# Patient Record
Sex: FEMALE | Race: WHITE | Employment: OTHER | URBAN - NONMETROPOLITAN AREA
[De-identification: names, ages, dates, MRNs, and addresses within clinical notes are randomized per-mention and may not be internally consistent; named-entity substitution may affect disease eponyms.]

---

## 2017-01-03 LAB
ALBUMIN SERPL-MCNC: ABNORMAL G/DL
ALP BLD-CCNC: ABNORMAL U/L
ALT SERPL-CCNC: ABNORMAL U/L
ANION GAP SERPL CALCULATED.3IONS-SCNC: ABNORMAL MMOL/L
AST SERPL-CCNC: ABNORMAL U/L
BASOPHILS ABSOLUTE: ABNORMAL /ΜL
BASOPHILS RELATIVE PERCENT: ABNORMAL %
BILIRUB SERPL-MCNC: ABNORMAL MG/DL (ref 0.1–1.4)
BUN BLDV-MCNC: ABNORMAL MG/DL
CALCIUM SERPL-MCNC: ABNORMAL MG/DL
CHLORIDE BLD-SCNC: ABNORMAL MMOL/L
CHOLESTEROL, TOTAL: ABNORMAL MG/DL
CHOLESTEROL/HDL RATIO: ABNORMAL
CO2: ABNORMAL MMOL/L
CREAT SERPL-MCNC: ABNORMAL MG/DL
EOSINOPHILS ABSOLUTE: ABNORMAL /ΜL
EOSINOPHILS RELATIVE PERCENT: ABNORMAL %
GFR CALCULATED: ABNORMAL
GLUCOSE BLD-MCNC: ABNORMAL MG/DL
HCT VFR BLD CALC: ABNORMAL % (ref 36–46)
HDLC SERPL-MCNC: ABNORMAL MG/DL (ref 35–70)
HEMOGLOBIN: ABNORMAL G/DL (ref 12–16)
LDL CHOLESTEROL CALCULATED: ABNORMAL MG/DL (ref 0–160)
LYMPHOCYTES ABSOLUTE: ABNORMAL /ΜL
LYMPHOCYTES RELATIVE PERCENT: ABNORMAL %
MCH RBC QN AUTO: ABNORMAL PG
MCHC RBC AUTO-ENTMCNC: ABNORMAL G/DL
MCV RBC AUTO: ABNORMAL FL
MONOCYTES ABSOLUTE: ABNORMAL /ΜL
MONOCYTES RELATIVE PERCENT: ABNORMAL %
NEUTROPHILS ABSOLUTE: ABNORMAL /ΜL
NEUTROPHILS RELATIVE PERCENT: ABNORMAL %
PDW BLD-RTO: ABNORMAL %
PLATELET # BLD: ABNORMAL K/ΜL
PMV BLD AUTO: ABNORMAL FL
POTASSIUM SERPL-SCNC: ABNORMAL MMOL/L
RBC # BLD: ABNORMAL 10^6/ΜL
SODIUM BLD-SCNC: ABNORMAL MMOL/L
TOTAL PROTEIN: ABNORMAL
TRIGL SERPL-MCNC: ABNORMAL MG/DL
VLDLC SERPL CALC-MCNC: ABNORMAL MG/DL
WBC # BLD: ABNORMAL 10^3/ML

## 2017-03-27 LAB
VITAMIN D 25-HYDROXY: 24.04
VITAMIN D2, 25 HYDROXY: ABNORMAL
VITAMIN D3,25 HYDROXY: ABNORMAL

## 2017-04-10 LAB
ALBUMIN SERPL-MCNC: ABNORMAL G/DL
ALP BLD-CCNC: ABNORMAL U/L
ALT SERPL-CCNC: ABNORMAL U/L
ANION GAP SERPL CALCULATED.3IONS-SCNC: ABNORMAL MMOL/L
AST SERPL-CCNC: ABNORMAL U/L
AVERAGE GLUCOSE: ABNORMAL
BASOPHILS ABSOLUTE: NORMAL /ΜL
BASOPHILS RELATIVE PERCENT: NORMAL %
BILIRUB SERPL-MCNC: ABNORMAL MG/DL (ref 0.1–1.4)
BUN BLDV-MCNC: ABNORMAL MG/DL
CALCIUM SERPL-MCNC: ABNORMAL MG/DL
CHLORIDE BLD-SCNC: ABNORMAL MMOL/L
CHOLESTEROL, TOTAL: ABNORMAL MG/DL
CHOLESTEROL/HDL RATIO: ABNORMAL
CO2: ABNORMAL MMOL/L
CREAT SERPL-MCNC: ABNORMAL MG/DL
EOSINOPHILS ABSOLUTE: NORMAL /ΜL
EOSINOPHILS RELATIVE PERCENT: NORMAL %
GFR CALCULATED: ABNORMAL
GLUCOSE BLD-MCNC: ABNORMAL MG/DL
HBA1C MFR BLD: ABNORMAL %
HCT VFR BLD CALC: NORMAL % (ref 36–46)
HDLC SERPL-MCNC: ABNORMAL MG/DL (ref 35–70)
HEMOGLOBIN: NORMAL G/DL (ref 12–16)
LDL CHOLESTEROL CALCULATED: ABNORMAL MG/DL (ref 0–160)
LYMPHOCYTES ABSOLUTE: NORMAL /ΜL
LYMPHOCYTES RELATIVE PERCENT: NORMAL %
MCH RBC QN AUTO: NORMAL PG
MCHC RBC AUTO-ENTMCNC: NORMAL G/DL
MCV RBC AUTO: NORMAL FL
MONOCYTES ABSOLUTE: NORMAL /ΜL
MONOCYTES RELATIVE PERCENT: NORMAL %
NEUTROPHILS ABSOLUTE: NORMAL /ΜL
NEUTROPHILS RELATIVE PERCENT: NORMAL %
PDW BLD-RTO: NORMAL %
PLATELET # BLD: NORMAL K/ΜL
PMV BLD AUTO: NORMAL FL
POTASSIUM SERPL-SCNC: ABNORMAL MMOL/L
RBC # BLD: NORMAL 10^6/ΜL
SODIUM BLD-SCNC: ABNORMAL MMOL/L
TOTAL PROTEIN: ABNORMAL
TRIGL SERPL-MCNC: ABNORMAL MG/DL
VLDLC SERPL CALC-MCNC: ABNORMAL MG/DL
WBC # BLD: NORMAL 10^3/ML

## 2017-07-13 LAB
INR BLD: 0.94
PROTIME: 10.8 SECONDS
PTH INTACT: 68.3

## 2017-09-08 LAB
MAGNESIUM: 1.8 MG/DL
PHOSPHORUS: 5.7 MG/DL

## 2017-10-03 LAB — CALCIUM SERPL-MCNC: 6.8 MG/DL

## 2017-10-05 LAB — IRON: 104

## 2017-11-17 LAB
T4 FREE: 0.49
TSH SERPL DL<=0.05 MIU/L-ACNC: 0.08 UIU/ML

## 2018-01-11 ENCOUNTER — OFFICE VISIT (OUTPATIENT)
Dept: FAMILY MEDICINE CLINIC | Age: 58
End: 2018-01-11
Payer: COMMERCIAL

## 2018-01-11 VITALS
HEART RATE: 84 BPM | DIASTOLIC BLOOD PRESSURE: 80 MMHG | WEIGHT: 130 LBS | TEMPERATURE: 98.1 F | RESPIRATION RATE: 16 BRPM | SYSTOLIC BLOOD PRESSURE: 116 MMHG | BODY MASS INDEX: 26.21 KG/M2 | HEIGHT: 59 IN

## 2018-01-11 DIAGNOSIS — K58.9 IRRITABLE BOWEL SYNDROME, UNSPECIFIED TYPE: ICD-10-CM

## 2018-01-11 DIAGNOSIS — F32.A DEPRESSION, UNSPECIFIED DEPRESSION TYPE: ICD-10-CM

## 2018-01-11 DIAGNOSIS — M79.7 FIBROMYALGIA: ICD-10-CM

## 2018-01-11 DIAGNOSIS — B00.1 RECURRENT COLD SORES: ICD-10-CM

## 2018-01-11 DIAGNOSIS — M85.80 OSTEOPENIA, UNSPECIFIED LOCATION: ICD-10-CM

## 2018-01-11 DIAGNOSIS — K21.00 GASTROESOPHAGEAL REFLUX DISEASE WITH ESOPHAGITIS: ICD-10-CM

## 2018-01-11 DIAGNOSIS — Z98.84 PERSONAL HISTORY OF GASTRIC BYPASS: Primary | ICD-10-CM

## 2018-01-11 DIAGNOSIS — E03.9 HYPOTHYROIDISM, UNSPECIFIED TYPE: ICD-10-CM

## 2018-01-11 DIAGNOSIS — R00.0 TACHYCARDIA: ICD-10-CM

## 2018-01-11 DIAGNOSIS — F41.9 ANXIETY: ICD-10-CM

## 2018-01-11 DIAGNOSIS — I82.622 DEEP VEIN THROMBOSIS (DVT) OF LEFT UPPER EXTREMITY, UNSPECIFIED CHRONICITY, UNSPECIFIED VEIN (HCC): ICD-10-CM

## 2018-01-11 DIAGNOSIS — E21.3 HYPERPARATHYROIDISM (HCC): ICD-10-CM

## 2018-01-11 PROCEDURE — G8484 FLU IMMUNIZE NO ADMIN: HCPCS | Performed by: FAMILY MEDICINE

## 2018-01-11 PROCEDURE — 3017F COLORECTAL CA SCREEN DOC REV: CPT | Performed by: FAMILY MEDICINE

## 2018-01-11 PROCEDURE — G8419 CALC BMI OUT NRM PARAM NOF/U: HCPCS | Performed by: FAMILY MEDICINE

## 2018-01-11 PROCEDURE — 1036F TOBACCO NON-USER: CPT | Performed by: FAMILY MEDICINE

## 2018-01-11 PROCEDURE — G8427 DOCREV CUR MEDS BY ELIG CLIN: HCPCS | Performed by: FAMILY MEDICINE

## 2018-01-11 PROCEDURE — 99204 OFFICE O/P NEW MOD 45 MIN: CPT | Performed by: FAMILY MEDICINE

## 2018-01-11 PROCEDURE — 3014F SCREEN MAMMO DOC REV: CPT | Performed by: FAMILY MEDICINE

## 2018-01-11 RX ORDER — MAGNESIUM OXIDE 400 MG/1
400 TABLET ORAL 2 TIMES DAILY
COMMUNITY
End: 2018-03-13 | Stop reason: SDUPTHER

## 2018-01-11 RX ORDER — GABAPENTIN 100 MG/1
100 CAPSULE ORAL 3 TIMES DAILY
Qty: 90 CAPSULE | Refills: 0 | Status: SHIPPED | OUTPATIENT
Start: 2018-01-11 | End: 2018-02-08 | Stop reason: SDUPTHER

## 2018-01-11 RX ORDER — METAXALONE 800 MG/1
800 TABLET ORAL DAILY
Qty: 90 TABLET | Refills: 3 | Status: SHIPPED | OUTPATIENT
Start: 2018-01-11 | End: 2018-12-06 | Stop reason: SDUPTHER

## 2018-01-11 RX ORDER — LEVOTHYROXINE SODIUM 0.03 MG/1
100 TABLET ORAL DAILY
COMMUNITY
End: 2018-02-22 | Stop reason: DRUGHIGH

## 2018-01-11 RX ORDER — ACYCLOVIR 400 MG/1
400 TABLET ORAL 2 TIMES DAILY
COMMUNITY
End: 2018-06-25 | Stop reason: SDUPTHER

## 2018-01-11 RX ORDER — DULOXETIN HYDROCHLORIDE 60 MG/1
60 CAPSULE, DELAYED RELEASE ORAL
COMMUNITY
End: 2018-06-25 | Stop reason: SDUPTHER

## 2018-01-11 RX ORDER — TRAMADOL HYDROCHLORIDE 50 MG/1
50 TABLET ORAL
COMMUNITY
End: 2018-02-08

## 2018-01-11 RX ORDER — TIZANIDINE 4 MG/1
TABLET ORAL
COMMUNITY
Start: 2018-01-07 | End: 2018-06-25 | Stop reason: SDUPTHER

## 2018-01-11 RX ORDER — DULOXETIN HYDROCHLORIDE 30 MG/1
30 CAPSULE, DELAYED RELEASE ORAL
COMMUNITY
End: 2018-06-25 | Stop reason: SDUPTHER

## 2018-01-11 RX ORDER — ATENOLOL 25 MG/1
25 TABLET ORAL DAILY
COMMUNITY
End: 2018-02-08 | Stop reason: SDUPTHER

## 2018-01-11 RX ORDER — PSYLLIUM HUSK/CALCIUM CARB 1 G-60 MG
CAPSULE ORAL
COMMUNITY
End: 2019-01-01

## 2018-01-11 RX ORDER — LEVOTHYROXINE SODIUM 0.15 MG/1
150 TABLET ORAL DAILY
COMMUNITY
End: 2018-02-22 | Stop reason: DRUGHIGH

## 2018-01-11 RX ORDER — ZOLPIDEM TARTRATE 10 MG/1
10 TABLET ORAL
COMMUNITY
End: 2018-03-13 | Stop reason: SDUPTHER

## 2018-01-11 RX ORDER — RANITIDINE 150 MG/1
150 TABLET ORAL 2 TIMES DAILY
Status: ON HOLD | COMMUNITY
End: 2020-01-01 | Stop reason: HOSPADM

## 2018-01-11 RX ORDER — CALCITRIOL 0.5 UG/1
0.5 CAPSULE, LIQUID FILLED ORAL 4 TIMES DAILY
COMMUNITY
End: 2018-02-08 | Stop reason: ALTCHOICE

## 2018-01-11 RX ORDER — ASCORBIC ACID 500 MG
500 TABLET ORAL 2 TIMES DAILY
COMMUNITY
End: 2019-01-01

## 2018-01-11 RX ORDER — POTASSIUM CHLORIDE 750 MG/1
10 TABLET, FILM COATED, EXTENDED RELEASE ORAL 2 TIMES DAILY
COMMUNITY
End: 2018-08-16 | Stop reason: SDUPTHER

## 2018-01-11 RX ORDER — ASPIRIN 325 MG
325 TABLET ORAL DAILY
COMMUNITY
End: 2018-08-16 | Stop reason: DRUGHIGH

## 2018-01-11 RX ORDER — MULTIVIT WITH MINERALS/LUTEIN
250 TABLET ORAL DAILY
COMMUNITY
End: 2018-02-08 | Stop reason: DRUGHIGH

## 2018-01-11 ASSESSMENT — PATIENT HEALTH QUESTIONNAIRE - PHQ9
6. FEELING BAD ABOUT YOURSELF - OR THAT YOU ARE A FAILURE OR HAVE LET YOURSELF OR YOUR FAMILY DOWN: 1
9. THOUGHTS THAT YOU WOULD BE BETTER OFF DEAD, OR OF HURTING YOURSELF: 0
7. TROUBLE CONCENTRATING ON THINGS, SUCH AS READING THE NEWSPAPER OR WATCHING TELEVISION: 1
8. MOVING OR SPEAKING SO SLOWLY THAT OTHER PEOPLE COULD HAVE NOTICED. OR THE OPPOSITE, BEING SO FIGETY OR RESTLESS THAT YOU HAVE BEEN MOVING AROUND A LOT MORE THAN USUAL: 0
10. IF YOU CHECKED OFF ANY PROBLEMS, HOW DIFFICULT HAVE THESE PROBLEMS MADE IT FOR YOU TO DO YOUR WORK, TAKE CARE OF THINGS AT HOME, OR GET ALONG WITH OTHER PEOPLE: 1
SUM OF ALL RESPONSES TO PHQ9 QUESTIONS 1 & 2: 4
SUM OF ALL RESPONSES TO PHQ QUESTIONS 1-9: 12
2. FEELING DOWN, DEPRESSED OR HOPELESS: 1
1. LITTLE INTEREST OR PLEASURE IN DOING THINGS: 3
3. TROUBLE FALLING OR STAYING ASLEEP: 3
5. POOR APPETITE OR OVEREATING: 0
4. FEELING TIRED OR HAVING LITTLE ENERGY: 3

## 2018-01-11 ASSESSMENT — ENCOUNTER SYMPTOMS
EYE DISCHARGE: 0
VOMITING: 0
BACK PAIN: 1
COUGH: 0
SHORTNESS OF BREATH: 0
SORE THROAT: 0
NAUSEA: 0
CONSTIPATION: 1
ABDOMINAL PAIN: 0
DIARRHEA: 1
BLOOD IN STOOL: 0
EYE REDNESS: 0

## 2018-01-11 NOTE — PROGRESS NOTES
dermatology but not currently following. Patient Active Problem List   Diagnosis    TIA (transient ischemic attack)    Hypothyroidism    Hypothyroidism    Hyperparathyroidism ([facility] [address]    DVT (deep venous thrombosis) (HCC)    Depression    Anxiety    Fibromyalgia    Osteopenia    Tachycardia    GERD (gastroesophageal reflux disease)    Recurrent cold sores    Irritable bowel       The patient is allergic to penicillins and sulfa antibiotics. Past Medical History  Gato Rea  has a past medical history of Anxiety; Depression; DVT (deep venous thrombosis) ([facility] [address] Fibromyalgia; GERD (gastroesophageal reflux disease); History of ITP; Hyperparathyroidism ([facility] [address] Hypothyroidism; Insomnia; Irritable bowel; Lactose intolerance; Osteopenia; Recurrent cold sores; Tachycardia; and TIA (transient ischemic attack). Past Surgical History  The patient  has a past surgical history that includes Wrist fracture surgery (Right); Leg Surgery (Right); Tonsillectomy and adenoidectomy; Gastric bypass surgery (1996); Cholecystectomy (1996); Appendectomy (1996); Tubal ligation (1996); parathyroidectomy (08/2017); Colonoscopy (approx 2013); and Upper gastrointestinal endoscopy (approx 2013). Family History  This patient's family history includes Breast Cancer in her mother and sister; Cancer in her father; Depression in her mother; High Blood Pressure in her father; Other in her father and mother. Social History  Gato Rea  reports that she has never smoked. She has never used smokeless tobacco. She reports that she drinks about 5.4 oz of alcohol per week . She reports that she does not use drugs.     Medications    Current Outpatient Prescriptions:     DULoxetine (CYMBALTA) 30 MG extended release capsule, Take 30 mg by mouth, Disp: , Rfl:     DULoxetine (CYMBALTA) 60 MG extended release capsule, Take 60 mg by mouth, Disp: , Rfl:     tiZANidine (ZANAFLEX) 4 MG tablet, At bed time, Disp: , Rfl:     traMADol (ULTRAM) 50 noted.   Does have a small open area that she reports is dry skin on right arm approx 1 cm diameter; almost looks like a shallow burn. Otherwise unremarkable. Psychiatric: She has a normal mood and affect. Her behavior is normal.   Vitals reviewed. No results found for: WBC, HGB, HCT, PLT, CHOL, TRIG, HDL, LDLDIRECT, ALT, AST, NA, K, CL, CREATININE, BUN, CO2, TSH, PSA, INR, GLUF, LABA1C, LABMICR    Assessment/Plan:   1. Hypothyroidism, unspecified type  To follow-up with Dr. Jayy Soriano this week; likely needs repeat labs. 2. Hyperparathyroidism ([facility] [address]  To follow-up with Dr. Jayy Soriano this week; likely needs repeat labs. 3. Deep vein thrombosis (DVT) of left upper extremity, unspecified chronicity, unspecified vein (HCC)  Post procedural.  Not currently on anticoagulation. 4. Depression, unspecified depression type  Continue current regimen. Consider adjustment if symptoms not related to organic process with thyroid/parathyroid. 5. Anxiety  Continue current regimen. Consider adjustment if symptoms not related to organic process with thyroid/parathyroid. 6. Fibromyalgia  Trialing gabapentin instead of lyrica. May switch back once records received if not effective. Agreed to re-start skelaxin. - metaxalone (SKELAXIN) 800 MG tablet; Take 1 tablet by mouth daily  Dispense: 90 tablet; Refill: 3  - gabapentin (NEURONTIN) 100 MG capsule; Take 1 capsule by mouth 3 times daily for 30 days. Dispense: 90 capsule; Refill: 0    7. Osteopenia, unspecified location  On reclast intermittently. 8. Tachycardia  On beta blocker with remission of symptoms. 9. Gastroesophageal reflux disease with esophagitis  Stable on current regimen. 10. Recurrent cold sores  Stable on current regimen. 11. Irritable bowel syndrome, unspecified type  Stable overall. Mild intermittent symptoms. Records requested.       12. Personal history of gastric bypass  Gave handout on multivitamins needed for persons with PMH bypass. Advised that endocrinologist consider checking vitamin levels with next labs to ensure that this is not related to symptoms. Will follow skin exam; encouraged lotion. Return in about 4 weeks (around 2/8/2018). Orders Placed   No orders of the defined types were placed in this encounter. Prescriptions given/sent   Orders Placed This Encounter   Medications    metaxalone (SKELAXIN) 800 MG tablet     Sig: Take 1 tablet by mouth daily     Dispense:  90 tablet     Refill:  3    gabapentin (NEURONTIN) 100 MG capsule     Sig: Take 1 capsule by mouth 3 times daily for 30 days. Dispense:  90 capsule     Refill:  0       Patient given educational materials - see patient instructions. Discussed use, benefit, and side effects of prescribed medications. All patient questions answered. Pt voiced understanding. Reviewed health maintenance. Records requested. Electronically signed by Kingston Campbell MD on 1/11/2018 at 10:50 AM                 On the basis of positive PHQ-9 screening (PHQ-9 Total Score: 12), the following plan was implemented: exercise program recommended for stress management and is to see endocrinology; continue current medications. .  Patient will follow-up in 4 week(s) with PCP.

## 2018-01-16 ENCOUNTER — HOSPITAL ENCOUNTER (OUTPATIENT)
Age: 58
Discharge: HOME | End: 2018-01-16
Payer: COMMERCIAL

## 2018-01-16 LAB
CALCIUM SERPL-MCNC: 6.9 MG/DL (ref 8.5–10.5)
MAGNESIUM: 2 MG/DL (ref 1.6–2.4)
POTASSIUM SERPL-SCNC: 4.8 MEQ/L (ref 3.5–5.2)
PTH INTACT: 14.5 PG/ML (ref 15–65)
T3 FREE: 2.61 PG/ML (ref 2.02–4.43)
T4 FREE: 0.54 NG/DL (ref 0.93–1.76)
TSH SERPL DL<=0.05 MIU/L-ACNC: 0.01 UIU/ML (ref 0.4–4.2)
VITAMIN D 25-HYDROXY: 31 NG/ML (ref 30–100)

## 2018-01-16 PROCEDURE — 83970 ASSAY OF PARATHORMONE: CPT

## 2018-01-16 PROCEDURE — 36415 COLL VENOUS BLD VENIPUNCTURE: CPT

## 2018-01-16 PROCEDURE — 83735 ASSAY OF MAGNESIUM: CPT

## 2018-01-16 PROCEDURE — 82306 VITAMIN D 25 HYDROXY: CPT

## 2018-01-16 PROCEDURE — 84132 ASSAY OF SERUM POTASSIUM: CPT

## 2018-01-16 PROCEDURE — 84443 ASSAY THYROID STIM HORMONE: CPT

## 2018-01-16 PROCEDURE — 82310 ASSAY OF CALCIUM: CPT

## 2018-01-16 PROCEDURE — 84481 FREE ASSAY (FT-3): CPT

## 2018-01-16 PROCEDURE — 84439 ASSAY OF FREE THYROXINE: CPT

## 2018-01-17 ENCOUNTER — TELEPHONE (OUTPATIENT)
Dept: FAMILY MEDICINE CLINIC | Age: 58
End: 2018-01-17

## 2018-01-17 NOTE — TELEPHONE ENCOUNTER
Called patient to confirm the medication she is taking.  She confirmed [facility] ER tabs 10 meq 2 times daily

## 2018-01-26 VITALS
BODY MASS INDEX: 28.66 KG/M2 | HEART RATE: 92 BPM | RESPIRATION RATE: 18 BRPM | SYSTOLIC BLOOD PRESSURE: 126 MMHG | OXYGEN SATURATION: 98 % | DIASTOLIC BLOOD PRESSURE: 68 MMHG | HEIGHT: 60 IN | WEIGHT: 146 LBS | TEMPERATURE: 98.2 F

## 2018-02-08 ENCOUNTER — OFFICE VISIT (OUTPATIENT)
Dept: FAMILY MEDICINE CLINIC | Age: 58
End: 2018-02-08
Payer: COMMERCIAL

## 2018-02-08 VITALS
TEMPERATURE: 98.2 F | OXYGEN SATURATION: 98 % | RESPIRATION RATE: 14 BRPM | BODY MASS INDEX: 26.65 KG/M2 | DIASTOLIC BLOOD PRESSURE: 78 MMHG | WEIGHT: 132.2 LBS | HEART RATE: 76 BPM | HEIGHT: 59 IN | SYSTOLIC BLOOD PRESSURE: 126 MMHG

## 2018-02-08 DIAGNOSIS — Z72.89 OTHER PROBLEMS RELATED TO LIFESTYLE: ICD-10-CM

## 2018-02-08 DIAGNOSIS — K58.9 IRRITABLE BOWEL SYNDROME, UNSPECIFIED TYPE: ICD-10-CM

## 2018-02-08 DIAGNOSIS — Z98.84 STATUS POST BARIATRIC SURGERY: ICD-10-CM

## 2018-02-08 DIAGNOSIS — E21.3 HYPERPARATHYROIDISM (HCC): ICD-10-CM

## 2018-02-08 DIAGNOSIS — R00.0 TACHYCARDIA: ICD-10-CM

## 2018-02-08 DIAGNOSIS — F41.9 ANXIETY: ICD-10-CM

## 2018-02-08 DIAGNOSIS — R73.09 BLOOD GLUCOSE ABNORMAL: ICD-10-CM

## 2018-02-08 DIAGNOSIS — Z13.31 POSITIVE DEPRESSION SCREENING: ICD-10-CM

## 2018-02-08 DIAGNOSIS — M79.7 FIBROMYALGIA: ICD-10-CM

## 2018-02-08 DIAGNOSIS — E03.9 HYPOTHYROIDISM, UNSPECIFIED TYPE: Primary | ICD-10-CM

## 2018-02-08 DIAGNOSIS — K21.00 GASTROESOPHAGEAL REFLUX DISEASE WITH ESOPHAGITIS: ICD-10-CM

## 2018-02-08 DIAGNOSIS — F32.A DEPRESSION, UNSPECIFIED DEPRESSION TYPE: ICD-10-CM

## 2018-02-08 LAB — HBA1C MFR BLD: 4.1 % (ref 4.3–5.7)

## 2018-02-08 PROCEDURE — G8427 DOCREV CUR MEDS BY ELIG CLIN: HCPCS | Performed by: FAMILY MEDICINE

## 2018-02-08 PROCEDURE — 3017F COLORECTAL CA SCREEN DOC REV: CPT | Performed by: FAMILY MEDICINE

## 2018-02-08 PROCEDURE — G8484 FLU IMMUNIZE NO ADMIN: HCPCS | Performed by: FAMILY MEDICINE

## 2018-02-08 PROCEDURE — G8431 POS CLIN DEPRES SCRN F/U DOC: HCPCS | Performed by: FAMILY MEDICINE

## 2018-02-08 PROCEDURE — 99214 OFFICE O/P EST MOD 30 MIN: CPT | Performed by: FAMILY MEDICINE

## 2018-02-08 PROCEDURE — 83036 HEMOGLOBIN GLYCOSYLATED A1C: CPT | Performed by: FAMILY MEDICINE

## 2018-02-08 PROCEDURE — G8419 CALC BMI OUT NRM PARAM NOF/U: HCPCS | Performed by: FAMILY MEDICINE

## 2018-02-08 PROCEDURE — 3014F SCREEN MAMMO DOC REV: CPT | Performed by: FAMILY MEDICINE

## 2018-02-08 PROCEDURE — 1036F TOBACCO NON-USER: CPT | Performed by: FAMILY MEDICINE

## 2018-02-08 RX ORDER — CALCITRIOL 1 UG/ML
INJECTION INTRAVENOUS
COMMUNITY
Start: 2018-01-31 | End: 2018-04-19

## 2018-02-08 RX ORDER — GABAPENTIN 300 MG/1
300 CAPSULE ORAL DAILY
Qty: 30 CAPSULE | Refills: 1 | Status: SHIPPED | OUTPATIENT
Start: 2018-02-08 | End: 2018-03-08 | Stop reason: SDUPTHER

## 2018-02-08 RX ORDER — CALCIUM CARBONATE 750 MG/1
40 TABLET, CHEWABLE ORAL DAILY
COMMUNITY
End: 2019-01-24

## 2018-02-08 RX ORDER — ATENOLOL 25 MG/1
25 TABLET ORAL DAILY
Qty: 90 TABLET | Refills: 3 | Status: SHIPPED | OUTPATIENT
Start: 2018-02-08 | End: 2018-12-06 | Stop reason: SDUPTHER

## 2018-02-08 RX ORDER — GABAPENTIN 100 MG/1
100 CAPSULE ORAL 2 TIMES DAILY
Qty: 60 CAPSULE | Refills: 1 | Status: SHIPPED | OUTPATIENT
Start: 2018-02-08 | End: 2018-03-08 | Stop reason: SDUPTHER

## 2018-02-08 ASSESSMENT — ENCOUNTER SYMPTOMS
SORE THROAT: 0
BACK PAIN: 1
COUGH: 0
CONSTIPATION: 1
SHORTNESS OF BREATH: 0
BLOOD IN STOOL: 0
ABDOMINAL PAIN: 0
EYE DISCHARGE: 0
VOMITING: 0
DIARRHEA: 1
EYE REDNESS: 0
NAUSEA: 0

## 2018-02-08 NOTE — PROGRESS NOTES
days., Disp: 60 capsule, Rfl: 1    gabapentin (NEURONTIN) 300 MG capsule, Take 1 capsule by mouth daily for 60 days Before bed. ., Disp: 30 capsule, Rfl: 1    calcium carbonate (TUMS EX) 750 MG chewable tablet, Take 15 tablets by mouth daily, Disp: , Rfl:     DULoxetine (CYMBALTA) 30 MG extended release capsule, Take 30 mg by mouth, Disp: , Rfl:     DULoxetine (CYMBALTA) 60 MG extended release capsule, Take 60 mg by mouth, Disp: , Rfl:     tiZANidine (ZANAFLEX) 4 MG tablet, At bed time, Disp: , Rfl:     zolpidem (AMBIEN) 10 MG tablet, Take 10 mg by mouth., Disp: , Rfl:     levothyroxine (SYNTHROID) 25 MCG tablet, Take 100 mcg by mouth Daily , Disp: , Rfl:     levothyroxine (SYNTHROID) 150 MCG tablet, Take 150 mcg by mouth Daily, Disp: , Rfl:     acyclovir (ZOVIRAX) 400 MG tablet, Take 400 mg by mouth 2 times daily, Disp: , Rfl:     potassium chloride (KLOR-CON 10) 10 MEQ extended release tablet, Take 10 mEq by mouth 2 times daily, Disp: , Rfl:     esomeprazole (NEXIUM) 20 MG delayed release capsule, Take 20 mg by mouth 2 times daily, Disp: , Rfl:     ranitidine (ZANTAC) 150 MG tablet, Take 150 mg by mouth 2 times daily, Disp: , Rfl:     Psyllium-Calcium (METAMUCIL PLUS CALCIUM) CAPS, Take by mouth Take with 8 oz water., Disp: , Rfl:     magnesium oxide (MAG-OX) 400 MG tablet, Take 400 mg by mouth 2 times daily, Disp: , Rfl:     CINNAMON PO, Take 1,000 mg by mouth 2 times daily, Disp: , Rfl:     aspirin 325 MG tablet, Take 325 mg by mouth daily, Disp: , Rfl:     Probiotic Product (PROBIOTIC PO), Take by mouth, Disp: , Rfl:     Cholecalciferol (VITAMIN D3) 5000 units CAPS, Take by mouth, Disp: , Rfl:     vitamin C (ASCORBIC ACID) 500 MG tablet, Take 500 mg by mouth 2 times daily, Disp: , Rfl:     vitamin A 06943 units capsule, Take 25,000 Units by mouth daily, Disp: , Rfl:     zinc 50 MG CAPS, Take by mouth, Disp: , Rfl:     metaxalone (SKELAXIN) 800 MG tablet, Take 1 tablet by mouth daily, Disp: 90 tablet, Rfl: 3    Subjective:      Review of Systems   Constitutional: Positive for fatigue. Negative for chills, fever and unexpected weight change (stable since last visit; up 2 pounds). HENT: Negative for congestion, ear discharge, ear pain, hearing loss and sore throat. Eyes: Negative for discharge and redness. Respiratory: Negative for cough and shortness of breath. Cardiovascular: Positive for palpitations (occasional). Negative for chest pain. Gastrointestinal: Positive for constipation and diarrhea (with irritable bowel; uses immodium sometimes). Negative for abdominal pain, blood in stool, nausea and vomiting. Genitourinary: Negative for difficulty urinating and dysuria. Musculoskeletal: Positive for arthralgias, back pain, gait problem and neck pain. Skin: Negative for rash. Allergic/Immunologic: Negative for environmental allergies. Neurological: Positive for headaches (occaionally). Psychiatric/Behavioral: Positive for sleep disturbance. Negative for dysphoric mood, self-injury and suicidal ideas. Confusion:    The patient is not nervous/anxious. Objective:     Vitals:    02/08/18 0934   BP: 126/78   Pulse: 76   Resp: 14   Temp: 98.2 °F (36.8 °C)   SpO2: 98%   Weight: 132 lb 3.2 oz (60 kg)   Height: 4' 11.49\" (1.511 m)       Physical Exam   Constitutional: She is oriented to person, place, and time. She appears well-developed and well-nourished. Diffuse tenderness consistent with fibromyalgia. HENT:   Head: Normocephalic and atraumatic. Mouth/Throat: Oropharynx is clear and moist.   Eyes: Conjunctivae are normal. Pupils are equal, round, and reactive to light. Neck: Neck supple. No thyromegaly present. Cardiovascular: Normal rate, regular rhythm and normal heart sounds. Pulmonary/Chest: Effort normal and breath sounds normal. No respiratory distress. Abdominal: Soft. There is no tenderness. Musculoskeletal:   No focal spinous tenderness.   Negative 2/8/2019    Lipid, Fasting     Standing Status:   Future     Standing Expiration Date:   2/8/2019    Comprehensive Metabolic Panel     Standing Status:   Future     Standing Expiration Date:   2/8/2019    CBC With Auto Differential     Standing Status:   Future     Standing Expiration Date:   2/8/2019    HIV Screen     Standing Status:   Future     Standing Expiration Date:   2/8/2019    Hepatitis C Antibody     Standing Status:   Future     Standing Expiration Date:   2/8/2019    POCT glycosylated hemoglobin (Hb A1C)    Positive Screen for Clinical Depression with a Documented Follow-up Plan        Prescriptions given/sent   Orders Placed This Encounter   Medications    atenolol (TENORMIN) 25 MG tablet     Sig: Take 1 tablet by mouth daily     Dispense:  90 tablet     Refill:  3    gabapentin (NEURONTIN) 100 MG capsule     Sig: Take 1 capsule by mouth 2 times daily for 30 days. Dispense:  60 capsule     Refill:  1    gabapentin (NEURONTIN) 300 MG capsule     Sig: Take 1 capsule by mouth daily for 60 days Before bed. .     Dispense:  30 capsule     Refill:  1       Patient given educational materials - see patient instructions. Discussed use, benefit, and side effects of prescribed medications. All patient questions answered. Pt voiced understanding. Reviewed health maintenance.               Electronically signed by Jaz Peters MD on 2/8/2018 at 10:40 AM

## 2018-02-08 NOTE — PATIENT INSTRUCTIONS
Go for labs. Follow-up in 1 month. Adjust gabapentin dose. Follow-up with endocrine as planned. Your A1C was 4.1 today so you do not have diabetes. The old entry of 8.5 must have been an error.

## 2018-02-12 ENCOUNTER — HOSPITAL ENCOUNTER (OUTPATIENT)
Age: 58
Discharge: HOME | End: 2018-02-12
Payer: COMMERCIAL

## 2018-02-12 DIAGNOSIS — Z98.84 STATUS POST BARIATRIC SURGERY: ICD-10-CM

## 2018-02-12 DIAGNOSIS — E03.9 HYPOTHYROIDISM, UNSPECIFIED TYPE: ICD-10-CM

## 2018-02-12 DIAGNOSIS — Z72.89 OTHER PROBLEMS RELATED TO LIFESTYLE: ICD-10-CM

## 2018-02-12 LAB
ALBUMIN SERPL-MCNC: 3.7 G/DL (ref 3.5–5.1)
ALP BLD-CCNC: 76 U/L (ref 38–126)
ALT SERPL-CCNC: 23 U/L (ref 11–66)
ANION GAP SERPL CALCULATED.3IONS-SCNC: 16 MEQ/L (ref 8–16)
AST SERPL-CCNC: 36 U/L (ref 5–40)
BASOPHILS # BLD: 0.4 %
BASOPHILS ABSOLUTE: 0 THOU/MM3 (ref 0–0.1)
BILIRUB SERPL-MCNC: 0.2 MG/DL (ref 0.3–1.2)
BUN BLDV-MCNC: 14 MG/DL (ref 7–22)
CALCIUM SERPL-MCNC: 7.7 MG/DL (ref 8.5–10.5)
CHLORIDE BLD-SCNC: 102 MEQ/L (ref 98–111)
CHOLESTEROL, FASTING: 197 MG/DL (ref 100–199)
CO2: 27 MEQ/L (ref 23–33)
CREAT SERPL-MCNC: 0.4 MG/DL (ref 0.4–1.2)
EOSINOPHIL # BLD: 0.6 %
EOSINOPHILS ABSOLUTE: 0.1 THOU/MM3 (ref 0–0.4)
GFR SERPL CREATININE-BSD FRML MDRD: > 90 ML/MIN/1.73M2
GLUCOSE BLD-MCNC: 81 MG/DL (ref 70–108)
HCT VFR BLD CALC: 38.6 % (ref 37–47)
HDLC SERPL-MCNC: 82 MG/DL
HEMOGLOBIN: 12.7 GM/DL (ref 12–16)
HEPATITIS C ANTIBODY: NEGATIVE
IRON: 86 UG/DL (ref 50–170)
LDL CHOLESTEROL CALCULATED: 94 MG/DL
LYMPHOCYTES # BLD: 23.9 %
LYMPHOCYTES ABSOLUTE: 2.1 THOU/MM3 (ref 1–4.8)
MACROCYTES: ABNORMAL
MCH RBC QN AUTO: 33.1 PG (ref 27–31)
MCHC RBC AUTO-ENTMCNC: 32.9 GM/DL (ref 33–37)
MCV RBC AUTO: 100.8 FL (ref 81–99)
MONOCYTES # BLD: 4.4 %
MONOCYTES ABSOLUTE: 0.4 THOU/MM3 (ref 0.4–1.3)
NUCLEATED RED BLOOD CELLS: 0 /100 WBC
PDW BLD-RTO: 12 % (ref 11.5–14.5)
PLATELET # BLD: 211 THOU/MM3 (ref 130–400)
PMV BLD AUTO: 8.7 FL (ref 7.4–10.4)
POTASSIUM SERPL-SCNC: 4.5 MEQ/L (ref 3.5–5.2)
PTH INTACT: 18.7 PG/ML (ref 15–65)
RBC # BLD: 3.83 MILL/MM3 (ref 4.2–5.4)
SEG NEUTROPHILS: 70.7 %
SEGMENTED NEUTROPHILS ABSOLUTE COUNT: 6.2 THOU/MM3 (ref 1.8–7.7)
SODIUM BLD-SCNC: 145 MEQ/L (ref 135–145)
T4 FREE: 0.69 NG/DL (ref 0.93–1.76)
TOTAL PROTEIN: 6.5 G/DL (ref 6.1–8)
TRIGLYCERIDE, FASTING: 107 MG/DL (ref 0–199)
VITAMIN D 25-HYDROXY: 30 NG/ML (ref 30–100)
WBC # BLD: 8.7 THOU/MM3 (ref 4.8–10.8)

## 2018-02-12 PROCEDURE — 86803 HEPATITIS C AB TEST: CPT

## 2018-02-12 PROCEDURE — 82525 ASSAY OF COPPER: CPT

## 2018-02-12 PROCEDURE — 87389 HIV-1 AG W/HIV-1&-2 AB AG IA: CPT

## 2018-02-12 PROCEDURE — 84439 ASSAY OF FREE THYROXINE: CPT

## 2018-02-12 PROCEDURE — 80061 LIPID PANEL: CPT

## 2018-02-12 PROCEDURE — 82607 VITAMIN B-12: CPT

## 2018-02-12 PROCEDURE — 84590 ASSAY OF VITAMIN A: CPT

## 2018-02-12 PROCEDURE — 36415 COLL VENOUS BLD VENIPUNCTURE: CPT

## 2018-02-12 PROCEDURE — 80053 COMPREHEN METABOLIC PANEL: CPT

## 2018-02-12 PROCEDURE — 84446 ASSAY OF VITAMIN E: CPT

## 2018-02-12 PROCEDURE — 82306 VITAMIN D 25 HYDROXY: CPT

## 2018-02-12 PROCEDURE — 84255 ASSAY OF SELENIUM: CPT

## 2018-02-12 PROCEDURE — 83970 ASSAY OF PARATHORMONE: CPT

## 2018-02-12 PROCEDURE — 85025 COMPLETE CBC W/AUTO DIFF WBC: CPT

## 2018-02-12 PROCEDURE — 84425 ASSAY OF VITAMIN B-1: CPT

## 2018-02-12 PROCEDURE — 84630 ASSAY OF ZINC: CPT

## 2018-02-12 PROCEDURE — 82746 ASSAY OF FOLIC ACID SERUM: CPT

## 2018-02-12 PROCEDURE — 83540 ASSAY OF IRON: CPT

## 2018-02-12 PROCEDURE — 84597 ASSAY OF VITAMIN K: CPT

## 2018-02-13 LAB
FOLATE: 16.2 NG/ML (ref 4.8–24.2)
VITAMIN B-12: 316 PG/ML (ref 211–911)

## 2018-02-14 LAB
COPPER: 60 UG/DL (ref 80–155)
HIV-2 AB: NEGATIVE
SELENIUM: 112 UG/L (ref 23–190)
ZINC: 59 UG/DL (ref 60–120)

## 2018-02-15 LAB
RETINOL (VITAMIN A): NORMAL
VITAMIN E LEVEL: NORMAL
VITAMIN K1: NORMAL

## 2018-02-16 ENCOUNTER — TELEPHONE (OUTPATIENT)
Dept: FAMILY MEDICINE CLINIC | Age: 58
End: 2018-02-16

## 2018-02-16 LAB — VITAMIN B1 WHOLE BLOOD: 158 NMOL/L (ref 70–180)

## 2018-02-22 ENCOUNTER — TELEPHONE (OUTPATIENT)
Dept: FAMILY MEDICINE CLINIC | Age: 58
End: 2018-02-22

## 2018-02-22 DIAGNOSIS — Z98.84 PERSONAL HISTORY OF GASTRIC BYPASS: ICD-10-CM

## 2018-02-22 RX ORDER — LEVOTHYROXINE SODIUM 200 MCG
200 TABLET ORAL DAILY
COMMUNITY
Start: 2018-02-16

## 2018-02-22 RX ORDER — LEVOTHYROXINE SODIUM 75 MCG
75 TABLET ORAL DAILY
Status: ON HOLD | COMMUNITY
Start: 2018-02-21 | End: 2020-01-01 | Stop reason: HOSPADM

## 2018-02-26 ENCOUNTER — TELEPHONE (OUTPATIENT)
Dept: FAMILY MEDICINE CLINIC | Age: 58
End: 2018-02-26

## 2018-02-26 DIAGNOSIS — E60 LOW ZINC LEVEL: ICD-10-CM

## 2018-02-26 DIAGNOSIS — Z98.84 GASTRIC BYPASS STATUS FOR OBESITY: Primary | ICD-10-CM

## 2018-02-26 DIAGNOSIS — E56.1 LOW SERUM VITAMIN K: ICD-10-CM

## 2018-02-26 DIAGNOSIS — R79.0 LOW SERUM COPPER FOR AGE: ICD-10-CM

## 2018-02-26 RX ORDER — PHYTONADIONE (VIT K1) 100 MCG
10 TABLET ORAL DAILY
Qty: 900 TABLET | Refills: 0 | Status: SHIPPED | OUTPATIENT
Start: 2018-02-26 | End: 2018-04-23 | Stop reason: SDUPTHER

## 2018-03-01 ENCOUNTER — TELEPHONE (OUTPATIENT)
Dept: FAMILY MEDICINE CLINIC | Age: 58
End: 2018-03-01

## 2018-03-08 ENCOUNTER — OFFICE VISIT (OUTPATIENT)
Dept: FAMILY MEDICINE CLINIC | Age: 58
End: 2018-03-08
Payer: COMMERCIAL

## 2018-03-08 DIAGNOSIS — M79.7 FIBROMYALGIA: ICD-10-CM

## 2018-03-08 DIAGNOSIS — E21.3 HYPERPARATHYROIDISM (HCC): ICD-10-CM

## 2018-03-08 DIAGNOSIS — K21.00 GASTROESOPHAGEAL REFLUX DISEASE WITH ESOPHAGITIS: ICD-10-CM

## 2018-03-08 DIAGNOSIS — F41.9 ANXIETY: ICD-10-CM

## 2018-03-08 DIAGNOSIS — Z98.84 GASTRIC BYPASS STATUS FOR OBESITY: Chronic | ICD-10-CM

## 2018-03-08 DIAGNOSIS — F32.A DEPRESSION, UNSPECIFIED DEPRESSION TYPE: ICD-10-CM

## 2018-03-08 DIAGNOSIS — E03.9 HYPOTHYROIDISM, UNSPECIFIED TYPE: Primary | ICD-10-CM

## 2018-03-08 PROCEDURE — 3017F COLORECTAL CA SCREEN DOC REV: CPT | Performed by: FAMILY MEDICINE

## 2018-03-08 PROCEDURE — G8419 CALC BMI OUT NRM PARAM NOF/U: HCPCS | Performed by: FAMILY MEDICINE

## 2018-03-08 PROCEDURE — 3014F SCREEN MAMMO DOC REV: CPT | Performed by: FAMILY MEDICINE

## 2018-03-08 PROCEDURE — G8484 FLU IMMUNIZE NO ADMIN: HCPCS | Performed by: FAMILY MEDICINE

## 2018-03-08 PROCEDURE — 99214 OFFICE O/P EST MOD 30 MIN: CPT | Performed by: FAMILY MEDICINE

## 2018-03-08 PROCEDURE — G8427 DOCREV CUR MEDS BY ELIG CLIN: HCPCS | Performed by: FAMILY MEDICINE

## 2018-03-08 PROCEDURE — 1036F TOBACCO NON-USER: CPT | Performed by: FAMILY MEDICINE

## 2018-03-08 RX ORDER — CALCITRIOL 1 UG/ML
SOLUTION ORAL 3 TIMES DAILY
COMMUNITY
Start: 2018-03-02 | End: 2018-04-19

## 2018-03-08 RX ORDER — MELATONIN 10 MG
10000 TABLET, SUBLINGUAL SUBLINGUAL DAILY
COMMUNITY
End: 2018-04-19

## 2018-03-08 RX ORDER — GABAPENTIN 300 MG/1
300 CAPSULE ORAL DAILY
Qty: 90 CAPSULE | Refills: 1 | Status: SHIPPED | OUTPATIENT
Start: 2018-03-08 | End: 2019-01-24

## 2018-03-08 RX ORDER — GABAPENTIN 100 MG/1
100 CAPSULE ORAL 2 TIMES DAILY
Qty: 180 CAPSULE | Refills: 1 | Status: SHIPPED | OUTPATIENT
Start: 2018-03-08 | End: 2018-08-16 | Stop reason: SDUPTHER

## 2018-03-08 ASSESSMENT — ENCOUNTER SYMPTOMS
NAUSEA: 0
COUGH: 0
CONSTIPATION: 1
DIARRHEA: 1
BACK PAIN: 1
BLOOD IN STOOL: 0
SORE THROAT: 0
VOMITING: 0
EYE REDNESS: 0
SHORTNESS OF BREATH: 0
ABDOMINAL PAIN: 0
EYE DISCHARGE: 0

## 2018-03-08 ASSESSMENT — PATIENT HEALTH QUESTIONNAIRE - PHQ9
1. LITTLE INTEREST OR PLEASURE IN DOING THINGS: 0
SUM OF ALL RESPONSES TO PHQ9 QUESTIONS 1 & 2: 0
SUM OF ALL RESPONSES TO PHQ QUESTIONS 1-9: 0
2. FEELING DOWN, DEPRESSED OR HOPELESS: 0

## 2018-03-08 NOTE — PROGRESS NOTES
days., Disp: 180 capsule, Rfl: 1    gabapentin (NEURONTIN) 300 MG capsule, Take 1 capsule by mouth daily for 60 days Before bed. ., Disp: 90 capsule, Rfl: 1    zinc 50 MG CAPS, Take 75 mg by mouth daily, Disp: 135 capsule, Rfl: 0    Copper 5 MG CAPS, Take 5 mg by mouth daily (Patient taking differently: Take 5 mg by mouth 2 times daily ), Disp: 90 capsule, Rfl: 0    Vitamin K, Phytonadione, 100 MCG TABS, Take 10 tablets by mouth daily, Disp: 900 tablet, Rfl: 0    SYNTHROID 75 MCG tablet, Take 75 mg by mouth daily, Disp: , Rfl:     SYNTHROID 200 MCG tablet, Take 200 mcg by mouth daily, Disp: , Rfl:     atenolol (TENORMIN) 25 MG tablet, Take 1 tablet by mouth daily, Disp: 90 tablet, Rfl: 3    calcium carbonate (TUMS EX) 750 MG chewable tablet, Take 15 tablets by mouth daily, Disp: , Rfl:     DULoxetine (CYMBALTA) 30 MG extended release capsule, Take 30 mg by mouth, Disp: , Rfl:     DULoxetine (CYMBALTA) 60 MG extended release capsule, Take 60 mg by mouth, Disp: , Rfl:     tiZANidine (ZANAFLEX) 4 MG tablet, At bed time, Disp: , Rfl:     zolpidem (AMBIEN) 10 MG tablet, Take 10 mg by mouth., Disp: , Rfl:     acyclovir (ZOVIRAX) 400 MG tablet, Take 400 mg by mouth 2 times daily, Disp: , Rfl:     potassium chloride (KLOR-CON 10) 10 MEQ extended release tablet, Take 10 mEq by mouth 2 times daily, Disp: , Rfl:     esomeprazole (NEXIUM) 20 MG delayed release capsule, Take 20 mg by mouth 2 times daily, Disp: , Rfl:     ranitidine (ZANTAC) 150 MG tablet, Take 150 mg by mouth 2 times daily, Disp: , Rfl:     Psyllium-Calcium (METAMUCIL PLUS CALCIUM) CAPS, Take by mouth Take with 8 oz water., Disp: , Rfl:     magnesium oxide (MAG-OX) 400 MG tablet, Take 400 mg by mouth 2 times daily, Disp: , Rfl:     CINNAMON PO, Take 1,000 mg by mouth 2 times daily, Disp: , Rfl:     aspirin 325 MG tablet, Take 325 mg by mouth daily, Disp: , Rfl:     Probiotic Product (PROBIOTIC PO), Take by mouth, Disp: , Rfl:     vitamin C Pulmonary/Chest: Effort normal and breath sounds normal. No respiratory distress. Abdominal: Soft. There is no tenderness. Lymphadenopathy:     She has no cervical adenopathy. Neurological: She is alert and oriented to person, place, and time. No cranial nerve deficit. No obvious focal deficit. Skin: Skin is warm and dry. No rash noted. Psychiatric: She has a normal mood and affect. Her behavior is normal.   Vitals reviewed. Lab Results   Component Value Date    WBC 8.7 02/12/2018    HGB 12.7 02/12/2018    HCT 38.6 02/12/2018     02/12/2018    CHOL 112 04/10/2017    TRIG 180 (H) 04/10/2017    HDL 82 02/12/2018    ALT 23 02/12/2018    AST 36 02/12/2018     02/12/2018    K 4.5 02/12/2018     02/12/2018    CREATININE 0.4 02/12/2018    BUN 14 02/12/2018    CO2 27 02/12/2018    TSH 0.011 (L) 01/16/2018    INR 0.94 07/13/2017    LABA1C 4.1 (L) 02/08/2018       Assessment/Plan:   1. Hypothyroidism, unspecified type  Follow-up with endocrine as planned. 2. Hyperparathyroidism ([facility] [address]  Follow-up with endocrine as planned. 3. Gastric bypass status for obesity  Supplementing vitamins. Plan repeat levels in 6 weeks. May taper vitamin K to 300 mcg daily once at goal. Will likely need copper and zinc long term since was on zinc 50 and still low and needs to maintain appropriate ratio. - Vitamin K1; Future  - Zinc; Future  - Copper, Serum; Future  - Vitamin B12 & Folate; Future    4. Depression, unspecified depression type  Stable. 5. Anxiety  Stable. 6. Fibromyalgia  Much improved today. Continue current dose of gabapentin.    - gabapentin (NEURONTIN) 100 MG capsule; Take 1 capsule by mouth 2 times daily for 30 days. Dispense: 180 capsule; Refill: 1  - gabapentin (NEURONTIN) 300 MG capsule; Take 1 capsule by mouth daily for 60 days Before bed. .  Dispense: 90 capsule; Refill: 1    7. Gastroesophageal reflux disease with esophagitis  Stable on current regimen.   Did not

## 2018-03-13 ENCOUNTER — HOSPITAL ENCOUNTER (OUTPATIENT)
Age: 58
Discharge: HOME | End: 2018-03-13
Payer: COMMERCIAL

## 2018-03-13 DIAGNOSIS — Z98.84 PERSONAL HISTORY OF GASTRIC BYPASS: ICD-10-CM

## 2018-03-13 DIAGNOSIS — G47.00 INSOMNIA, UNSPECIFIED TYPE: Primary | ICD-10-CM

## 2018-03-13 DIAGNOSIS — F32.A DEPRESSION, UNSPECIFIED DEPRESSION TYPE: ICD-10-CM

## 2018-03-13 LAB
CALCIUM SERPL-MCNC: 7.7 MG/DL (ref 8.5–10.5)
T4 FREE: 0.98 NG/DL (ref 0.93–1.76)
VITAMIN D 25-HYDROXY: 32 NG/ML (ref 30–100)

## 2018-03-13 PROCEDURE — 82306 VITAMIN D 25 HYDROXY: CPT

## 2018-03-13 PROCEDURE — 84439 ASSAY OF FREE THYROXINE: CPT

## 2018-03-13 PROCEDURE — 36415 COLL VENOUS BLD VENIPUNCTURE: CPT

## 2018-03-13 PROCEDURE — 82310 ASSAY OF CALCIUM: CPT

## 2018-03-15 PROBLEM — G47.00 INSOMNIA: Status: ACTIVE | Noted: 2018-03-15

## 2018-03-15 RX ORDER — ZOLPIDEM TARTRATE 10 MG/1
10 TABLET ORAL NIGHTLY PRN
Qty: 90 TABLET | Refills: 0 | Status: SHIPPED | OUTPATIENT
Start: 2018-03-15 | End: 2018-06-13

## 2018-03-15 RX ORDER — MAGNESIUM OXIDE 400 MG/1
400 TABLET ORAL 2 TIMES DAILY
Qty: 180 TABLET | Refills: 0 | Status: SHIPPED | OUTPATIENT
Start: 2018-03-15 | End: 2018-06-24 | Stop reason: SDUPTHER

## 2018-04-17 ENCOUNTER — HOSPITAL ENCOUNTER (OUTPATIENT)
Age: 58
Discharge: HOME | End: 2018-04-17
Payer: COMMERCIAL

## 2018-04-17 DIAGNOSIS — Z98.84 GASTRIC BYPASS STATUS FOR OBESITY: Chronic | ICD-10-CM

## 2018-04-17 PROCEDURE — 82525 ASSAY OF COPPER: CPT

## 2018-04-17 PROCEDURE — 82607 VITAMIN B-12: CPT

## 2018-04-17 PROCEDURE — 84630 ASSAY OF ZINC: CPT

## 2018-04-17 PROCEDURE — 36415 COLL VENOUS BLD VENIPUNCTURE: CPT

## 2018-04-17 PROCEDURE — 82746 ASSAY OF FOLIC ACID SERUM: CPT

## 2018-04-17 PROCEDURE — 84597 ASSAY OF VITAMIN K: CPT

## 2018-04-18 LAB
FOLATE: > 20 NG/ML (ref 4.8–24.2)
VITAMIN B-12: > 2000 PG/ML (ref 211–911)

## 2018-04-19 ENCOUNTER — OFFICE VISIT (OUTPATIENT)
Dept: FAMILY MEDICINE CLINIC | Age: 58
End: 2018-04-19
Payer: COMMERCIAL

## 2018-04-19 VITALS
WEIGHT: 132 LBS | BODY MASS INDEX: 26.61 KG/M2 | HEIGHT: 59 IN | RESPIRATION RATE: 16 BRPM | DIASTOLIC BLOOD PRESSURE: 68 MMHG | HEART RATE: 97 BPM | OXYGEN SATURATION: 95 % | SYSTOLIC BLOOD PRESSURE: 110 MMHG

## 2018-04-19 DIAGNOSIS — F32.A DEPRESSION, UNSPECIFIED DEPRESSION TYPE: ICD-10-CM

## 2018-04-19 DIAGNOSIS — E21.3 HYPERPARATHYROIDISM (HCC): ICD-10-CM

## 2018-04-19 DIAGNOSIS — E03.9 HYPOTHYROIDISM, UNSPECIFIED TYPE: ICD-10-CM

## 2018-04-19 DIAGNOSIS — M79.7 FIBROMYALGIA: ICD-10-CM

## 2018-04-19 DIAGNOSIS — Z98.84 GASTRIC BYPASS STATUS FOR OBESITY: Chronic | ICD-10-CM

## 2018-04-19 DIAGNOSIS — G47.00 INSOMNIA, UNSPECIFIED TYPE: ICD-10-CM

## 2018-04-19 DIAGNOSIS — F41.9 ANXIETY: ICD-10-CM

## 2018-04-19 DIAGNOSIS — T75.3XXA SEA SICKNESS, INITIAL ENCOUNTER: Primary | ICD-10-CM

## 2018-04-19 DIAGNOSIS — K21.00 GASTROESOPHAGEAL REFLUX DISEASE WITH ESOPHAGITIS: ICD-10-CM

## 2018-04-19 DIAGNOSIS — K58.9 IRRITABLE BOWEL SYNDROME, UNSPECIFIED TYPE: ICD-10-CM

## 2018-04-19 PROCEDURE — G8427 DOCREV CUR MEDS BY ELIG CLIN: HCPCS | Performed by: FAMILY MEDICINE

## 2018-04-19 PROCEDURE — 3014F SCREEN MAMMO DOC REV: CPT | Performed by: FAMILY MEDICINE

## 2018-04-19 PROCEDURE — G8419 CALC BMI OUT NRM PARAM NOF/U: HCPCS | Performed by: FAMILY MEDICINE

## 2018-04-19 PROCEDURE — 99214 OFFICE O/P EST MOD 30 MIN: CPT | Performed by: FAMILY MEDICINE

## 2018-04-19 PROCEDURE — 1036F TOBACCO NON-USER: CPT | Performed by: FAMILY MEDICINE

## 2018-04-19 PROCEDURE — 3017F COLORECTAL CA SCREEN DOC REV: CPT | Performed by: FAMILY MEDICINE

## 2018-04-19 RX ORDER — SCOLOPAMINE TRANSDERMAL SYSTEM 1 MG/1
1 PATCH, EXTENDED RELEASE TRANSDERMAL
Qty: 10 PATCH | Refills: 0 | Status: SHIPPED | OUTPATIENT
Start: 2018-04-19 | End: 2018-06-18 | Stop reason: SDUPTHER

## 2018-04-19 RX ORDER — GABAPENTIN 100 MG/1
100 CAPSULE ORAL 2 TIMES DAILY
COMMUNITY
End: 2018-08-16 | Stop reason: SDUPTHER

## 2018-04-19 RX ORDER — CALCITRIOL 1 UG/ML
SOLUTION ORAL
COMMUNITY
End: 2020-01-01

## 2018-04-19 ASSESSMENT — ENCOUNTER SYMPTOMS
SHORTNESS OF BREATH: 0
ABDOMINAL PAIN: 0
BLOOD IN STOOL: 0
VOMITING: 0
DIARRHEA: 1
SORE THROAT: 0
NAUSEA: 0
EYE DISCHARGE: 0
BACK PAIN: 0
EYE REDNESS: 0
CONSTIPATION: 0
COUGH: 0

## 2018-04-21 LAB
COPPER: 92 UG/DL (ref 80–155)
ZINC: 105 UG/DL (ref 60–120)

## 2018-04-22 LAB — VITAMIN K1: NORMAL

## 2018-04-23 DIAGNOSIS — E56.1 LOW SERUM VITAMIN K: ICD-10-CM

## 2018-04-23 DIAGNOSIS — Z98.84 GASTRIC BYPASS STATUS FOR OBESITY: Primary | Chronic | ICD-10-CM

## 2018-04-23 RX ORDER — CHOLECALCIFEROL (VITAMIN D3) 125 MCG
500 CAPSULE ORAL DAILY
Qty: 30 TABLET | Refills: 3 | Status: SHIPPED | OUTPATIENT
Start: 2018-04-23 | End: 2019-08-12 | Stop reason: SDUPTHER

## 2018-04-23 RX ORDER — PHYTONADIONE (VIT K1) 100 MCG
3 TABLET ORAL DAILY
Qty: 270 TABLET | Refills: 0
Start: 2018-04-23

## 2018-05-02 ENCOUNTER — HOSPITAL ENCOUNTER (OUTPATIENT)
Age: 58
Discharge: HOME | End: 2018-05-02
Payer: COMMERCIAL

## 2018-05-02 LAB
CALCIUM SERPL-MCNC: 6.9 MG/DL (ref 8.5–10.5)
VITAMIN D 25-HYDROXY: 43 NG/ML (ref 30–100)

## 2018-05-02 PROCEDURE — 36415 COLL VENOUS BLD VENIPUNCTURE: CPT

## 2018-05-02 PROCEDURE — 82306 VITAMIN D 25 HYDROXY: CPT

## 2018-05-02 PROCEDURE — 82310 ASSAY OF CALCIUM: CPT

## 2018-05-11 ENCOUNTER — HOSPITAL ENCOUNTER (OUTPATIENT)
Age: 58
Discharge: HOME | End: 2018-05-11
Payer: COMMERCIAL

## 2018-05-11 LAB — POC CALCIUM: 7.2 MG/DL (ref 8.5–10.1)

## 2018-05-11 PROCEDURE — 36415 COLL VENOUS BLD VENIPUNCTURE: CPT

## 2018-05-11 PROCEDURE — 82310 ASSAY OF CALCIUM: CPT

## 2018-05-15 ENCOUNTER — TELEPHONE (OUTPATIENT)
Dept: FAMILY MEDICINE CLINIC | Age: 58
End: 2018-05-15

## 2018-05-26 ENCOUNTER — HOSPITAL ENCOUNTER (OUTPATIENT)
Age: 58
Discharge: HOME | End: 2018-05-26
Payer: COMMERCIAL

## 2018-05-26 PROCEDURE — 82310 ASSAY OF CALCIUM: CPT

## 2018-05-26 PROCEDURE — 36415 COLL VENOUS BLD VENIPUNCTURE: CPT

## 2018-05-29 LAB — CALCIUM SERPL-MCNC: 7.3 MG/DL (ref 8.5–10.5)

## 2018-06-04 ENCOUNTER — HOSPITAL ENCOUNTER (OUTPATIENT)
Age: 58
Discharge: HOME | End: 2018-06-04
Payer: COMMERCIAL

## 2018-06-04 LAB
CALCIUM SERPL-MCNC: 8 MG/DL (ref 8.5–10.5)
T3 FREE: 4.71 PG/ML (ref 2.02–4.43)
T3 FREE: NORMAL
T4 FREE: 1.1 NG/DL (ref 0.93–1.76)

## 2018-06-04 PROCEDURE — 82310 ASSAY OF CALCIUM: CPT

## 2018-06-04 PROCEDURE — 84439 ASSAY OF FREE THYROXINE: CPT

## 2018-06-04 PROCEDURE — 84481 FREE ASSAY (FT-3): CPT

## 2018-06-04 PROCEDURE — 36415 COLL VENOUS BLD VENIPUNCTURE: CPT

## 2018-06-15 ENCOUNTER — HOSPITAL ENCOUNTER (OUTPATIENT)
Age: 58
Discharge: HOME | End: 2018-06-15
Payer: COMMERCIAL

## 2018-06-15 LAB — CALCIUM SERPL-MCNC: 7.1 MG/DL (ref 8.5–10.5)

## 2018-06-15 PROCEDURE — 36415 COLL VENOUS BLD VENIPUNCTURE: CPT

## 2018-06-15 PROCEDURE — 82310 ASSAY OF CALCIUM: CPT

## 2018-06-18 DIAGNOSIS — T75.3XXA SEA SICKNESS, INITIAL ENCOUNTER: ICD-10-CM

## 2018-06-18 RX ORDER — SCOLOPAMINE TRANSDERMAL SYSTEM 1 MG/1
1 PATCH, EXTENDED RELEASE TRANSDERMAL
Qty: 10 PATCH | Refills: 0 | Status: SHIPPED | OUTPATIENT
Start: 2018-06-18 | End: 2019-04-01

## 2018-06-22 ENCOUNTER — HOSPITAL ENCOUNTER (OUTPATIENT)
Age: 58
Discharge: HOME | End: 2018-06-22
Payer: COMMERCIAL

## 2018-06-22 LAB — CALCIUM SERPL-MCNC: 9 MG/DL (ref 8.5–10.5)

## 2018-06-22 PROCEDURE — 82310 ASSAY OF CALCIUM: CPT

## 2018-06-22 PROCEDURE — 36415 COLL VENOUS BLD VENIPUNCTURE: CPT

## 2018-06-24 DIAGNOSIS — Z98.84 PERSONAL HISTORY OF GASTRIC BYPASS: ICD-10-CM

## 2018-06-25 DIAGNOSIS — F41.9 ANXIETY: ICD-10-CM

## 2018-06-25 DIAGNOSIS — G47.00 INSOMNIA, UNSPECIFIED TYPE: Primary | ICD-10-CM

## 2018-06-25 DIAGNOSIS — F32.A DEPRESSION, UNSPECIFIED DEPRESSION TYPE: ICD-10-CM

## 2018-06-25 DIAGNOSIS — M79.7 FIBROMYALGIA: ICD-10-CM

## 2018-06-25 DIAGNOSIS — B00.1 RECURRENT COLD SORES: ICD-10-CM

## 2018-06-25 RX ORDER — ZOLPIDEM TARTRATE 10 MG/1
10 TABLET ORAL NIGHTLY PRN
Qty: 90 TABLET | Refills: 0 | Status: SHIPPED | OUTPATIENT
Start: 2018-06-25 | End: 2018-08-16 | Stop reason: SDUPTHER

## 2018-06-25 RX ORDER — TIZANIDINE 4 MG/1
4 TABLET ORAL NIGHTLY
Qty: 90 TABLET | Refills: 0 | Status: SHIPPED | OUTPATIENT
Start: 2018-06-25 | End: 2018-08-16 | Stop reason: SDUPTHER

## 2018-06-25 RX ORDER — DULOXETIN HYDROCHLORIDE 60 MG/1
60 CAPSULE, DELAYED RELEASE ORAL NIGHTLY
Qty: 90 CAPSULE | Refills: 0 | Status: SHIPPED | OUTPATIENT
Start: 2018-06-25 | End: 2018-08-16 | Stop reason: SDUPTHER

## 2018-06-25 RX ORDER — DULOXETIN HYDROCHLORIDE 30 MG/1
30 CAPSULE, DELAYED RELEASE ORAL DAILY
Qty: 90 CAPSULE | Refills: 0 | Status: SHIPPED | OUTPATIENT
Start: 2018-06-25 | End: 2018-08-16 | Stop reason: SDUPTHER

## 2018-06-25 RX ORDER — ACYCLOVIR 400 MG/1
400 TABLET ORAL 2 TIMES DAILY
Qty: 180 TABLET | Refills: 0 | Status: SHIPPED | OUTPATIENT
Start: 2018-06-25 | End: 2018-08-16 | Stop reason: SDUPTHER

## 2018-06-25 RX ORDER — ZOLPIDEM TARTRATE 10 MG/1
10 TABLET ORAL NIGHTLY PRN
COMMUNITY
End: 2018-06-25 | Stop reason: SDUPTHER

## 2018-06-29 ENCOUNTER — HOSPITAL ENCOUNTER (OUTPATIENT)
Age: 58
Discharge: HOME | End: 2018-06-29
Payer: COMMERCIAL

## 2018-06-29 LAB — CALCIUM SERPL-MCNC: 8.6 MG/DL (ref 8.5–10.5)

## 2018-06-29 PROCEDURE — 36415 COLL VENOUS BLD VENIPUNCTURE: CPT

## 2018-06-29 PROCEDURE — 82310 ASSAY OF CALCIUM: CPT

## 2018-07-06 ENCOUNTER — HOSPITAL ENCOUNTER (OUTPATIENT)
Age: 58
Discharge: HOME | End: 2018-07-06
Payer: COMMERCIAL

## 2018-07-06 LAB — CALCIUM SERPL-MCNC: 8.3 MG/DL (ref 8.5–10.5)

## 2018-07-06 PROCEDURE — 36415 COLL VENOUS BLD VENIPUNCTURE: CPT

## 2018-07-06 PROCEDURE — 82310 ASSAY OF CALCIUM: CPT

## 2018-07-27 ENCOUNTER — HOSPITAL ENCOUNTER (OUTPATIENT)
Age: 58
Discharge: HOME | End: 2018-07-27
Payer: COMMERCIAL

## 2018-07-27 LAB — CALCIUM SERPL-MCNC: 8.7 MG/DL (ref 8.5–10.5)

## 2018-07-27 PROCEDURE — 36415 COLL VENOUS BLD VENIPUNCTURE: CPT

## 2018-07-27 PROCEDURE — 82310 ASSAY OF CALCIUM: CPT

## 2018-08-10 ENCOUNTER — HOSPITAL ENCOUNTER (OUTPATIENT)
Age: 58
Discharge: HOME | End: 2018-08-10
Payer: COMMERCIAL

## 2018-08-10 LAB — CALCIUM SERPL-MCNC: 8 MG/DL (ref 8.5–10.5)

## 2018-08-10 PROCEDURE — 36415 COLL VENOUS BLD VENIPUNCTURE: CPT

## 2018-08-10 PROCEDURE — 82310 ASSAY OF CALCIUM: CPT

## 2018-08-16 ENCOUNTER — OFFICE VISIT (OUTPATIENT)
Dept: FAMILY MEDICINE CLINIC | Age: 58
End: 2018-08-16
Payer: COMMERCIAL

## 2018-08-16 VITALS
HEIGHT: 58 IN | HEART RATE: 91 BPM | OXYGEN SATURATION: 98 % | WEIGHT: 132 LBS | TEMPERATURE: 99 F | RESPIRATION RATE: 20 BRPM | SYSTOLIC BLOOD PRESSURE: 138 MMHG | DIASTOLIC BLOOD PRESSURE: 82 MMHG | BODY MASS INDEX: 27.71 KG/M2

## 2018-08-16 DIAGNOSIS — E21.3 HYPERPARATHYROIDISM (HCC): ICD-10-CM

## 2018-08-16 DIAGNOSIS — F32.A DEPRESSION, UNSPECIFIED DEPRESSION TYPE: ICD-10-CM

## 2018-08-16 DIAGNOSIS — K58.9 IRRITABLE BOWEL SYNDROME, UNSPECIFIED TYPE: ICD-10-CM

## 2018-08-16 DIAGNOSIS — F41.9 ANXIETY: ICD-10-CM

## 2018-08-16 DIAGNOSIS — B00.1 RECURRENT COLD SORES: ICD-10-CM

## 2018-08-16 DIAGNOSIS — E60 LOW ZINC LEVEL: ICD-10-CM

## 2018-08-16 DIAGNOSIS — Z98.84 PERSONAL HISTORY OF GASTRIC BYPASS: ICD-10-CM

## 2018-08-16 DIAGNOSIS — Z98.84 GASTRIC BYPASS STATUS FOR OBESITY: Chronic | ICD-10-CM

## 2018-08-16 DIAGNOSIS — G47.00 INSOMNIA, UNSPECIFIED TYPE: ICD-10-CM

## 2018-08-16 DIAGNOSIS — E03.9 HYPOTHYROIDISM, UNSPECIFIED TYPE: Primary | ICD-10-CM

## 2018-08-16 DIAGNOSIS — K21.00 GASTROESOPHAGEAL REFLUX DISEASE WITH ESOPHAGITIS: ICD-10-CM

## 2018-08-16 DIAGNOSIS — M79.7 FIBROMYALGIA: ICD-10-CM

## 2018-08-16 PROCEDURE — 1036F TOBACCO NON-USER: CPT | Performed by: FAMILY MEDICINE

## 2018-08-16 PROCEDURE — 3017F COLORECTAL CA SCREEN DOC REV: CPT | Performed by: FAMILY MEDICINE

## 2018-08-16 PROCEDURE — G8427 DOCREV CUR MEDS BY ELIG CLIN: HCPCS | Performed by: FAMILY MEDICINE

## 2018-08-16 PROCEDURE — G8419 CALC BMI OUT NRM PARAM NOF/U: HCPCS | Performed by: FAMILY MEDICINE

## 2018-08-16 PROCEDURE — 99214 OFFICE O/P EST MOD 30 MIN: CPT | Performed by: FAMILY MEDICINE

## 2018-08-16 RX ORDER — GABAPENTIN 300 MG/1
300 CAPSULE ORAL NIGHTLY
Qty: 90 CAPSULE | Refills: 1 | Status: SHIPPED | OUTPATIENT
Start: 2018-08-16 | End: 2018-12-06 | Stop reason: SDUPTHER

## 2018-08-16 RX ORDER — DULOXETIN HYDROCHLORIDE 60 MG/1
60 CAPSULE, DELAYED RELEASE ORAL NIGHTLY
Qty: 90 CAPSULE | Refills: 1 | Status: SHIPPED | OUTPATIENT
Start: 2018-08-16 | End: 2018-12-06 | Stop reason: SDUPTHER

## 2018-08-16 RX ORDER — ZOLPIDEM TARTRATE 10 MG/1
10 TABLET ORAL NIGHTLY PRN
Qty: 90 TABLET | Refills: 0 | Status: SHIPPED | OUTPATIENT
Start: 2018-08-16 | End: 2018-11-14

## 2018-08-16 RX ORDER — GABAPENTIN 100 MG/1
100 CAPSULE ORAL 2 TIMES DAILY
Qty: 180 CAPSULE | Refills: 1 | Status: SHIPPED | OUTPATIENT
Start: 2018-08-16 | End: 2018-12-06 | Stop reason: SDUPTHER

## 2018-08-16 RX ORDER — POTASSIUM CHLORIDE 750 MG/1
10 TABLET, FILM COATED, EXTENDED RELEASE ORAL 2 TIMES DAILY
Qty: 180 TABLET | Refills: 1 | Status: SHIPPED | OUTPATIENT
Start: 2018-08-16 | End: 2018-12-06 | Stop reason: SDUPTHER

## 2018-08-16 RX ORDER — ACYCLOVIR 400 MG/1
400 TABLET ORAL 2 TIMES DAILY
Qty: 180 TABLET | Refills: 1 | Status: SHIPPED | OUTPATIENT
Start: 2018-08-16 | End: 2018-12-06 | Stop reason: SDUPTHER

## 2018-08-16 RX ORDER — TIZANIDINE 4 MG/1
4 TABLET ORAL NIGHTLY
Qty: 90 TABLET | Refills: 1 | Status: SHIPPED | OUTPATIENT
Start: 2018-08-16 | End: 2018-12-06 | Stop reason: SDUPTHER

## 2018-08-16 RX ORDER — ASPIRIN 81 MG/1
81 TABLET, CHEWABLE ORAL DAILY
COMMUNITY
End: 2019-01-24

## 2018-08-16 RX ORDER — DULOXETIN HYDROCHLORIDE 30 MG/1
30 CAPSULE, DELAYED RELEASE ORAL DAILY
Qty: 90 CAPSULE | Refills: 1 | Status: SHIPPED | OUTPATIENT
Start: 2018-08-16 | End: 2018-12-06 | Stop reason: SDUPTHER

## 2018-08-16 ASSESSMENT — ENCOUNTER SYMPTOMS
SORE THROAT: 0
BACK PAIN: 0
COUGH: 0
EYE DISCHARGE: 0
BLOOD IN STOOL: 0
EYE REDNESS: 0
VOMITING: 0
ABDOMINAL PAIN: 0
SHORTNESS OF BREATH: 0
DIARRHEA: 1
NAUSEA: 0
CONSTIPATION: 0

## 2018-08-16 ASSESSMENT — PATIENT HEALTH QUESTIONNAIRE - PHQ9
SUM OF ALL RESPONSES TO PHQ QUESTIONS 1-9: 0
2. FEELING DOWN, DEPRESSED OR HOPELESS: 0
1. LITTLE INTEREST OR PLEASURE IN DOING THINGS: 0
SUM OF ALL RESPONSES TO PHQ9 QUESTIONS 1 & 2: 0
SUM OF ALL RESPONSES TO PHQ QUESTIONS 1-9: 0

## 2018-08-16 NOTE — PROGRESS NOTES
Dyspareunia in female; External hemorrhoids without complication; Fibromyalgia; Gastric bypass status for obesity; GERD (gastroesophageal reflux disease); History of ITP; Hungry bone syndrome; Hyperparathyroidism ([address] Hypothyroidism; Insomnia; Irritable bowel; Lactose intolerance; Menopausal syndrome; Osteoarthritis; Osteopenia; Osteopenia; Recurrent cold sores; Tachycardia; and TIA (transient ischemic attack). Past Surgical History  The patient  has a past surgical history that includes Wrist fracture surgery (Right); Leg Surgery (Right); Tonsillectomy and adenoidectomy; Gastric bypass surgery (1996); Cholecystectomy (1996); Appendectomy (1996); parathyroidectomy (08/2017); Colonoscopy (approx 2013); Upper gastrointestinal endoscopy (approx 2013); laparoscopy; Foot surgery; and Tubal ligation (1996). Family History  This patient's family history includes Asthma in her maternal grandfather; Breast Cancer in her mother and sister; Cancer in her father; Depression in her father and mother; Heart Attack in her maternal grandmother; High Blood Pressure in her father and mother; Mental Illness in her maternal grandmother; Other in her father, maternal grandfather, maternal grandmother, and mother. Social History  Levi Parra  reports that she has never smoked. She has never used smokeless tobacco. She reports that she drinks about 5.4 oz of alcohol per week . She reports that she does not use drugs. Medications    Current Outpatient Prescriptions:     aspirin 81 MG chewable tablet, Take 81 mg by mouth daily, Disp: , Rfl:     gabapentin (NEURONTIN) 100 MG capsule, Take 1 capsule by mouth 2 times daily for 90 days. ., Disp: 180 capsule, Rfl: 1    gabapentin (NEURONTIN) 300 MG capsule, Take 1 capsule by mouth nightly for 180 days. ., Disp: 90 capsule, Rfl: 1    DULoxetine (CYMBALTA) 30 MG extended release capsule, Take 1 capsule by mouth daily, Disp: 90 capsule, Rfl: 1    DULoxetine (CYMBALTA) 60 MG extended release capsule, Take 1 capsule by mouth nightly, Disp: 90 capsule, Rfl: 1    tiZANidine (ZANAFLEX) 4 MG tablet, Take 1 tablet by mouth nightly At bed time, Disp: 90 tablet, Rfl: 1    acyclovir (ZOVIRAX) 400 MG tablet, Take 1 tablet by mouth 2 times daily, Disp: 180 tablet, Rfl: 1    zolpidem (AMBIEN) 10 MG tablet, Take 1 tablet by mouth nightly as needed for Sleep for up to 90 days. ., Disp: 90 tablet, Rfl: 0    zinc 50 MG CAPS, Take 75 mg by mouth daily, Disp: 135 capsule, Rfl: 0    potassium chloride (KLOR-CON 10) 10 MEQ extended release tablet, Take 1 tablet by mouth 2 times daily, Disp: 180 tablet, Rfl: 1    magnesium oxide (MAG-OX) 400 (241.3 Mg) MG TABS tablet, TAKE 1 TABLET TWICE A DAY, Disp: 180 tablet, Rfl: 0    Vitamin K, Phytonadione, 100 MCG TABS, Take 3 tablets by mouth daily, Disp: 270 tablet, Rfl: 0    vitamin B-12 (CYANOCOBALAMIN) 500 MCG tablet, Take 1 tablet by mouth daily (Patient taking differently: Take 500 mcg by mouth every 48 hours ), Disp: 30 tablet, Rfl: 3    calcitRIOL (ROCALTROL) 1 MCG/ML solution, 15 mcg Take 4 ml daily , Disp: , Rfl:     Prenatal Multivit-Min-Fe-FA (PRENATAL VITAMINS PO), Take by mouth, Disp: , Rfl:     FOLIC ACID PO, Take 7,262 mg by mouth daily, Disp: , Rfl:     gabapentin (NEURONTIN) 300 MG capsule, Take 1 capsule by mouth daily for 60 days Before bed. ., Disp: 90 capsule, Rfl: 1    Copper 5 MG CAPS, Take 5 mg by mouth daily, Disp: 90 capsule, Rfl: 0    SYNTHROID 75 MCG tablet, Take 75 mg by mouth daily, Disp: , Rfl:     SYNTHROID 200 MCG tablet, Take 200 mcg by mouth daily, Disp: , Rfl:     Cholecalciferol (VITAMIN D3) 5000 units CAPS, Take 2 capsules by mouth daily, Disp: 60 capsule, Rfl: 0    atenolol (TENORMIN) 25 MG tablet, Take 1 tablet by mouth daily, Disp: 90 tablet, Rfl: 3    calcium carbonate (TUMS EX) 750 MG chewable tablet, Take 40 tablets by mouth daily , Disp: , Rfl:     esomeprazole (NEXIUM) 20 MG delayed release capsule, Take 20 mg by mouth 2 times daily, Disp: , Rfl:     ranitidine (ZANTAC) 150 MG tablet, Take 150 mg by mouth 2 times daily, Disp: , Rfl:     Psyllium-Calcium (METAMUCIL PLUS CALCIUM) CAPS, Take by mouth Take with 8 oz water., Disp: , Rfl:     CINNAMON PO, Take 1,000 mg by mouth 2 times daily, Disp: , Rfl:     Probiotic Product (PROBIOTIC PO), Take by mouth, Disp: , Rfl:     vitamin C (ASCORBIC ACID) 500 MG tablet, Take 500 mg by mouth 2 times daily, Disp: , Rfl:     vitamin A 96511 units capsule, Take 25,000 Units by mouth daily, Disp: , Rfl:     metaxalone (SKELAXIN) 800 MG tablet, Take 1 tablet by mouth daily, Disp: 90 tablet, Rfl: 3    scopolamine (TRANSDERM-SCOP, 1.5 MG,) transdermal patch, Place 1 patch onto the skin every 72 hours Apply 12 hours prior to initial anticipated exposure to motion, Disp: 10 patch, Rfl: 0    Subjective:      Review of Systems   Constitutional: Negative for chills, fatigue, fever and unexpected weight change. HENT: Negative for congestion, ear discharge, ear pain, hearing loss and sore throat. Eyes: Negative for discharge and redness. Respiratory: Negative for cough and shortness of breath. Cardiovascular: Negative for chest pain and palpitations. Gastrointestinal: Positive for diarrhea (rare; diet impacts; more formed stool than in years). Negative for abdominal pain, blood in stool, constipation, nausea and vomiting. Genitourinary: Negative for difficulty urinating and dysuria. Musculoskeletal: Negative for arthralgias, back pain, gait problem and neck pain. Skin: Negative for rash. Allergic/Immunologic: Negative for environmental allergies. Neurological: Negative for headaches (occasional). Psychiatric/Behavioral: Negative for dysphoric mood, self-injury, sleep disturbance (none on medication) and suicidal ideas. Confusion:    The patient is not nervous/anxious.         Objective:     Vitals:    08/16/18 0918   BP: 138/82   Site: Left Arm   Position: Sitting Cuff Size: Small Adult   Pulse: 91   Resp: 20   Temp: 99 °F (37.2 °C)   TempSrc: Oral   SpO2: 98%   Weight: 132 lb (59.9 kg)   Height: 4' 10\" (1.473 m)       Physical Exam   Constitutional: She is oriented to person, place, and time. She appears well-developed and well-nourished. HENT:   Head: Normocephalic and atraumatic. Mouth/Throat: Oropharynx is clear and moist.   Eyes: Pupils are equal, round, and reactive to light. Conjunctivae are normal.   Neck: Neck supple. No thyromegaly present. Cardiovascular: Normal rate, regular rhythm and normal heart sounds. Pulmonary/Chest: Effort normal and breath sounds normal. No respiratory distress. Abdominal: Soft. There is no tenderness. Lymphadenopathy:     She has no cervical adenopathy. Neurological: She is alert and oriented to person, place, and time. No cranial nerve deficit. No obvious focal deficit. Skin: Skin is warm and dry. No rash noted. Psychiatric: She has a normal mood and affect. Her behavior is normal.   Vitals reviewed. Mildly tender to right posterior ribs without palpable deformity. Lab Results   Component Value Date    WBC 8.7 02/12/2018    HGB 12.7 02/12/2018    HCT 38.6 02/12/2018     02/12/2018    CHOL 112 04/10/2017    TRIG 180 (H) 04/10/2017    HDL 82 02/12/2018    ALT 23 02/12/2018    AST 36 02/12/2018     02/12/2018    K 4.5 02/12/2018     02/12/2018    CREATININE 0.4 02/12/2018    BUN 14 02/12/2018    CO2 27 02/12/2018    TSH 0.011 (L) 01/16/2018    INR 0.94 07/13/2017    LABA1C 4.1 (L) 02/08/2018       Assessment/Plan:   1. Hypothyroidism, unspecified type  Recent labs okay. Follows with endocrine. 2. Hyperparathyroidism ([address]  Follows with endocrine. 3. Depression, unspecified depression type  Stable. - DULoxetine (CYMBALTA) 30 MG extended release capsule; Take 1 capsule by mouth daily  Dispense: 90 capsule; Refill: 1  - DULoxetine (CYMBALTA) 60 MG extended release capsule;  Take 1 by mouth 2 times daily  Dispense: 180 tablet; Refill: 1    Will follow clinically for sore ribs; possible fracture but 3 weeks out and improving so imaging would not . Return in about 4 months (around 12/16/2018) for Follow-up chronic conditions. Orders Placed   Orders Placed This Encounter   Procedures    Copper, Serum     Standing Status:   Future     Standing Expiration Date:   8/16/2019    Zinc     Standing Status:   Future     Standing Expiration Date:   8/16/2019    Vitamin K1     Standing Status:   Future     Standing Expiration Date:   8/16/2019    Vitamin D 25 Hydroxy     Standing Status:   Future     Standing Expiration Date:   8/16/2019       Prescriptions given/sent   Orders Placed This Encounter   Medications    gabapentin (NEURONTIN) 100 MG capsule     Sig: Take 1 capsule by mouth 2 times daily for 90 days. .     Dispense:  180 capsule     Refill:  1    gabapentin (NEURONTIN) 300 MG capsule     Sig: Take 1 capsule by mouth nightly for 180 days. .     Dispense:  90 capsule     Refill:  1    DULoxetine (CYMBALTA) 30 MG extended release capsule     Sig: Take 1 capsule by mouth daily     Dispense:  90 capsule     Refill:  1    DULoxetine (CYMBALTA) 60 MG extended release capsule     Sig: Take 1 capsule by mouth nightly     Dispense:  90 capsule     Refill:  1    tiZANidine (ZANAFLEX) 4 MG tablet     Sig: Take 1 tablet by mouth nightly At bed time     Dispense:  90 tablet     Refill:  1    acyclovir (ZOVIRAX) 400 MG tablet     Sig: Take 1 tablet by mouth 2 times daily     Dispense:  180 tablet     Refill:  1    zolpidem (AMBIEN) 10 MG tablet     Sig: Take 1 tablet by mouth nightly as needed for Sleep for up to 90 days. .     Dispense:  90 tablet     Refill:  0    zinc 50 MG CAPS     Sig: Take 75 mg by mouth daily     Dispense:  135 capsule     Refill:  0    potassium chloride (KLOR-CON 10) 10 MEQ extended release tablet     Sig: Take 1 tablet by mouth 2 times daily Dispense:  180 tablet     Refill:  1       Patient given educational materials - see patient instructions. Discussed use, benefit, and side effects of prescribed medications. All patient questions answered. Pt voiced understanding. Reviewed health maintenance.               Electronically signed by Bren Cohen MD on 8/16/2018 at 10:04 AM

## 2018-08-16 NOTE — PATIENT INSTRUCTIONS
If you can get us records of tetanus shot and pap smear that would be appreciated. You are due for a shingles shot; you can get this at the pharmacy. Continue current medications.

## 2018-08-22 ENCOUNTER — HOSPITAL ENCOUNTER (OUTPATIENT)
Age: 58
Discharge: HOME | End: 2018-08-22
Payer: COMMERCIAL

## 2018-08-22 LAB — CALCIUM SERPL-MCNC: 8.5 MG/DL (ref 8.5–10.5)

## 2018-08-22 PROCEDURE — 36415 COLL VENOUS BLD VENIPUNCTURE: CPT

## 2018-08-22 PROCEDURE — 82310 ASSAY OF CALCIUM: CPT

## 2018-09-06 ENCOUNTER — HOSPITAL ENCOUNTER (OUTPATIENT)
Age: 58
Discharge: HOME | End: 2018-09-06
Payer: COMMERCIAL

## 2018-09-06 LAB — CALCIUM SERPL-MCNC: 8.6 MG/DL (ref 8.5–10.5)

## 2018-09-06 PROCEDURE — 36415 COLL VENOUS BLD VENIPUNCTURE: CPT

## 2018-09-06 PROCEDURE — 82310 ASSAY OF CALCIUM: CPT

## 2018-09-20 ENCOUNTER — TELEPHONE (OUTPATIENT)
Dept: FAMILY MEDICINE CLINIC | Age: 58
End: 2018-09-20

## 2018-09-20 NOTE — TELEPHONE ENCOUNTER
Received voicemail from patient asking if our office had the flu shots in stock yet. Left patient voicemail stating we do have flu shots in the office and she could come in anytime to get this, she does not need an appointment. She could come anytime from 8am-8pm. If she had any questions she could call us at the office.

## 2018-09-26 ENCOUNTER — HOSPITAL ENCOUNTER (OUTPATIENT)
Dept: WOMENS IMAGING | Age: 58
Discharge: HOME | End: 2018-09-26
Payer: COMMERCIAL

## 2018-09-26 ENCOUNTER — HOSPITAL ENCOUNTER (OUTPATIENT)
Dept: MAMMOGRAPHY | Age: 58
Discharge: HOME | End: 2018-09-26
Payer: COMMERCIAL

## 2018-09-26 DIAGNOSIS — Z12.31 VISIT FOR SCREENING MAMMOGRAM: ICD-10-CM

## 2018-09-26 DIAGNOSIS — Z12.39 BREAST SCREENING: ICD-10-CM

## 2018-09-26 LAB — CALCIUM SERPL-MCNC: 8.5 MG/DL (ref 8.5–10.5)

## 2018-09-26 PROCEDURE — 36415 COLL VENOUS BLD VENIPUNCTURE: CPT

## 2018-09-26 PROCEDURE — 3209999900 MAM COMPARISON OF OUTSIDE IMAGES

## 2018-09-26 PROCEDURE — 82310 ASSAY OF CALCIUM: CPT

## 2018-09-26 PROCEDURE — 77063 BREAST TOMOSYNTHESIS BI: CPT

## 2018-09-30 DIAGNOSIS — Z98.84 PERSONAL HISTORY OF GASTRIC BYPASS: ICD-10-CM

## 2018-10-15 ENCOUNTER — TELEPHONE (OUTPATIENT)
Dept: FAMILY MEDICINE CLINIC | Age: 58
End: 2018-10-15

## 2018-10-16 ENCOUNTER — HOSPITAL ENCOUNTER (OUTPATIENT)
Age: 58
Discharge: HOME | End: 2018-10-16
Payer: COMMERCIAL

## 2018-10-16 LAB — CALCIUM SERPL-MCNC: 9 MG/DL (ref 8.5–10.5)

## 2018-10-16 PROCEDURE — 82310 ASSAY OF CALCIUM: CPT

## 2018-10-16 PROCEDURE — 36415 COLL VENOUS BLD VENIPUNCTURE: CPT

## 2018-11-05 ENCOUNTER — HOSPITAL ENCOUNTER (OUTPATIENT)
Age: 58
Discharge: HOME | End: 2018-11-05
Payer: COMMERCIAL

## 2018-11-05 LAB
CALCIUM SERPL-MCNC: 8.4 MG/DL (ref 8.5–10.5)
VITAMIN D 25-HYDROXY: 40 NG/ML (ref 30–100)

## 2018-11-05 PROCEDURE — 82306 VITAMIN D 25 HYDROXY: CPT

## 2018-11-05 PROCEDURE — 84630 ASSAY OF ZINC: CPT

## 2018-11-05 PROCEDURE — 82525 ASSAY OF COPPER: CPT

## 2018-11-05 PROCEDURE — 84597 ASSAY OF VITAMIN K: CPT

## 2018-11-05 PROCEDURE — 36415 COLL VENOUS BLD VENIPUNCTURE: CPT

## 2018-11-05 PROCEDURE — 82310 ASSAY OF CALCIUM: CPT

## 2018-11-09 LAB
COPPER: 101 UG/DL (ref 80–155)
VITAMIN K1: NORMAL

## 2018-11-12 LAB — ZINC: 129 UG/DL (ref 60–120)

## 2018-12-04 ENCOUNTER — HOSPITAL ENCOUNTER (OUTPATIENT)
Age: 58
Discharge: HOME | End: 2018-12-04
Payer: COMMERCIAL

## 2018-12-04 LAB — CALCIUM SERPL-MCNC: 8.1 MG/DL (ref 8.5–10.5)

## 2018-12-04 PROCEDURE — 82310 ASSAY OF CALCIUM: CPT

## 2018-12-04 PROCEDURE — 36415 COLL VENOUS BLD VENIPUNCTURE: CPT

## 2018-12-06 ENCOUNTER — OFFICE VISIT (OUTPATIENT)
Dept: FAMILY MEDICINE CLINIC | Age: 58
End: 2018-12-06
Payer: COMMERCIAL

## 2018-12-06 VITALS
DIASTOLIC BLOOD PRESSURE: 78 MMHG | HEART RATE: 90 BPM | RESPIRATION RATE: 16 BRPM | TEMPERATURE: 97 F | WEIGHT: 139 LBS | OXYGEN SATURATION: 97 % | BODY MASS INDEX: 29.05 KG/M2 | SYSTOLIC BLOOD PRESSURE: 118 MMHG

## 2018-12-06 DIAGNOSIS — F41.9 ANXIETY: ICD-10-CM

## 2018-12-06 DIAGNOSIS — Z98.84 GASTRIC BYPASS STATUS FOR OBESITY: Chronic | ICD-10-CM

## 2018-12-06 DIAGNOSIS — R00.0 TACHYCARDIA: ICD-10-CM

## 2018-12-06 DIAGNOSIS — M79.7 FIBROMYALGIA: ICD-10-CM

## 2018-12-06 DIAGNOSIS — Z98.84 PERSONAL HISTORY OF GASTRIC BYPASS: ICD-10-CM

## 2018-12-06 DIAGNOSIS — E21.3 HYPERPARATHYROIDISM (HCC): ICD-10-CM

## 2018-12-06 DIAGNOSIS — F32.A DEPRESSION, UNSPECIFIED DEPRESSION TYPE: ICD-10-CM

## 2018-12-06 DIAGNOSIS — B00.1 RECURRENT COLD SORES: ICD-10-CM

## 2018-12-06 DIAGNOSIS — G47.00 INSOMNIA, UNSPECIFIED TYPE: ICD-10-CM

## 2018-12-06 DIAGNOSIS — E03.9 HYPOTHYROIDISM, UNSPECIFIED TYPE: Primary | ICD-10-CM

## 2018-12-06 DIAGNOSIS — Z23 NEED FOR TDAP VACCINATION: ICD-10-CM

## 2018-12-06 PROCEDURE — 3017F COLORECTAL CA SCREEN DOC REV: CPT | Performed by: FAMILY MEDICINE

## 2018-12-06 PROCEDURE — 99214 OFFICE O/P EST MOD 30 MIN: CPT | Performed by: FAMILY MEDICINE

## 2018-12-06 PROCEDURE — 90715 TDAP VACCINE 7 YRS/> IM: CPT | Performed by: FAMILY MEDICINE

## 2018-12-06 PROCEDURE — G8427 DOCREV CUR MEDS BY ELIG CLIN: HCPCS | Performed by: FAMILY MEDICINE

## 2018-12-06 PROCEDURE — 1036F TOBACCO NON-USER: CPT | Performed by: FAMILY MEDICINE

## 2018-12-06 PROCEDURE — G8419 CALC BMI OUT NRM PARAM NOF/U: HCPCS | Performed by: FAMILY MEDICINE

## 2018-12-06 PROCEDURE — 90471 IMMUNIZATION ADMIN: CPT | Performed by: FAMILY MEDICINE

## 2018-12-06 PROCEDURE — G8484 FLU IMMUNIZE NO ADMIN: HCPCS | Performed by: FAMILY MEDICINE

## 2018-12-06 RX ORDER — TIZANIDINE 4 MG/1
4 TABLET ORAL NIGHTLY
Qty: 90 TABLET | Refills: 1 | Status: SHIPPED | OUTPATIENT
Start: 2018-12-06 | End: 2019-05-23 | Stop reason: SDUPTHER

## 2018-12-06 RX ORDER — ACYCLOVIR 400 MG/1
400 TABLET ORAL 2 TIMES DAILY
Qty: 180 TABLET | Refills: 1 | Status: SHIPPED | OUTPATIENT
Start: 2018-12-06 | End: 2019-05-23 | Stop reason: SDUPTHER

## 2018-12-06 RX ORDER — ZOLPIDEM TARTRATE 10 MG/1
10 TABLET ORAL NIGHTLY PRN
Qty: 90 TABLET | Refills: 0 | Status: SHIPPED | OUTPATIENT
Start: 2018-12-06 | End: 2019-01-24

## 2018-12-06 RX ORDER — GABAPENTIN 300 MG/1
300 CAPSULE ORAL NIGHTLY
Qty: 90 CAPSULE | Refills: 1 | Status: SHIPPED | OUTPATIENT
Start: 2018-12-06 | End: 2019-05-23 | Stop reason: SDUPTHER

## 2018-12-06 RX ORDER — METAXALONE 800 MG/1
800 TABLET ORAL DAILY
Qty: 90 TABLET | Refills: 1 | Status: SHIPPED | OUTPATIENT
Start: 2018-12-06 | End: 2019-05-23 | Stop reason: ALTCHOICE

## 2018-12-06 RX ORDER — DULOXETIN HYDROCHLORIDE 30 MG/1
30 CAPSULE, DELAYED RELEASE ORAL DAILY
Qty: 90 CAPSULE | Refills: 1 | Status: SHIPPED | OUTPATIENT
Start: 2018-12-06 | End: 2019-05-23 | Stop reason: SDUPTHER

## 2018-12-06 RX ORDER — GABAPENTIN 100 MG/1
100 CAPSULE ORAL 2 TIMES DAILY
Qty: 180 CAPSULE | Refills: 1 | Status: SHIPPED | OUTPATIENT
Start: 2018-12-06 | End: 2019-04-01 | Stop reason: ALTCHOICE

## 2018-12-06 RX ORDER — ZOLPIDEM TARTRATE 10 MG/1
10 TABLET ORAL NIGHTLY PRN
COMMUNITY
End: 2018-12-06 | Stop reason: SDUPTHER

## 2018-12-06 RX ORDER — POTASSIUM CHLORIDE 750 MG/1
10 TABLET, FILM COATED, EXTENDED RELEASE ORAL 2 TIMES DAILY
Qty: 180 TABLET | Refills: 1 | Status: SHIPPED | OUTPATIENT
Start: 2018-12-06 | End: 2019-05-23 | Stop reason: SDUPTHER

## 2018-12-06 RX ORDER — ATENOLOL 25 MG/1
25 TABLET ORAL DAILY
Qty: 90 TABLET | Refills: 3 | Status: SHIPPED | OUTPATIENT
Start: 2018-12-06 | End: 2019-04-01

## 2018-12-06 RX ORDER — DULOXETIN HYDROCHLORIDE 60 MG/1
60 CAPSULE, DELAYED RELEASE ORAL NIGHTLY
Qty: 90 CAPSULE | Refills: 1 | Status: SHIPPED | OUTPATIENT
Start: 2018-12-06 | End: 2019-05-23 | Stop reason: SDUPTHER

## 2018-12-06 ASSESSMENT — PATIENT HEALTH QUESTIONNAIRE - PHQ9
1. LITTLE INTEREST OR PLEASURE IN DOING THINGS: 0
SUM OF ALL RESPONSES TO PHQ QUESTIONS 1-9: 0
SUM OF ALL RESPONSES TO PHQ QUESTIONS 1-9: 0
2. FEELING DOWN, DEPRESSED OR HOPELESS: 0
SUM OF ALL RESPONSES TO PHQ9 QUESTIONS 1 & 2: 0

## 2018-12-06 ASSESSMENT — ENCOUNTER SYMPTOMS
ABDOMINAL PAIN: 0
SORE THROAT: 0
NAUSEA: 0
VOMITING: 0
EYE REDNESS: 0
BACK PAIN: 0
SHORTNESS OF BREATH: 0
CONSTIPATION: 0
EYE DISCHARGE: 0
DIARRHEA: 1
COUGH: 0
BLOOD IN STOOL: 0

## 2018-12-06 NOTE — PROGRESS NOTES
Fibromyalgia; Gastric bypass status for obesity; GERD (gastroesophageal reflux disease); History of ITP; Hungry bone syndrome; Hyperparathyroidism ([facility] [address] Hypothyroidism; Insomnia; Irritable bowel; Lactose intolerance; Menopausal syndrome; Osteoarthritis; Osteopenia; Osteopenia; Recurrent cold sores; Tachycardia; and TIA (transient ischemic attack). Past SurgicalHistory  The patient  has a past surgical history that includes Wrist fracture surgery (Right); Leg Surgery (Right); Tonsillectomy and adenoidectomy; Gastric bypass surgery (1996); Cholecystectomy (1996); Appendectomy (1996); parathyroidectomy (08/2017); Colonoscopy (approx 2013); Upper gastrointestinal endoscopy (approx 2013); laparoscopy; Foot surgery; and Tubal ligation (1996). Family History  This patient's family history includes Asthma in her maternal grandfather; Breast Cancer in her mother and sister; Cancer in her father; Depression in her father and mother; Heart Attack in her maternal grandmother; High Blood Pressure in her father and mother; Mental Illness in her maternal grandmother; Other in her father, maternal grandfather, maternal grandmother, and mother. Social History  Kerline Abdi  reports that she has never smoked. She has never used smokeless tobacco. She reports that she drinks about 5.4 oz of alcohol per week . She reports that she does not use drugs. Medications    Current Outpatient Prescriptions:     gabapentin (NEURONTIN) 100 MG capsule, Take 1 capsule by mouth 2 times daily for 90 days. ., Disp: 180 capsule, Rfl: 1    gabapentin (NEURONTIN) 300 MG capsule, Take 1 capsule by mouth nightly for 180 days. ., Disp: 90 capsule, Rfl: 1    acyclovir (ZOVIRAX) 400 MG tablet, Take 1 tablet by mouth 2 times daily, Disp: 180 tablet, Rfl: 1    atenolol (TENORMIN) 25 MG tablet, Take 1 tablet by mouth daily, Disp: 90 tablet, Rfl: 3    DULoxetine (CYMBALTA) 30 MG extended release capsule, Take 1 capsule by mouth daily, Disp: 90 capsule, maintenance.               Electronically signed by Loni Dickerson MD on 12/6/2018 at 10:24 AM

## 2018-12-17 DIAGNOSIS — Z98.84 GASTRIC BYPASS STATUS FOR OBESITY: Chronic | ICD-10-CM

## 2018-12-17 DIAGNOSIS — E60 LOW ZINC LEVEL: ICD-10-CM

## 2018-12-17 RX ORDER — ZINC GLUCONATE 50 MG
TABLET ORAL
Qty: 135 TABLET | Refills: 0 | Status: SHIPPED | OUTPATIENT
Start: 2018-12-17 | End: 2019-02-24 | Stop reason: SDUPTHER

## 2019-01-01 ENCOUNTER — OFFICE VISIT (OUTPATIENT)
Dept: FAMILY MEDICINE CLINIC | Age: 59
End: 2019-01-01
Payer: COMMERCIAL

## 2019-01-01 ENCOUNTER — HOSPITAL ENCOUNTER (OUTPATIENT)
Dept: MAMMOGRAPHY | Age: 59
Discharge: HOME | End: 2019-11-08
Payer: COMMERCIAL

## 2019-01-01 ENCOUNTER — HOSPITAL ENCOUNTER (OUTPATIENT)
Dept: WOMENS IMAGING | Age: 59
Discharge: HOME | End: 2019-11-22
Payer: COMMERCIAL

## 2019-01-01 ENCOUNTER — TELEPHONE (OUTPATIENT)
Dept: FAMILY MEDICINE CLINIC | Age: 59
End: 2019-01-01

## 2019-01-01 ENCOUNTER — TELEPHONE (OUTPATIENT)
Dept: INTERNAL MEDICINE CLINIC | Age: 59
End: 2019-01-01

## 2019-01-01 ENCOUNTER — APPOINTMENT (OUTPATIENT)
Dept: GENERAL RADIOLOGY | Age: 59
DRG: 690 | End: 2019-01-01
Payer: COMMERCIAL

## 2019-01-01 ENCOUNTER — NURSE ONLY (OUTPATIENT)
Dept: FAMILY MEDICINE CLINIC | Age: 59
End: 2019-01-01
Payer: COMMERCIAL

## 2019-01-01 ENCOUNTER — HOSPITAL ENCOUNTER (OUTPATIENT)
Dept: WOMENS IMAGING | Age: 59
Discharge: HOME | End: 2019-11-15
Payer: COMMERCIAL

## 2019-01-01 ENCOUNTER — HOSPITAL ENCOUNTER (INPATIENT)
Age: 59
LOS: 4 days | Discharge: HOME | DRG: 644 | End: 2019-12-10
Attending: PHYSICIAN ASSISTANT | Admitting: PHYSICIAN ASSISTANT
Payer: COMMERCIAL

## 2019-01-01 ENCOUNTER — HOSPITAL ENCOUNTER (OUTPATIENT)
Dept: INTERVENTIONAL RADIOLOGY/VASCULAR | Age: 59
Discharge: HOME | End: 2019-12-27
Payer: COMMERCIAL

## 2019-01-01 ENCOUNTER — HOSPITAL ENCOUNTER (INPATIENT)
Age: 59
LOS: 3 days | Discharge: HOME | DRG: 690 | End: 2019-10-20
Attending: EMERGENCY MEDICINE | Admitting: FAMILY MEDICINE
Payer: COMMERCIAL

## 2019-01-01 VITALS
TEMPERATURE: 98.6 F | WEIGHT: 103 LBS | HEART RATE: 82 BPM | SYSTOLIC BLOOD PRESSURE: 116 MMHG | HEIGHT: 58 IN | RESPIRATION RATE: 16 BRPM | OXYGEN SATURATION: 100 % | BODY MASS INDEX: 21.62 KG/M2 | DIASTOLIC BLOOD PRESSURE: 65 MMHG

## 2019-01-01 VITALS
RESPIRATION RATE: 12 BRPM | HEIGHT: 58 IN | DIASTOLIC BLOOD PRESSURE: 85 MMHG | WEIGHT: 111.4 LBS | BODY MASS INDEX: 23.38 KG/M2 | SYSTOLIC BLOOD PRESSURE: 138 MMHG | HEART RATE: 88 BPM | OXYGEN SATURATION: 95 % | TEMPERATURE: 98.4 F

## 2019-01-01 VITALS
HEIGHT: 59 IN | SYSTOLIC BLOOD PRESSURE: 98 MMHG | BODY MASS INDEX: 24.6 KG/M2 | WEIGHT: 122 LBS | HEART RATE: 82 BPM | DIASTOLIC BLOOD PRESSURE: 54 MMHG

## 2019-01-01 VITALS
TEMPERATURE: 99 F | DIASTOLIC BLOOD PRESSURE: 40 MMHG | HEART RATE: 48 BPM | BODY MASS INDEX: 22.67 KG/M2 | WEIGHT: 108 LBS | RESPIRATION RATE: 18 BRPM | SYSTOLIC BLOOD PRESSURE: 68 MMHG | HEIGHT: 58 IN

## 2019-01-01 VITALS
WEIGHT: 97.8 LBS | OXYGEN SATURATION: 100 % | SYSTOLIC BLOOD PRESSURE: 126 MMHG | DIASTOLIC BLOOD PRESSURE: 59 MMHG | BODY MASS INDEX: 20.44 KG/M2 | RESPIRATION RATE: 16 BRPM | HEART RATE: 87 BPM | TEMPERATURE: 97.1 F

## 2019-01-01 VITALS
TEMPERATURE: 97.7 F | HEIGHT: 58 IN | HEART RATE: 90 BPM | RESPIRATION RATE: 16 BRPM | OXYGEN SATURATION: 99 % | BODY MASS INDEX: 22.08 KG/M2 | DIASTOLIC BLOOD PRESSURE: 76 MMHG | WEIGHT: 105.2 LBS | SYSTOLIC BLOOD PRESSURE: 118 MMHG

## 2019-01-01 VITALS
BODY MASS INDEX: 21.62 KG/M2 | HEART RATE: 96 BPM | WEIGHT: 103 LBS | RESPIRATION RATE: 18 BRPM | DIASTOLIC BLOOD PRESSURE: 60 MMHG | SYSTOLIC BLOOD PRESSURE: 90 MMHG | HEIGHT: 58 IN

## 2019-01-01 DIAGNOSIS — F10.982 ALCOHOL-INDUCED INSOMNIA (HCC): ICD-10-CM

## 2019-01-01 DIAGNOSIS — E86.0 DEHYDRATION: ICD-10-CM

## 2019-01-01 DIAGNOSIS — M79.7 FIBROMYALGIA: ICD-10-CM

## 2019-01-01 DIAGNOSIS — E83.42 HYPOMAGNESEMIA: ICD-10-CM

## 2019-01-01 DIAGNOSIS — R92.1 BREAST CALCIFICATION, LEFT: ICD-10-CM

## 2019-01-01 DIAGNOSIS — H93.13 BILATERAL TINNITUS: Primary | ICD-10-CM

## 2019-01-01 DIAGNOSIS — E03.4 HYPOTHYROIDISM DUE TO ACQUIRED ATROPHY OF THYROID: ICD-10-CM

## 2019-01-01 DIAGNOSIS — E88.09 HYPOALBUMINEMIA DUE TO PROTEIN-CALORIE MALNUTRITION (HCC): ICD-10-CM

## 2019-01-01 DIAGNOSIS — N30.00 ACUTE CYSTITIS WITHOUT HEMATURIA: Primary | ICD-10-CM

## 2019-01-01 DIAGNOSIS — E20.8 OTHER HYPOPARATHYROIDISM (HCC): ICD-10-CM

## 2019-01-01 DIAGNOSIS — E46 HYPOALBUMINEMIA DUE TO PROTEIN-CALORIE MALNUTRITION (HCC): ICD-10-CM

## 2019-01-01 DIAGNOSIS — Z98.84 GASTRIC BYPASS STATUS FOR OBESITY: Chronic | ICD-10-CM

## 2019-01-01 DIAGNOSIS — E83.51 HYPOCALCEMIA: ICD-10-CM

## 2019-01-01 DIAGNOSIS — E20.8 OTHER HYPOPARATHYROIDISM (HCC): Primary | ICD-10-CM

## 2019-01-01 DIAGNOSIS — I95.9 HYPOTENSION, UNSPECIFIED HYPOTENSION TYPE: Primary | ICD-10-CM

## 2019-01-01 DIAGNOSIS — F10.20 ALCOHOLISM (HCC): ICD-10-CM

## 2019-01-01 DIAGNOSIS — R25.3 MUSCLE TWITCH: ICD-10-CM

## 2019-01-01 DIAGNOSIS — R30.0 DYSURIA: ICD-10-CM

## 2019-01-01 DIAGNOSIS — R00.2 PALPITATIONS: ICD-10-CM

## 2019-01-01 DIAGNOSIS — B35.6 FUNGAL INFECTION OF THE GROIN: ICD-10-CM

## 2019-01-01 DIAGNOSIS — R29.6 FALLING: ICD-10-CM

## 2019-01-01 DIAGNOSIS — E83.51 HYPOCALCEMIA: Primary | ICD-10-CM

## 2019-01-01 DIAGNOSIS — R92.1 BREAST CALCIFICATIONS: ICD-10-CM

## 2019-01-01 DIAGNOSIS — N39.0 URINARY TRACT INFECTION WITHOUT HEMATURIA, SITE UNSPECIFIED: ICD-10-CM

## 2019-01-01 DIAGNOSIS — F41.9 ANXIETY: ICD-10-CM

## 2019-01-01 DIAGNOSIS — R05.9 COUGH: ICD-10-CM

## 2019-01-01 DIAGNOSIS — Z12.39 SCREENING BREAST EXAMINATION: ICD-10-CM

## 2019-01-01 DIAGNOSIS — F32.A DEPRESSION, UNSPECIFIED DEPRESSION TYPE: ICD-10-CM

## 2019-01-01 DIAGNOSIS — Z23 FLU VACCINE NEED: Primary | ICD-10-CM

## 2019-01-01 DIAGNOSIS — B00.1 RECURRENT COLD SORES: ICD-10-CM

## 2019-01-01 LAB
ALBUMIN SERPL-MCNC: 1.7 G/DL (ref 3.5–5.1)
ALBUMIN SERPL-MCNC: 1.8 G/DL (ref 3.5–5.1)
ALBUMIN SERPL-MCNC: 2 G/DL (ref 3.5–5.1)
ALP BLD-CCNC: 115 U/L (ref 38–126)
ALP BLD-CCNC: 123 U/L (ref 38–126)
ALP BLD-CCNC: 131 U/L (ref 38–126)
ALT SERPL-CCNC: 31 U/L (ref 11–66)
ALT SERPL-CCNC: 33 U/L (ref 11–66)
ALT SERPL-CCNC: 36 U/L (ref 11–66)
AMORPHOUS: ABNORMAL
ANION GAP SERPL CALCULATED.3IONS-SCNC: 11 MEQ/L (ref 8–16)
ANION GAP SERPL CALCULATED.3IONS-SCNC: 11 MEQ/L (ref 8–16)
ANION GAP SERPL CALCULATED.3IONS-SCNC: 12 MEQ/L (ref 8–16)
ANION GAP SERPL CALCULATED.3IONS-SCNC: 14 MEQ/L (ref 8–16)
ANION GAP SERPL CALCULATED.3IONS-SCNC: 14 MEQ/L (ref 8–16)
ANION GAP SERPL CALCULATED.3IONS-SCNC: 15 MEQ/L (ref 8–16)
ANION GAP SERPL CALCULATED.3IONS-SCNC: 9 MEQ/L (ref 8–16)
AST SERPL-CCNC: 51 U/L (ref 5–40)
AST SERPL-CCNC: 63 U/L (ref 5–40)
AST SERPL-CCNC: 70 U/L (ref 5–40)
BACTERIA: ABNORMAL /HPF
BACTERIA: ABNORMAL /HPF
BASE EXCESS MIXED: -0.8 MMOL/L (ref -2–3)
BASE EXCESS MIXED: 1.4 MMOL/L (ref -2–3)
BASOPHILS # BLD: 0.1 %
BASOPHILS # BLD: 0.2 %
BASOPHILS # BLD: 0.2 %
BASOPHILS ABSOLUTE: 0 THOU/MM3 (ref 0–0.1)
BILIRUB SERPL-MCNC: 0.5 MG/DL (ref 0.3–1.2)
BILIRUB SERPL-MCNC: 0.5 MG/DL (ref 0.3–1.2)
BILIRUB SERPL-MCNC: 0.9 MG/DL (ref 0.3–1.2)
BILIRUBIN DIRECT: 0.3 MG/DL (ref 0–0.3)
BILIRUBIN DIRECT: < 0.2 MG/DL (ref 0–0.3)
BILIRUBIN URINE: ABNORMAL
BILIRUBIN URINE: NEGATIVE
BLOOD, URINE: ABNORMAL
BLOOD, URINE: NEGATIVE
BUN BLDV-MCNC: 6 MG/DL (ref 7–22)
BUN BLDV-MCNC: 7 MG/DL (ref 7–22)
BUN BLDV-MCNC: 8 MG/DL (ref 7–22)
BUN BLDV-MCNC: 9 MG/DL (ref 7–22)
CALCIUM IONIZED: 0.56 MMOL/L (ref 1.12–1.32)
CALCIUM IONIZED: 0.61 MMOL/L (ref 1.12–1.32)
CALCIUM IONIZED: 0.63 MMOL/L (ref 1.12–1.32)
CALCIUM IONIZED: 0.64 MMOL/L (ref 1.12–1.32)
CALCIUM IONIZED: 0.67 MMOL/L (ref 1.12–1.32)
CALCIUM IONIZED: 0.68 MMOL/L (ref 1.12–1.32)
CALCIUM IONIZED: 0.76 MMOL/L (ref 1.12–1.32)
CALCIUM IONIZED: 0.78 MMOL/L (ref 1.12–1.32)
CALCIUM IONIZED: 0.81 MMOL/L (ref 1.12–1.32)
CALCIUM IONIZED: 0.85 MMOL/L (ref 1.12–1.32)
CALCIUM IONIZED: 0.86 MMOL/L (ref 1.12–1.32)
CALCIUM IONIZED: 0.93 MMOL/L (ref 1.12–1.32)
CALCIUM IONIZED: 0.94 MMOL/L (ref 1.12–1.32)
CALCIUM IONIZED: 0.99 MMOL/L (ref 1.12–1.32)
CALCIUM IONIZED: 0.99 MMOL/L (ref 1.12–1.32)
CALCIUM IONIZED: 1.06 MMOL/L (ref 1.12–1.32)
CALCIUM IONIZED: 1.07 MMOL/L (ref 1.12–1.32)
CALCIUM IONIZED: 1.12 MMOL/L (ref 1.12–1.32)
CALCIUM IONIZED: 1.15 MMOL/L (ref 1.12–1.32)
CALCIUM IONIZED: 1.24 MMOL/L (ref 1.12–1.32)
CALCIUM IONIZED: NORMAL MMOL/L (ref 1.12–1.32)
CALCIUM SERPL-MCNC: 4.5 MG/DL (ref 8.5–10.5)
CALCIUM SERPL-MCNC: 4.8 MG/DL (ref 8.5–10.5)
CALCIUM SERPL-MCNC: 5.2 MG/DL (ref 8.5–10.5)
CALCIUM SERPL-MCNC: 5.7 MG/DL (ref 8.5–10.5)
CALCIUM SERPL-MCNC: 5.8 MG/DL (ref 8.5–10.5)
CALCIUM SERPL-MCNC: 6.4 MG/DL (ref 8.5–10.5)
CALCIUM SERPL-MCNC: 6.5 MG/DL (ref 8.5–10.5)
CALCIUM SERPL-MCNC: 7.2 MG/DL (ref 8.5–10.5)
CALCIUM SERPL-MCNC: 7.2 MG/DL (ref 8.5–10.5)
CALCIUM SERPL-MCNC: 7.7 MG/DL (ref 8.5–10.5)
CALCIUM SERPL-MCNC: 7.8 MG/DL (ref 8.5–10.5)
CALCIUM SERPL-MCNC: 9.5 MG/DL (ref 8.5–10.5)
CASTS 2: ABNORMAL /LPF
CASTS 2: ABNORMAL /LPF
CASTS UA: ABNORMAL /LPF
CASTS UA: ABNORMAL /LPF
CHARACTER, URINE: ABNORMAL
CHARACTER, URINE: CLEAR
CHLORIDE BLD-SCNC: 101 MEQ/L (ref 98–111)
CHLORIDE BLD-SCNC: 102 MEQ/L (ref 98–111)
CHLORIDE BLD-SCNC: 104 MEQ/L (ref 98–111)
CHLORIDE BLD-SCNC: 105 MEQ/L (ref 98–111)
CHLORIDE BLD-SCNC: 106 MEQ/L (ref 98–111)
CHLORIDE BLD-SCNC: 106 MEQ/L (ref 98–111)
CHLORIDE BLD-SCNC: 107 MEQ/L (ref 98–111)
CHLORIDE BLD-SCNC: 107 MEQ/L (ref 98–111)
CHLORIDE BLD-SCNC: 108 MEQ/L (ref 98–111)
CHLORIDE BLD-SCNC: 111 MEQ/L (ref 98–111)
CO2: 19 MEQ/L (ref 23–33)
CO2: 20 MEQ/L (ref 23–33)
CO2: 20 MEQ/L (ref 23–33)
CO2: 21 MEQ/L (ref 23–33)
CO2: 22 MEQ/L (ref 23–33)
CO2: 23 MEQ/L (ref 23–33)
CO2: 24 MEQ/L (ref 23–33)
CO2: 24 MEQ/L (ref 23–33)
CO2: 25 MEQ/L (ref 23–33)
CO2: 25 MEQ/L (ref 23–33)
COLLECTED BY:: ABNORMAL
COLLECTED BY:: ABNORMAL
COLOR: ABNORMAL
COLOR: YELLOW
CORTISOL COLLECTION INFO: NORMAL
CORTISOL: 9.04 UG/DL
CREAT SERPL-MCNC: 0.4 MG/DL (ref 0.4–1.2)
CREAT SERPL-MCNC: 0.5 MG/DL (ref 0.4–1.2)
CREATININE URINE: 25.2 MG/DL
CRYSTALS, UA: ABNORMAL
CRYSTALS, UA: ABNORMAL
DEVICE: ABNORMAL
EKG ATRIAL RATE: 103 BPM
EKG ATRIAL RATE: 110 BPM
EKG ATRIAL RATE: 71 BPM
EKG ATRIAL RATE: 71 BPM
EKG ATRIAL RATE: 73 BPM
EKG ATRIAL RATE: 73 BPM
EKG P AXIS: 36 DEGREES
EKG P AXIS: 36 DEGREES
EKG P AXIS: 37 DEGREES
EKG P AXIS: 43 DEGREES
EKG P AXIS: 52 DEGREES
EKG P AXIS: 53 DEGREES
EKG P-R INTERVAL: 126 MS
EKG P-R INTERVAL: 128 MS
EKG P-R INTERVAL: 134 MS
EKG P-R INTERVAL: 136 MS
EKG Q-T INTERVAL: 312 MS
EKG Q-T INTERVAL: 380 MS
EKG Q-T INTERVAL: 434 MS
EKG Q-T INTERVAL: 436 MS
EKG Q-T INTERVAL: 446 MS
EKG Q-T INTERVAL: 450 MS
EKG QRS DURATION: 52 MS
EKG QRS DURATION: 52 MS
EKG QRS DURATION: 54 MS
EKG QRS DURATION: 56 MS
EKG QRS DURATION: 58 MS
EKG QRS DURATION: 60 MS
EKG QTC CALCULATION (BAZETT): 422 MS
EKG QTC CALCULATION (BAZETT): 473 MS
EKG QTC CALCULATION (BAZETT): 478 MS
EKG QTC CALCULATION (BAZETT): 489 MS
EKG QTC CALCULATION (BAZETT): 491 MS
EKG QTC CALCULATION (BAZETT): 497 MS
EKG R AXIS: -3 DEGREES
EKG R AXIS: -5 DEGREES
EKG R AXIS: -6 DEGREES
EKG R AXIS: 1 DEGREES
EKG R AXIS: 5 DEGREES
EKG R AXIS: 9 DEGREES
EKG T AXIS: -16 DEGREES
EKG T AXIS: 15 DEGREES
EKG T AXIS: 18 DEGREES
EKG T AXIS: 36 DEGREES
EKG T AXIS: 62 DEGREES
EKG T AXIS: 65 DEGREES
EKG VENTRICULAR RATE: 103 BPM
EKG VENTRICULAR RATE: 110 BPM
EKG VENTRICULAR RATE: 71 BPM
EKG VENTRICULAR RATE: 71 BPM
EKG VENTRICULAR RATE: 73 BPM
EKG VENTRICULAR RATE: 73 BPM
EOSINOPHIL # BLD: 0.2 %
EOSINOPHIL # BLD: 0.3 %
EOSINOPHIL # BLD: 0.4 %
EOSINOPHILS ABSOLUTE: 0 THOU/MM3 (ref 0–0.4)
EPITHELIAL CELLS, UA: ABNORMAL /HPF
EPITHELIAL CELLS, UA: ABNORMAL /HPF
ERYTHROCYTE [DISTWIDTH] IN BLOOD BY AUTOMATED COUNT: 12.6 % (ref 11.5–14.5)
ERYTHROCYTE [DISTWIDTH] IN BLOOD BY AUTOMATED COUNT: 12.8 % (ref 11.5–14.5)
ERYTHROCYTE [DISTWIDTH] IN BLOOD BY AUTOMATED COUNT: 12.9 % (ref 11.5–14.5)
ERYTHROCYTE [DISTWIDTH] IN BLOOD BY AUTOMATED COUNT: 13 % (ref 11.5–14.5)
ERYTHROCYTE [DISTWIDTH] IN BLOOD BY AUTOMATED COUNT: 13.9 % (ref 11.5–14.5)
ERYTHROCYTE [DISTWIDTH] IN BLOOD BY AUTOMATED COUNT: 14 % (ref 11.5–14.5)
ERYTHROCYTE [DISTWIDTH] IN BLOOD BY AUTOMATED COUNT: 14.3 % (ref 11.5–14.5)
ERYTHROCYTE [DISTWIDTH] IN BLOOD BY AUTOMATED COUNT: 14.3 % (ref 11.5–14.5)
ERYTHROCYTE [DISTWIDTH] IN BLOOD BY AUTOMATED COUNT: 14.6 % (ref 11.5–14.5)
ERYTHROCYTE [DISTWIDTH] IN BLOOD BY AUTOMATED COUNT: 45.1 FL (ref 35–45)
ERYTHROCYTE [DISTWIDTH] IN BLOOD BY AUTOMATED COUNT: 45.9 FL (ref 35–45)
ERYTHROCYTE [DISTWIDTH] IN BLOOD BY AUTOMATED COUNT: 46 FL (ref 35–45)
ERYTHROCYTE [DISTWIDTH] IN BLOOD BY AUTOMATED COUNT: 46.4 FL (ref 35–45)
ERYTHROCYTE [DISTWIDTH] IN BLOOD BY AUTOMATED COUNT: 49.2 FL (ref 35–45)
ERYTHROCYTE [DISTWIDTH] IN BLOOD BY AUTOMATED COUNT: 49.3 FL (ref 35–45)
ERYTHROCYTE [DISTWIDTH] IN BLOOD BY AUTOMATED COUNT: 52.4 FL (ref 35–45)
ERYTHROCYTE [DISTWIDTH] IN BLOOD BY AUTOMATED COUNT: 53.5 FL (ref 35–45)
ERYTHROCYTE [DISTWIDTH] IN BLOOD BY AUTOMATED COUNT: 55.7 FL (ref 35–45)
FERRITIN: 217 NG/ML (ref 10–291)
FLU A ANTIGEN: NEGATIVE
FLU B ANTIGEN: NEGATIVE
GAMMA GLUTAMYL TRANSFERASE: 262 U/L (ref 8–69)
GFR SERPL CREATININE-BSD FRML MDRD: > 90 ML/MIN/1.73M2
GLUCOSE BLD-MCNC: 104 MG/DL (ref 70–108)
GLUCOSE BLD-MCNC: 113 MG/DL (ref 70–108)
GLUCOSE BLD-MCNC: 81 MG/DL (ref 70–108)
GLUCOSE BLD-MCNC: 82 MG/DL (ref 70–108)
GLUCOSE BLD-MCNC: 83 MG/DL (ref 70–108)
GLUCOSE BLD-MCNC: 84 MG/DL (ref 70–108)
GLUCOSE BLD-MCNC: 85 MG/DL (ref 70–108)
GLUCOSE BLD-MCNC: 90 MG/DL (ref 70–108)
GLUCOSE BLD-MCNC: 95 MG/DL (ref 70–108)
GLUCOSE BLD-MCNC: 99 MG/DL (ref 70–108)
GLUCOSE URINE: NEGATIVE MG/DL
GLUCOSE URINE: NEGATIVE MG/DL
HCO3, MIXED: 25 MMOL/L (ref 23–28)
HCO3, MIXED: 27 MMOL/L (ref 23–28)
HCT VFR BLD CALC: 31.4 % (ref 37–47)
HCT VFR BLD CALC: 31.7 % (ref 37–47)
HCT VFR BLD CALC: 32.3 % (ref 37–47)
HCT VFR BLD CALC: 33.5 % (ref 37–47)
HCT VFR BLD CALC: 34.1 % (ref 37–47)
HCT VFR BLD CALC: 35 % (ref 37–47)
HCT VFR BLD CALC: 35.7 % (ref 37–47)
HCT VFR BLD CALC: 40.1 % (ref 37–47)
HCT VFR BLD CALC: 40.4 % (ref 37–47)
HEMOGLOBIN: 10.2 GM/DL (ref 12–16)
HEMOGLOBIN: 10.3 GM/DL (ref 12–16)
HEMOGLOBIN: 10.3 GM/DL (ref 12–16)
HEMOGLOBIN: 10.7 GM/DL (ref 12–16)
HEMOGLOBIN: 11 GM/DL (ref 12–16)
HEMOGLOBIN: 11.1 GM/DL (ref 12–16)
HEMOGLOBIN: 11.2 GM/DL (ref 12–16)
HEMOGLOBIN: 12.3 GM/DL (ref 12–16)
HEMOGLOBIN: 12.8 GM/DL (ref 12–16)
ICTOTEST: NEGATIVE
IMMATURE GRANS (ABS): 0.02 THOU/MM3 (ref 0–0.07)
IMMATURE GRANS (ABS): 0.02 THOU/MM3 (ref 0–0.07)
IMMATURE GRANS (ABS): 0.03 THOU/MM3 (ref 0–0.07)
IMMATURE GRANULOCYTES: 0.2 %
IMMATURE GRANULOCYTES: 0.4 %
IMMATURE GRANULOCYTES: 0.4 %
IRON SATURATION: 81 % (ref 20–50)
IRON: 80 UG/DL (ref 50–170)
IRON: 93 UG/DL (ref 50–170)
KETONES, URINE: ABNORMAL
KETONES, URINE: NEGATIVE
LEUKOCYTE ESTERASE, URINE: ABNORMAL
LEUKOCYTE ESTERASE, URINE: ABNORMAL
LYMPHOCYTES # BLD: 22 %
LYMPHOCYTES # BLD: 25.7 %
LYMPHOCYTES # BLD: 32.3 %
LYMPHOCYTES ABSOLUTE: 1.6 THOU/MM3 (ref 1–4.8)
LYMPHOCYTES ABSOLUTE: 1.7 THOU/MM3 (ref 1–4.8)
LYMPHOCYTES ABSOLUTE: 2.1 THOU/MM3 (ref 1–4.8)
MAGNESIUM: 1.3 MG/DL (ref 1.6–2.4)
MAGNESIUM: 1.3 MG/DL (ref 1.6–2.4)
MAGNESIUM: 1.4 MG/DL (ref 1.6–2.4)
MAGNESIUM: 1.5 MG/DL (ref 1.6–2.4)
MAGNESIUM: 1.6 MG/DL (ref 1.6–2.4)
MAGNESIUM: 1.8 MG/DL (ref 1.6–2.4)
MAGNESIUM: 1.8 MG/DL (ref 1.6–2.4)
MAGNESIUM: 1.9 MG/DL (ref 1.6–2.4)
MCH RBC QN AUTO: 31 PG (ref 26–33)
MCH RBC QN AUTO: 31.1 PG (ref 26–33)
MCH RBC QN AUTO: 31.3 PG (ref 26–33)
MCH RBC QN AUTO: 31.5 PG (ref 26–33)
MCH RBC QN AUTO: 31.6 PG (ref 26–33)
MCH RBC QN AUTO: 31.6 PG (ref 26–33)
MCH RBC QN AUTO: 31.7 PG (ref 26–33)
MCH RBC QN AUTO: 31.7 PG (ref 26–33)
MCH RBC QN AUTO: 32.8 PG (ref 26–33)
MCHC RBC AUTO-ENTMCNC: 30.4 GM/DL (ref 32.2–35.5)
MCHC RBC AUTO-ENTMCNC: 30.8 GM/DL (ref 32.2–35.5)
MCHC RBC AUTO-ENTMCNC: 31.7 GM/DL (ref 32.2–35.5)
MCHC RBC AUTO-ENTMCNC: 31.9 GM/DL (ref 32.2–35.5)
MCHC RBC AUTO-ENTMCNC: 32.5 GM/DL (ref 32.2–35.5)
MCHC RBC AUTO-ENTMCNC: 32.5 GM/DL (ref 32.2–35.5)
MCHC RBC AUTO-ENTMCNC: 32.8 GM/DL (ref 32.2–35.5)
MCV RBC AUTO: 100 FL (ref 81–99)
MCV RBC AUTO: 102.9 FL (ref 81–99)
MCV RBC AUTO: 104.1 FL (ref 81–99)
MCV RBC AUTO: 95.7 FL (ref 81–99)
MCV RBC AUTO: 97.1 FL (ref 81–99)
MCV RBC AUTO: 97.2 FL (ref 81–99)
MCV RBC AUTO: 98.2 FL (ref 81–99)
MCV RBC AUTO: 98.5 FL (ref 81–99)
MCV RBC AUTO: 99.7 FL (ref 81–99)
MISCELLANEOUS 2: ABNORMAL
MISCELLANEOUS 2: ABNORMAL
MONOCYTES # BLD: 4.7 %
MONOCYTES # BLD: 7.4 %
MONOCYTES # BLD: 8.3 %
MONOCYTES ABSOLUTE: 0.4 THOU/MM3 (ref 0.4–1.3)
MONOCYTES ABSOLUTE: 0.4 THOU/MM3 (ref 0.4–1.3)
MONOCYTES ABSOLUTE: 0.6 THOU/MM3 (ref 0.4–1.3)
NITRITE, URINE: NEGATIVE
NITRITE, URINE: POSITIVE
NUCLEATED RED BLOOD CELLS: 0 /100 WBC
O2 SAT, MIXED: 21 %
O2 SAT, MIXED: 31 %
ORGANISM: ABNORMAL
OSMOLALITY CALCULATION: 274.6 MOSMOL/KG (ref 275–300)
OSMOLALITY CALCULATION: 283.1 MOSMOL/KG (ref 275–300)
PCO2, MIXED VENOUS: 43 MMHG (ref 41–51)
PCO2, MIXED VENOUS: 47 MMHG (ref 41–51)
PH UA: 6.5 (ref 5–9)
PH UA: 6.5 (ref 5–9)
PH, MIXED: 7.36 (ref 7.31–7.41)
PH, MIXED: 7.37 (ref 7.31–7.41)
PHOSPHORUS: 4.3 MG/DL (ref 2.4–4.7)
PHOSPHORUS: 4.7 MG/DL (ref 2.4–4.7)
PHOSPHORUS: 5.4 MG/DL (ref 2.4–4.7)
PHOSPHORUS: 6 MG/DL (ref 2.4–4.7)
PHOSPHORUS: 6.5 MG/DL (ref 2.4–4.7)
PHOSPHORUS: 7.4 MG/DL (ref 2.4–4.7)
PLATELET # BLD: 113 THOU/MM3 (ref 130–400)
PLATELET # BLD: 115 THOU/MM3 (ref 130–400)
PLATELET # BLD: 136 THOU/MM3 (ref 130–400)
PLATELET # BLD: 142 THOU/MM3 (ref 130–400)
PLATELET # BLD: 150 THOU/MM3 (ref 130–400)
PLATELET # BLD: 152 THOU/MM3 (ref 130–400)
PLATELET # BLD: 159 THOU/MM3 (ref 130–400)
PLATELET # BLD: 168 THOU/MM3 (ref 130–400)
PLATELET # BLD: 176 THOU/MM3 (ref 130–400)
PMV BLD AUTO: 11.1 FL (ref 9.4–12.4)
PMV BLD AUTO: 11.1 FL (ref 9.4–12.4)
PMV BLD AUTO: 11.3 FL (ref 9.4–12.4)
PMV BLD AUTO: 11.3 FL (ref 9.4–12.4)
PMV BLD AUTO: 11.4 FL (ref 9.4–12.4)
PMV BLD AUTO: 11.5 FL (ref 9.4–12.4)
PMV BLD AUTO: 11.6 FL (ref 9.4–12.4)
PMV BLD AUTO: 11.7 FL (ref 9.4–12.4)
PMV BLD AUTO: 12 FL (ref 9.4–12.4)
PO2 MIXED: 16 MMHG (ref 25–40)
PO2 MIXED: 20 MMHG (ref 25–40)
POTASSIUM REFLEX MAGNESIUM: 3.2 MEQ/L (ref 3.5–5.2)
POTASSIUM REFLEX MAGNESIUM: 3.5 MEQ/L (ref 3.5–5.2)
POTASSIUM REFLEX MAGNESIUM: 3.8 MEQ/L (ref 3.5–5.2)
POTASSIUM SERPL-SCNC: 3.6 MEQ/L (ref 3.5–5.2)
POTASSIUM SERPL-SCNC: 3.7 MEQ/L (ref 3.5–5.2)
POTASSIUM SERPL-SCNC: 4 MEQ/L (ref 3.5–5.2)
POTASSIUM SERPL-SCNC: 4 MEQ/L (ref 3.5–5.2)
POTASSIUM SERPL-SCNC: 4.4 MEQ/L (ref 3.5–5.2)
POTASSIUM SERPL-SCNC: 4.5 MEQ/L (ref 3.5–5.2)
POTASSIUM SERPL-SCNC: 4.5 MEQ/L (ref 3.5–5.2)
POTASSIUM SERPL-SCNC: 5.6 MEQ/L (ref 3.5–5.2)
PREALBUMIN: 10.9 MG/DL (ref 20–40)
PROT/CREAT RATIO, UR: 0.19
PROTEIN UA: ABNORMAL
PROTEIN UA: NEGATIVE
PROTEIN, URINE: 4.8 MG/DL
PTH INTACT: 7.8 PG/ML (ref 15–65)
PTH INTACT: 9.1 PG/ML (ref 15–65)
RBC # BLD: 3.26 MILL/MM3 (ref 4.2–5.4)
RBC # BLD: 3.28 MILL/MM3 (ref 4.2–5.4)
RBC # BLD: 3.29 MILL/MM3 (ref 4.2–5.4)
RBC # BLD: 3.4 MILL/MM3 (ref 4.2–5.4)
RBC # BLD: 3.41 MILL/MM3 (ref 4.2–5.4)
RBC # BLD: 3.47 MILL/MM3 (ref 4.2–5.4)
RBC # BLD: 3.51 MILL/MM3 (ref 4.2–5.4)
RBC # BLD: 3.88 MILL/MM3 (ref 4.2–5.4)
RBC # BLD: 4.13 MILL/MM3 (ref 4.2–5.4)
RBC URINE: ABNORMAL /HPF
RBC URINE: ABNORMAL /HPF
RENAL EPITHELIAL, UA: ABNORMAL
RENAL EPITHELIAL, UA: ABNORMAL
SEG NEUTROPHILS: 59.5 %
SEG NEUTROPHILS: 68.8 %
SEG NEUTROPHILS: 68.9 %
SEGMENTED NEUTROPHILS ABSOLUTE COUNT: 3.1 THOU/MM3 (ref 1.8–7.7)
SEGMENTED NEUTROPHILS ABSOLUTE COUNT: 5 THOU/MM3 (ref 1.8–7.7)
SEGMENTED NEUTROPHILS ABSOLUTE COUNT: 5.7 THOU/MM3 (ref 1.8–7.7)
SODIUM BLD-SCNC: 135 MEQ/L (ref 135–145)
SODIUM BLD-SCNC: 137 MEQ/L (ref 135–145)
SODIUM BLD-SCNC: 139 MEQ/L (ref 135–145)
SODIUM BLD-SCNC: 141 MEQ/L (ref 135–145)
SODIUM BLD-SCNC: 142 MEQ/L (ref 135–145)
SODIUM BLD-SCNC: 143 MEQ/L (ref 135–145)
SODIUM BLD-SCNC: 143 MEQ/L (ref 135–145)
SODIUM BLD-SCNC: 144 MEQ/L (ref 135–145)
SPECIFIC GRAVITY, URINE: 1.01 (ref 1–1.03)
SPECIFIC GRAVITY, URINE: 1.02 (ref 1–1.03)
T3 FREE: 3.44 PG/ML (ref 2.02–4.43)
T4 FREE: 0.53 NG/DL (ref 0.93–1.76)
T4 FREE: 0.8 NG/DL (ref 0.93–1.76)
T4 FREE: 0.97 NG/DL (ref 0.93–1.76)
T4 FREE: 1.07 NG/DL (ref 0.93–1.76)
T4 FREE: 1.55 NG/DL (ref 0.93–1.76)
TOTAL IRON BINDING CAPACITY: 99 UG/DL (ref 171–450)
TOTAL PROTEIN: 4 G/DL (ref 6.1–8)
TOTAL PROTEIN: 4.1 G/DL (ref 6.1–8)
TOTAL PROTEIN: 4.8 G/DL (ref 6.1–8)
TROPONIN T: < 0.01 NG/ML
TSH SERPL DL<=0.05 MIU/L-ACNC: 0.01 UIU/ML (ref 0.4–4.2)
TSH SERPL DL<=0.05 MIU/L-ACNC: 0.01 UIU/ML (ref 0.4–4.2)
URINE CULTURE REFLEX: ABNORMAL
UROBILINOGEN, URINE: 1 EU/DL (ref 0–1)
UROBILINOGEN, URINE: 1 EU/DL (ref 0–1)
VITAMIN D 1,25-DIHYDROXY: 33.9 PG/ML (ref 19.9–79.3)
VITAMIN D 25-HYDROXY: 41 NG/ML (ref 30–100)
VITAMIN D 25-HYDROXY: 47 NG/ML (ref 30–100)
VITAMIN D 25-HYDROXY: 47 NG/ML (ref 30–100)
WBC # BLD: 4.1 THOU/MM3 (ref 4.8–10.8)
WBC # BLD: 4.2 THOU/MM3 (ref 4.8–10.8)
WBC # BLD: 4.2 THOU/MM3 (ref 4.8–10.8)
WBC # BLD: 4.4 THOU/MM3 (ref 4.8–10.8)
WBC # BLD: 4.9 THOU/MM3 (ref 4.8–10.8)
WBC # BLD: 5.2 THOU/MM3 (ref 4.8–10.8)
WBC # BLD: 5.5 THOU/MM3 (ref 4.8–10.8)
WBC # BLD: 7.2 THOU/MM3 (ref 4.8–10.8)
WBC # BLD: 8.3 THOU/MM3 (ref 4.8–10.8)
WBC UA: > 200 /HPF
WBC UA: ABNORMAL /HPF
YEAST: ABNORMAL
YEAST: ABNORMAL

## 2019-01-01 PROCEDURE — 1200000003 HC TELEMETRY R&B

## 2019-01-01 PROCEDURE — 85027 COMPLETE CBC AUTOMATED: CPT

## 2019-01-01 PROCEDURE — 93010 ELECTROCARDIOGRAM REPORT: CPT | Performed by: INTERNAL MEDICINE

## 2019-01-01 PROCEDURE — 99285 EMERGENCY DEPT VISIT HI MDM: CPT

## 2019-01-01 PROCEDURE — 99233 SBSQ HOSP IP/OBS HIGH 50: CPT | Performed by: INTERNAL MEDICINE

## 2019-01-01 PROCEDURE — 6360000002 HC RX W HCPCS: Performed by: INTERNAL MEDICINE

## 2019-01-01 PROCEDURE — 84100 ASSAY OF PHOSPHORUS: CPT

## 2019-01-01 PROCEDURE — 2709999900 HC NON-CHARGEABLE SUPPLY

## 2019-01-01 PROCEDURE — 6370000000 HC RX 637 (ALT 250 FOR IP): Performed by: PHYSICIAN ASSISTANT

## 2019-01-01 PROCEDURE — 99232 SBSQ HOSP IP/OBS MODERATE 35: CPT | Performed by: FAMILY MEDICINE

## 2019-01-01 PROCEDURE — 83735 ASSAY OF MAGNESIUM: CPT

## 2019-01-01 PROCEDURE — 84443 ASSAY THYROID STIM HORMONE: CPT

## 2019-01-01 PROCEDURE — 82330 ASSAY OF CALCIUM: CPT

## 2019-01-01 PROCEDURE — 81001 URINALYSIS AUTO W/SCOPE: CPT

## 2019-01-01 PROCEDURE — 84439 ASSAY OF FREE THYROXINE: CPT

## 2019-01-01 PROCEDURE — 77001 FLUOROGUIDE FOR VEIN DEVICE: CPT | Performed by: RADIOLOGY

## 2019-01-01 PROCEDURE — 83970 ASSAY OF PARATHORMONE: CPT

## 2019-01-01 PROCEDURE — 2580000003 HC RX 258: Performed by: PHYSICIAN ASSISTANT

## 2019-01-01 PROCEDURE — 2500000003 HC RX 250 WO HCPCS: Performed by: RADIOLOGY

## 2019-01-01 PROCEDURE — 80048 BASIC METABOLIC PNL TOTAL CA: CPT

## 2019-01-01 PROCEDURE — 36415 COLL VENOUS BLD VENIPUNCTURE: CPT

## 2019-01-01 PROCEDURE — C1894 INTRO/SHEATH, NON-LASER: HCPCS

## 2019-01-01 PROCEDURE — 6360000002 HC RX W HCPCS: Performed by: PHYSICIAN ASSISTANT

## 2019-01-01 PROCEDURE — 82533 TOTAL CORTISOL: CPT

## 2019-01-01 PROCEDURE — 94761 N-INVAS EAR/PLS OXIMETRY MLT: CPT

## 2019-01-01 PROCEDURE — 76937 US GUIDE VASCULAR ACCESS: CPT | Performed by: RADIOLOGY

## 2019-01-01 PROCEDURE — 87804 INFLUENZA ASSAY W/OPTIC: CPT

## 2019-01-01 PROCEDURE — 99223 1ST HOSP IP/OBS HIGH 75: CPT | Performed by: PHYSICIAN ASSISTANT

## 2019-01-01 PROCEDURE — 2580000003 HC RX 258: Performed by: STUDENT IN AN ORGANIZED HEALTH CARE EDUCATION/TRAINING PROGRAM

## 2019-01-01 PROCEDURE — 2580000003 HC RX 258: Performed by: EMERGENCY MEDICINE

## 2019-01-01 PROCEDURE — 96361 HYDRATE IV INFUSION ADD-ON: CPT

## 2019-01-01 PROCEDURE — 90471 IMMUNIZATION ADMIN: CPT | Performed by: NURSE PRACTITIONER

## 2019-01-01 PROCEDURE — 96374 THER/PROPH/DIAG INJ IV PUSH: CPT

## 2019-01-01 PROCEDURE — 84156 ASSAY OF PROTEIN URINE: CPT

## 2019-01-01 PROCEDURE — 6370000000 HC RX 637 (ALT 250 FOR IP): Performed by: INTERNAL MEDICINE

## 2019-01-01 PROCEDURE — 93005 ELECTROCARDIOGRAM TRACING: CPT | Performed by: FAMILY MEDICINE

## 2019-01-01 PROCEDURE — 36415 COLL VENOUS BLD VENIPUNCTURE: CPT | Performed by: FAMILY MEDICINE

## 2019-01-01 PROCEDURE — 99238 HOSP IP/OBS DSCHRG MGMT 30/<: CPT | Performed by: FAMILY MEDICINE

## 2019-01-01 PROCEDURE — 96365 THER/PROPH/DIAG IV INF INIT: CPT

## 2019-01-01 PROCEDURE — 99215 OFFICE O/P EST HI 40 MIN: CPT | Performed by: FAMILY MEDICINE

## 2019-01-01 PROCEDURE — 6370000000 HC RX 637 (ALT 250 FOR IP): Performed by: STUDENT IN AN ORGANIZED HEALTH CARE EDUCATION/TRAINING PROGRAM

## 2019-01-01 PROCEDURE — 93307 TTE W/O DOPPLER COMPLETE: CPT

## 2019-01-01 PROCEDURE — 80053 COMPREHEN METABOLIC PANEL: CPT

## 2019-01-01 PROCEDURE — 82977 ASSAY OF GGT: CPT

## 2019-01-01 PROCEDURE — 99214 OFFICE O/P EST MOD 30 MIN: CPT | Performed by: FAMILY MEDICINE

## 2019-01-01 PROCEDURE — 84484 ASSAY OF TROPONIN QUANT: CPT

## 2019-01-01 PROCEDURE — 2580000003 HC RX 258: Performed by: INTERNAL MEDICINE

## 2019-01-01 PROCEDURE — 82306 VITAMIN D 25 HYDROXY: CPT

## 2019-01-01 PROCEDURE — 99232 SBSQ HOSP IP/OBS MODERATE 35: CPT | Performed by: INTERNAL MEDICINE

## 2019-01-01 PROCEDURE — 2720000010 HC SURG SUPPLY STERILE

## 2019-01-01 PROCEDURE — 6360000002 HC RX W HCPCS: Performed by: STUDENT IN AN ORGANIZED HEALTH CARE EDUCATION/TRAINING PROGRAM

## 2019-01-01 PROCEDURE — 87186 SC STD MICRODIL/AGAR DIL: CPT

## 2019-01-01 PROCEDURE — 1111F DSCHRG MED/CURRENT MED MERGE: CPT | Performed by: FAMILY MEDICINE

## 2019-01-01 PROCEDURE — 87077 CULTURE AEROBIC IDENTIFY: CPT

## 2019-01-01 PROCEDURE — 6360000002 HC RX W HCPCS: Performed by: EMERGENCY MEDICINE

## 2019-01-01 PROCEDURE — C1788 PORT, INDWELLING, IMP: HCPCS

## 2019-01-01 PROCEDURE — 93005 ELECTROCARDIOGRAM TRACING: CPT | Performed by: EMERGENCY MEDICINE

## 2019-01-01 PROCEDURE — 83550 IRON BINDING TEST: CPT

## 2019-01-01 PROCEDURE — 87086 URINE CULTURE/COLONY COUNT: CPT

## 2019-01-01 PROCEDURE — 6360000002 HC RX W HCPCS

## 2019-01-01 PROCEDURE — 83540 ASSAY OF IRON: CPT

## 2019-01-01 PROCEDURE — 36561 INSERT TUNNELED CV CATH: CPT | Performed by: RADIOLOGY

## 2019-01-01 PROCEDURE — C1769 GUIDE WIRE: HCPCS

## 2019-01-01 PROCEDURE — 85025 COMPLETE CBC W/AUTO DIFF WBC: CPT

## 2019-01-01 PROCEDURE — 84134 ASSAY OF PREALBUMIN: CPT

## 2019-01-01 PROCEDURE — 93005 ELECTROCARDIOGRAM TRACING: CPT | Performed by: INTERNAL MEDICINE

## 2019-01-01 PROCEDURE — 82652 VIT D 1 25-DIHYDROXY: CPT

## 2019-01-01 PROCEDURE — 2580000003 HC RX 258: Performed by: RADIOLOGY

## 2019-01-01 PROCEDURE — 71046 X-RAY EXAM CHEST 2 VIEWS: CPT

## 2019-01-01 PROCEDURE — 99239 HOSP IP/OBS DSCHRG MGMT >30: CPT | Performed by: INTERNAL MEDICINE

## 2019-01-01 PROCEDURE — 99496 TRANSJ CARE MGMT HIGH F2F 7D: CPT | Performed by: FAMILY MEDICINE

## 2019-01-01 PROCEDURE — 99254 IP/OBS CNSLTJ NEW/EST MOD 60: CPT | Performed by: INTERNAL MEDICINE

## 2019-01-01 PROCEDURE — 36415 COLL VENOUS BLD VENIPUNCTURE: CPT | Performed by: NURSE PRACTITIONER

## 2019-01-01 PROCEDURE — G0279 TOMOSYNTHESIS, MAMMO: HCPCS

## 2019-01-01 PROCEDURE — 99231 SBSQ HOSP IP/OBS SF/LOW 25: CPT | Performed by: INTERNAL MEDICINE

## 2019-01-01 PROCEDURE — 93005 ELECTROCARDIOGRAM TRACING: CPT | Performed by: NURSE PRACTITIONER

## 2019-01-01 PROCEDURE — 82310 ASSAY OF CALCIUM: CPT

## 2019-01-01 PROCEDURE — 82728 ASSAY OF FERRITIN: CPT

## 2019-01-01 PROCEDURE — 6370000000 HC RX 637 (ALT 250 FOR IP): Performed by: EMERGENCY MEDICINE

## 2019-01-01 PROCEDURE — A4648 IMPLANTABLE TISSUE MARKER: HCPCS

## 2019-01-01 PROCEDURE — 80076 HEPATIC FUNCTION PANEL: CPT

## 2019-01-01 PROCEDURE — 93005 ELECTROCARDIOGRAM TRACING: CPT | Performed by: STUDENT IN AN ORGANIZED HEALTH CARE EDUCATION/TRAINING PROGRAM

## 2019-01-01 PROCEDURE — 2500000003 HC RX 250 WO HCPCS

## 2019-01-01 PROCEDURE — 77063 BREAST TOMOSYNTHESIS BI: CPT

## 2019-01-01 PROCEDURE — 90686 IIV4 VACC NO PRSV 0.5 ML IM: CPT | Performed by: NURSE PRACTITIONER

## 2019-01-01 PROCEDURE — 93010 ELECTROCARDIOGRAM REPORT: CPT | Performed by: NUCLEAR MEDICINE

## 2019-01-01 PROCEDURE — 82248 BILIRUBIN DIRECT: CPT

## 2019-01-01 PROCEDURE — 82570 ASSAY OF URINE CREATININE: CPT

## 2019-01-01 RX ORDER — MAGNESIUM SULFATE IN WATER 40 MG/ML
2 INJECTION, SOLUTION INTRAVENOUS ONCE
Status: COMPLETED | OUTPATIENT
Start: 2019-01-01 | End: 2019-01-01

## 2019-01-01 RX ORDER — ZOLPIDEM TARTRATE 5 MG/1
2.5 TABLET ORAL NIGHTLY PRN
Status: DISCONTINUED | OUTPATIENT
Start: 2019-01-01 | End: 2019-01-01 | Stop reason: HOSPADM

## 2019-01-01 RX ORDER — THIAMINE MONONITRATE (VIT B1) 100 MG
100 TABLET ORAL DAILY
Status: DISCONTINUED | OUTPATIENT
Start: 2019-01-01 | End: 2019-01-01 | Stop reason: HOSPADM

## 2019-01-01 RX ORDER — PANTOPRAZOLE SODIUM 40 MG/1
40 TABLET, DELAYED RELEASE ORAL
Status: DISCONTINUED | OUTPATIENT
Start: 2019-01-01 | End: 2019-01-01 | Stop reason: HOSPADM

## 2019-01-01 RX ORDER — CALCITRIOL 0.25 UG/1
1 CAPSULE, LIQUID FILLED ORAL EVERY 6 HOURS
Status: DISCONTINUED | OUTPATIENT
Start: 2019-01-01 | End: 2019-01-01 | Stop reason: HOSPADM

## 2019-01-01 RX ORDER — SODIUM CHLORIDE, SODIUM LACTATE, POTASSIUM CHLORIDE, CALCIUM CHLORIDE 600; 310; 30; 20 MG/100ML; MG/100ML; MG/100ML; MG/100ML
1000 INJECTION, SOLUTION INTRAVENOUS ONCE
Status: COMPLETED | OUTPATIENT
Start: 2019-01-01 | End: 2019-01-01

## 2019-01-01 RX ORDER — LEVOTHYROXINE SODIUM 0.1 MG/1
200 TABLET ORAL DAILY
Status: DISCONTINUED | OUTPATIENT
Start: 2019-01-01 | End: 2019-01-01 | Stop reason: SDUPTHER

## 2019-01-01 RX ORDER — LIOTHYRONINE SODIUM 5 UG/1
5 TABLET ORAL DAILY
Status: DISCONTINUED | OUTPATIENT
Start: 2019-01-01 | End: 2019-01-01 | Stop reason: HOSPADM

## 2019-01-01 RX ORDER — NITROFURANTOIN 25; 75 MG/1; MG/1
100 CAPSULE ORAL EVERY 12 HOURS SCHEDULED
Qty: 6 CAPSULE | Refills: 0 | Status: SHIPPED | OUTPATIENT
Start: 2019-01-01 | End: 2019-01-01

## 2019-01-01 RX ORDER — POTASSIUM CHLORIDE 20 MEQ/1
40 TABLET, EXTENDED RELEASE ORAL ONCE
Status: COMPLETED | OUTPATIENT
Start: 2019-01-01 | End: 2019-01-01

## 2019-01-01 RX ORDER — SODIUM CHLORIDE 450 MG/100ML
INJECTION, SOLUTION INTRAVENOUS CONTINUOUS
Status: CANCELLED | OUTPATIENT
Start: 2019-01-01

## 2019-01-01 RX ORDER — CALCITRIOL 1 UG/ML
15 SOLUTION ORAL DAILY
Status: DISCONTINUED | OUTPATIENT
Start: 2019-01-01 | End: 2019-01-01 | Stop reason: HOSPADM

## 2019-01-01 RX ORDER — GABAPENTIN 100 MG/1
100 CAPSULE ORAL 2 TIMES DAILY
Status: DISCONTINUED | OUTPATIENT
Start: 2019-01-01 | End: 2019-01-01 | Stop reason: HOSPADM

## 2019-01-01 RX ORDER — PSEUDOEPHEDRINE HCL 30 MG
1500 TABLET ORAL 4 TIMES DAILY
Status: DISCONTINUED | OUTPATIENT
Start: 2019-01-01 | End: 2019-01-01 | Stop reason: SDUPTHER

## 2019-01-01 RX ORDER — ONDANSETRON 4 MG/1
4 TABLET, ORALLY DISINTEGRATING ORAL EVERY 8 HOURS PRN
Status: DISCONTINUED | OUTPATIENT
Start: 2019-01-01 | End: 2019-01-01

## 2019-01-01 RX ORDER — CALCIUM GLUCONATE 94 MG/ML
2 INJECTION, SOLUTION INTRAVENOUS ONCE
Status: DISCONTINUED | OUTPATIENT
Start: 2019-01-01 | End: 2019-01-01 | Stop reason: SDUPTHER

## 2019-01-01 RX ORDER — ACETAMINOPHEN 325 MG/1
650 TABLET ORAL EVERY 4 HOURS PRN
Status: DISCONTINUED | OUTPATIENT
Start: 2019-01-01 | End: 2019-01-01 | Stop reason: HOSPADM

## 2019-01-01 RX ORDER — GABAPENTIN 300 MG/1
300 CAPSULE ORAL NIGHTLY
Qty: 90 CAPSULE | Refills: 1 | Status: SHIPPED | OUTPATIENT
Start: 2019-01-01 | End: 2020-01-01 | Stop reason: SDUPTHER

## 2019-01-01 RX ORDER — PARATHYROID HORMONE 50 UG/.08ML
INJECTION, POWDER, LYOPHILIZED, FOR SOLUTION SUBCUTANEOUS
COMMUNITY
Start: 2019-08-27 | End: 2019-01-01 | Stop reason: CLARIF

## 2019-01-01 RX ORDER — ESTRADIOL 10 UG/1
10 INSERT VAGINAL DAILY
Status: DISCONTINUED | OUTPATIENT
Start: 2019-01-01 | End: 2019-01-01

## 2019-01-01 RX ORDER — AMILORIDE HYDROCHLORIDE 5 MG/1
5 TABLET ORAL DAILY
Status: ON HOLD | COMMUNITY
Start: 2019-01-01 | End: 2020-01-01 | Stop reason: HOSPADM

## 2019-01-01 RX ORDER — FENTANYL CITRATE 50 UG/ML
50 INJECTION, SOLUTION INTRAMUSCULAR; INTRAVENOUS ONCE
Status: COMPLETED | OUTPATIENT
Start: 2019-01-01 | End: 2019-01-01

## 2019-01-01 RX ORDER — LANOLIN ALCOHOL/MO/W.PET/CERES
500 CREAM (GRAM) TOPICAL DAILY
Status: DISCONTINUED | OUTPATIENT
Start: 2019-01-01 | End: 2019-01-01 | Stop reason: HOSPADM

## 2019-01-01 RX ORDER — LEVOTHYROXINE SODIUM 0.07 MG/1
75 TABLET ORAL DAILY
Status: DISCONTINUED | OUTPATIENT
Start: 2019-01-01 | End: 2019-01-01 | Stop reason: DRUGHIGH

## 2019-01-01 RX ORDER — VITAMIN B COMPLEX
10000 TABLET ORAL DAILY
Status: DISCONTINUED | OUTPATIENT
Start: 2019-01-01 | End: 2019-01-01

## 2019-01-01 RX ORDER — ERGOCALCIFEROL 1.25 MG/1
50000 CAPSULE ORAL ONCE
Status: COMPLETED | OUTPATIENT
Start: 2019-01-01 | End: 2019-01-01

## 2019-01-01 RX ORDER — ERGOCALCIFEROL 1.25 MG/1
50000 CAPSULE ORAL DAILY
Status: DISCONTINUED | OUTPATIENT
Start: 2019-01-01 | End: 2019-01-01 | Stop reason: HOSPADM

## 2019-01-01 RX ORDER — CLINDAMYCIN PHOSPHATE 600 MG/50ML
600 INJECTION INTRAVENOUS
Status: COMPLETED | OUTPATIENT
Start: 2019-01-01 | End: 2019-01-01

## 2019-01-01 RX ORDER — GABAPENTIN 300 MG/1
300 CAPSULE ORAL NIGHTLY
Status: DISCONTINUED | OUTPATIENT
Start: 2019-01-01 | End: 2019-01-01 | Stop reason: HOSPADM

## 2019-01-01 RX ORDER — GABAPENTIN 100 MG/1
100 CAPSULE ORAL DAILY
Qty: 90 CAPSULE | Refills: 0 | Status: SHIPPED | OUTPATIENT
Start: 2019-01-01 | End: 2020-01-01 | Stop reason: SDUPTHER

## 2019-01-01 RX ORDER — ZOLPIDEM TARTRATE 5 MG/1
5 TABLET ORAL NIGHTLY PRN
Status: DISCONTINUED | OUTPATIENT
Start: 2019-01-01 | End: 2019-01-01 | Stop reason: HOSPADM

## 2019-01-01 RX ORDER — FOLIC ACID 1 MG/1
1600 TABLET ORAL DAILY
Status: DISCONTINUED | OUTPATIENT
Start: 2019-01-01 | End: 2019-01-01

## 2019-01-01 RX ORDER — ONDANSETRON 2 MG/ML
4 INJECTION INTRAMUSCULAR; INTRAVENOUS EVERY 6 HOURS PRN
Status: DISCONTINUED | OUTPATIENT
Start: 2019-01-01 | End: 2019-01-01

## 2019-01-01 RX ORDER — 0.9 % SODIUM CHLORIDE 0.9 %
1000 INTRAVENOUS SOLUTION INTRAVENOUS ONCE
Status: DISCONTINUED | OUTPATIENT
Start: 2019-01-01 | End: 2019-01-01

## 2019-01-01 RX ORDER — DULOXETIN HYDROCHLORIDE 30 MG/1
30 CAPSULE, DELAYED RELEASE ORAL DAILY
Qty: 90 CAPSULE | Refills: 1 | Status: SHIPPED | OUTPATIENT
Start: 2019-01-01 | End: 2020-01-01

## 2019-01-01 RX ORDER — ZOLPIDEM TARTRATE 5 MG/1
2.5-5 TABLET ORAL NIGHTLY PRN
Qty: 45 TABLET | Refills: 0 | Status: SHIPPED | OUTPATIENT
Start: 2019-01-01 | End: 2019-01-01 | Stop reason: SDUPTHER

## 2019-01-01 RX ORDER — FOLIC ACID 1 MG/1
1 TABLET ORAL DAILY
Status: DISCONTINUED | OUTPATIENT
Start: 2019-01-01 | End: 2019-01-01 | Stop reason: HOSPADM

## 2019-01-01 RX ORDER — ZINC GLUCONATE 50 MG
50 TABLET ORAL DAILY
Status: DISCONTINUED | OUTPATIENT
Start: 2019-01-01 | End: 2019-01-01 | Stop reason: HOSPADM

## 2019-01-01 RX ORDER — MIDAZOLAM HYDROCHLORIDE 1 MG/ML
1 INJECTION INTRAMUSCULAR; INTRAVENOUS ONCE
Status: CANCELLED | OUTPATIENT
Start: 2019-01-01 | End: 2019-01-01

## 2019-01-01 RX ORDER — ZOLPIDEM TARTRATE 5 MG/1
2.5 TABLET ORAL NIGHTLY PRN
Qty: 45 TABLET | Refills: 0 | Status: CANCELLED | OUTPATIENT
Start: 2019-01-01 | End: 2020-01-01

## 2019-01-01 RX ORDER — DULOXETIN HYDROCHLORIDE 60 MG/1
60 CAPSULE, DELAYED RELEASE ORAL NIGHTLY
Status: DISCONTINUED | OUTPATIENT
Start: 2019-01-01 | End: 2019-01-01 | Stop reason: HOSPADM

## 2019-01-01 RX ORDER — ACYCLOVIR 400 MG/1
400 TABLET ORAL 2 TIMES DAILY
Status: DISCONTINUED | OUTPATIENT
Start: 2019-01-01 | End: 2019-01-01 | Stop reason: HOSPADM

## 2019-01-01 RX ORDER — POTASSIUM CHLORIDE 750 MG/1
10 TABLET, FILM COATED, EXTENDED RELEASE ORAL 2 TIMES DAILY
Status: DISCONTINUED | OUTPATIENT
Start: 2019-01-01 | End: 2019-01-01 | Stop reason: HOSPADM

## 2019-01-01 RX ORDER — ZOLPIDEM TARTRATE 5 MG/1
2.5 TABLET ORAL NIGHTLY PRN
Qty: 45 TABLET | Refills: 0 | Status: SHIPPED | OUTPATIENT
Start: 2019-01-01 | End: 2019-01-01 | Stop reason: ALTCHOICE

## 2019-01-01 RX ORDER — PHYTONADIONE (VIT K1) 100 MCG
300 TABLET ORAL DAILY
Status: DISCONTINUED | OUTPATIENT
Start: 2019-01-01 | End: 2019-01-01 | Stop reason: HOSPADM

## 2019-01-01 RX ORDER — POLYETHYLENE GLYCOL 3350 17 G/17G
17 POWDER, FOR SOLUTION ORAL DAILY
Qty: 527 G | Refills: 1 | Status: SHIPPED | OUTPATIENT
Start: 2019-01-01 | End: 2019-01-01

## 2019-01-01 RX ORDER — SODIUM CHLORIDE 0.9 % (FLUSH) 0.9 %
10 SYRINGE (ML) INJECTION EVERY 12 HOURS SCHEDULED
Status: DISCONTINUED | OUTPATIENT
Start: 2019-01-01 | End: 2019-01-01 | Stop reason: HOSPADM

## 2019-01-01 RX ORDER — PHYTONADIONE (VIT K1) 100 MCG
3 TABLET ORAL DAILY
Status: DISCONTINUED | OUTPATIENT
Start: 2019-01-01 | End: 2019-01-01

## 2019-01-01 RX ORDER — FAMOTIDINE 20 MG/1
20 TABLET, FILM COATED ORAL DAILY
Status: DISCONTINUED | OUTPATIENT
Start: 2019-01-01 | End: 2019-01-01 | Stop reason: HOSPADM

## 2019-01-01 RX ORDER — ESTRADIOL 10 UG/1
INSERT VAGINAL
Qty: 40 TABLET | Refills: 0 | Status: SHIPPED | OUTPATIENT
Start: 2019-01-01 | End: 2020-01-01 | Stop reason: SDUPTHER

## 2019-01-01 RX ORDER — POTASSIUM CHLORIDE 7.45 MG/ML
10 INJECTION INTRAVENOUS PRN
Status: DISCONTINUED | OUTPATIENT
Start: 2019-01-01 | End: 2019-01-01 | Stop reason: HOSPADM

## 2019-01-01 RX ORDER — ACYCLOVIR 400 MG/1
400 TABLET ORAL 2 TIMES DAILY
Qty: 180 TABLET | Refills: 1 | Status: SHIPPED | OUTPATIENT
Start: 2019-01-01 | End: 2020-01-01

## 2019-01-01 RX ORDER — LIOTHYRONINE SODIUM 5 UG/1
5 TABLET ORAL DAILY
Status: DISCONTINUED | OUTPATIENT
Start: 2019-01-01 | End: 2019-01-01

## 2019-01-01 RX ORDER — LEVOTHYROXINE SODIUM 0.1 MG/1
200 TABLET ORAL DAILY
Status: DISCONTINUED | OUTPATIENT
Start: 2019-01-01 | End: 2019-01-01 | Stop reason: DRUGHIGH

## 2019-01-01 RX ORDER — CLINDAMYCIN PHOSPHATE 600 MG/50ML
600 INJECTION INTRAVENOUS
Status: CANCELLED | OUTPATIENT
Start: 2019-01-01

## 2019-01-01 RX ORDER — DULOXETIN HYDROCHLORIDE 60 MG/1
60 CAPSULE, DELAYED RELEASE ORAL NIGHTLY
Qty: 90 CAPSULE | Refills: 1 | Status: SHIPPED | OUTPATIENT
Start: 2019-01-01 | End: 2020-01-01 | Stop reason: SDUPTHER

## 2019-01-01 RX ORDER — DULOXETIN HYDROCHLORIDE 30 MG/1
30 CAPSULE, DELAYED RELEASE ORAL DAILY
Status: DISCONTINUED | OUTPATIENT
Start: 2019-01-01 | End: 2019-01-01 | Stop reason: HOSPADM

## 2019-01-01 RX ORDER — HEPARIN SODIUM (PORCINE) LOCK FLUSH IV SOLN 100 UNIT/ML 100 UNIT/ML
500 SOLUTION INTRAVENOUS ONCE
Status: CANCELLED | OUTPATIENT
Start: 2019-01-01 | End: 2019-01-01

## 2019-01-01 RX ORDER — CALCIUM CARBONATE 500(1250)
2500 TABLET ORAL 4 TIMES DAILY
Qty: 30 TABLET | Refills: 3 | Status: SHIPPED | OUTPATIENT
Start: 2019-01-01 | End: 2020-01-01

## 2019-01-01 RX ORDER — NITROFURANTOIN 25; 75 MG/1; MG/1
100 CAPSULE ORAL EVERY 12 HOURS SCHEDULED
Status: DISCONTINUED | OUTPATIENT
Start: 2019-01-01 | End: 2019-01-01 | Stop reason: HOSPADM

## 2019-01-01 RX ORDER — FUROSEMIDE 20 MG/1
TABLET ORAL
COMMUNITY
Start: 2019-01-01 | End: 2019-01-01 | Stop reason: CLARIF

## 2019-01-01 RX ORDER — 0.9 % SODIUM CHLORIDE 0.9 %
1000 INTRAVENOUS SOLUTION INTRAVENOUS ONCE
Status: COMPLETED | OUTPATIENT
Start: 2019-01-01 | End: 2019-01-01

## 2019-01-01 RX ORDER — HEPARIN SODIUM (PORCINE) LOCK FLUSH IV SOLN 100 UNIT/ML 100 UNIT/ML
500 SOLUTION INTRAVENOUS ONCE
Status: COMPLETED | OUTPATIENT
Start: 2019-01-01 | End: 2019-01-01

## 2019-01-01 RX ORDER — MAGNESIUM SULFATE IN WATER 40 MG/ML
4 INJECTION, SOLUTION INTRAVENOUS ONCE
Status: COMPLETED | OUTPATIENT
Start: 2019-01-01 | End: 2019-01-01

## 2019-01-01 RX ORDER — CALCIUM CARBONATE 500(1250)
2500 TABLET ORAL 4 TIMES DAILY
Status: DISCONTINUED | OUTPATIENT
Start: 2019-01-01 | End: 2019-01-01 | Stop reason: HOSPADM

## 2019-01-01 RX ORDER — TIZANIDINE 4 MG/1
4 TABLET ORAL NIGHTLY
Status: DISCONTINUED | OUTPATIENT
Start: 2019-01-01 | End: 2019-01-01 | Stop reason: HOSPADM

## 2019-01-01 RX ORDER — ONDANSETRON 4 MG/1
4 TABLET, ORALLY DISINTEGRATING ORAL EVERY 8 HOURS PRN
Status: DISCONTINUED | OUTPATIENT
Start: 2019-01-01 | End: 2019-01-01 | Stop reason: HOSPADM

## 2019-01-01 RX ORDER — MIDAZOLAM HYDROCHLORIDE 1 MG/ML
1 INJECTION INTRAMUSCULAR; INTRAVENOUS ONCE
Status: COMPLETED | OUTPATIENT
Start: 2019-01-01 | End: 2019-01-01

## 2019-01-01 RX ORDER — 0.9 % SODIUM CHLORIDE 0.9 %
500 INTRAVENOUS SOLUTION INTRAVENOUS ONCE
Status: COMPLETED | OUTPATIENT
Start: 2019-01-01 | End: 2019-01-01

## 2019-01-01 RX ORDER — NYSTATIN 100000 U/G
CREAM TOPICAL
Qty: 30 G | Refills: 1 | Status: SHIPPED | OUTPATIENT
Start: 2019-01-01 | End: 2019-01-01 | Stop reason: ALTCHOICE

## 2019-01-01 RX ORDER — TRIAMCINOLONE ACETONIDE 1 MG/G
CREAM TOPICAL
Qty: 45 G | Refills: 1 | Status: SHIPPED | OUTPATIENT
Start: 2019-01-01 | End: 2020-01-01

## 2019-01-01 RX ORDER — POLYETHYLENE GLYCOL 3350 17 G/17G
17 POWDER, FOR SOLUTION ORAL DAILY
Status: DISCONTINUED | OUTPATIENT
Start: 2019-01-01 | End: 2019-01-01 | Stop reason: HOSPADM

## 2019-01-01 RX ORDER — FENTANYL CITRATE 50 UG/ML
50 INJECTION, SOLUTION INTRAMUSCULAR; INTRAVENOUS ONCE
Status: CANCELLED | OUTPATIENT
Start: 2019-01-01 | End: 2019-01-01

## 2019-01-01 RX ORDER — POTASSIUM CHLORIDE 750 MG/1
10 TABLET, FILM COATED, EXTENDED RELEASE ORAL DAILY
Status: DISCONTINUED | OUTPATIENT
Start: 2019-01-01 | End: 2019-01-01 | Stop reason: HOSPADM

## 2019-01-01 RX ORDER — SODIUM CHLORIDE 0.9 % (FLUSH) 0.9 %
10 SYRINGE (ML) INJECTION PRN
Status: DISCONTINUED | OUTPATIENT
Start: 2019-01-01 | End: 2019-01-01 | Stop reason: HOSPADM

## 2019-01-01 RX ORDER — SODIUM CHLORIDE 450 MG/100ML
INJECTION, SOLUTION INTRAVENOUS CONTINUOUS
Status: DISCONTINUED | OUTPATIENT
Start: 2019-01-01 | End: 2019-01-01 | Stop reason: HOSPADM

## 2019-01-01 RX ORDER — SPIRONOLACTONE 25 MG/1
12.5 TABLET ORAL DAILY
Status: DISCONTINUED | OUTPATIENT
Start: 2019-01-01 | End: 2019-01-01 | Stop reason: HOSPADM

## 2019-01-01 RX ORDER — LOPERAMIDE HYDROCHLORIDE 2 MG/1
2 CAPSULE ORAL 4 TIMES DAILY PRN
Status: DISCONTINUED | OUTPATIENT
Start: 2019-01-01 | End: 2019-01-01 | Stop reason: HOSPADM

## 2019-01-01 RX ORDER — CALCIUM CARBONATE 500(1250)
2500 TABLET ORAL 4 TIMES DAILY
Status: DISCONTINUED | OUTPATIENT
Start: 2019-01-01 | End: 2019-01-01 | Stop reason: ALTCHOICE

## 2019-01-01 RX ORDER — CALCIUM CARBONATE 500(1250)
1500 TABLET ORAL 2 TIMES DAILY
Status: DISCONTINUED | OUTPATIENT
Start: 2019-01-01 | End: 2019-01-01 | Stop reason: HOSPADM

## 2019-01-01 RX ORDER — TIZANIDINE 4 MG/1
4 TABLET ORAL NIGHTLY
Qty: 90 TABLET | Refills: 1 | Status: SHIPPED | OUTPATIENT
Start: 2019-01-01 | End: 2020-01-01 | Stop reason: SDUPTHER

## 2019-01-01 RX ORDER — CALCITRIOL 0.25 UG/1
1 CAPSULE, LIQUID FILLED ORAL EVERY 8 HOURS
Status: DISCONTINUED | OUTPATIENT
Start: 2019-01-01 | End: 2019-01-01

## 2019-01-01 RX ORDER — GABAPENTIN 100 MG/1
100 CAPSULE ORAL DAILY
Status: DISCONTINUED | OUTPATIENT
Start: 2019-01-01 | End: 2019-01-01 | Stop reason: HOSPADM

## 2019-01-01 RX ADMIN — CALCIUM 1500 MG: 500 TABLET ORAL at 19:07

## 2019-01-01 RX ADMIN — CALCIUM GLUCONATE 3 G: 98 INJECTION, SOLUTION INTRAVENOUS at 13:53

## 2019-01-01 RX ADMIN — ACYCLOVIR 400 MG: 400 TABLET ORAL at 08:32

## 2019-01-01 RX ADMIN — ACYCLOVIR 400 MG: 400 TABLET ORAL at 21:25

## 2019-01-01 RX ADMIN — VITAMIN D, TAB 1000IU (100/BT) 10000 UNITS: 25 TAB at 11:33

## 2019-01-01 RX ADMIN — PANTOPRAZOLE SODIUM 40 MG: 40 TABLET, DELAYED RELEASE ORAL at 05:10

## 2019-01-01 RX ADMIN — SODIUM CHLORIDE 1000 ML: 9 INJECTION, SOLUTION INTRAVENOUS at 11:55

## 2019-01-01 RX ADMIN — CALCIUM ACETATE 1334 MG: 667 CAPSULE ORAL at 08:22

## 2019-01-01 RX ADMIN — CALCIUM 2500 MG: 500 TABLET ORAL at 17:19

## 2019-01-01 RX ADMIN — Medication 100 MG: at 17:00

## 2019-01-01 RX ADMIN — Medication 20000 UNITS: at 08:32

## 2019-01-01 RX ADMIN — ZOLPIDEM TARTRATE 2.5 MG: 5 TABLET ORAL at 22:01

## 2019-01-01 RX ADMIN — CALCITRIOL 1 MCG: 0.25 CAPSULE ORAL at 04:14

## 2019-01-01 RX ADMIN — POTASSIUM CHLORIDE 10 MEQ: 750 TABLET, FILM COATED, EXTENDED RELEASE ORAL at 08:37

## 2019-01-01 RX ADMIN — Medication 100 MG: at 09:00

## 2019-01-01 RX ADMIN — GABAPENTIN 300 MG: 300 CAPSULE ORAL at 20:36

## 2019-01-01 RX ADMIN — POTASSIUM CHLORIDE 10 MEQ: 750 TABLET, FILM COATED, EXTENDED RELEASE ORAL at 11:30

## 2019-01-01 RX ADMIN — ACYCLOVIR 400 MG: 400 TABLET ORAL at 09:07

## 2019-01-01 RX ADMIN — Medication 10 ML: at 20:31

## 2019-01-01 RX ADMIN — CALCIUM GLUCONATE: 98 INJECTION, SOLUTION INTRAVENOUS at 08:34

## 2019-01-01 RX ADMIN — LIOTHYRONINE SODIUM 5 MCG: 5 TABLET ORAL at 08:24

## 2019-01-01 RX ADMIN — CALCIUM ACETATE 1334 MG: 667 CAPSULE ORAL at 11:26

## 2019-01-01 RX ADMIN — GABAPENTIN 100 MG: 100 CAPSULE ORAL at 09:00

## 2019-01-01 RX ADMIN — CALCITRIOL 1 MCG: 0.25 CAPSULE ORAL at 08:23

## 2019-01-01 RX ADMIN — GABAPENTIN 100 MG: 100 CAPSULE ORAL at 11:35

## 2019-01-01 RX ADMIN — LOPERAMIDE HYDROCHLORIDE 2 MG: 2 CAPSULE ORAL at 11:47

## 2019-01-01 RX ADMIN — FOLIC ACID 1 MG: 1 TABLET ORAL at 08:35

## 2019-01-01 RX ADMIN — CALCITRIOL 1 MCG: 0.25 CAPSULE ORAL at 21:05

## 2019-01-01 RX ADMIN — MAGNESIUM GLUCONATE 500 MG ORAL TABLET 400 MG: 500 TABLET ORAL at 21:06

## 2019-01-01 RX ADMIN — ACETAMINOPHEN 650 MG: 325 TABLET ORAL at 16:20

## 2019-01-01 RX ADMIN — GABAPENTIN 100 MG: 100 CAPSULE ORAL at 13:33

## 2019-01-01 RX ADMIN — Medication 100 MG: at 09:06

## 2019-01-01 RX ADMIN — CALCIUM ACETATE 1334 MG: 667 CAPSULE ORAL at 08:32

## 2019-01-01 RX ADMIN — GABAPENTIN 300 MG: 300 CAPSULE ORAL at 20:53

## 2019-01-01 RX ADMIN — METOPROLOL TARTRATE 12.5 MG: 25 TABLET ORAL at 20:29

## 2019-01-01 RX ADMIN — Medication 500 MCG: at 14:06

## 2019-01-01 RX ADMIN — PANTOPRAZOLE SODIUM 40 MG: 40 TABLET, DELAYED RELEASE ORAL at 06:27

## 2019-01-01 RX ADMIN — FENTANYL CITRATE 50 MCG: 50 INJECTION, SOLUTION INTRAMUSCULAR; INTRAVENOUS at 11:33

## 2019-01-01 RX ADMIN — DULOXETINE HYDROCHLORIDE 60 MG: 60 CAPSULE, DELAYED RELEASE ORAL at 21:22

## 2019-01-01 RX ADMIN — Medication 20000 UNITS: at 08:06

## 2019-01-01 RX ADMIN — VITAMIN D, TAB 1000IU (100/BT) 10000 UNITS: 25 TAB at 09:01

## 2019-01-01 RX ADMIN — Medication 20000 UNITS: at 16:11

## 2019-01-01 RX ADMIN — CALCIUM ACETATE 1334 MG: 667 CAPSULE ORAL at 17:29

## 2019-01-01 RX ADMIN — CALCIUM ACETATE 1334 MG: 667 CAPSULE ORAL at 13:34

## 2019-01-01 RX ADMIN — ZOLPIDEM TARTRATE 2.5 MG: 5 TABLET ORAL at 22:58

## 2019-01-01 RX ADMIN — SODIUM CHLORIDE 1000 ML: 9 INJECTION, SOLUTION INTRAVENOUS at 23:41

## 2019-01-01 RX ADMIN — CALCITRIOL 1 MCG: 0.25 CAPSULE ORAL at 02:00

## 2019-01-01 RX ADMIN — GABAPENTIN 300 MG: 300 CAPSULE ORAL at 20:28

## 2019-01-01 RX ADMIN — ACYCLOVIR 400 MG: 400 TABLET ORAL at 08:38

## 2019-01-01 RX ADMIN — MAGNESIUM GLUCONATE 500 MG ORAL TABLET 400 MG: 500 TABLET ORAL at 21:25

## 2019-01-01 RX ADMIN — CALCIUM 1500 MG: 500 TABLET ORAL at 08:32

## 2019-01-01 RX ADMIN — PANTOPRAZOLE SODIUM 40 MG: 40 TABLET, DELAYED RELEASE ORAL at 07:04

## 2019-01-01 RX ADMIN — CALCIUM ACETATE 1334 MG: 667 CAPSULE ORAL at 19:07

## 2019-01-01 RX ADMIN — ZOLPIDEM TARTRATE 5 MG: 5 TABLET ORAL at 23:05

## 2019-01-01 RX ADMIN — VITAMIN D, TAB 1000IU (100/BT) 10000 UNITS: 25 TAB at 08:06

## 2019-01-01 RX ADMIN — CALCIUM 1500 MG: 500 TABLET ORAL at 21:06

## 2019-01-01 RX ADMIN — CALCIUM 2500 MG: 500 TABLET ORAL at 08:35

## 2019-01-01 RX ADMIN — ERGOCALCIFEROL 50000 UNITS: 1.25 CAPSULE ORAL at 22:48

## 2019-01-01 RX ADMIN — CALCIUM 1500 MG: 500 TABLET ORAL at 09:00

## 2019-01-01 RX ADMIN — LOPERAMIDE HYDROCHLORIDE 2 MG: 2 CAPSULE ORAL at 11:28

## 2019-01-01 RX ADMIN — ACYCLOVIR 400 MG: 400 TABLET ORAL at 21:21

## 2019-01-01 RX ADMIN — DULOXETINE HYDROCHLORIDE 60 MG: 60 CAPSULE, DELAYED RELEASE ORAL at 20:28

## 2019-01-01 RX ADMIN — Medication 100 MG: at 14:05

## 2019-01-01 RX ADMIN — GABAPENTIN 100 MG: 100 CAPSULE ORAL at 08:06

## 2019-01-01 RX ADMIN — MAGNESIUM GLUCONATE 500 MG ORAL TABLET 400 MG: 500 TABLET ORAL at 20:36

## 2019-01-01 RX ADMIN — Medication 100 MG: at 08:35

## 2019-01-01 RX ADMIN — DULOXETINE HYDROCHLORIDE 30 MG: 30 CAPSULE, DELAYED RELEASE ORAL at 08:23

## 2019-01-01 RX ADMIN — DULOXETINE HYDROCHLORIDE 30 MG: 30 CAPSULE, DELAYED RELEASE ORAL at 09:00

## 2019-01-01 RX ADMIN — CALCIUM GLUCONATE 1 G: 98 INJECTION, SOLUTION INTRAVENOUS at 11:05

## 2019-01-01 RX ADMIN — ZOLPIDEM TARTRATE 5 MG: 5 TABLET ORAL at 23:15

## 2019-01-01 RX ADMIN — NITROFURANTOIN MONOHYDRATE/MACROCRYSTALLINE 100 MG: 25; 75 CAPSULE ORAL at 21:24

## 2019-01-01 RX ADMIN — SODIUM CHLORIDE: 4.5 INJECTION, SOLUTION INTRAVENOUS at 10:26

## 2019-01-01 RX ADMIN — POTASSIUM CHLORIDE 10 MEQ: 750 TABLET, FILM COATED, EXTENDED RELEASE ORAL at 21:25

## 2019-01-01 RX ADMIN — CALCITRIOL 15 MCG: 1 SOLUTION ORAL at 07:55

## 2019-01-01 RX ADMIN — ERGOCALCIFEROL 50000 UNITS: 1.25 CAPSULE ORAL at 08:22

## 2019-01-01 RX ADMIN — LOPERAMIDE HYDROCHLORIDE 2 MG: 2 CAPSULE ORAL at 11:10

## 2019-01-01 RX ADMIN — TIZANIDINE 4 MG: 4 TABLET ORAL at 21:06

## 2019-01-01 RX ADMIN — ACYCLOVIR 400 MG: 400 TABLET ORAL at 20:29

## 2019-01-01 RX ADMIN — GABAPENTIN 100 MG: 100 CAPSULE ORAL at 09:07

## 2019-01-01 RX ADMIN — MAGNESIUM GLUCONATE 500 MG ORAL TABLET 800 MG: 500 TABLET ORAL at 11:37

## 2019-01-01 RX ADMIN — Medication 10 ML: at 09:08

## 2019-01-01 RX ADMIN — MAGNESIUM GLUCONATE 500 MG ORAL TABLET 400 MG: 500 TABLET ORAL at 08:32

## 2019-01-01 RX ADMIN — POTASSIUM CHLORIDE 40 MEQ: 1500 TABLET, EXTENDED RELEASE ORAL at 11:59

## 2019-01-01 RX ADMIN — CLINDAMYCIN PHOSPHATE 600 MG: 600 INJECTION, SOLUTION INTRAVENOUS at 10:49

## 2019-01-01 RX ADMIN — MAGNESIUM GLUCONATE 500 MG ORAL TABLET 400 MG: 500 TABLET ORAL at 21:07

## 2019-01-01 RX ADMIN — NITROFURANTOIN MONOHYDRATE/MACROCRYSTALLINE 100 MG: 25; 75 CAPSULE ORAL at 08:06

## 2019-01-01 RX ADMIN — FOLIC ACID 1 MG: 1 TABLET ORAL at 08:32

## 2019-01-01 RX ADMIN — Medication 300 MCG: at 08:23

## 2019-01-01 RX ADMIN — DULOXETINE HYDROCHLORIDE 60 MG: 60 CAPSULE, DELAYED RELEASE ORAL at 20:53

## 2019-01-01 RX ADMIN — GABAPENTIN 300 MG: 300 CAPSULE ORAL at 21:08

## 2019-01-01 RX ADMIN — Medication 300 MCG: at 07:53

## 2019-01-01 RX ADMIN — CALCIUM 1500 MG: 500 TABLET ORAL at 20:36

## 2019-01-01 RX ADMIN — TIZANIDINE 4 MG: 4 TABLET ORAL at 21:21

## 2019-01-01 RX ADMIN — POTASSIUM CHLORIDE 10 MEQ: 750 TABLET, FILM COATED, EXTENDED RELEASE ORAL at 20:53

## 2019-01-01 RX ADMIN — FOLIC ACID 1 MG: 1 TABLET ORAL at 09:00

## 2019-01-01 RX ADMIN — Medication 100 MG: at 08:06

## 2019-01-01 RX ADMIN — DULOXETINE HYDROCHLORIDE 30 MG: 30 CAPSULE, DELAYED RELEASE ORAL at 08:33

## 2019-01-01 RX ADMIN — METOPROLOL TARTRATE 12.5 MG: 25 TABLET ORAL at 08:24

## 2019-01-01 RX ADMIN — CALCIUM 2500 MG: 500 TABLET ORAL at 05:27

## 2019-01-01 RX ADMIN — SPIRONOLACTONE 12.5 MG: 25 TABLET ORAL at 17:01

## 2019-01-01 RX ADMIN — DULOXETINE HYDROCHLORIDE 30 MG: 30 CAPSULE, DELAYED RELEASE ORAL at 09:07

## 2019-01-01 RX ADMIN — FOLIC ACID 1 MG: 1 TABLET ORAL at 17:00

## 2019-01-01 RX ADMIN — CALCIUM GLUCONATE 2 G: 98 INJECTION, SOLUTION INTRAVENOUS at 13:06

## 2019-01-01 RX ADMIN — ACYCLOVIR 400 MG: 400 TABLET ORAL at 08:07

## 2019-01-01 RX ADMIN — CALCIUM GLUCONATE 1 G: 98 INJECTION, SOLUTION INTRAVENOUS at 19:24

## 2019-01-01 RX ADMIN — MAGNESIUM GLUCONATE 500 MG ORAL TABLET 400 MG: 500 TABLET ORAL at 08:22

## 2019-01-01 RX ADMIN — PANTOPRAZOLE SODIUM 40 MG: 40 TABLET, DELAYED RELEASE ORAL at 05:49

## 2019-01-01 RX ADMIN — FOLIC ACID 1 MG: 1 TABLET ORAL at 09:07

## 2019-01-01 RX ADMIN — FOLIC ACID 1 MG: 1 TABLET ORAL at 08:23

## 2019-01-01 RX ADMIN — FOLIC ACID 1 MG: 1 TABLET ORAL at 08:07

## 2019-01-01 RX ADMIN — VITAMIN D, TAB 1000IU (100/BT) 10000 UNITS: 25 TAB at 08:37

## 2019-01-01 RX ADMIN — CALCIUM GLUCONATE 1 G: 98 INJECTION, SOLUTION INTRAVENOUS at 23:17

## 2019-01-01 RX ADMIN — CALCIUM ACETATE 1334 MG: 667 CAPSULE ORAL at 16:31

## 2019-01-01 RX ADMIN — Medication 100 MG: at 08:32

## 2019-01-01 RX ADMIN — SPIRONOLACTONE 12.5 MG: 25 TABLET ORAL at 08:24

## 2019-01-01 RX ADMIN — SODIUM CHLORIDE 50000 UNITS: 0.9 IRRIGANT IRRIGATION at 11:46

## 2019-01-01 RX ADMIN — FOLIC ACID 1 MG: 1 TABLET ORAL at 14:06

## 2019-01-01 RX ADMIN — METOPROLOL TARTRATE 12.5 MG: 25 TABLET ORAL at 14:11

## 2019-01-01 RX ADMIN — GABAPENTIN 100 MG: 100 CAPSULE ORAL at 14:12

## 2019-01-01 RX ADMIN — DULOXETINE HYDROCHLORIDE 60 MG: 60 CAPSULE, DELAYED RELEASE ORAL at 21:25

## 2019-01-01 RX ADMIN — ACYCLOVIR 400 MG: 400 TABLET ORAL at 11:31

## 2019-01-01 RX ADMIN — CALCIUM GLUCONATE 2 G: 98 INJECTION, SOLUTION INTRAVENOUS at 13:59

## 2019-01-01 RX ADMIN — MAGNESIUM GLUCONATE 500 MG ORAL TABLET 800 MG: 500 TABLET ORAL at 13:59

## 2019-01-01 RX ADMIN — POTASSIUM CHLORIDE 40 MEQ: 1500 TABLET, EXTENDED RELEASE ORAL at 10:55

## 2019-01-01 RX ADMIN — CALCIUM ACETATE 1334 MG: 667 CAPSULE ORAL at 13:11

## 2019-01-01 RX ADMIN — GABAPENTIN 300 MG: 300 CAPSULE ORAL at 21:25

## 2019-01-01 RX ADMIN — VITAMIN D, TAB 1000IU (100/BT) 10000 UNITS: 25 TAB at 11:06

## 2019-01-01 RX ADMIN — METOPROLOL TARTRATE 12.5 MG: 25 TABLET ORAL at 09:00

## 2019-01-01 RX ADMIN — CALCITRIOL 1 MCG: 0.25 CAPSULE ORAL at 19:14

## 2019-01-01 RX ADMIN — Medication 10 ML: at 11:48

## 2019-01-01 RX ADMIN — CALCIUM 2500 MG: 500 TABLET ORAL at 21:26

## 2019-01-01 RX ADMIN — NITROFURANTOIN MONOHYDRATE/MACROCRYSTALLINE 100 MG: 25; 75 CAPSULE ORAL at 10:23

## 2019-01-01 RX ADMIN — ZOLPIDEM TARTRATE 5 MG: 5 TABLET ORAL at 22:58

## 2019-01-01 RX ADMIN — Medication 20000 UNITS: at 09:06

## 2019-01-01 RX ADMIN — ERGOCALCIFEROL 50000 UNITS: 1.25 CAPSULE ORAL at 08:32

## 2019-01-01 RX ADMIN — CALCIUM GLUCONATE 1 G: 98 INJECTION, SOLUTION INTRAVENOUS at 13:09

## 2019-01-01 RX ADMIN — CALCIUM 1500 MG: 500 TABLET ORAL at 08:23

## 2019-01-01 RX ADMIN — MAGNESIUM GLUCONATE 500 MG ORAL TABLET 400 MG: 500 TABLET ORAL at 08:36

## 2019-01-01 RX ADMIN — ACYCLOVIR 400 MG: 400 TABLET ORAL at 22:09

## 2019-01-01 RX ADMIN — Medication 20000 UNITS: at 09:00

## 2019-01-01 RX ADMIN — ACYCLOVIR 400 MG: 400 TABLET ORAL at 08:22

## 2019-01-01 RX ADMIN — MAGNESIUM SULFATE HEPTAHYDRATE 2 G: 40 INJECTION, SOLUTION INTRAVENOUS at 11:48

## 2019-01-01 RX ADMIN — CEFTRIAXONE SODIUM 1 G: 1 INJECTION, POWDER, FOR SOLUTION INTRAMUSCULAR; INTRAVENOUS at 17:53

## 2019-01-01 RX ADMIN — CALCITRIOL 1 MCG: 0.25 CAPSULE ORAL at 08:35

## 2019-01-01 RX ADMIN — Medication 20000 UNITS: at 17:00

## 2019-01-01 RX ADMIN — GABAPENTIN 300 MG: 300 CAPSULE ORAL at 21:06

## 2019-01-01 RX ADMIN — CALCITRIOL 1 MCG: 0.25 CAPSULE ORAL at 13:35

## 2019-01-01 RX ADMIN — Medication 300 MCG: at 11:39

## 2019-01-01 RX ADMIN — LEVOTHYROXINE SODIUM 275 MCG: 150 TABLET ORAL at 11:07

## 2019-01-01 RX ADMIN — Medication 20000 UNITS: at 08:36

## 2019-01-01 RX ADMIN — CALCIUM GLUCONATE 1 G: 98 INJECTION, SOLUTION INTRAVENOUS at 18:11

## 2019-01-01 RX ADMIN — Medication 50 MG: at 11:39

## 2019-01-01 RX ADMIN — POTASSIUM CHLORIDE 10 MEQ: 750 TABLET, FILM COATED, EXTENDED RELEASE ORAL at 21:07

## 2019-01-01 RX ADMIN — Medication 500 MCG: at 08:07

## 2019-01-01 RX ADMIN — SODIUM CHLORIDE 500 ML: 9 INJECTION, SOLUTION INTRAVENOUS at 05:18

## 2019-01-01 RX ADMIN — Medication 20000 UNITS: at 08:22

## 2019-01-01 RX ADMIN — ACYCLOVIR 400 MG: 400 TABLET ORAL at 20:33

## 2019-01-01 RX ADMIN — METOPROLOL TARTRATE 12.5 MG: 25 TABLET ORAL at 20:38

## 2019-01-01 RX ADMIN — GABAPENTIN 100 MG: 100 CAPSULE ORAL at 14:09

## 2019-01-01 RX ADMIN — CALCIUM GLUCONATE: 98 INJECTION, SOLUTION INTRAVENOUS at 13:22

## 2019-01-01 RX ADMIN — LEVOTHYROXINE SODIUM 275 MCG: 150 TABLET ORAL at 05:48

## 2019-01-01 RX ADMIN — HEPARIN SODIUM (PORCINE) LOCK FLUSH IV SOLN 100 UNIT/ML 500 UNITS: 100 SOLUTION at 11:46

## 2019-01-01 RX ADMIN — METOPROLOL TARTRATE 12.5 MG: 25 TABLET ORAL at 21:25

## 2019-01-01 RX ADMIN — POTASSIUM CHLORIDE 10 MEQ: 750 TABLET, FILM COATED, EXTENDED RELEASE ORAL at 08:06

## 2019-01-01 RX ADMIN — TIZANIDINE 4 MG: 4 TABLET ORAL at 23:15

## 2019-01-01 RX ADMIN — ACYCLOVIR 400 MG: 400 TABLET ORAL at 20:53

## 2019-01-01 RX ADMIN — ACYCLOVIR 400 MG: 400 TABLET ORAL at 11:28

## 2019-01-01 RX ADMIN — GABAPENTIN 100 MG: 100 CAPSULE ORAL at 08:36

## 2019-01-01 RX ADMIN — DULOXETINE HYDROCHLORIDE 30 MG: 30 CAPSULE, DELAYED RELEASE ORAL at 08:37

## 2019-01-01 RX ADMIN — Medication 10 ML: at 09:16

## 2019-01-01 RX ADMIN — CALCIUM GLUCONATE 1 G: 98 INJECTION, SOLUTION INTRAVENOUS at 10:59

## 2019-01-01 RX ADMIN — MIDAZOLAM HYDROCHLORIDE 1 MG: 1 INJECTION INTRAMUSCULAR; INTRAVENOUS at 11:32

## 2019-01-01 RX ADMIN — TIZANIDINE 4 MG: 4 TABLET ORAL at 22:57

## 2019-01-01 RX ADMIN — GABAPENTIN 100 MG: 100 CAPSULE ORAL at 08:23

## 2019-01-01 RX ADMIN — MAGNESIUM GLUCONATE 500 MG ORAL TABLET 400 MG: 500 TABLET ORAL at 08:06

## 2019-01-01 RX ADMIN — GABAPENTIN 100 MG: 100 CAPSULE ORAL at 08:32

## 2019-01-01 RX ADMIN — LEVOTHYROXINE SODIUM 275 MCG: 150 TABLET ORAL at 07:03

## 2019-01-01 RX ADMIN — CALCIUM 2500 MG: 500 TABLET ORAL at 13:01

## 2019-01-01 RX ADMIN — MAGNESIUM GLUCONATE 500 MG ORAL TABLET 400 MG: 500 TABLET ORAL at 20:53

## 2019-01-01 RX ADMIN — FAMOTIDINE 20 MG: 20 TABLET ORAL at 18:02

## 2019-01-01 RX ADMIN — MAGNESIUM GLUCONATE 500 MG ORAL TABLET 400 MG: 500 TABLET ORAL at 11:32

## 2019-01-01 RX ADMIN — CALCIUM GLUCONATE 1 G: 98 INJECTION, SOLUTION INTRAVENOUS at 11:28

## 2019-01-01 RX ADMIN — ZOLPIDEM TARTRATE 2.5 MG: 5 TABLET ORAL at 23:58

## 2019-01-01 RX ADMIN — GABAPENTIN 300 MG: 300 CAPSULE ORAL at 21:22

## 2019-01-01 RX ADMIN — TIZANIDINE 4 MG: 4 TABLET ORAL at 20:36

## 2019-01-01 RX ADMIN — LIOTHYRONINE SODIUM 5 MCG: 5 TABLET ORAL at 07:58

## 2019-01-01 RX ADMIN — Medication 10 ML: at 08:35

## 2019-01-01 RX ADMIN — MAGNESIUM SULFATE HEPTAHYDRATE 4 G: 40 INJECTION, SOLUTION INTRAVENOUS at 13:53

## 2019-01-01 RX ADMIN — DULOXETINE HYDROCHLORIDE 60 MG: 60 CAPSULE, DELAYED RELEASE ORAL at 20:36

## 2019-01-01 RX ADMIN — Medication 10 ML: at 19:24

## 2019-01-01 RX ADMIN — CALCITRIOL 1 MCG: 0.25 CAPSULE ORAL at 03:24

## 2019-01-01 RX ADMIN — DULOXETINE HYDROCHLORIDE 60 MG: 60 CAPSULE, DELAYED RELEASE ORAL at 21:07

## 2019-01-01 RX ADMIN — DULOXETINE HYDROCHLORIDE 30 MG: 30 CAPSULE, DELAYED RELEASE ORAL at 08:07

## 2019-01-01 RX ADMIN — VITAMIN D, TAB 1000IU (100/BT) 10000 UNITS: 25 TAB at 18:01

## 2019-01-01 RX ADMIN — CALCIUM 2500 MG: 500 TABLET ORAL at 20:53

## 2019-01-01 RX ADMIN — CEFTRIAXONE SODIUM 1 G: 1 INJECTION, POWDER, FOR SOLUTION INTRAMUSCULAR; INTRAVENOUS at 17:41

## 2019-01-01 RX ADMIN — ACYCLOVIR 400 MG: 400 TABLET ORAL at 21:07

## 2019-01-01 RX ADMIN — DULOXETINE HYDROCHLORIDE 30 MG: 30 CAPSULE, DELAYED RELEASE ORAL at 11:33

## 2019-01-01 RX ADMIN — LIOTHYRONINE SODIUM 5 MCG: 5 TABLET ORAL at 11:38

## 2019-01-01 RX ADMIN — CALCIUM 2500 MG: 500 TABLET ORAL at 12:39

## 2019-01-01 RX ADMIN — MAGNESIUM GLUCONATE 500 MG ORAL TABLET 400 MG: 500 TABLET ORAL at 21:26

## 2019-01-01 RX ADMIN — CALCIUM GLUCONATE: 98 INJECTION, SOLUTION INTRAVENOUS at 07:52

## 2019-01-01 RX ADMIN — CALCIUM 2500 MG: 500 TABLET ORAL at 08:07

## 2019-01-01 RX ADMIN — DULOXETINE HYDROCHLORIDE 60 MG: 60 CAPSULE, DELAYED RELEASE ORAL at 21:06

## 2019-01-01 RX ADMIN — Medication 500 MCG: at 08:34

## 2019-01-01 RX ADMIN — CALCITRIOL 15 MCG: 1 SOLUTION ORAL at 17:28

## 2019-01-01 RX ADMIN — ZOLPIDEM TARTRATE 2.5 MG: 5 TABLET ORAL at 00:25

## 2019-01-01 RX ADMIN — LOPERAMIDE HYDROCHLORIDE 2 MG: 2 CAPSULE ORAL at 21:21

## 2019-01-01 RX ADMIN — CALCIUM ACETATE 1334 MG: 667 CAPSULE ORAL at 09:00

## 2019-01-01 RX ADMIN — MAGNESIUM SULFATE HEPTAHYDRATE 2 G: 40 INJECTION, SOLUTION INTRAVENOUS at 13:22

## 2019-01-01 RX ADMIN — Medication 100 MG: at 08:22

## 2019-01-01 RX ADMIN — CALCIUM 2500 MG: 500 TABLET ORAL at 16:21

## 2019-01-01 RX ADMIN — MAGNESIUM GLUCONATE 500 MG ORAL TABLET 400 MG: 500 TABLET ORAL at 09:00

## 2019-01-01 RX ADMIN — PANTOPRAZOLE SODIUM 40 MG: 40 TABLET, DELAYED RELEASE ORAL at 06:34

## 2019-01-01 RX ADMIN — Medication 50 MG: at 08:23

## 2019-01-01 RX ADMIN — TIZANIDINE 4 MG: 4 TABLET ORAL at 20:28

## 2019-01-01 RX ADMIN — METOPROLOL TARTRATE 12.5 MG: 25 TABLET ORAL at 09:07

## 2019-01-01 RX ADMIN — CALCITRIOL 1 MCG: 0.25 CAPSULE ORAL at 11:25

## 2019-01-01 RX ADMIN — METOPROLOL TARTRATE 12.5 MG: 25 TABLET ORAL at 21:22

## 2019-01-01 RX ADMIN — SODIUM CHLORIDE, POTASSIUM CHLORIDE, SODIUM LACTATE AND CALCIUM CHLORIDE 1000 ML: 600; 310; 30; 20 INJECTION, SOLUTION INTRAVENOUS at 13:59

## 2019-01-01 RX ADMIN — Medication 50 MG: at 07:56

## 2019-01-01 RX ADMIN — ERGOCALCIFEROL 50000 UNITS: 1.25 CAPSULE ORAL at 19:08

## 2019-01-01 RX ADMIN — ACETAMINOPHEN 650 MG: 325 TABLET ORAL at 21:07

## 2019-01-01 RX ADMIN — LEVOTHYROXINE SODIUM 275 MCG: 150 TABLET ORAL at 05:10

## 2019-01-01 RX ADMIN — LEVOTHYROXINE SODIUM 275 MCG: 150 TABLET ORAL at 08:06

## 2019-01-01 RX ADMIN — CALCITRIOL 1 MCG: 0.25 CAPSULE ORAL at 20:33

## 2019-01-01 RX ADMIN — SODIUM CHLORIDE 1000 ML: 9 INJECTION, SOLUTION INTRAVENOUS at 13:06

## 2019-01-01 ASSESSMENT — PAIN SCALES - GENERAL
PAINLEVEL_OUTOF10: 0
PAINLEVEL_OUTOF10: 3
PAINLEVEL_OUTOF10: 0
PAINLEVEL_OUTOF10: 3
PAINLEVEL_OUTOF10: 2
PAINLEVEL_OUTOF10: 0
PAINLEVEL_OUTOF10: 3
PAINLEVEL_OUTOF10: 0
PAINLEVEL_OUTOF10: 0

## 2019-01-01 ASSESSMENT — ENCOUNTER SYMPTOMS
COUGH: 0
SHORTNESS OF BREATH: 0
CHEST TIGHTNESS: 0
NAUSEA: 0
ABDOMINAL PAIN: 0
COUGH: 0
SORE THROAT: 0
EYE PAIN: 0
SINUS PAIN: 0
EYE DISCHARGE: 0
DIARRHEA: 0
RHINORRHEA: 0
CONSTIPATION: 0
COUGH: 1
VOMITING: 0
NAUSEA: 1
VOMITING: 0
COLOR CHANGE: 0
DIARRHEA: 0
SORE THROAT: 0
CHOKING: 0
SINUS PRESSURE: 0
WHEEZING: 0
NAUSEA: 1
SHORTNESS OF BREATH: 1
COUGH: 0
CONSTIPATION: 0
NAUSEA: 0
ABDOMINAL PAIN: 0
WHEEZING: 0
NAUSEA: 0
VOMITING: 0
VOMITING: 0
RHINORRHEA: 0
COUGH: 0
ABDOMINAL DISTENTION: 0
SORE THROAT: 1
EYE REDNESS: 0
ABDOMINAL DISTENTION: 0
BACK PAIN: 0
SHORTNESS OF BREATH: 0
DIARRHEA: 0
VOMITING: 1
EYE PAIN: 0
ABDOMINAL PAIN: 0
ABDOMINAL PAIN: 0
CHEST TIGHTNESS: 0
DIARRHEA: 0
SHORTNESS OF BREATH: 0
SINUS PRESSURE: 0
SORE THROAT: 0
ABDOMINAL PAIN: 0
ABDOMINAL PAIN: 0
CONSTIPATION: 0
EYE REDNESS: 0
RHINORRHEA: 0
SORE THROAT: 0
TROUBLE SWALLOWING: 0
SHORTNESS OF BREATH: 1
VOMITING: 0
ABDOMINAL DISTENTION: 1
BLOOD IN STOOL: 0
DIARRHEA: 0
NAUSEA: 0
CONSTIPATION: 0
SORE THROAT: 1
CONSTIPATION: 0
EYE DISCHARGE: 0
SINUS PAIN: 0
CHEST TIGHTNESS: 0
EYE REDNESS: 0
COUGH: 1
EYE PAIN: 0
EYE DISCHARGE: 0
BACK PAIN: 0
CONSTIPATION: 0
ABDOMINAL PAIN: 1
COUGH: 0
BACK PAIN: 0
BLOOD IN STOOL: 0
ANAL BLEEDING: 0
BACK PAIN: 0
SHORTNESS OF BREATH: 0
SINUS PRESSURE: 0
PHOTOPHOBIA: 0

## 2019-01-01 ASSESSMENT — PAIN DESCRIPTION - LOCATION: LOCATION: GENERALIZED

## 2019-01-01 ASSESSMENT — PAIN - FUNCTIONAL ASSESSMENT: PAIN_FUNCTIONAL_ASSESSMENT: 0-10

## 2019-01-01 ASSESSMENT — PAIN DESCRIPTION - PAIN TYPE
TYPE: ACUTE PAIN
TYPE: ACUTE PAIN

## 2019-01-01 ASSESSMENT — PAIN DESCRIPTION - FREQUENCY: FREQUENCY: CONTINUOUS

## 2019-01-24 ENCOUNTER — OFFICE VISIT (OUTPATIENT)
Dept: FAMILY MEDICINE CLINIC | Age: 59
End: 2019-01-24
Payer: COMMERCIAL

## 2019-01-24 ENCOUNTER — TELEPHONE (OUTPATIENT)
Dept: FAMILY MEDICINE CLINIC | Age: 59
End: 2019-01-24

## 2019-01-24 ENCOUNTER — NURSE ONLY (OUTPATIENT)
Dept: FAMILY MEDICINE CLINIC | Age: 59
End: 2019-01-24
Payer: COMMERCIAL

## 2019-01-24 VITALS
DIASTOLIC BLOOD PRESSURE: 78 MMHG | SYSTOLIC BLOOD PRESSURE: 124 MMHG | RESPIRATION RATE: 18 BRPM | OXYGEN SATURATION: 98 % | HEIGHT: 60 IN | HEART RATE: 94 BPM | BODY MASS INDEX: 25.32 KG/M2 | WEIGHT: 129 LBS

## 2019-01-24 DIAGNOSIS — F41.9 ANXIETY: ICD-10-CM

## 2019-01-24 DIAGNOSIS — G47.00 INSOMNIA, UNSPECIFIED TYPE: ICD-10-CM

## 2019-01-24 DIAGNOSIS — K21.00 GASTROESOPHAGEAL REFLUX DISEASE WITH ESOPHAGITIS: ICD-10-CM

## 2019-01-24 DIAGNOSIS — Z98.84 GASTRIC BYPASS STATUS FOR OBESITY: Chronic | ICD-10-CM

## 2019-01-24 DIAGNOSIS — Z13.220 LIPID SCREENING: ICD-10-CM

## 2019-01-24 DIAGNOSIS — Z00.00 WELL WOMAN EXAM (NO GYNECOLOGICAL EXAM): Primary | ICD-10-CM

## 2019-01-24 DIAGNOSIS — F32.A DEPRESSION, UNSPECIFIED DEPRESSION TYPE: ICD-10-CM

## 2019-01-24 DIAGNOSIS — E03.9 HYPOTHYROIDISM, UNSPECIFIED TYPE: ICD-10-CM

## 2019-01-24 DIAGNOSIS — E83.51 HYPOCALCEMIA: ICD-10-CM

## 2019-01-24 DIAGNOSIS — M79.7 FIBROMYALGIA: ICD-10-CM

## 2019-01-24 DIAGNOSIS — B00.1 RECURRENT COLD SORES: ICD-10-CM

## 2019-01-24 LAB — CALCIUM SERPL-MCNC: 7.9 MG/DL (ref 8.5–10.5)

## 2019-01-24 PROCEDURE — 1036F TOBACCO NON-USER: CPT | Performed by: FAMILY MEDICINE

## 2019-01-24 PROCEDURE — G8484 FLU IMMUNIZE NO ADMIN: HCPCS | Performed by: FAMILY MEDICINE

## 2019-01-24 PROCEDURE — 36415 COLL VENOUS BLD VENIPUNCTURE: CPT | Performed by: NURSE PRACTITIONER

## 2019-01-24 PROCEDURE — 99396 PREV VISIT EST AGE 40-64: CPT | Performed by: FAMILY MEDICINE

## 2019-01-24 PROCEDURE — G8419 CALC BMI OUT NRM PARAM NOF/U: HCPCS | Performed by: FAMILY MEDICINE

## 2019-01-24 PROCEDURE — 3017F COLORECTAL CA SCREEN DOC REV: CPT | Performed by: FAMILY MEDICINE

## 2019-01-24 PROCEDURE — G8427 DOCREV CUR MEDS BY ELIG CLIN: HCPCS | Performed by: FAMILY MEDICINE

## 2019-01-24 RX ORDER — LIOTHYRONINE SODIUM 5 UG/1
5 TABLET ORAL DAILY
COMMUNITY
Start: 2018-12-18 | End: 2019-01-01 | Stop reason: ALTCHOICE

## 2019-01-24 ASSESSMENT — ENCOUNTER SYMPTOMS
EYE REDNESS: 0
SORE THROAT: 0
COUGH: 0
BLOOD IN STOOL: 0
ABDOMINAL PAIN: 0
NAUSEA: 0
VOMITING: 0
CONSTIPATION: 0
DIARRHEA: 1
EYE DISCHARGE: 0
BACK PAIN: 0
SHORTNESS OF BREATH: 0

## 2019-02-21 ENCOUNTER — NURSE ONLY (OUTPATIENT)
Dept: FAMILY MEDICINE CLINIC | Age: 59
End: 2019-02-21
Payer: COMMERCIAL

## 2019-02-21 ENCOUNTER — TELEPHONE (OUTPATIENT)
Dept: FAMILY MEDICINE CLINIC | Age: 59
End: 2019-02-21

## 2019-02-21 DIAGNOSIS — E83.51 HYPOCALCEMIA: ICD-10-CM

## 2019-02-21 LAB — CALCIUM SERPL-MCNC: 7.7 MG/DL (ref 8.5–10.5)

## 2019-02-21 PROCEDURE — 36415 COLL VENOUS BLD VENIPUNCTURE: CPT | Performed by: NURSE PRACTITIONER

## 2019-02-24 DIAGNOSIS — Z98.84 GASTRIC BYPASS STATUS FOR OBESITY: Chronic | ICD-10-CM

## 2019-02-24 DIAGNOSIS — E60 LOW ZINC LEVEL: ICD-10-CM

## 2019-02-25 ENCOUNTER — TELEPHONE (OUTPATIENT)
Dept: FAMILY MEDICINE CLINIC | Age: 59
End: 2019-02-25

## 2019-02-26 RX ORDER — ZINC GLUCONATE 50 MG
TABLET ORAL
Qty: 135 TABLET | Refills: 0 | Status: SHIPPED | OUTPATIENT
Start: 2019-02-26 | End: 2019-08-12 | Stop reason: SDUPTHER

## 2019-03-14 ENCOUNTER — APPOINTMENT (OUTPATIENT)
Dept: CT IMAGING | Age: 59
End: 2019-03-14
Payer: COMMERCIAL

## 2019-03-14 ENCOUNTER — HOSPITAL ENCOUNTER (EMERGENCY)
Age: 59
Discharge: HOME | End: 2019-03-14
Attending: EMERGENCY MEDICINE
Payer: COMMERCIAL

## 2019-03-14 ENCOUNTER — OFFICE VISIT (OUTPATIENT)
Dept: FAMILY MEDICINE CLINIC | Age: 59
End: 2019-03-14
Payer: COMMERCIAL

## 2019-03-14 VITALS
HEART RATE: 116 BPM | HEIGHT: 60 IN | SYSTOLIC BLOOD PRESSURE: 98 MMHG | WEIGHT: 141.6 LBS | RESPIRATION RATE: 18 BRPM | BODY MASS INDEX: 27.8 KG/M2 | DIASTOLIC BLOOD PRESSURE: 72 MMHG | OXYGEN SATURATION: 95 %

## 2019-03-14 VITALS
WEIGHT: 145 LBS | TEMPERATURE: 98.1 F | RESPIRATION RATE: 16 BRPM | HEART RATE: 90 BPM | OXYGEN SATURATION: 99 % | DIASTOLIC BLOOD PRESSURE: 75 MMHG | SYSTOLIC BLOOD PRESSURE: 125 MMHG | BODY MASS INDEX: 28.32 KG/M2

## 2019-03-14 DIAGNOSIS — R00.0 TACHYCARDIA: ICD-10-CM

## 2019-03-14 DIAGNOSIS — R82.2 BILIRUBINURIA: Primary | ICD-10-CM

## 2019-03-14 DIAGNOSIS — R11.2 NAUSEA AND VOMITING, INTRACTABILITY OF VOMITING NOT SPECIFIED, UNSPECIFIED VOMITING TYPE: ICD-10-CM

## 2019-03-14 DIAGNOSIS — R10.84 GENERALIZED ABDOMINAL PAIN: ICD-10-CM

## 2019-03-14 DIAGNOSIS — R82.998 RED-COLORED URINE: ICD-10-CM

## 2019-03-14 DIAGNOSIS — R10.9 ABDOMINAL PAIN OF UNKNOWN ETIOLOGY: Primary | ICD-10-CM

## 2019-03-14 DIAGNOSIS — R42 DIZZINESS: ICD-10-CM

## 2019-03-14 DIAGNOSIS — Z86.2 HISTORY OF ITP: ICD-10-CM

## 2019-03-14 DIAGNOSIS — R44.3 HALLUCINATIONS: ICD-10-CM

## 2019-03-14 DIAGNOSIS — R53.1 WEAKNESS: ICD-10-CM

## 2019-03-14 DIAGNOSIS — Z98.84 GASTRIC BYPASS STATUS FOR OBESITY: ICD-10-CM

## 2019-03-14 LAB
ALBUMIN SERPL-MCNC: 2.5 G/DL (ref 3.5–5.1)
ALP BLD-CCNC: 219 U/L (ref 38–126)
ALT SERPL-CCNC: 76 U/L (ref 11–66)
ANION GAP SERPL CALCULATED.3IONS-SCNC: 13 MEQ/L (ref 8–16)
AST SERPL-CCNC: 122 U/L (ref 5–40)
BASOPHILS # BLD: 0.5 %
BASOPHILS ABSOLUTE: 0 THOU/MM3 (ref 0–0.1)
BILIRUB SERPL-MCNC: 1.7 MG/DL (ref 0.3–1.2)
BILIRUBIN DIRECT: 1.1 MG/DL (ref 0–0.3)
BILIRUBIN URINE: ABNORMAL
BLOOD URINE, POC: NEGATIVE
BUN BLDV-MCNC: 6 MG/DL (ref 7–22)
CALCIUM SERPL-MCNC: 7.4 MG/DL (ref 8.5–10.5)
CHARACTER, URINE: ABNORMAL
CHLORIDE BLD-SCNC: 100 MEQ/L (ref 98–111)
CO2: 25 MEQ/L (ref 23–33)
COLOR, URINE: ABNORMAL
CREAT SERPL-MCNC: 0.4 MG/DL (ref 0.4–1.2)
EKG ATRIAL RATE: 113 BPM
EKG P AXIS: 42 DEGREES
EKG P-R INTERVAL: 136 MS
EKG Q-T INTERVAL: 314 MS
EKG QRS DURATION: 48 MS
EKG QTC CALCULATION (BAZETT): 430 MS
EKG R AXIS: -8 DEGREES
EKG T AXIS: 6 DEGREES
EKG VENTRICULAR RATE: 113 BPM
EOSINOPHIL # BLD: 0.2 %
EOSINOPHILS ABSOLUTE: 0 THOU/MM3 (ref 0–0.4)
ERYTHROCYTE [DISTWIDTH] IN BLOOD BY AUTOMATED COUNT: 13.2 % (ref 11.5–14.5)
ERYTHROCYTE [DISTWIDTH] IN BLOOD BY AUTOMATED COUNT: 48.1 FL (ref 35–45)
GFR SERPL CREATININE-BSD FRML MDRD: > 90 ML/MIN/1.73M2
GLUCOSE BLD-MCNC: 98 MG/DL (ref 70–108)
GLUCOSE URINE: NEGATIVE MG/DL
HCT VFR BLD CALC: 38 % (ref 37–47)
HEMOGLOBIN: 12.2 GM/DL (ref 12–16)
IMMATURE GRANS (ABS): 0.02 THOU/MM3 (ref 0–0.07)
IMMATURE GRANULOCYTES: 0.3 %
KETONES, URINE: 15
LEUKOCYTE CLUMPS, URINE: ABNORMAL
LIPASE: 6.3 U/L (ref 5.6–51.3)
LYMPHOCYTES # BLD: 27.9 %
LYMPHOCYTES ABSOLUTE: 1.6 THOU/MM3 (ref 1–4.8)
MCH RBC QN AUTO: 32.2 PG (ref 26–33)
MCHC RBC AUTO-ENTMCNC: 32.1 GM/DL (ref 32.2–35.5)
MCV RBC AUTO: 100.3 FL (ref 81–99)
MONOCYTES # BLD: 11.9 %
MONOCYTES ABSOLUTE: 0.7 THOU/MM3 (ref 0.4–1.3)
NITRITE, URINE: NEGATIVE
NUCLEATED RED BLOOD CELLS: 0 /100 WBC
OSMOLALITY CALCULATION: 273.3 MOSMOL/KG (ref 275–300)
PH, URINE: 7 (ref 5–9)
PLATELET # BLD: 242 THOU/MM3 (ref 130–400)
PMV BLD AUTO: 10.9 FL (ref 9.4–12.4)
POTASSIUM SERPL-SCNC: 4.3 MEQ/L (ref 3.5–5.2)
PROTEIN, URINE: 30 MG/DL
RBC # BLD: 3.79 MILL/MM3 (ref 4.2–5.4)
SEG NEUTROPHILS: 59.2 %
SEGMENTED NEUTROPHILS ABSOLUTE COUNT: 3.4 THOU/MM3 (ref 1.8–7.7)
SODIUM BLD-SCNC: 138 MEQ/L (ref 135–145)
SPECIFIC GRAVITY, URINE: 1.02 (ref 1–1.03)
TOTAL PROTEIN: 5.3 G/DL (ref 6.1–8)
UROBILINOGEN, URINE: 2 EU/DL (ref 0–1)
WBC # BLD: 5.8 THOU/MM3 (ref 4.8–10.8)

## 2019-03-14 PROCEDURE — 74177 CT ABD & PELVIS W/CONTRAST: CPT

## 2019-03-14 PROCEDURE — G8427 DOCREV CUR MEDS BY ELIG CLIN: HCPCS | Performed by: FAMILY MEDICINE

## 2019-03-14 PROCEDURE — 83690 ASSAY OF LIPASE: CPT

## 2019-03-14 PROCEDURE — 82248 BILIRUBIN DIRECT: CPT

## 2019-03-14 PROCEDURE — 96374 THER/PROPH/DIAG INJ IV PUSH: CPT

## 2019-03-14 PROCEDURE — 93005 ELECTROCARDIOGRAM TRACING: CPT | Performed by: EMERGENCY MEDICINE

## 2019-03-14 PROCEDURE — 36415 COLL VENOUS BLD VENIPUNCTURE: CPT

## 2019-03-14 PROCEDURE — 93010 ELECTROCARDIOGRAM REPORT: CPT | Performed by: INTERNAL MEDICINE

## 2019-03-14 PROCEDURE — 85025 COMPLETE CBC W/AUTO DIFF WBC: CPT

## 2019-03-14 PROCEDURE — 6360000002 HC RX W HCPCS: Performed by: EMERGENCY MEDICINE

## 2019-03-14 PROCEDURE — 80053 COMPREHEN METABOLIC PANEL: CPT

## 2019-03-14 PROCEDURE — 1036F TOBACCO NON-USER: CPT | Performed by: FAMILY MEDICINE

## 2019-03-14 PROCEDURE — 2580000003 HC RX 258: Performed by: EMERGENCY MEDICINE

## 2019-03-14 PROCEDURE — G8419 CALC BMI OUT NRM PARAM NOF/U: HCPCS | Performed by: FAMILY MEDICINE

## 2019-03-14 PROCEDURE — 99215 OFFICE O/P EST HI 40 MIN: CPT | Performed by: FAMILY MEDICINE

## 2019-03-14 PROCEDURE — 99284 EMERGENCY DEPT VISIT MOD MDM: CPT

## 2019-03-14 PROCEDURE — G8484 FLU IMMUNIZE NO ADMIN: HCPCS | Performed by: FAMILY MEDICINE

## 2019-03-14 PROCEDURE — 6360000004 HC RX CONTRAST MEDICATION: Performed by: EMERGENCY MEDICINE

## 2019-03-14 PROCEDURE — 3017F COLORECTAL CA SCREEN DOC REV: CPT | Performed by: FAMILY MEDICINE

## 2019-03-14 RX ORDER — KETOROLAC TROMETHAMINE 30 MG/ML
30 INJECTION, SOLUTION INTRAMUSCULAR; INTRAVENOUS ONCE
Status: COMPLETED | OUTPATIENT
Start: 2019-03-14 | End: 2019-03-14

## 2019-03-14 RX ORDER — 0.9 % SODIUM CHLORIDE 0.9 %
1000 INTRAVENOUS SOLUTION INTRAVENOUS ONCE
Status: COMPLETED | OUTPATIENT
Start: 2019-03-14 | End: 2019-03-14

## 2019-03-14 RX ORDER — ONDANSETRON 4 MG/1
4 TABLET, ORALLY DISINTEGRATING ORAL EVERY 8 HOURS PRN
Qty: 10 TABLET | Refills: 0 | Status: SHIPPED | OUTPATIENT
Start: 2019-03-14

## 2019-03-14 RX ADMIN — KETOROLAC TROMETHAMINE 30 MG: 30 INJECTION, SOLUTION INTRAMUSCULAR at 16:07

## 2019-03-14 RX ADMIN — IOPAMIDOL 80 ML: 755 INJECTION, SOLUTION INTRAVENOUS at 17:46

## 2019-03-14 RX ADMIN — SODIUM CHLORIDE 1000 ML: 9 INJECTION, SOLUTION INTRAVENOUS at 15:36

## 2019-03-14 ASSESSMENT — PAIN DESCRIPTION - LOCATION
LOCATION: ABDOMEN
LOCATION: ABDOMEN

## 2019-03-14 ASSESSMENT — ENCOUNTER SYMPTOMS
RHINORRHEA: 1
ABDOMINAL PAIN: 1
EYE DISCHARGE: 0
COUGH: 1
SORE THROAT: 0
DIARRHEA: 0
CONSTIPATION: 1
BACK PAIN: 1
BLOOD IN STOOL: 0
EYE REDNESS: 0
ANAL BLEEDING: 0
NAUSEA: 1
SINUS PAIN: 1
SHORTNESS OF BREATH: 1
VOMITING: 1

## 2019-03-14 ASSESSMENT — PAIN DESCRIPTION - DESCRIPTORS: DESCRIPTORS: DULL

## 2019-03-14 ASSESSMENT — PAIN DESCRIPTION - ORIENTATION
ORIENTATION: LEFT;UPPER
ORIENTATION: LEFT;UPPER

## 2019-03-14 ASSESSMENT — PAIN SCALES - GENERAL
PAINLEVEL_OUTOF10: 4
PAINLEVEL_OUTOF10: 4

## 2019-03-14 ASSESSMENT — PAIN DESCRIPTION - PAIN TYPE: TYPE: ACUTE PAIN

## 2019-03-15 ENCOUNTER — NURSE ONLY (OUTPATIENT)
Dept: FAMILY MEDICINE CLINIC | Age: 59
End: 2019-03-15
Payer: COMMERCIAL

## 2019-03-15 DIAGNOSIS — Z13.220 LIPID SCREENING: ICD-10-CM

## 2019-03-15 DIAGNOSIS — F32.A DEPRESSION, UNSPECIFIED DEPRESSION TYPE: ICD-10-CM

## 2019-03-15 DIAGNOSIS — Z98.84 GASTRIC BYPASS STATUS FOR OBESITY: Chronic | ICD-10-CM

## 2019-03-15 LAB
CALCIUM SERPL-MCNC: 7.2 MG/DL (ref 8.5–10.5)
T4 FREE: 2.54 NG/DL (ref 0.93–1.76)

## 2019-03-15 PROCEDURE — 36415 COLL VENOUS BLD VENIPUNCTURE: CPT | Performed by: FAMILY MEDICINE

## 2019-03-18 ENCOUNTER — TELEPHONE (OUTPATIENT)
Dept: FAMILY MEDICINE CLINIC | Age: 59
End: 2019-03-18

## 2019-03-18 ENCOUNTER — NURSE ONLY (OUTPATIENT)
Dept: FAMILY MEDICINE CLINIC | Age: 59
End: 2019-03-18
Payer: COMMERCIAL

## 2019-03-18 DIAGNOSIS — E83.51 HYPOCALCEMIA: ICD-10-CM

## 2019-03-18 DIAGNOSIS — E03.8 HYPOTHYROIDISM DUE TO FIBROUS INVASIVE THYROIDITIS: ICD-10-CM

## 2019-03-18 DIAGNOSIS — N94.89 ADNEXAL MASS: Primary | ICD-10-CM

## 2019-03-18 DIAGNOSIS — E06.5 HYPOTHYROIDISM DUE TO FIBROUS INVASIVE THYROIDITIS: ICD-10-CM

## 2019-03-18 LAB — T4 FREE: 1.55 NG/DL (ref 0.93–1.76)

## 2019-03-18 PROCEDURE — 36415 COLL VENOUS BLD VENIPUNCTURE: CPT | Performed by: FAMILY MEDICINE

## 2019-03-21 ENCOUNTER — HOSPITAL ENCOUNTER (OUTPATIENT)
Dept: ULTRASOUND IMAGING | Age: 59
Discharge: HOME | End: 2019-03-21
Payer: COMMERCIAL

## 2019-03-21 DIAGNOSIS — N94.89 ADNEXAL MASS: ICD-10-CM

## 2019-03-21 PROCEDURE — 76830 TRANSVAGINAL US NON-OB: CPT

## 2019-04-01 ENCOUNTER — OFFICE VISIT (OUTPATIENT)
Dept: FAMILY MEDICINE CLINIC | Age: 59
End: 2019-04-01
Payer: COMMERCIAL

## 2019-04-01 VITALS
WEIGHT: 129 LBS | TEMPERATURE: 98.3 F | DIASTOLIC BLOOD PRESSURE: 60 MMHG | BODY MASS INDEX: 26 KG/M2 | RESPIRATION RATE: 18 BRPM | HEART RATE: 124 BPM | HEIGHT: 59 IN | SYSTOLIC BLOOD PRESSURE: 100 MMHG | OXYGEN SATURATION: 98 %

## 2019-04-01 DIAGNOSIS — Z79.899 POLYPHARMACY: ICD-10-CM

## 2019-04-01 DIAGNOSIS — E03.8 HYPOTHYROIDISM DUE TO FIBROUS INVASIVE THYROIDITIS: ICD-10-CM

## 2019-04-01 DIAGNOSIS — E60 LOW ZINC LEVEL: ICD-10-CM

## 2019-04-01 DIAGNOSIS — R00.0 TACHYCARDIA: ICD-10-CM

## 2019-04-01 DIAGNOSIS — R79.89 ELEVATED LIVER FUNCTION TESTS: ICD-10-CM

## 2019-04-01 DIAGNOSIS — M19.90 OSTEOARTHRITIS, UNSPECIFIED OSTEOARTHRITIS TYPE, UNSPECIFIED SITE: ICD-10-CM

## 2019-04-01 DIAGNOSIS — F32.A DEPRESSION, UNSPECIFIED DEPRESSION TYPE: ICD-10-CM

## 2019-04-01 DIAGNOSIS — B00.1 RECURRENT COLD SORES: ICD-10-CM

## 2019-04-01 DIAGNOSIS — Z98.84 GASTRIC BYPASS STATUS FOR OBESITY: Primary | ICD-10-CM

## 2019-04-01 DIAGNOSIS — M79.7 FIBROMYALGIA: ICD-10-CM

## 2019-04-01 DIAGNOSIS — G47.00 INSOMNIA, UNSPECIFIED TYPE: ICD-10-CM

## 2019-04-01 DIAGNOSIS — E83.51 HYPOCALCEMIA: ICD-10-CM

## 2019-04-01 DIAGNOSIS — E56.1 LOW SERUM VITAMIN K: ICD-10-CM

## 2019-04-01 DIAGNOSIS — N94.89 ADNEXAL MASS: ICD-10-CM

## 2019-04-01 DIAGNOSIS — F10.20 ALCOHOLISM (HCC): ICD-10-CM

## 2019-04-01 DIAGNOSIS — R79.89 LOW VITAMIN D LEVEL: ICD-10-CM

## 2019-04-01 DIAGNOSIS — E06.5 HYPOTHYROIDISM DUE TO FIBROUS INVASIVE THYROIDITIS: ICD-10-CM

## 2019-04-01 PROBLEM — M62.81 MUSCLE WEAKNESS (GENERALIZED): Status: ACTIVE | Noted: 2018-02-01

## 2019-04-01 PROCEDURE — 1036F TOBACCO NON-USER: CPT | Performed by: FAMILY MEDICINE

## 2019-04-01 PROCEDURE — G8427 DOCREV CUR MEDS BY ELIG CLIN: HCPCS | Performed by: FAMILY MEDICINE

## 2019-04-01 PROCEDURE — G8419 CALC BMI OUT NRM PARAM NOF/U: HCPCS | Performed by: FAMILY MEDICINE

## 2019-04-01 PROCEDURE — 99214 OFFICE O/P EST MOD 30 MIN: CPT | Performed by: FAMILY MEDICINE

## 2019-04-01 PROCEDURE — 3017F COLORECTAL CA SCREEN DOC REV: CPT | Performed by: FAMILY MEDICINE

## 2019-04-01 RX ORDER — ZOLPIDEM TARTRATE 10 MG/1
10 TABLET ORAL NIGHTLY
Status: CANCELLED | OUTPATIENT
Start: 2019-04-01

## 2019-04-01 RX ORDER — ONDANSETRON 4 MG/1
TABLET, FILM COATED ORAL
COMMUNITY
Start: 2019-03-14 | End: 2019-04-01 | Stop reason: CLARIF

## 2019-04-01 RX ORDER — GABAPENTIN 100 MG/1
100 CAPSULE ORAL 2 TIMES DAILY
COMMUNITY
End: 2019-05-23 | Stop reason: SDUPTHER

## 2019-04-01 RX ORDER — ZOLPIDEM TARTRATE 10 MG/1
5 TABLET ORAL NIGHTLY PRN
COMMUNITY
End: 2019-05-23 | Stop reason: DRUGHIGH

## 2019-04-01 RX ORDER — THIAMINE MONONITRATE (VIT B1) 100 MG
100 TABLET ORAL DAILY
COMMUNITY

## 2019-04-01 ASSESSMENT — ENCOUNTER SYMPTOMS
COLOR CHANGE: 0
SHORTNESS OF BREATH: 0
BLOOD IN STOOL: 0
SORE THROAT: 0
ANAL BLEEDING: 0
DIARRHEA: 0
EYE REDNESS: 0
CONSTIPATION: 0
ABDOMINAL PAIN: 0
ROS SKIN COMMENTS: DRY SKIN
BACK PAIN: 0
VOMITING: 1
NAUSEA: 1
EYE DISCHARGE: 0
COUGH: 0

## 2019-04-01 NOTE — PROGRESS NOTES
[address] [address]  Dept: [phone]  Dept Fax: [phone]  Loc: [phone]      Yanique Jackson is a 59 y.o. female who presents todayfor her medical conditions/complaints as noted below. Yanique Jackson is c/o of Follow-Up from [facility]- Follow up - March 14); Medication Refill (Ambien- scopolamine (TRANSDERM-SCOP, 1.5 MG,) transdermal patch ); and Nausea & Vomiting      HPI:      HPI     Here for follow-up. Symptoms are better overall. Still nauseous however and vomiting at least twice a day. Usually after getting out of shower in the morning. On whey shake for muscle loss per Dr. Meghana Palmer. Linzess helps with bowel movements. Goes once daily. Reports has been quite a heavy drinker. Was up to 10 glasses a day. Drinking increased in shelter. Quit 3 weeks ago. No alcohol at all for 3 weeks. No issues with stopping suddenly. Father and grandfather were both drinkers. Embarrassed about drinking but wants to be up front on this now. Was in the ER for 4 hours after last visit. Had labs. Elevated MCV. Calcium was still low at 7.4; Dr. Shantal Knox with endocrine managing, left cytomel at same dose. Bilirubinuria; total bilirubin 1.7, direct bilirubin 1.1, Alk phosphatase 219, , ALT 76. Lipase normal.  T4 was 2.54 but repeat 3 days later was normal.      Saw GI Dr. Meghana Palmer on March 26th. Additional labs are pending (Iron TIBC, Transferring, Ferritin, Iron Saturation, Alpha 1 Anti-trypsin, Ceruloplasmin, Serum Copper, SU, Antimitochondrial antibody, Antismooth Muscle antibody. Ano-rectal manometry is planned this coming Wednesday. Liver biopsy is planned on the 9th. Upper GI and Colonoscopy are planned on May 7th. He thinks that fatty liver, hiatal hernia, possible esophageal ulcerations with gastric bypass.   May recommend surgery to remove part of esophagus and reconnect stomach per patient. Did get pelvic ultrasound done. Cyst was stable. Repeat in 6 months is recommended. Tried going off [place] and had issues with sleep. Was off for about a month. Then re-started. Still has some on hand. Agrees to trial wean. Patient Active Problem List   Diagnosis    TIA (transient ischemic attack)    Hypothyroidism    Hyperparathyroidism ([address]    DVT (deep venous thrombosis) (HCC)    Depression    Anxiety    Fibromyalgia    Fatty liver    Tachycardia    GERD (gastroesophageal reflux disease)    Recurrent cold sores    Irritable bowel    Gastric bypass status for obesity    Insomnia    Ovarian cyst    Muscle weakness (generalized)    Alcoholism ([address]    Osteoarthritis       The patient is allergic to estrogens; penicillins; sulfa antibiotics; sulfur; and bactrim [sulfamethoxazole-trimethoprim]. Past Medical History  Samuel Miller has a past medical history of Alcoholism ([address] Anxiety, Atrophy of vulva, Atyp squam cell of undet signfc cyto smr crvx (ASC-US), Closed fracture of seventh cervical vertebra without spinal cord injury [facility]), Closed fracture of sixth cervical vertebra ([address] Closed fracture of thoracic vertebra ([address] Depression, Dry eye syndrome, DVT (deep venous thrombosis) ([address] Dyspareunia in female, External hemorrhoids without complication, Fibromyalgia, Gastric bypass status for obesity, GERD (gastroesophageal reflux disease), GERD (gastroesophageal reflux disease), Hiatal hernia, History of ITP, Hungry bone syndrome, Hyperparathyroidism ([address] Hypothyroidism, Insomnia, Irritable bowel, Lactose intolerance, Menopausal syndrome, Osteoarthritis, Osteopenia, Osteopenia, Ovarian cyst, Recurrent cold sores, Tachycardia, and TIA (transient ischemic attack). Past SurgicalHistory  The patient  has a past surgical history that includes Wrist fracture surgery (Right); Leg Surgery (Right);  Tonsillectomy and adenoidectomy; Gastric bypass surgery (1996); 1 capsule by mouth daily, Disp: 90 capsule, Rfl: 1    DULoxetine (CYMBALTA) 60 MG extended release capsule, Take 1 capsule by mouth nightly, Disp: 90 capsule, Rfl: 1    magnesium oxide (MAG-OX) 400 (241.3 Mg) MG TABS tablet, Take 1 tablet by mouth 2 times daily, Disp: 180 tablet, Rfl: 1    metaxalone (SKELAXIN) 800 MG tablet, Take 1 tablet by mouth daily, Disp: 90 tablet, Rfl: 1    potassium chloride (KLOR-CON 10) 10 MEQ extended release tablet, Take 1 tablet by mouth 2 times daily, Disp: 180 tablet, Rfl: 1    tiZANidine (ZANAFLEX) 4 MG tablet, Take 1 tablet by mouth nightly At bed time, Disp: 90 tablet, Rfl: 1    Vitamin K, Phytonadione, 100 MCG TABS, Take 3 tablets by mouth daily, Disp: 270 tablet, Rfl: 0    vitamin B-12 (CYANOCOBALAMIN) 500 MCG tablet, Take 1 tablet by mouth daily (Patient taking differently: Take 500 mcg by mouth every 48 hours ), Disp: 30 tablet, Rfl: 3    calcitRIOL (ROCALTROL) 1 MCG/ML solution, Take 15 ml daily, Disp: , Rfl:     Prenatal Multivit-Min-Fe-FA (PRENATAL VITAMINS PO), Take by mouth, Disp: , Rfl:     FOLIC ACID PO, Take 7,066 mg by mouth daily, Disp: , Rfl:     Copper 5 MG CAPS, Take 5 mg by mouth daily, Disp: 90 capsule, Rfl: 0    SYNTHROID 75 MCG tablet, Take 75 mg by mouth daily, Disp: , Rfl:     SYNTHROID 200 MCG tablet, Take 200 mcg by mouth daily, Disp: , Rfl:     Cholecalciferol (VITAMIN D3) 5000 units CAPS, Take 2 capsules by mouth daily, Disp: 60 capsule, Rfl: 0    esomeprazole (NEXIUM) 20 MG delayed release capsule, Take 20 mg by mouth 2 times daily, Disp: , Rfl:     ranitidine (ZANTAC) 150 MG tablet, Take 150 mg by mouth 2 times daily, Disp: , Rfl:     Psyllium-Calcium (METAMUCIL PLUS CALCIUM) CAPS, Take by mouth Take with 8 oz water., Disp: , Rfl:     Probiotic Product (PROBIOTIC PO), Take by mouth, Disp: , Rfl:     vitamin C (ASCORBIC ACID) 500 MG tablet, Take 500 mg by mouth 2 times daily, Disp: , Rfl:     vitamin A 16936 units capsule, Take 25,000 Units by mouth daily, Disp: , Rfl:     Subjective:      Review of Systems   Constitutional: Positive for fatigue. Negative for chills, fever and unexpected weight change. HENT: Negative for congestion, ear discharge, ear pain, hearing loss and sore throat. Eyes: Negative for discharge and redness. Respiratory: Negative for cough and shortness of breath. Cardiovascular: Negative for chest pain and palpitations. Gastrointestinal: Positive for nausea and vomiting. Negative for abdominal pain (mild intermittent left upper quadrant), anal bleeding, blood in stool, constipation and diarrhea. Endocrine: Positive for cold intolerance. Genitourinary: Negative for difficulty urinating and dysuria. Musculoskeletal: Positive for neck pain (mild chronic). Negative for arthralgias, back pain and gait problem. Skin: Negative for color change and rash. Dry skin   Allergic/Immunologic: Negative for environmental allergies. Neurological: Negative for headaches (occasional). Psychiatric/Behavioral: Negative for confusion, dysphoric mood, self-injury, sleep disturbance (better back on [place]) and suicidal ideas. The patient is not nervous/anxious. Objective:        Vitals:    04/01/19 1228   BP: 100/60   Site: Left Upper Arm   Position: Sitting   Pulse: 124   Resp: 18   Temp: 98.3 °F (36.8 °C)   TempSrc: Oral   SpO2: 98%   Weight: 129 lb (58.5 kg)   Height: 4' 10.5\" (1.486 m)        Physical Exam   Constitutional: She is oriented to person, place, and time. She does not appear ill. Appears well at this time. HENT:   Head: Normocephalic and atraumatic. Mouth/Throat: Oropharynx is clear and moist.   Eyes: Pupils are equal, round, and reactive to light. Conjunctivae are normal. No scleral icterus. Neck: Neck supple. No thyromegaly present. Cardiovascular: Regular rhythm and normal heart sounds. Tachycardia present.    Pulmonary/Chest: Effort normal and breath sounds normal. No respiratory distress. Abdominal: Soft. She exhibits no distension and no mass. There is no tenderness. There is no rebound and no guarding. Still mildly tender in left upper quadrant. Musculoskeletal: She exhibits no edema. Lymphadenopathy:     She has no cervical adenopathy. Neurological: She is alert and oriented to person, place, and time. No cranial nerve deficit. No obvious focal deficit. Talking in complete sentences. Skin: Skin is warm and dry. No rash noted. No jaundice noted. Psychiatric: Thought content normal.   Vitals reviewed. Lab Results   Component Value Date    WBC 5.8 03/14/2019    HGB 12.2 03/14/2019    HCT 38.0 03/14/2019     03/14/2019    HDL 82 02/12/2018    ALT 76 (H) 03/14/2019     (H) 03/14/2019     03/14/2019    K 4.3 03/14/2019     03/14/2019    CREATININE 0.4 03/14/2019    BUN 6 (L) 03/14/2019    CO2 25 03/14/2019    TSH 0.011 (L) 01/16/2018    INR 0.94 07/13/2017    LABA1C 4.1 (L) 02/08/2018       Assessment/Plan:   1. Elevated liver function tests  Extensive work-up planned with GI. May also be related to alcoholism. Repeating CMP now that patient is off alcohol.    - Comprehensive Metabolic Panel; Future    2. Alcoholism ([address]  In remission at present. Suspect elevated MCV related. Repeating CBC. May also be contributing to electrolyte imbalances and vitamin deficiencies. Praised recent healthy changes and stopping drinking.    - CBC With Auto Differential; Future    3. Gastric bypass status for obesity  Checking labs. - Zinc; Future  - Vitamin K1; Future  - Vitamin D 25 Hydroxy; Future  - CBC With Auto Differential; Future  - Comprehensive Metabolic Panel; Future    4. Hypocalcemia  Follows with endocrine. Checking vitamin D.    - Vitamin D 25 Hydroxy; Future    5. Low serum vitamin K  Checking level. - Vitamin K1; Future    6. Low zinc level  Checking level. - Zinc; Future    7. Low vitamin D level  Checking level. this encounter. Patient given educational materials - see patient instructions. Discussed use, benefit, and side effects of prescribed medications. All patientquestions answered. Pt voiced understanding. Plans visit for pap.               Electronically signed by Silverio Pan MD on 4/1/2019 at 2:44 PM

## 2019-04-08 RX ORDER — SODIUM CHLORIDE 450 MG/100ML
INJECTION, SOLUTION INTRAVENOUS CONTINUOUS
Status: CANCELLED | OUTPATIENT
Start: 2019-04-08

## 2019-04-09 ENCOUNTER — HOSPITAL ENCOUNTER (OUTPATIENT)
Dept: ULTRASOUND IMAGING | Age: 59
Discharge: HOME | End: 2019-04-09
Payer: COMMERCIAL

## 2019-04-09 VITALS
BODY MASS INDEX: 28.15 KG/M2 | DIASTOLIC BLOOD PRESSURE: 93 MMHG | HEART RATE: 130 BPM | SYSTOLIC BLOOD PRESSURE: 136 MMHG | RESPIRATION RATE: 18 BRPM | WEIGHT: 137 LBS | TEMPERATURE: 97.3 F | OXYGEN SATURATION: 99 %

## 2019-04-09 PROCEDURE — 6360000002 HC RX W HCPCS

## 2019-04-09 PROCEDURE — 2580000003 HC RX 258: Performed by: RADIOLOGY

## 2019-04-09 PROCEDURE — 76942 ECHO GUIDE FOR BIOPSY: CPT

## 2019-04-09 PROCEDURE — 88313 SPECIAL STAINS GROUP 2: CPT

## 2019-04-09 PROCEDURE — 2709999900 HC NON-CHARGEABLE SUPPLY

## 2019-04-09 PROCEDURE — 6370000000 HC RX 637 (ALT 250 FOR IP)

## 2019-04-09 PROCEDURE — 88307 TISSUE EXAM BY PATHOLOGIST: CPT

## 2019-04-09 RX ORDER — FENTANYL CITRATE 50 UG/ML
50 INJECTION, SOLUTION INTRAMUSCULAR; INTRAVENOUS ONCE
Status: COMPLETED | OUTPATIENT
Start: 2019-04-09 | End: 2019-04-09

## 2019-04-09 RX ORDER — DIAPER,BRIEF,INFANT-TODD,DISP
EACH MISCELLANEOUS ONCE
Status: DISCONTINUED | OUTPATIENT
Start: 2019-04-09 | End: 2019-04-10 | Stop reason: HOSPADM

## 2019-04-09 RX ORDER — SODIUM CHLORIDE 450 MG/100ML
INJECTION, SOLUTION INTRAVENOUS CONTINUOUS
Status: DISCONTINUED | OUTPATIENT
Start: 2019-04-09 | End: 2019-04-10 | Stop reason: HOSPADM

## 2019-04-09 RX ORDER — MIDAZOLAM HYDROCHLORIDE 1 MG/ML
0.5 INJECTION INTRAMUSCULAR; INTRAVENOUS ONCE
Status: COMPLETED | OUTPATIENT
Start: 2019-04-09 | End: 2019-04-09

## 2019-04-09 RX ADMIN — MIDAZOLAM HYDROCHLORIDE 0.5 MG: 1 INJECTION INTRAMUSCULAR; INTRAVENOUS at 09:32

## 2019-04-09 RX ADMIN — SODIUM CHLORIDE: 4.5 INJECTION, SOLUTION INTRAVENOUS at 07:13

## 2019-04-09 RX ADMIN — FENTANYL CITRATE 50 MCG: 50 INJECTION, SOLUTION INTRAMUSCULAR; INTRAVENOUS at 09:32

## 2019-04-09 ASSESSMENT — PAIN SCALES - GENERAL
PAINLEVEL_OUTOF10: 1
PAINLEVEL_OUTOF10: 0
PAINLEVEL_OUTOF10: 1

## 2019-04-09 ASSESSMENT — PAIN DESCRIPTION - DESCRIPTORS: DESCRIPTORS: CRAMPING

## 2019-04-09 ASSESSMENT — PAIN DESCRIPTION - ORIENTATION: ORIENTATION: RIGHT

## 2019-04-09 ASSESSMENT — PAIN - FUNCTIONAL ASSESSMENT: PAIN_FUNCTIONAL_ASSESSMENT: 0-10

## 2019-04-09 ASSESSMENT — PAIN DESCRIPTION - PAIN TYPE: TYPE: ACUTE PAIN

## 2019-04-09 ASSESSMENT — PAIN DESCRIPTION - LOCATION: LOCATION: SHOULDER

## 2019-04-09 NOTE — H&P
[facility]  Sedation/Analgesia History & Physical    Pt Name: Yogesh Viera  MRN: [id number]  Date of Birth: @birthdate@  Provider Performing Procedure: ROBE Rios MD  Primary Care Physician: Rosalio Barajas MD    PRE-PROCEDURE   DNR-CCA/DNR-CC []Yes [x]No  Brief History/Pre-Procedure Diagnosis: Alcoholic Liver Disease          MEDICAL HISTORY  []CAD/Valve  [x]Liver Disease  []Lung Disease []Diabetes  []Hypertension []Renal Disease  []Additional information:       has a past medical history of Alcoholism ([facility] [address] Anxiety, Atrophy of vulva, Atyp squam cell of undet signfc cyto smr crvx (ASC-US), Closed fracture of seventh cervical vertebra without spinal cord injury [facility]), Closed fracture of sixth cervical vertebra ([facility] [address] Closed fracture of thoracic vertebra ([facility] [address] Depression, Dry eye syndrome, DVT (deep venous thrombosis) ([facility] [address] Dyspareunia in female, External hemorrhoids without complication, Fibromyalgia, Gastric bypass status for obesity, GERD (gastroesophageal reflux disease), GERD (gastroesophageal reflux disease), Hiatal hernia, History of ITP, Hungry bone syndrome, Hyperparathyroidism ([facility] [address] Hypothyroidism, Insomnia, Irritable bowel, Lactose intolerance, Menopausal syndrome, Osteoarthritis, Osteopenia, Osteopenia, Ovarian cyst, Recurrent cold sores, Tachycardia, and TIA (transient ischemic attack). SURGICAL HISTORY   has a past surgical history that includes Wrist fracture surgery (Right); Leg Surgery (Right); Tonsillectomy and adenoidectomy; Gastric bypass surgery (1996); Cholecystectomy (1996); Appendectomy (1996); parathyroidectomy (08/2017); Colonoscopy (approx 2013); Upper gastrointestinal endoscopy (approx 2013); laparoscopy; Foot surgery; and Tubal ligation (1996).   Additional information:       ALLERGIES   Allergies as of 04/09/2019 - Review Complete 04/09/2019   Allergen Reaction Noted    Estrogens  01/26/2018    Penicillins  08/14/2017    Sulfa antibiotics  08/14/2017 tablet, Take 1 tablet by mouth nightly At bed time, Disp: 90 tablet, Rfl: 1    Vitamin K, Phytonadione, 100 MCG TABS, Take 3 tablets by mouth daily, Disp: 270 tablet, Rfl: 0    vitamin B-12 (CYANOCOBALAMIN) 500 MCG tablet, Take 1 tablet by mouth daily (Patient taking differently: Take 500 mcg by mouth every 48 hours ), Disp: 30 tablet, Rfl: 3    calcitRIOL (ROCALTROL) 1 MCG/ML solution, Take 15 ml daily, Disp: , Rfl:     Prenatal Multivit-Min-Fe-FA (PRENATAL VITAMINS PO), Take by mouth, Disp: , Rfl:     FOLIC ACID PO, Take 5,897 mg by mouth daily, Disp: , Rfl:     Copper 5 MG CAPS, Take 5 mg by mouth daily, Disp: 90 capsule, Rfl: 0    SYNTHROID 75 MCG tablet, Take 75 mg by mouth daily, Disp: , Rfl:     SYNTHROID 200 MCG tablet, Take 200 mcg by mouth daily, Disp: , Rfl:     Cholecalciferol (VITAMIN D3) 5000 units CAPS, Take 2 capsules by mouth daily, Disp: 60 capsule, Rfl: 0    esomeprazole (NEXIUM) 20 MG delayed release capsule, Take 20 mg by mouth 2 times daily, Disp: , Rfl:     ranitidine (ZANTAC) 150 MG tablet, Take 150 mg by mouth 2 times daily, Disp: , Rfl:     Psyllium-Calcium (METAMUCIL PLUS CALCIUM) CAPS, Take by mouth Take with 8 oz water., Disp: , Rfl:     Probiotic Product (PROBIOTIC PO), Take by mouth, Disp: , Rfl:     vitamin C (ASCORBIC ACID) 500 MG tablet, Take 500 mg by mouth 2 times daily, Disp: , Rfl:     vitamin A 86873 units capsule, Take 25,000 Units by mouth daily, Disp: , Rfl:     Current Facility-Administered Medications:     0.45 % sodium chloride infusion, , Intravenous, Continuous, ROBE Johnson DO, Last Rate: 20 mL/hr at 04/09/19 9552  Prior to Admission medications    Medication Sig Start Date End Date Taking? Authorizing Provider   linaclotide Lisbeth Religious) 290 MCG CAPS capsule  3/27/19   Historical Provider, MD   zolpidem (AMBIEN) 10 MG tablet Take 5 mg by mouth nightly as needed.      Historical Provider, MD   vitamin B-1 (THIAMINE) 100 MG tablet Take 100 mg by mouth daily    Historical Provider, MD ANDRADEULARY Whey Protein Powder    Historical Provider, MD   gabapentin (NEURONTIN) 100 MG capsule Take 100 mg by mouth 2 times daily. Historical Provider, MD   ondansetron (ZOFRAN ODT) 4 MG disintegrating tablet Take 1 tablet by mouth every 8 hours as needed for Nausea 3/14/19   Quinn Singh DO   zinc gluconate 50 MG tablet TAKE ONE AND ONE-HALF TABLETS (75 MG) DAILY 2/26/19   Dayton Cole MD   Calcium Carbonate Antacid 1000 MG CHEW Take by mouth Take 20 chewables a day    Historical Provider, MD   liothyronine (CYTOMEL) 5 MCG tablet Take 5 mcg by mouth daily  12/18/18   Historical Provider, MD   gabapentin (NEURONTIN) 300 MG capsule Take 1 capsule by mouth nightly for 180 days. . 12/6/18 6/4/19  Dayton Cole MD   acyclovir (ZOVIRAX) 400 MG tablet Take 1 tablet by mouth 2 times daily 12/6/18   Dayton Cole MD   DULoxetine (CYMBALTA) 30 MG extended release capsule Take 1 capsule by mouth daily 12/6/18   Dayton Cole MD   DULoxetine (CYMBALTA) 60 MG extended release capsule Take 1 capsule by mouth nightly 12/6/18   Dayton Cole MD   magnesium oxide (MAG-OX) 400 (241.3 Mg) MG TABS tablet Take 1 tablet by mouth 2 times daily 12/6/18   Dayton Cole MD   metaxalone [facility]) 800 MG tablet Take 1 tablet by mouth daily 12/6/18   Dayton Cole MD   potassium chloride (KLOR-CON 10) 10 MEQ extended release tablet Take 1 tablet by mouth 2 times daily 12/6/18   Dayton Cole MD   tiZANidine (ZANAFLEX) 4 MG tablet Take 1 tablet by mouth nightly At bed time 12/6/18   Dayton Cole MD   Vitamin K, Phytonadione, 100 MCG TABS Take 3 tablets by mouth daily 4/23/18   Dayton Cole MD   vitamin B-12 (CYANOCOBALAMIN) 500 MCG tablet Take 1 tablet by mouth daily  Patient taking differently: Take 500 mcg by mouth every 48 hours  4/23/18   Dayton Cole MD   calcitRIOL (ROCALTROL) 1 MCG/ML solution Take 15 ml daily    Historical Provider, MD   Prenatal Multivit-Min-Fe-FA (PRENATAL VITAMINS PO) Take by mouth    Historical Provider, MD   FOLIC ACID PO Take 5,081 mg by mouth daily    Historical Provider, MD   Copper 5 MG CAPS Take 5 mg by mouth daily 2/26/18   Chula Swan MD   SYNTHROID 75 MCG tablet Take 75 mg by mouth daily 2/21/18   Historical Provider, MD   SYNTHROID 200 MCG tablet Take 200 mcg by mouth daily 2/16/18   Historical Provider, MD   Cholecalciferol (VITAMIN D3) 5000 units CAPS Take 2 capsules by mouth daily 2/22/18   Chula Swan MD   esomeprazole (NEXIUM) 20 MG delayed release capsule Take 20 mg by mouth 2 times daily    Historical Provider, MD   ranitidine (ZANTAC) 150 MG tablet Take 150 mg by mouth 2 times daily    Historical Provider, MD   Psyllium-Calcium (METAMUCIL PLUS CALCIUM) CAPS Take by mouth Take with 8 oz water.     Historical Provider, MD   Probiotic Product (PROBIOTIC PO) Take by mouth    Historical Provider, MD   vitamin C (ASCORBIC ACID) 500 MG tablet Take 500 mg by mouth 2 times daily    Historical Provider, MD   vitamin A 83710 units capsule Take 25,000 Units by mouth daily    Historical Provider, MD     Additional information:       VITAL SIGNS   Vitals:    04/09/19 0638   BP: 124/86   Pulse: 120   Resp: 16   Temp: 97.3 °F (36.3 °C)   SpO2: 99%       PHYSICAL:   Heart:  [x]Regular rate and rhythm  []Other:    Lungs:  [x]Clear    []Other:    Abdomen: [x]Soft    []Other:    Mental Status: [x]Alert & Oriented  []Other:      PLANNED PROCEDURE   [x]Biospy []Arteriogram    []Drainage  []Fistulogram []IV access       []Dialysis catheter  []IVC filter []Dialysis catheter []Biliary drainage  []Other:  SEDATION  Planned agent:[x]Midazolam []Meperidine []Sublimaze []Morphine  []Diazepam  [x]Other: Fentanyl     ASA Classification:  []1 [x]2 []3 []4 []5  Class 1: A normal healthy patient  Class 2: Pt with mild to moderate systemic disease  Class 3: Severe systemic disease or disturbance  Class 4: Severe systemic disorders that are

## 2019-04-09 NOTE — PROGRESS NOTES
4771 Patient received in ultrasound for procedure with family at bedside. 1657 Monitor applied and pre scan started. 5139 Dr. Beckie Espionza here; spoke to patient. 9561 This procedure has been fully reviewed with the patient and written informed consent has been obtained. 7937 Family taken to waiting area. 9190 Procedure started with Dr. Beckie Espnioza. 3603 Five samples collected and sent to pathologist for further review. 5613 Aviteve collagen material administered per Dr Beckie Espinoza. Procedure completed; patient tolerated well. Post scan obtained. 8151 Bacitracin oint, band aid to site; no bleeding noted. Patient on cart; comfort ensured. 6319 Report called to OPN and patient taken to OPN via cart with family at bedside.

## 2019-04-09 NOTE — H&P
Formulation and discussion of sedation / procedure plans, risks, benefits, side effects and alternatives with patient and/or responsible adult completed. Electronically signed by ROBE Lemon DO on 4/9/2019 at 8:52 AM

## 2019-04-09 NOTE — BRIEF OP NOTE
Brief Postoperative Note    Kate Dandy  YOB: 1960  [id number]    Pre-operative Diagnosis: Alcoholic liver disease    Post-operative Diagnosis: Same    Procedure: US liver biopsy    Anesthesia: Moderate Sedation    Surgeons/Assistants: ROBE Ayala DO    Estimated Blood Loss: less than 50     Complications: None    Specimens: Was Obtained: 5 core biopsies    Findings: Full report to follow in PACS. Electronically signed by ROBE Cook DO on 4/9/2019 at 9:48 AM

## 2019-04-09 NOTE — PROGRESS NOTES
1013 Returns from xray. Pain at biopsy site is 1 area soft. Dressing dry and intact. family at bedside. Lunch taken. 1030 Dressing dry area soft. Pain at site is 1  1045 Dressing dry no change in pain. 1100 Dressing dry no change in pain. 1130 Dressing dry Pain at 1  1200 Dressing dry pain at 1.  1230 Dressing dry Pain remains at 1  1300 No change in pain dressing dry. 1320 Up to void. Dressing dry. 1340 Dressing dry, Pain at site is 1. Home instructions to  Pt and family, verbalized understanding.   [phone] Discharged per wheelchair stable home with family

## 2019-04-09 NOTE — PROGRESS NOTES
9596 pt arrives to OPN for US guided liver biopsy. Denies use of blood thinners.   with pt  3275 PATIENT RIGHTS AND RESPONSIBILITIES SHEET OFFERED TO PT TO READ.  0650 side rail up x1, call light in reach  0700 bedside report to Jasbir Greenwood

## 2019-04-10 NOTE — PROGRESS NOTES
46 Called pt for follow-up liver biopsy. States \"Doing fine. \" Denies any problems at biopsy site and states the pain in her shoulder is getting better.

## 2019-04-15 ENCOUNTER — HOSPITAL ENCOUNTER (OUTPATIENT)
Age: 59
Discharge: HOME | End: 2019-04-15
Payer: COMMERCIAL

## 2019-04-15 DIAGNOSIS — E60 LOW ZINC LEVEL: ICD-10-CM

## 2019-04-15 DIAGNOSIS — F10.20 ALCOHOLISM (HCC): ICD-10-CM

## 2019-04-15 DIAGNOSIS — E56.1 LOW SERUM VITAMIN K: ICD-10-CM

## 2019-04-15 DIAGNOSIS — R79.89 LOW VITAMIN D LEVEL: ICD-10-CM

## 2019-04-15 DIAGNOSIS — Z98.84 GASTRIC BYPASS STATUS FOR OBESITY: ICD-10-CM

## 2019-04-15 DIAGNOSIS — E83.51 HYPOCALCEMIA: ICD-10-CM

## 2019-04-15 DIAGNOSIS — R79.89 ELEVATED LIVER FUNCTION TESTS: ICD-10-CM

## 2019-04-15 LAB
ALBUMIN SERPL-MCNC: 1.9 G/DL (ref 3.5–5.1)
ALP BLD-CCNC: 94 U/L (ref 38–126)
ALT SERPL-CCNC: 24 U/L (ref 11–66)
ANION GAP SERPL CALCULATED.3IONS-SCNC: 11 MEQ/L (ref 8–16)
AST SERPL-CCNC: 56 U/L (ref 5–40)
BASOPHILS # BLD: 0.3 %
BASOPHILS ABSOLUTE: 0 THOU/MM3 (ref 0–0.1)
BILIRUB SERPL-MCNC: 0.6 MG/DL (ref 0.3–1.2)
BUN BLDV-MCNC: 6 MG/DL (ref 7–22)
CALCIUM SERPL-MCNC: 6.5 MG/DL (ref 8.5–10.5)
CHLORIDE BLD-SCNC: 103 MEQ/L (ref 98–111)
CO2: 23 MEQ/L (ref 23–33)
CREAT SERPL-MCNC: 0.5 MG/DL (ref 0.4–1.2)
EOSINOPHIL # BLD: 1.1 %
EOSINOPHILS ABSOLUTE: 0 THOU/MM3 (ref 0–0.4)
ERYTHROCYTE [DISTWIDTH] IN BLOOD BY AUTOMATED COUNT: 11.8 % (ref 11.5–14.5)
ERYTHROCYTE [DISTWIDTH] IN BLOOD BY AUTOMATED COUNT: 42.5 FL (ref 35–45)
FERRITIN: 120 NG/ML (ref 10–291)
GFR SERPL CREATININE-BSD FRML MDRD: > 90 ML/MIN/1.73M2
GLUCOSE BLD-MCNC: 82 MG/DL (ref 70–108)
HCT VFR BLD CALC: 34.9 % (ref 37–47)
HEMOGLOBIN: 11 GM/DL (ref 12–16)
IMMATURE GRANS (ABS): 0.01 THOU/MM3 (ref 0–0.07)
IMMATURE GRANULOCYTES: 0.3 %
IRON SATURATION: 22 % (ref 20–50)
IRON: 35 UG/DL (ref 50–170)
LYMPHOCYTES # BLD: 46.6 %
LYMPHOCYTES ABSOLUTE: 1.6 THOU/MM3 (ref 1–4.8)
MCH RBC QN AUTO: 31.3 PG (ref 26–33)
MCHC RBC AUTO-ENTMCNC: 31.5 GM/DL (ref 32.2–35.5)
MCV RBC AUTO: 99.1 FL (ref 81–99)
MONOCYTES # BLD: 6.5 %
MONOCYTES ABSOLUTE: 0.2 THOU/MM3 (ref 0.4–1.3)
NUCLEATED RED BLOOD CELLS: 0 /100 WBC
PLATELET # BLD: 225 THOU/MM3 (ref 130–400)
PMV BLD AUTO: 10.9 FL (ref 9.4–12.4)
POTASSIUM SERPL-SCNC: 3.8 MEQ/L (ref 3.5–5.2)
RBC # BLD: 3.52 MILL/MM3 (ref 4.2–5.4)
SEG NEUTROPHILS: 45.2 %
SEGMENTED NEUTROPHILS ABSOLUTE COUNT: 1.6 THOU/MM3 (ref 1.8–7.7)
SODIUM BLD-SCNC: 137 MEQ/L (ref 135–145)
T4 FREE: 1.26 NG/DL (ref 0.93–1.76)
TOTAL IRON BINDING CAPACITY: 158 UG/DL (ref 171–450)
TOTAL PROTEIN: 4.7 G/DL (ref 6.1–8)
VITAMIN D 25-HYDROXY: 39 NG/ML (ref 30–100)
WBC # BLD: 3.5 THOU/MM3 (ref 4.8–10.8)

## 2019-04-15 PROCEDURE — 84439 ASSAY OF FREE THYROXINE: CPT

## 2019-04-15 PROCEDURE — 82103 ALPHA-1-ANTITRYPSIN TOTAL: CPT

## 2019-04-15 PROCEDURE — 84466 ASSAY OF TRANSFERRIN: CPT

## 2019-04-15 PROCEDURE — 83516 IMMUNOASSAY NONANTIBODY: CPT

## 2019-04-15 PROCEDURE — 84597 ASSAY OF VITAMIN K: CPT

## 2019-04-15 PROCEDURE — 82525 ASSAY OF COPPER: CPT

## 2019-04-15 PROCEDURE — 82728 ASSAY OF FERRITIN: CPT

## 2019-04-15 PROCEDURE — 86256 FLUORESCENT ANTIBODY TITER: CPT

## 2019-04-15 PROCEDURE — 80053 COMPREHEN METABOLIC PANEL: CPT

## 2019-04-15 PROCEDURE — 83550 IRON BINDING TEST: CPT

## 2019-04-15 PROCEDURE — 85025 COMPLETE CBC W/AUTO DIFF WBC: CPT

## 2019-04-15 PROCEDURE — 36415 COLL VENOUS BLD VENIPUNCTURE: CPT

## 2019-04-15 PROCEDURE — 82306 VITAMIN D 25 HYDROXY: CPT

## 2019-04-15 PROCEDURE — 84481 FREE ASSAY (FT-3): CPT

## 2019-04-15 PROCEDURE — 83540 ASSAY OF IRON: CPT

## 2019-04-15 PROCEDURE — 84630 ASSAY OF ZINC: CPT

## 2019-04-15 PROCEDURE — 82390 ASSAY OF CERULOPLASMIN: CPT

## 2019-04-15 PROCEDURE — 86038 ANTINUCLEAR ANTIBODIES: CPT

## 2019-04-16 ENCOUNTER — TELEPHONE (OUTPATIENT)
Dept: FAMILY MEDICINE CLINIC | Age: 59
End: 2019-04-16

## 2019-04-16 LAB
ANA SCREEN: NORMAL
MITOCHONDRIAL ANTIBODY: 2.9 UNITS (ref 0–20)
T3 FREE: 3.25 PG/ML (ref 2.02–4.43)

## 2019-04-17 LAB
ALPHA-1 ANTITRYPSIN: 118 MG/DL (ref 90–200)
CERULOPLASMIN: 19 MG/DL (ref 17–54)
SMOOTH MUSCLE AB IGG TITER: ABNORMAL
TRANSFERRIN: 143 MG/DL (ref 200–400)

## 2019-04-18 ENCOUNTER — OFFICE VISIT (OUTPATIENT)
Dept: FAMILY MEDICINE CLINIC | Age: 59
End: 2019-04-18
Payer: COMMERCIAL

## 2019-04-18 VITALS
HEIGHT: 59 IN | OXYGEN SATURATION: 99 % | TEMPERATURE: 97.7 F | RESPIRATION RATE: 16 BRPM | HEART RATE: 93 BPM | SYSTOLIC BLOOD PRESSURE: 106 MMHG | DIASTOLIC BLOOD PRESSURE: 68 MMHG | BODY MASS INDEX: 28.67 KG/M2 | WEIGHT: 142.2 LBS

## 2019-04-18 DIAGNOSIS — R19.00 ABDOMINAL MASS, UNSPECIFIED ABDOMINAL LOCATION: ICD-10-CM

## 2019-04-18 DIAGNOSIS — E60 LOW ZINC LEVEL: ICD-10-CM

## 2019-04-18 DIAGNOSIS — Z98.84 GASTRIC BYPASS STATUS FOR OBESITY: ICD-10-CM

## 2019-04-18 DIAGNOSIS — E56.1 LOW SERUM VITAMIN K: ICD-10-CM

## 2019-04-18 DIAGNOSIS — F10.20 ALCOHOLISM (HCC): ICD-10-CM

## 2019-04-18 DIAGNOSIS — R30.0 DYSURIA: Primary | ICD-10-CM

## 2019-04-18 DIAGNOSIS — E46 HYPOALBUMINEMIA DUE TO PROTEIN-CALORIE MALNUTRITION (HCC): ICD-10-CM

## 2019-04-18 DIAGNOSIS — E83.51 HYPOCALCEMIA: ICD-10-CM

## 2019-04-18 DIAGNOSIS — R79.89 ELEVATED LIVER FUNCTION TESTS: ICD-10-CM

## 2019-04-18 DIAGNOSIS — R79.89 LOW VITAMIN D LEVEL: ICD-10-CM

## 2019-04-18 DIAGNOSIS — E88.09 HYPOALBUMINEMIA DUE TO PROTEIN-CALORIE MALNUTRITION (HCC): ICD-10-CM

## 2019-04-18 DIAGNOSIS — E88.09 EDEMA DUE TO HYPOALBUMINEMIA: ICD-10-CM

## 2019-04-18 DIAGNOSIS — R60.1 ANASARCA: ICD-10-CM

## 2019-04-18 DIAGNOSIS — E06.5 HYPOTHYROIDISM DUE TO FIBROUS INVASIVE THYROIDITIS: ICD-10-CM

## 2019-04-18 DIAGNOSIS — E03.8 HYPOTHYROIDISM DUE TO FIBROUS INVASIVE THYROIDITIS: ICD-10-CM

## 2019-04-18 LAB
BACTERIA: ABNORMAL
BILIRUBIN URINE: NEGATIVE
BILIRUBIN, POC: NORMAL
BLOOD URINE, POC: NORMAL
BLOOD, URINE: ABNORMAL
CASTS: ABNORMAL /LPF
CASTS: ABNORMAL /LPF
CHARACTER, URINE: ABNORMAL
CLARITY, POC: NORMAL
COLOR, POC: NORMAL
COLOR: ABNORMAL
COPPER: 96 UG/DL (ref 80–155)
CRYSTALS: ABNORMAL
EPITHELIAL CELLS, UA: ABNORMAL /HPF
GLUCOSE URINE, POC: NEGATIVE
GLUCOSE, URINE: NEGATIVE MG/DL
KETONES, POC: NORMAL
KETONES, URINE: ABNORMAL
LEUKOCYTE EST, POC: NORMAL
LEUKOCYTE ESTERASE, URINE: ABNORMAL
MISCELLANEOUS LAB TEST RESULT: ABNORMAL
MUCUS: ABNORMAL
NITRITE, POC: POSITIVE
NITRITE, URINE: POSITIVE
PH UA: 6 (ref 5–9)
PH, POC: 6
PROTEIN UA: 30 MG/DL
PROTEIN, POC: 100
RBC URINE: ABNORMAL /HPF
RENAL EPITHELIAL, UA: ABNORMAL
SPECIFIC GRAVITY UA: 1.02 (ref 1–1.03)
SPECIFIC GRAVITY, POC: 1.02
UROBILINOGEN, POC: 0.2
UROBILINOGEN, URINE: 0.2 EU/DL (ref 0–1)
VITAMIN K1: NORMAL
WBC UA: ABNORMAL /HPF
YEAST: ABNORMAL

## 2019-04-18 PROCEDURE — 1036F TOBACCO NON-USER: CPT | Performed by: FAMILY MEDICINE

## 2019-04-18 PROCEDURE — 99214 OFFICE O/P EST MOD 30 MIN: CPT | Performed by: FAMILY MEDICINE

## 2019-04-18 PROCEDURE — 81003 URINALYSIS AUTO W/O SCOPE: CPT | Performed by: FAMILY MEDICINE

## 2019-04-18 PROCEDURE — 3017F COLORECTAL CA SCREEN DOC REV: CPT | Performed by: FAMILY MEDICINE

## 2019-04-18 PROCEDURE — G8427 DOCREV CUR MEDS BY ELIG CLIN: HCPCS | Performed by: FAMILY MEDICINE

## 2019-04-18 PROCEDURE — G8419 CALC BMI OUT NRM PARAM NOF/U: HCPCS | Performed by: FAMILY MEDICINE

## 2019-04-18 RX ORDER — PYRIDOXINE HCL (VITAMIN B6) 100 MG
500 TABLET ORAL 2 TIMES DAILY
COMMUNITY
End: 2019-01-01

## 2019-04-18 ASSESSMENT — ENCOUNTER SYMPTOMS
COLOR CHANGE: 0
CONSTIPATION: 0
VOMITING: 1
BACK PAIN: 0
EYE DISCHARGE: 0
NAUSEA: 1
SHORTNESS OF BREATH: 0
EYE REDNESS: 0
ANAL BLEEDING: 0
BLOOD IN STOOL: 0

## 2019-04-18 NOTE — PROGRESS NOTES
[address] [address] [place] [address]  Dept: [phone]  Dept Fax: [phone]  Loc: [phone]      Lai Davidson is a 59 y.o. female who presents todayfor her medical conditions/complaints as noted below. Lai Davidson is c/o of Bloated (burnning to touch and hard x6 weeks getting worse ); Dysuria (x2 days); Foot Swelling (yesterday); Emesis (off and on x6 weeks); and Other (no appetite)      HPI:      Dysuria    This is a new problem. The current episode started yesterday. The problem occurs every urination. The problem has been waxing and waning. The quality of the pain is described as burning. The pain is at a severity of 2/10. The pain is mild. There has been no fever. She is not sexually active ( but not sexually active for years; wants to work on this when feeling better). There is no history of pyelonephritis. Associated symptoms include hesitancy, nausea and vomiting (once a day for the last month; not every day). Pertinent negatives include no chills, discharge, flank pain, frequency, hematuria, possible pregnancy, sweats or urgency. Associated symptoms comments: Pushing on belly to empty bladder. . Treatments tried: hydration, cranberry. The treatment provided no relief. There is no history of catheterization, kidney stones, recurrent UTIs, a single kidney, urinary stasis or a urological procedure. Here for acute visit today. Feels like lower abdomen is hard and swollen with ropy texture. Labia are also swollen. Feet are also swollen. Mouth feels dry. Was taking linzess with soft unformed stools. About 4 days ago stopped linzess since stool looks like brown cream of wheat. Was incontinent of stool with passing flatus. Was worried about possible bowel obstruction but this morning had soft mashed potato consistency stool. No tightness in abdomen.   No pain in abdomen just swelling bone syndrome, Hyperparathyroidism ([facility] [address] Hypothyroidism, Insomnia, Irritable bowel, Lactose intolerance, Menopausal syndrome, Osteoarthritis, Osteopenia, Osteopenia, Ovarian cyst, Recurrent cold sores, Tachycardia, and TIA (transient ischemic attack). Past SurgicalHistory  The patient  has a past surgical history that includes Wrist fracture surgery (Right); Leg Surgery (Right); Tonsillectomy and adenoidectomy; Gastric bypass surgery (1996); Cholecystectomy (1996); Appendectomy (1996); parathyroidectomy (08/2017); Colonoscopy (approx 2013); Upper gastrointestinal endoscopy (approx 2013); laparoscopy; Foot surgery; and Tubal ligation (1996). Family History  This patient's family history includes Asthma in her maternal grandfather; Breast Cancer in her mother and sister; Cancer in her father; Depression in her father and mother; Heart Attack in her maternal grandmother; High Blood Pressure in her father and mother; Mental Illness in her maternal grandmother; Other in her father, maternal grandfather, maternal grandmother, and mother. Social History  Aubrey Thibodeaux  reports that she has never smoked. She has never used smokeless tobacco. She reports that she drinks about 5.4 oz of alcohol per week. She reports that she does not use drugs. Medications    Current Outpatient Medications:     Cranberry 500 MG CAPS, Take 500 mg by mouth 2 times daily, Disp: , Rfl:     linaclotide (LINZESS) 290 MCG CAPS capsule, , Disp: , Rfl:     zolpidem (AMBIEN) 10 MG tablet, Take 5 mg by mouth nightly as needed. Indications: 1/2 tablet , Disp: , Rfl:     vitamin B-1 (THIAMINE) 100 MG tablet, Take 100 mg by mouth daily, Disp: , Rfl:     NONFORMULARY, Whey Protein Powder, Disp: , Rfl:     gabapentin (NEURONTIN) 100 MG capsule, Take 100 mg by mouth 2 times daily. , Disp: , Rfl:     ondansetron (ZOFRAN ODT) 4 MG disintegrating tablet, Take 1 tablet by mouth every 8 hours as needed for Nausea, Disp: 10 tablet, Rfl: 0    zinc gluconate 50 MG tablet, TAKE ONE AND ONE-HALF TABLETS (75 MG) DAILY, Disp: 135 tablet, Rfl: 0    Calcium Carbonate Antacid 1000 MG CHEW, Take by mouth Take 20 chewables a day, Disp: , Rfl:     liothyronine (CYTOMEL) 5 MCG tablet, Take 5 mcg by mouth daily , Disp: , Rfl:     gabapentin (NEURONTIN) 300 MG capsule, Take 1 capsule by mouth nightly for 180 days. ., Disp: 90 capsule, Rfl: 1    acyclovir (ZOVIRAX) 400 MG tablet, Take 1 tablet by mouth 2 times daily, Disp: 180 tablet, Rfl: 1    DULoxetine (CYMBALTA) 30 MG extended release capsule, Take 1 capsule by mouth daily, Disp: 90 capsule, Rfl: 1    DULoxetine (CYMBALTA) 60 MG extended release capsule, Take 1 capsule by mouth nightly, Disp: 90 capsule, Rfl: 1    magnesium oxide (MAG-OX) 400 (241.3 Mg) MG TABS tablet, Take 1 tablet by mouth 2 times daily, Disp: 180 tablet, Rfl: 1    metaxalone (SKELAXIN) 800 MG tablet, Take 1 tablet by mouth daily, Disp: 90 tablet, Rfl: 1    potassium chloride (KLOR-CON 10) 10 MEQ extended release tablet, Take 1 tablet by mouth 2 times daily, Disp: 180 tablet, Rfl: 1    tiZANidine (ZANAFLEX) 4 MG tablet, Take 1 tablet by mouth nightly At bed time, Disp: 90 tablet, Rfl: 1    Vitamin K, Phytonadione, 100 MCG TABS, Take 3 tablets by mouth daily, Disp: 270 tablet, Rfl: 0    vitamin B-12 (CYANOCOBALAMIN) 500 MCG tablet, Take 1 tablet by mouth daily (Patient taking differently: Take 500 mcg by mouth every 48 hours ), Disp: 30 tablet, Rfl: 3    calcitRIOL (ROCALTROL) 1 MCG/ML solution, Take 15 ml daily, Disp: , Rfl:     Prenatal Multivit-Min-Fe-FA (PRENATAL VITAMINS PO), Take by mouth, Disp: , Rfl:     FOLIC ACID PO, Take 9,100 mg by mouth daily, Disp: , Rfl:     Copper 5 MG CAPS, Take 5 mg by mouth daily, Disp: 90 capsule, Rfl: 0    SYNTHROID 75 MCG tablet, Take 75 mg by mouth daily, Disp: , Rfl:     SYNTHROID 200 MCG tablet, Take 200 mcg by mouth daily, Disp: , Rfl:     Cholecalciferol (VITAMIN D3) 5000 units CAPS, Take 2 capsules by mouth daily, Disp: 60 capsule, Rfl: 0    esomeprazole (NEXIUM) 20 MG delayed release capsule, Take 20 mg by mouth 2 times daily, Disp: , Rfl:     ranitidine (ZANTAC) 150 MG tablet, Take 150 mg by mouth 2 times daily, Disp: , Rfl:     Psyllium-Calcium (METAMUCIL PLUS CALCIUM) CAPS, Take by mouth Take with 8 oz water., Disp: , Rfl:     Probiotic Product (PROBIOTIC PO), Take by mouth, Disp: , Rfl:     vitamin C (ASCORBIC ACID) 500 MG tablet, Take 500 mg by mouth 2 times daily, Disp: , Rfl:     vitamin A 86848 units capsule, Take 25,000 Units by mouth daily, Disp: , Rfl:     Subjective:      Review of Systems   Constitutional: Negative for chills and unexpected weight change. HENT: Negative for ear discharge, ear pain and hearing loss. Eyes: Negative for discharge and redness. Respiratory: Negative for shortness of breath. Cardiovascular: Negative for palpitations. Gastrointestinal: Positive for nausea and vomiting (once a day for the last month; not every day). Negative for anal bleeding, blood in stool and constipation. Endocrine: Positive for cold intolerance. Genitourinary: Positive for dysuria and hesitancy. Negative for difficulty urinating, flank pain, frequency, hematuria and urgency. Musculoskeletal: Negative for back pain and gait problem. Skin: Negative for color change and rash. Dry skin; Striae   Allergic/Immunologic: Negative for environmental allergies. Psychiatric/Behavioral: Negative for confusion, dysphoric mood, self-injury, sleep disturbance (better back on [place]) and suicidal ideas. The patient is not nervous/anxious. Objective:        Vitals:    04/18/19 1100   BP: 106/68   Site: Left Upper Arm   Pulse: 93   Resp: 16   Temp: 97.7 °F (36.5 °C)   TempSrc: Oral   SpO2: 99%   Weight: 142 lb 3.2 oz (64.5 kg)   Height: 4' 10.5\" (1.486 m)        Physical Exam   Constitutional: She is oriented to person, place, and time. She does not appear ill. Appears well at this time. HENT:   Head: Normocephalic and atraumatic. Mouth/Throat: Oropharynx is clear and moist.   Eyes: Pupils are equal, round, and reactive to light. Conjunctivae are normal. No scleral icterus. Neck: Neck supple. No thyromegaly present. Cardiovascular: Normal rate, regular rhythm and normal heart sounds. Pulmonary/Chest: Effort normal and breath sounds normal. No respiratory distress. Abdominal: Soft. She exhibits no distension, no fluid wave, no ascites and no mass. There is no tenderness. There is no rebound and no guarding. Skin of lower half of abdomen is thickened. No striae but does have a ropy consistency that is palpable and visible. Vulvar skin is also swollen. Most consistent with anasarca and vulvar edema. No warmth, tenderness, or redness to suggest infection. Musculoskeletal: She exhibits edema. 2+ pitting edema of bilateral lower extremities below the knees. Lymphadenopathy:     She has no cervical adenopathy. Neurological: She is alert and oriented to person, place, and time. No cranial nerve deficit. No obvious focal deficit. Talking in complete sentences. Skin: Skin is warm and dry. No rash noted. No jaundice noted. Psychiatric: Thought content normal.   Vitals reviewed. No CVA tenderness. Lab Results   Component Value Date    WBC 3.5 (L) 04/15/2019    HGB 11.0 (L) 04/15/2019    HCT 34.9 (L) 04/15/2019     04/15/2019    HDL 82 02/12/2018    ALT 24 04/15/2019    AST 56 (H) 04/15/2019     04/15/2019    K 3.8 04/15/2019     04/15/2019    CREATININE 0.5 04/15/2019    BUN 6 (L) 04/15/2019    CO2 23 04/15/2019    TSH 0.011 (L) 01/16/2018    INR 0.94 07/13/2017    LABA1C 4.1 (L) 02/08/2018       Assessment/Plan:   1. Dysuria  Concerning for UTI today. Will treat with cephalexin. Indications for emergent presentation reviewed.     - POCT Urinalysis No Micro (Auto)  - Urine Culture  - Urinalysis With Microscopic; Future  - Urinalysis With Microscopic    2. Abdominal mass, unspecified abdominal location  No mass per say but overlying skin changes. Suspect related to albumin level and anasarca but would like to repeat imaging since worsening to exclude underlying mass with lymphatic obstruction or other etiology. Stat CT ordered. - CTA ABDOMEN PELVIS W WO CONTRAST; Future  - CT ABDOMEN PELVIS W WO CONTRAST Additional Contrast? Radiologist Recommendation; Future    3. Elevated liver function tests  Improving. 4. Alcoholism ([facility] [address]  I remission. 5. Gastric bypass status for obesity  On supplements. 6. Hypoalbuminemia due to protein-calorie malnutrition ([facility] [address]  Suspect related to #4 and #5.      7. Anasarca  Suspect secondary to #6.      8. Edema due to hypoalbuminemia  Suspect secondary to #4 and #5 and #6.      9. Hypocalcemia  Following with endocrine on supplement. 10. Low serum vitamin K  Pending. 11. Low zinc level  In range. 12. Low vitamin D level  At goal      13. 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         Return in about 4 days (around 4/22/2019) for re-check UTI and abdominal swelling. Orders Placed   Orders Placed This Encounter   Procedures    Urine Culture     Order Specific Question:   Specify (ex-cath, midstream, cysto, etc)?      Answer:   mid-stream    CTA ABDOMEN PELVIS W WO CONTRAST     Standing Status:   Future     Standing Expiration Date:   4/18/2020     Order Specific Question:   Reason for exam:     Answer:   swelling of skin in lower abdomen and labia; possible anasarca but worsening and want to exclude underlying mass, infection, or other etiology    CT ABDOMEN PELVIS W WO CONTRAST Additional Contrast? Radiologist Recommendation     Standing Status:   Future     Standing Expiration Date:   4/18/2020     Order Specific Question:   Additional Contrast?     Answer:   Radiologist Recommendation     Order Specific Question:   Reason for exam:     Answer:   swelling of skin in lower abdomen and labia; possible anasarca but worsenign and want to exclude underlying mass, infection, or other etiology    Urinalysis With Microscopic     Standing Status:   Future     Number of Occurrences:   1     Standing Expiration Date:   4/18/2020     Order Specific Question:   SPECIFY(EX-CATH,MIDSTREAM,CYSTO,ETC)? Answer:   mid-stream    POCT Urinalysis No Micro (Auto)       Prescriptions given/sent  No orders of the defined types were placed in this encounter. Patient given educational materials - see patient instructions. Discussed use, benefit, and side effects of prescribed medications. All patientquestions answered. Pt voiced understanding. Plans visit for pap.               Electronically signed by Dayton Cole MD on 4/18/2019 at 1:03 PM

## 2019-04-18 NOTE — PATIENT INSTRUCTIONS
Go for CT abdomen pelvis today. Start cephalexin 500 mg twice daily. Follow-up promptly if worsening or Monday for re-check otherwise. Patient Education        Painful Urination (Dysuria): Care Instructions  Your Care Instructions  Burning pain with urination (dysuria) is a common symptom of a urinary tract infection or other urinary problems. The bladder may become inflamed. This can cause pain when the bladder fills and empties. You may also feel pain if the tube that carries urine from the bladder to the outside of the body (urethra) gets irritated or infected. Sexually transmitted infections (STIs) also may cause pain when you urinate. Sometimes the pain can be caused by things other than an infection. The urethra can be irritated by soaps, perfumes, or foreign objects in the urethra. Kidney stones can cause pain when they pass through the urethra. The cause may be hard to find. You may need tests. Treatment for painful urination depends on the cause. Follow-up care is a key part of your treatment and safety. Be sure to make and go to all appointments, and call your doctor if you are having problems. It's also a good idea to know your test results and keep a list of the medicines you take. How can you care for yourself at home? · Drink extra water for the next day or two. This will help make the urine less concentrated. (If you have kidney, heart, or liver disease and have to limit fluids, talk with your doctor before you increase the amount of fluids you drink.)  · Avoid drinks that are carbonated or have caffeine. They can irritate the bladder. · Urinate often. Try to empty your bladder each time. For women:  · Urinate right after you have sex. · After going to the bathroom, wipe from front to back. · Avoid douches, bubble baths, and feminine hygiene sprays. And avoid other feminine hygiene products that have deodorants. When should you call for help?   Call your doctor now or seek immediate medical care if:    · You have new symptoms, such as fever, nausea, or vomiting.     · You have new or worse symptoms of a urinary problem. For example:  ? You have blood or pus in your urine. ? You have chills or body aches. ? It hurts worse to urinate. ? You have groin or belly pain. ? You have pain in your back just below your rib cage (the flank area).    Watch closely for changes in your health, and be sure to contact your doctor if you have any problems. Where can you learn more? Go to https://Premier DiagnosticspeCustExeb.Everyware Global. org and sign in to your [employer] account. Enter S067 in the [employer] box to learn more about \"Painful Urination (Dysuria): Care Instructions. \"     If you do not have an account, please click on the \"Sign Up Now\" link. Current as of: March 20, 2018  Content Version: 11.9  © 6417-7968 [employer]. Care instructions adapted under license by [facility] ([facility]). If you have questions about a medical condition or this instruction, always ask your healthcare professional. [place] [address] any warranty or liability for your use of this information. Patient Education        CT Scan of the Abdomen: About This Test  What is it? A CT (computed tomography) scan uses X-rays to make detailed pictures of your body and structures inside your body. A CT scan of the abdomen (belly) can give your doctor information about your liver, pancreas, kidneys, and other structures in your belly. During the test, you will lie on a table that is attached to the [employer]. The CT scanner is a large doughnut-shaped machine. Why is this test done? A CT scan of the belly can help find problems such as kidney stones, infected pouches in the colon (diverticulitis), and appendicitis. It also helps find tumors and abscesses.   How can you prepare for the test?  Talk to your doctor about all your health conditions before the test. For example, tell your doctor if:  · You are or might be pregnant. · You are allergic to any medicines. · You have diabetes. · You take metformin. · You are breastfeeding. · You get nervous in confined spaces. You may need medicine to help you relax. · You have had an X-ray test using barium contrast material in the past 4 days. You may be asked to not eat any solid foods starting the night before your scan. What happens before the test?  · You may have to take off jewelry. · You will take off all or most of your clothes and change into a gown. If you do leave some clothes on, make sure you take everything out of your pockets. · You may have contrast material (dye) put into your arm through a tube called an IV. Or, you may drink the contrast material or it may be put through a tube into your bladder or rectum. Contrast material helps doctors see specific organs, blood vessels, and most tumors. What happens during the test?  · You will lie on a table that is attached to the CT scanner. · The table slides into the round opening of the scanner. The table will move during the scan. The scanner moves inside the doughnut-shaped casing around your body. · You will be asked to hold still during the scan. You may be asked to hold your breath for short periods. · You may be alone in the scanning room, but a technologist will be watching you through a window and talking with you during the test.  What else should you know about the test?  · A CT scan does not hurt. · If a dye is used, you may feel a quick sting or pinch when the IV is started. The dye may make you feel warm and flushed and give you a metallic taste in your mouth. Some people feel sick to their stomach or get a headache. · If you breastfeed and are concerned about whether the dye used in this test is safe, talk to your doctor. Most experts believe that very little dye passes into breast milk and even less is passed on to the baby.  But if you prefer, you can store some of your breast milk ahead of time and use it for a day or two after the test.  · The dose of radiation from a CT scanner may be higher than that from other X-ray tests. If you are concerned about the radiation risk, talk to your doctor. How long does the test take? · The test will take about 30 to 60 minutes. Most of this time is spent getting ready for the scan. The actual test only takes a few minutes. What happens after the test?  · You will probably be able to go home right away. · You can go back to your usual activities right away. · Drink plenty of fluids for 24 hours after the test if dye was used, unless your doctor tells you not to. When should you call for help? Watch closely for changes in your health, and be sure to contact your doctor if you have any problems. Follow-up care is a key part of your treatment and safety. Be sure to make and go to all appointments, and call your doctor if you are having problems. It's also a good idea to keep a list of the medicines you take. Ask your doctor when you can expect to have your test results. Where can you learn more? Go to https://DoubleRecallpepicPressflip.drchrono. org and sign in to your [employer] account. Enter Z238 in the [employer] box to learn more about \"CT Scan of the Abdomen: About This Test.\"     If you do not have an account, please click on the \"Sign Up Now\" link. Current as of: June 25, 2018  Content Version: 11.9  © 1191-4365 [employer]. Care instructions adapted under license by [facility] [employer] [facility] ([facility]). If you have questions about a medical condition or this instruction, always ask your healthcare professional. [place] [address] any warranty or liability for your use of this information.

## 2019-04-19 LAB — ZINC: 45 UG/DL (ref 60–120)

## 2019-04-20 LAB
ORGANISM: ABNORMAL
URINE CULTURE, ROUTINE: ABNORMAL

## 2019-04-24 ENCOUNTER — TELEPHONE (OUTPATIENT)
Dept: FAMILY MEDICINE CLINIC | Age: 59
End: 2019-04-24

## 2019-04-24 DIAGNOSIS — R19.00 ABDOMINAL MASS, UNSPECIFIED ABDOMINAL LOCATION: Primary | ICD-10-CM

## 2019-04-24 DIAGNOSIS — F10.20 ALCOHOLISM (HCC): ICD-10-CM

## 2019-04-24 DIAGNOSIS — Z98.84 GASTRIC BYPASS STATUS FOR OBESITY: ICD-10-CM

## 2019-04-24 DIAGNOSIS — E46 HYPOALBUMINEMIA DUE TO PROTEIN-CALORIE MALNUTRITION (HCC): ICD-10-CM

## 2019-04-24 DIAGNOSIS — R60.1 ANASARCA: ICD-10-CM

## 2019-04-24 DIAGNOSIS — E88.09 HYPOALBUMINEMIA DUE TO PROTEIN-CALORIE MALNUTRITION (HCC): ICD-10-CM

## 2019-04-24 DIAGNOSIS — R79.89 ELEVATED LIVER FUNCTION TESTS: ICD-10-CM

## 2019-04-24 NOTE — TELEPHONE ENCOUNTER
CT abdomen/pelvis was denied and the reasons are as follows:   we cannot approve this request. An abdominal and pelvic CT is generally performed only with contrast for patients in the evaluation of abdominal pain and most other conditions. MRI is considered in cases of renal failure or allergy to intravenous CT contrast. The clinical information provided does not describe any of these exceptions and, therefore, a CT of the abdomen and pelvis with contrast only ([id number]) can be approved as an alternative. Please contact us if you would like to accept this recommendation. Does the provider wants to change the order?

## 2019-04-26 ENCOUNTER — HOSPITAL ENCOUNTER (OUTPATIENT)
Dept: CT IMAGING | Age: 59
Discharge: HOME | End: 2019-04-26
Payer: COMMERCIAL

## 2019-04-26 DIAGNOSIS — F10.20 ALCOHOLISM (HCC): ICD-10-CM

## 2019-04-26 DIAGNOSIS — R79.89 ELEVATED LIVER FUNCTION TESTS: ICD-10-CM

## 2019-04-26 DIAGNOSIS — E46 HYPOALBUMINEMIA DUE TO PROTEIN-CALORIE MALNUTRITION (HCC): ICD-10-CM

## 2019-04-26 DIAGNOSIS — Z98.84 GASTRIC BYPASS STATUS FOR OBESITY: ICD-10-CM

## 2019-04-26 DIAGNOSIS — R60.1 ANASARCA: ICD-10-CM

## 2019-04-26 DIAGNOSIS — E88.09 HYPOALBUMINEMIA DUE TO PROTEIN-CALORIE MALNUTRITION (HCC): ICD-10-CM

## 2019-04-26 DIAGNOSIS — R19.00 ABDOMINAL MASS, UNSPECIFIED ABDOMINAL LOCATION: ICD-10-CM

## 2019-04-26 PROCEDURE — 74177 CT ABD & PELVIS W/CONTRAST: CPT

## 2019-04-26 PROCEDURE — 6360000004 HC RX CONTRAST MEDICATION: Performed by: FAMILY MEDICINE

## 2019-04-26 RX ADMIN — IOPAMIDOL 100 ML: 755 INJECTION, SOLUTION INTRAVENOUS at 14:39

## 2019-04-26 RX ADMIN — IOHEXOL 50 ML: 240 INJECTION, SOLUTION INTRATHECAL; INTRAVASCULAR; INTRAVENOUS; ORAL at 14:39

## 2019-04-28 ENCOUNTER — TELEPHONE (OUTPATIENT)
Dept: FAMILY MEDICINE CLINIC | Age: 59
End: 2019-04-28

## 2019-04-28 DIAGNOSIS — J90 PLEURAL EFFUSION: Primary | ICD-10-CM

## 2019-04-28 RX ORDER — SPIRONOLACTONE 25 MG/1
25 TABLET ORAL DAILY
Qty: 30 TABLET | Refills: 1 | Status: SHIPPED | OUTPATIENT
Start: 2019-04-28 | End: 2019-05-23 | Stop reason: SDUPTHER

## 2019-04-28 RX ORDER — FUROSEMIDE 20 MG/1
20 TABLET ORAL DAILY
Qty: 30 TABLET | Refills: 0 | Status: SHIPPED | OUTPATIENT
Start: 2019-04-28 | End: 2019-05-30 | Stop reason: SDUPTHER

## 2019-05-01 ENCOUNTER — NURSE ONLY (OUTPATIENT)
Dept: LAB | Age: 59
End: 2019-05-01

## 2019-05-01 LAB
ALBUMIN SERPL-MCNC: 2.1 G/DL (ref 3.5–5.1)
ALP BLD-CCNC: 117 U/L (ref 38–126)
ALT SERPL-CCNC: 22 U/L (ref 11–66)
ANION GAP SERPL CALCULATED.3IONS-SCNC: 9 MEQ/L (ref 8–16)
AST SERPL-CCNC: 47 U/L (ref 5–40)
BILIRUB SERPL-MCNC: 0.8 MG/DL (ref 0.3–1.2)
BUN BLDV-MCNC: 6 MG/DL (ref 7–22)
CALCIUM SERPL-MCNC: 7.4 MG/DL (ref 8.5–10.5)
CHLORIDE BLD-SCNC: 104 MEQ/L (ref 98–111)
CO2: 24 MEQ/L (ref 23–33)
CREAT SERPL-MCNC: 0.5 MG/DL (ref 0.4–1.2)
GFR SERPL CREATININE-BSD FRML MDRD: > 90 ML/MIN/1.73M2
GLUCOSE BLD-MCNC: 99 MG/DL (ref 70–108)
POTASSIUM SERPL-SCNC: 4.7 MEQ/L (ref 3.5–5.2)
SODIUM BLD-SCNC: 137 MEQ/L (ref 135–145)
TOTAL PROTEIN: 5.6 G/DL (ref 6.1–8)

## 2019-05-02 ENCOUNTER — TELEPHONE (OUTPATIENT)
Dept: FAMILY MEDICINE CLINIC | Age: 59
End: 2019-05-02

## 2019-05-02 DIAGNOSIS — J90 PLEURAL EFFUSION: Primary | ICD-10-CM

## 2019-05-03 ENCOUNTER — HOSPITAL ENCOUNTER (OUTPATIENT)
Dept: NON INVASIVE DIAGNOSTICS | Age: 59
Discharge: HOME | End: 2019-05-03
Payer: COMMERCIAL

## 2019-05-03 DIAGNOSIS — J90 PLEURAL EFFUSION: ICD-10-CM

## 2019-05-03 LAB
LV EF: 55 %
LVEF MODALITY: NORMAL

## 2019-05-03 PROCEDURE — 93306 TTE W/DOPPLER COMPLETE: CPT

## 2019-05-07 ENCOUNTER — HOSPITAL ENCOUNTER (OUTPATIENT)
Age: 59
Setting detail: OUTPATIENT SURGERY
Discharge: HOME | End: 2019-05-07
Attending: INTERNAL MEDICINE | Admitting: INTERNAL MEDICINE
Payer: COMMERCIAL

## 2019-05-07 ENCOUNTER — ANESTHESIA EVENT (OUTPATIENT)
Dept: ENDOSCOPY | Age: 59
End: 2019-05-07
Payer: COMMERCIAL

## 2019-05-07 ENCOUNTER — ANESTHESIA (OUTPATIENT)
Dept: ENDOSCOPY | Age: 59
End: 2019-05-07
Payer: COMMERCIAL

## 2019-05-07 VITALS
OXYGEN SATURATION: 99 % | BODY MASS INDEX: 23.75 KG/M2 | RESPIRATION RATE: 18 BRPM | DIASTOLIC BLOOD PRESSURE: 55 MMHG | HEART RATE: 100 BPM | WEIGHT: 117.8 LBS | TEMPERATURE: 96.8 F | HEIGHT: 59 IN | SYSTOLIC BLOOD PRESSURE: 109 MMHG

## 2019-05-07 VITALS
DIASTOLIC BLOOD PRESSURE: 56 MMHG | RESPIRATION RATE: 16 BRPM | SYSTOLIC BLOOD PRESSURE: 103 MMHG | OXYGEN SATURATION: 100 %

## 2019-05-07 PROCEDURE — 7100000000 HC PACU RECOVERY - FIRST 15 MIN: Performed by: INTERNAL MEDICINE

## 2019-05-07 PROCEDURE — 3609010600 HC COLONOSCOPY POLYPECTOMY SNARE/COLD BIOPSY: Performed by: INTERNAL MEDICINE

## 2019-05-07 PROCEDURE — 6360000002 HC RX W HCPCS: Performed by: REGISTERED NURSE

## 2019-05-07 PROCEDURE — 2580000003 HC RX 258: Performed by: INTERNAL MEDICINE

## 2019-05-07 PROCEDURE — 2709999900 HC NON-CHARGEABLE SUPPLY: Performed by: INTERNAL MEDICINE

## 2019-05-07 PROCEDURE — 7100000001 HC PACU RECOVERY - ADDTL 15 MIN: Performed by: INTERNAL MEDICINE

## 2019-05-07 PROCEDURE — 3700000001 HC ADD 15 MINUTES (ANESTHESIA): Performed by: INTERNAL MEDICINE

## 2019-05-07 PROCEDURE — 3700000000 HC ANESTHESIA ATTENDED CARE: Performed by: INTERNAL MEDICINE

## 2019-05-07 PROCEDURE — 86677 HELICOBACTER PYLORI ANTIBODY: CPT

## 2019-05-07 PROCEDURE — 2500000003 HC RX 250 WO HCPCS: Performed by: REGISTERED NURSE

## 2019-05-07 PROCEDURE — 88305 TISSUE EXAM BY PATHOLOGIST: CPT

## 2019-05-07 PROCEDURE — 3609012400 HC EGD TRANSORAL BIOPSY SINGLE/MULTIPLE: Performed by: INTERNAL MEDICINE

## 2019-05-07 RX ORDER — SODIUM CHLORIDE 450 MG/100ML
INJECTION, SOLUTION INTRAVENOUS CONTINUOUS
Status: DISCONTINUED | OUTPATIENT
Start: 2019-05-07 | End: 2019-05-07 | Stop reason: HOSPADM

## 2019-05-07 RX ORDER — FENTANYL CITRATE 50 UG/ML
INJECTION, SOLUTION INTRAMUSCULAR; INTRAVENOUS PRN
Status: DISCONTINUED | OUTPATIENT
Start: 2019-05-07 | End: 2019-05-07 | Stop reason: SDUPTHER

## 2019-05-07 RX ORDER — LIDOCAINE HYDROCHLORIDE 10 MG/ML
INJECTION, SOLUTION INFILTRATION; PERINEURAL PRN
Status: DISCONTINUED | OUTPATIENT
Start: 2019-05-07 | End: 2019-05-07 | Stop reason: SDUPTHER

## 2019-05-07 RX ORDER — CEPHALEXIN 500 MG/1
500 CAPSULE ORAL 3 TIMES DAILY
COMMUNITY
End: 2019-05-23 | Stop reason: ALTCHOICE

## 2019-05-07 RX ORDER — PROPOFOL 10 MG/ML
INJECTION, EMULSION INTRAVENOUS PRN
Status: DISCONTINUED | OUTPATIENT
Start: 2019-05-07 | End: 2019-05-07 | Stop reason: SDUPTHER

## 2019-05-07 RX ADMIN — LIDOCAINE HYDROCHLORIDE 60 MG: 10 INJECTION, SOLUTION INFILTRATION; PERINEURAL at 08:20

## 2019-05-07 RX ADMIN — PHENYLEPHRINE HYDROCHLORIDE 150 MCG: 10 INJECTION INTRAVENOUS at 08:42

## 2019-05-07 RX ADMIN — FENTANYL CITRATE 50 MCG: 50 INJECTION INTRAMUSCULAR; INTRAVENOUS at 08:20

## 2019-05-07 RX ADMIN — PROPOFOL 400 MG: 10 INJECTION, EMULSION INTRAVENOUS at 08:20

## 2019-05-07 RX ADMIN — SODIUM CHLORIDE: 4.5 INJECTION, SOLUTION INTRAVENOUS at 07:27

## 2019-05-07 RX ADMIN — FENTANYL CITRATE 50 MCG: 50 INJECTION INTRAMUSCULAR; INTRAVENOUS at 08:27

## 2019-05-07 ASSESSMENT — PAIN - FUNCTIONAL ASSESSMENT: PAIN_FUNCTIONAL_ASSESSMENT: 0-10

## 2019-05-07 ASSESSMENT — PAIN SCALES - GENERAL: PAINLEVEL_OUTOF10: 0

## 2019-05-07 NOTE — ANESTHESIA POSTPROCEDURE EVALUATION
Department of Anesthesiology  Postprocedure Note    Patient: Ever El  MRN: [id number]  YOB: 1960  Date of evaluation: 5/7/2019  Time:  8:56 AM     Procedure Summary     Date:  05/07/19 Room / Location:  [facility] / [facility]    Anesthesia Start:  5186 Anesthesia Stop:  2936    Procedures:       COLONOSCOPY POLYPECTOMY SNARE/COLD BIOPSY (Left Anus)      EGD BIOPSY (Left ) Diagnosis:  (EPIGASTRIC AND LOWER ABD PAIN, CONSTIPATIO N)    Surgeon:  Pete Grissom MD Responsible Provider:  Lashell Basilio MD    Anesthesia Type:  MAC ASA Status:  2          Anesthesia Type: No value filed. Brianne Phase I: Brianne Score: 10    Brianne Phase II:      Last vitals: Reviewed and per EMR flowsheets.        Anesthesia Post Evaluation    Patient location during evaluation: PACU  Patient participation: complete - patient participated  Level of consciousness: awake and alert  Pain score: 0  Airway patency: patent  Nausea & Vomiting: no vomiting and no nausea  Complications: no  Cardiovascular status: blood pressure returned to baseline  Respiratory status: acceptable, spontaneous ventilation and room air  Hydration status: euvolemic

## 2019-05-07 NOTE — BRIEF OP NOTE
Brief Postoperative Note  ______________________________________________________________    Patient: Herrera Aldana  YOB: 1960  MRN: [id number]  Date of Procedure: 5/7/2019    Pre-Op Diagnosis: EPIGASTRIC AND LOWER ABD PAIN, CONSTIPATIO N    Post-Op Diagnosis: Same       Procedure(s):  COLONOSCOPY POLYPECTOMY SNARE/COLD BIOPSY  EGD BIOPSY    Anesthesia: Anesthesia type not filed in the log. Surgeon(s):  Easton Simental MD    Assistant: yes    Estimated Blood Loss (mL): less than 50     Complications: None    Specimens:   ID Type Source Tests Collected by Time Destination   A : RANDOM COLON POLYPS Tissue Colon SURGICAL PATHOLOGY Easton Simental MD 5/7/2019 9947        Implants:  * No implants in log *      Drains: * No LDAs found *    Findings: gastric ulcer, colon 2 poyps snare, tics.     Easton Simental MD  Date: 5/7/2019  Time: 8:56 AM

## 2019-05-07 NOTE — ANESTHESIA PRE PROCEDURE
Department of Anesthesiology  Preprocedure Note       Name:  Kayleigh Stern   Age:  59 y.o.  :  1960                                          MRN:  [id number]         Date:  2019      Surgeon: Inga Mcneal):  Sabas Brito MD    Procedure: COLONOSCOPY (Left Anus)  EGD BIOPSY (Left )    Medications prior to admission:   Prior to Admission medications    Medication Sig Start Date End Date Taking? Authorizing Provider   cephALEXin (KEFLEX) 500 MG capsule Take 500 mg by mouth 3 times daily   Yes Historical Provider, MD   spironolactone (ALDACTONE) 25 MG tablet Take 1 tablet by mouth daily 19  Yes Nan Garnica MD   furosemide (LASIX) 20 MG tablet Take 1 tablet by mouth daily 19  Yes Nan Garnica MD   Cranberry 500 MG CAPS Take 500 mg by mouth 2 times daily   Yes Historical Provider, MD   zolpidem (AMBIEN) 10 MG tablet Take 5 mg by mouth nightly as needed. Indications: 1/2 tablet    Yes Historical Provider, MD   vitamin B-1 (THIAMINE) 100 MG tablet Take 100 mg by mouth daily   Yes Historical Provider, MD   NONFORMULARY Whey Protein Powder   Yes Historical Provider, MD   gabapentin (NEURONTIN) 100 MG capsule Take 100 mg by mouth 2 times daily. Yes Historical Provider, MD   ondansetron (ZOFRAN ODT) 4 MG disintegrating tablet Take 1 tablet by mouth every 8 hours as needed for Nausea 3/14/19  Yes Arielle Crenshaw DO   zinc gluconate 50 MG tablet TAKE ONE AND ONE-HALF TABLETS (75 MG) DAILY 19  Yes Nan Garnica MD   Calcium Carbonate Antacid 1000 MG CHEW Take by mouth Take 20 chewables a day   Yes Historical Provider, MD   liothyronine (CYTOMEL) 5 MCG tablet Take 5 mcg by mouth daily  18  Yes Historical Provider, MD   gabapentin (NEURONTIN) 300 MG capsule Take 1 capsule by mouth nightly for 180 days. . 18 Yes Nan Garnica MD   acyclovir (ZOVIRAX) 400 MG tablet Take 1 tablet by mouth 2 times daily 18  Yes Nan Garnica MD   DULoxetine (CYMBALTA) 30 MG extended release capsule Take 1 capsule by mouth daily 12/6/18  Yes Flor Marcos MD   DULoxetine (CYMBALTA) 60 MG extended release capsule Take 1 capsule by mouth nightly 12/6/18  Yes Flor Marcos MD   magnesium oxide (MAG-OX) 400 (241.3 Mg) MG TABS tablet Take 1 tablet by mouth 2 times daily 12/6/18  Yes Flor Marcos MD   metaxalone [facility]) 800 MG tablet Take 1 tablet by mouth daily 12/6/18  Yes Flor Marcos MD   potassium chloride (KLOR-CON 10) 10 MEQ extended release tablet Take 1 tablet by mouth 2 times daily 12/6/18  Yes Flor Marcos MD   tiZANidine (ZANAFLEX) 4 MG tablet Take 1 tablet by mouth nightly At bed time 12/6/18  Yes Flor Marcos MD   Vitamin K, Phytonadione, 100 MCG TABS Take 3 tablets by mouth daily 4/23/18  Yes Flor Marcos MD   vitamin B-12 (CYANOCOBALAMIN) 500 MCG tablet Take 1 tablet by mouth daily  Patient taking differently: Take 500 mcg by mouth every 48 hours  4/23/18  Yes Flor Marcos MD   calcitRIOL (ROCALTROL) 1 MCG/ML solution Take 15 ml daily   Yes Historical Provider, MD   Prenatal Multivit-Min-Fe-FA (PRENATAL VITAMINS PO) Take by mouth   Yes Historical Provider, MD   FOLIC ACID PO Take 2,812 mg by mouth daily   Yes Historical Provider, MD   Copper 5 MG CAPS Take 5 mg by mouth daily 2/26/18  Yes Flor Marcos MD   SYNTHROID 75 MCG tablet Take 75 mg by mouth daily 2/21/18  Yes Historical Provider, MD   SYNTHROID 200 MCG tablet Take 200 mcg by mouth daily 2/16/18  Yes Historical Provider, MD   Cholecalciferol (VITAMIN D3) 5000 units CAPS Take 2 capsules by mouth daily 2/22/18  Yes Flor Marcos MD   esomeprazole ([address]) 20 MG delayed release capsule Take 20 mg by mouth 2 times daily   Yes Historical Provider, MD   ranitidine (ZANTAC) 150 MG tablet Take 150 mg by mouth 2 times daily   Yes Historical Provider, MD   Psyllium-Calcium (METAMUCIL PLUS CALCIUM) CAPS Take by mouth Take with 8 oz water.    Yes Historical Provider, MD   Probiotic Product (PROBIOTIC BP Readings from Last 3 Encounters:   05/07/19 (!) 98/54   05/07/19 128/63   04/18/19 106/68       NPO Status: Time of last liquid consumption: 2200                        Time of last solid consumption: 1900                        Date of last liquid consumption: 05/06/19                        Date of last solid food consumption: 05/05/19    BMI:   Wt Readings from Last 3 Encounters:   05/07/19 117 lb 12.8 oz (53.4 kg)   04/18/19 142 lb 3.2 oz (64.5 kg)   04/09/19 137 lb (62.1 kg)     Body mass index is 24.2 kg/m². CBC:   Lab Results   Component Value Date    WBC 3.5 04/15/2019    RBC 3.52 04/15/2019    HGB 11.0 04/15/2019    HCT 34.9 04/15/2019    MCV 99.1 04/15/2019    RDW 12.0 02/12/2018     04/15/2019       CMP:   Lab Results   Component Value Date     05/01/2019    K 4.7 05/01/2019     05/01/2019    CO2 24 05/01/2019    BUN 6 05/01/2019    CREATININE 0.5 05/01/2019    LABGLOM >90 05/01/2019    GLUCOSE 99 05/01/2019    PROT 5.6 05/01/2019    CALCIUM 7.4 05/01/2019    BILITOT 0.8 05/01/2019    ALKPHOS 117 05/01/2019    AST 47 05/01/2019    ALT 22 05/01/2019       POC Tests: No results for input(s): POCGLU, POCNA, POCK, POCCL, POCBUN, POCHEMO, POCHCT in the last 72 hours.     Coags:   Lab Results   Component Value Date    PROTIME 10.8 07/13/2017    INR 0.94 07/13/2017       HCG (If Applicable): No results found for: PREGTESTUR, PREGSERUM, HCG, HCGQUANT     ABGs: No results found for: PHART, PO2ART, HGX0ZAL, HMJ3XAP, BEART, K0MZQWPX     Type & Screen (If Applicable):  No results found for: LABABO, [address]    Anesthesia Evaluation  Patient summary reviewed and Nursing notes reviewed no history of anesthetic complications:   Airway: Mallampati: I  TM distance: >3 FB   Neck ROM: full  Mouth opening: > = 3 FB Dental: normal exam         Pulmonary:Negative Pulmonary ROS and normal exam                               Cardiovascular:  Exercise tolerance: good (>4 METS),         ECG reviewed      Echocardiogram reviewed                  Neuro/Psych:   (+) TIA, psychiatric history: stable with treatmentdepression/anxiety             GI/Hepatic/Renal:   (+) hiatal hernia, GERD: well controlled,           Endo/Other:    (+) hypothyroidism: arthritis: OA., .                 Abdominal:           Vascular:   + DVT, . Anesthesia Plan      MAC     ASA 2             Anesthetic plan and risks discussed with patient and spouse. Plan discussed with CRNA and attending.                   ANITA Tian - CRNA   5/7/2019

## 2019-05-07 NOTE — OP NOTE
[facility] Endoscopy    CONSCIOUS SEDATION      Patient: Fanta Mace  : 1960  Acct#: [de-identified]    PLANNED PROCEDURE   [x]EGD  [x]Colonoscopy []Flex Sigmoid  []Other:  Indication: Pain control for GI procedure    Consent: I have discussed with the patient and/or the patient representative the indication, alternatives, and the possible risks and/or complications of the planned procedure and the anesthesia methods. The patient and/or patient representative appear to understand and agree to proceed. Pre-Sedation Documentation and Exam: I have personally completed a history, physical exam & review of systems for this patient (see notes). Airway Assessment: mac    Prior History of Anesthesia Complications: mac    ASA Classification: mac    Sedation/ Anesthesia Plan: mac      SEDATION  Planned agent:[x]Midazolam []Meperidine [x]Sublimaze []Morphine    Monitoring and Safety: The patient will be placed on a cardiac monitor and vital signs, pulse oximetry and level of consciousness will be continuously evaluated throughout the procedure. The patient will be closely monitored until recovery from the medications is complete and the patient has returned to baseline status. Respiratory therapy will be on standby during the procedure. I have reviewed with the patient and/or family the risks, benefits, and alternatives to the procedure.     Brandy Casper MD, CNSP  2019, 8:15 AM    Post-Sedation Vital Signs: Vital signs were reviewed and were stable after the procedure (see flow sheet for vitals)            Post-Sedation Exam: Lungs: clear           Complications: none     Brandy Casper MD, CNSP  2019,

## 2019-05-07 NOTE — OP NOTE
[address] [place] [address]                                OPERATIVE REPORT    PATIENT NAME: Mariah Rodriguez                 :        1960  MED REC NO:   [id number]                           ROOM:  ACCOUNT NO:   [de-identified]                           ADMIT DATE: 2019  PROVIDER:     Easton Simental M.D.      DATE OF PROCEDURE:  2019    PROCEDURES PERFORMED:  Esophagogastroduodenoscopy and colonoscopy. INDICATIONS:  The patient is a 42-year-old pleasant female with history  of alcohol use, who stopped drinking long time ago, who is having  abdominal pain, nausea, constipation. She is undergoing further  evaluation. Please see my brief history for details and for physical  examination. SURGEON:  Easton Simental M.D. ASA CLASSIFICATION:  As per Anesthesia. Please see Anesthesia note for  details. INSTRUMENT:  PCF-H190L pediatric colonoscope. PHOTOGRAPHS:  Yes. BIOPSIES:  Yes. CONSENT:  Procedure, indications, and complications including, but not  limited to perforation, bleeding, infection, adverse reaction to  medicine, very slight chance of missing significant lesions discussed  with the patient, and the patient expressed her understanding and a  written consent was obtained. DESCRIPTION OF PROCEDURE:  The patient was placed in the left lateral  decubitus position in the endo room #13. The patient was placed on  appropriate monitoring of vitals including blood pressure, heart rate,  and pulse ox. ESOPHAGOGASTRODUODENOSCOPY REPORT:  Oropharynx was sprayed with  Cetacaine spray and bite block was placed between maxilla and mandible. After the adequate total intravenous anesthesia was given by Anesthesia,  the PCF-H190L pediatric colonoscope was inserted into the oropharynx,  and the esophagus was intubated under direct vision.   The scope was  advanced down through the stomach into the second portion of duodenum. On careful inspection and on withdrawal of the scope, the duodenum looks  normal as shown in picture #A4. The patient had mild gastric reticular  pattern of the gastric mucosa consistent with portal hypotensive  gastropathy noted and what appears to be the patient may have banding  versus gastric stapling as shown in picture #A6 and A7. There is an  ulcer proximal to the stapling as to in the body of stomach as shown in  picture #A8 and A2. Biopsies obtained for CLOtest.  The esophagus looks  normal without any varices as shown in picture #A1 and A9. Air was  withdrawn as the scope was removed. The patient tolerated the procedure  well without any immediate complications. COLONOSCOPY REPORT:  The patient's position was changed. After the  rectal examination, continued on total intravenous anesthesia. The  PCF-H190L pediatric colonoscope was inserted into the rectum and  advanced up to the cecum without any difficulty and terminal ileum was  intubated. On careful inspection, the preparation was good. On  withdrawal of the scope, the terminal ileum looks normal as shown in  picture #A4. Appendiceal orifice and cecum look normal as shown in  picture #A3. Ascending colon looks normal as shown in picture #A5. There is one polyp in the ascending colon as shown in picture #A6,  removed by snare polypectomy. Transverse colon looks normal as shown in  picture #A7 and A8. In the descending colon, the patient has one 4 mm  flat polyp as shown in picture #1 and 2. Removed by snare polypectomy. Mild diverticulosis noted. On retroflex in the rectum, small to  moderate internal hemorrhoids noted as shown in picture #9. Air was  withdrawn as the scope was removed. The patient tolerated the procedure  well without any immediate complications. Vitals including blood  pressure, heart rate, and pulse ox were stable during the procedure and  after the procedure.   The patient was transferred to the recovery room  in stable condition. IMPRESSION:  Esophagogastroduodenoscopy to second portion of duodenum. Gastric polyps are noted what appears to be gastric stapling noted. Mild portal hypertensive gastropathy changes noted. No varices noted. Colonoscopy to distal ileum. Two flat polyps around 4 mm, removed by  snare polypectomy. Mild diverticulosis. RECOMMENDATIONS:  Antireflux instructions, continue esomeprazole 20 mg  in the morning and Zantac in the evening. Advised her to stop taking  meloxicam.  Follow the biopsy results. Followup colonoscopy in five  years pending the biopsy results. Thank you Dr. Naeem Pedro, for letting me to do procedure on the patient. Do not hesitate to call me if you have any questions. My  recommendations are above.         Israel Melton M.D.    D: 05/07/2019 9:03:29       T: 05/07/2019 16:45:57     [id number]  Job#: [id number]     Doc#: [id number]    CC:  Birgit Rose M.D.

## 2019-05-07 NOTE — PROGRESS NOTES
Colonoscopy complete. Photos taken. Biopsies taken by hot snare  sent to lab in 1 specimen jar. Patient tolerated well.

## 2019-05-07 NOTE — H&P
[address] [place] [address]                       PREOPERATIVE HISTORY AND PHYSICAL    PATIENT NAME: Sharrie Cowden                 :        1960  MED REC NO:   [id number]                           ROOM:  ACCOUNT NO:   [de-identified]                           ADMIT DATE: 2019  PROVIDER:     Ramona Johnson M.D.    DATE OF PROCEDURE:  2019    PROCEDURES:  Esophagogastroduodenoscopy and colonoscopy. INDICATION:  The patient is a 24-year-old pleasant female who had  alcoholic hepatitis, complains of epigastric pain and lower abdominal  pain. Complains of constipation. Denied any blood in the stool or  black stools and she stopped drinking alcohol six months ago. Overall  clinically, she looks much better than before. She is undergoing  further evaluation. 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 PAST SURGICAL HISTORY:  Appendectomy, cholecystectomy, esophageal  dilation, foot surgery, gastric bypass surgery, laparoscopy, right leg  fracture repair, parathyroidectomy, tonsillectomy, adenoidectomy, tubal  ligation, wrist fracture repair. MEDICATIONS:  Reviewed. PHYSICAL EXAMINATION:  VITAL SIGNS:  Temperature 97.2, pulse 93, respiratory rate 16, blood  pressure 98/54. HEART:  Regular. Normal S1 and S2. No S3.  LUNGS:  Equal to expansion on inspection. [address] air entry bilaterally. ABDOMEN:  Soft. Normoactive bowel sounds. Nontender. Not distended. IMPRESSION:  A 59year-old pleasant female has abdominal pain,  constipation. She is undergoing further evaluation. RECOMMENDATIONS:  Keep the patient nothing by mouth.   Proceed with EGD  and colonoscopy as planned. The risks including, but not limited to  perforation, bleeding, infection, adverse reaction to medicine, very  slight chance of missing significant lesions. The patient expressed her  understanding. Further plans pending the above test results.         Merna Mendieta M.D.    D: 05/07/2019 8:15:59       T: 05/07/2019 9:49:54     [email or web address]  Job#: [id number]     Doc#: [id number]    CC:

## 2019-05-07 NOTE — PROGRESS NOTES
EGD complete. Photos taken. Biopsies taken by cold forceps sent to lab in 1 JODI. Patient tolerated well.

## 2019-05-08 LAB — UREASE-CLO-C. PYLORI: NEGATIVE

## 2019-05-23 ENCOUNTER — TELEPHONE (OUTPATIENT)
Dept: FAMILY MEDICINE CLINIC | Age: 59
End: 2019-05-23

## 2019-05-23 ENCOUNTER — OFFICE VISIT (OUTPATIENT)
Dept: FAMILY MEDICINE CLINIC | Age: 59
End: 2019-05-23
Payer: COMMERCIAL

## 2019-05-23 VITALS
BODY MASS INDEX: 22.58 KG/M2 | RESPIRATION RATE: 18 BRPM | SYSTOLIC BLOOD PRESSURE: 100 MMHG | WEIGHT: 112 LBS | HEIGHT: 59 IN | HEART RATE: 108 BPM | DIASTOLIC BLOOD PRESSURE: 60 MMHG | OXYGEN SATURATION: 98 %

## 2019-05-23 DIAGNOSIS — Z12.4 SCREENING FOR CERVICAL CANCER: ICD-10-CM

## 2019-05-23 DIAGNOSIS — Z23 NEED FOR PNEUMOCOCCAL VACCINATION: ICD-10-CM

## 2019-05-23 DIAGNOSIS — F32.A DEPRESSION, UNSPECIFIED DEPRESSION TYPE: ICD-10-CM

## 2019-05-23 DIAGNOSIS — N95.2 VAGINAL ATROPHY: ICD-10-CM

## 2019-05-23 DIAGNOSIS — F10.982 ALCOHOL-INDUCED INSOMNIA (HCC): ICD-10-CM

## 2019-05-23 DIAGNOSIS — Z98.84 GASTRIC BYPASS STATUS FOR OBESITY: Chronic | ICD-10-CM

## 2019-05-23 DIAGNOSIS — E88.09 EDEMA DUE TO HYPOALBUMINEMIA: ICD-10-CM

## 2019-05-23 DIAGNOSIS — Z01.84 IMMUNITY TO MEASLES DETERMINED BY SEROLOGIC TEST: Primary | ICD-10-CM

## 2019-05-23 DIAGNOSIS — J90 PLEURAL EFFUSION: ICD-10-CM

## 2019-05-23 DIAGNOSIS — F10.20 ALCOHOLISM (HCC): ICD-10-CM

## 2019-05-23 DIAGNOSIS — B00.1 RECURRENT COLD SORES: ICD-10-CM

## 2019-05-23 DIAGNOSIS — F41.9 ANXIETY: ICD-10-CM

## 2019-05-23 DIAGNOSIS — K21.00 GASTROESOPHAGEAL REFLUX DISEASE WITH ESOPHAGITIS: ICD-10-CM

## 2019-05-23 DIAGNOSIS — M79.7 FIBROMYALGIA: ICD-10-CM

## 2019-05-23 PROCEDURE — 36415 COLL VENOUS BLD VENIPUNCTURE: CPT | Performed by: FAMILY MEDICINE

## 2019-05-23 PROCEDURE — 90471 IMMUNIZATION ADMIN: CPT | Performed by: FAMILY MEDICINE

## 2019-05-23 PROCEDURE — 99214 OFFICE O/P EST MOD 30 MIN: CPT | Performed by: FAMILY MEDICINE

## 2019-05-23 PROCEDURE — 90732 PPSV23 VACC 2 YRS+ SUBQ/IM: CPT | Performed by: FAMILY MEDICINE

## 2019-05-23 RX ORDER — TIZANIDINE 4 MG/1
4 TABLET ORAL NIGHTLY
Qty: 90 TABLET | Refills: 1 | Status: SHIPPED | OUTPATIENT
Start: 2019-05-23 | End: 2019-01-01 | Stop reason: SDUPTHER

## 2019-05-23 RX ORDER — GABAPENTIN 100 MG/1
100 CAPSULE ORAL 2 TIMES DAILY
Qty: 180 CAPSULE | Refills: 2 | Status: SHIPPED | OUTPATIENT
Start: 2019-05-23 | End: 2019-01-01 | Stop reason: SDUPTHER

## 2019-05-23 RX ORDER — SPIRONOLACTONE 25 MG/1
25 TABLET ORAL DAILY
Qty: 30 TABLET | Refills: 1 | Status: SHIPPED | OUTPATIENT
Start: 2019-05-23 | End: 2019-05-30 | Stop reason: SDUPTHER

## 2019-05-23 RX ORDER — GABAPENTIN 300 MG/1
300 CAPSULE ORAL NIGHTLY
Qty: 90 CAPSULE | Refills: 1 | Status: SHIPPED | OUTPATIENT
Start: 2019-05-23 | End: 2019-01-01 | Stop reason: SDUPTHER

## 2019-05-23 RX ORDER — ESTRADIOL 10 UG/1
10 INSERT VAGINAL DAILY
Qty: 40 TABLET | Refills: 0 | Status: SHIPPED | OUTPATIENT
Start: 2019-05-23 | End: 2019-07-30 | Stop reason: SDUPTHER

## 2019-05-23 RX ORDER — ACYCLOVIR 400 MG/1
400 TABLET ORAL 2 TIMES DAILY
Qty: 180 TABLET | Refills: 1 | Status: SHIPPED | OUTPATIENT
Start: 2019-05-23 | End: 2019-01-01 | Stop reason: SDUPTHER

## 2019-05-23 RX ORDER — DULOXETIN HYDROCHLORIDE 60 MG/1
60 CAPSULE, DELAYED RELEASE ORAL NIGHTLY
Qty: 90 CAPSULE | Refills: 1 | Status: SHIPPED | OUTPATIENT
Start: 2019-05-23 | End: 2019-01-01 | Stop reason: SDUPTHER

## 2019-05-23 RX ORDER — ZOLPIDEM TARTRATE 5 MG/1
5 TABLET ORAL NIGHTLY PRN
COMMUNITY
End: 2019-05-23 | Stop reason: SDUPTHER

## 2019-05-23 RX ORDER — ZOLPIDEM TARTRATE 5 MG/1
2.5-5 TABLET ORAL NIGHTLY PRN
Qty: 60 TABLET | Refills: 0 | Status: SHIPPED | OUTPATIENT
Start: 2019-05-23 | End: 2019-07-30 | Stop reason: SDUPTHER

## 2019-05-23 RX ORDER — DULOXETIN HYDROCHLORIDE 30 MG/1
30 CAPSULE, DELAYED RELEASE ORAL DAILY
Qty: 90 CAPSULE | Refills: 1 | Status: SHIPPED | OUTPATIENT
Start: 2019-05-23 | End: 2019-01-01 | Stop reason: SDUPTHER

## 2019-05-23 RX ORDER — POTASSIUM CHLORIDE 750 MG/1
10 TABLET, FILM COATED, EXTENDED RELEASE ORAL 2 TIMES DAILY
Qty: 180 TABLET | Refills: 1 | Status: ON HOLD | OUTPATIENT
Start: 2019-05-23 | End: 2019-01-01 | Stop reason: ALTCHOICE

## 2019-05-23 ASSESSMENT — ENCOUNTER SYMPTOMS
BLOOD IN STOOL: 0
EYE DISCHARGE: 0
EYE REDNESS: 0
COLOR CHANGE: 0
ANAL BLEEDING: 0
SHORTNESS OF BREATH: 0

## 2019-05-23 NOTE — PROGRESS NOTES
After obtaining consent, and per orders of Dr. Shar Rhodes, injection of Pneumovax 23 given in Right deltoid by Addi Heath. Patient instructed to remain in clinic for 20 minutes afterwards, and to report any adverse reaction to me immediately.     VIS sheet given to patient  Vaccine checklists completed

## 2019-05-23 NOTE — PROGRESS NOTES
[address]  [address] [address]  Dept: [phone]  Dept Fax: [phone]  Loc: [phone]      Jaycob Rincon is a 59 y.o. female who presents todayfor her medical conditions/complaints as noted below. Jaycob Rincon is c/o of Check-Up (4 month follow up ); Gynecologic Exam; and Medication Refill      HPI:       Feeling okay. Still a bit tired but able to do a few stairs. Strength is improved. Using protein drinks. Does have a burning cold numbness in toes bilaterally; not sure why this is still there with gabapentin. Warming toes in shower helps. Plugs drain and soaks in warm water. Takes tylenol for this at night and uses massage. Urinary symptoms are gone. Still abstinent from alcohol. Has not seen GI since scope. Does have follow-up coming up in about 3 weeks. Weight is down from 142 to 112 pounds with diuretics. Abdomen and labia are no longer swollen. Needs refills on medications. Still with mild dry mouth. Stooling doing well. Occasional loose BM related to fruits and dairy. Sleeping pretty well on 1/2 tab ambien at night. Mother just had mastectomy and had bleeding post.      Vaginal atrophy. Tried premarin in the past.  Used twice a day for a bit. This was long enough ago that she is not sure if made a difference. Not sexually active for a long time; maybe about a year.         Patient Active Problem List   Diagnosis    TIA (transient ischemic attack)    Hypothyroidism    Hyperparathyroidism ([address]    DVT (deep venous thrombosis) (HCC)    Depression    Anxiety    Fibromyalgia    Fatty liver    Tachycardia    GERD (gastroesophageal reflux disease)    Recurrent cold sores    Irritable bowel    Gastric bypass status for obesity    Insomnia    Ovarian cyst    Muscle weakness (generalized)    Alcoholism ([address]    Osteoarthritis       The patient is allergic to estrogens; penicillins; sulfa antibiotics; sulfur; and bactrim [sulfamethoxazole-trimethoprim]. Past Medical History  Esperanza Méndez has a past medical history of Alcoholism ([facility] Anxiety, Atrophy of vulva, Atyp squam cell of undet signfc cyto smr crvx (ASC-US), Closed fracture of seventh cervical vertebra without spinal cord injury [facility]), Closed fracture of sixth cervical vertebra ([facility] Closed fracture of thoracic vertebra ([facility] Depression, Dry eye syndrome, DVT (deep venous thrombosis) ([address] Dyspareunia in female, External hemorrhoids without complication, Fibromyalgia, Gastric bypass status for obesity, GERD (gastroesophageal reflux disease), GERD (gastroesophageal reflux disease), Hiatal hernia, History of ITP, Hungry bone syndrome, Hx of blood clots, Hyperparathyroidism ([address] Hypothyroidism, Insomnia, Irritable bowel, Lactose intolerance, Menopausal syndrome, Osteoarthritis, Osteopenia, Osteopenia, Ovarian cyst, Oxaluria ([address] Recurrent cold sores, Tachycardia, TIA (transient ischemic attack), and Tubular adenoma of colon. Past SurgicalHistory  The patient  has a past surgical history that includes Wrist fracture surgery (Right); Leg Surgery (Right); Tonsillectomy and adenoidectomy; Gastric bypass surgery (1996); Cholecystectomy (1996); Appendectomy (1996); parathyroidectomy (08/2017); Colonoscopy (approx 2013); Upper gastrointestinal endoscopy (approx 2013); laparoscopy; Foot surgery; Tubal ligation (1996); Endoscopy, colon, diagnostic; fracture surgery; Dilatation, esophagus; Colonoscopy (Left, 5/7/2019); and Upper gastrointestinal endoscopy (Left, 5/7/2019).     Family History  This patient's family history includes Asthma in her maternal grandfather; Breast Cancer in her mother and sister; Cancer in her father; Depression in her father and mother; Heart Attack in her maternal grandmother; High Blood Pressure in her father and mother; Mental Illness in her maternal grandmother; Other in her father, maternal grandfather, maternal grandmother, and mother. Social History  Padmaja Pattersno  reports that she has never smoked. She has never used smokeless tobacco. She reports that she drinks about 5.4 oz of alcohol per week. She reports that she does not use drugs. Medications    Current Outpatient Medications:     acyclovir (ZOVIRAX) 400 MG tablet, Take 1 tablet by mouth 2 times daily, Disp: 180 tablet, Rfl: 1    DULoxetine (CYMBALTA) 30 MG extended release capsule, Take 1 capsule by mouth daily, Disp: 90 capsule, Rfl: 1    DULoxetine (CYMBALTA) 60 MG extended release capsule, Take 1 capsule by mouth nightly, Disp: 90 capsule, Rfl: 1    gabapentin (NEURONTIN) 300 MG capsule, Take 1 capsule by mouth nightly for 180 days. , Disp: 90 capsule, Rfl: 1    magnesium oxide (MAG-OX) 400 (241.3 Mg) MG TABS tablet, Take 1 tablet by mouth 2 times daily, Disp: 180 tablet, Rfl: 1    potassium chloride (KLOR-CON 10) 10 MEQ extended release tablet, Take 1 tablet by mouth 2 times daily, Disp: 180 tablet, Rfl: 1    spironolactone (ALDACTONE) 25 MG tablet, Take 1 tablet by mouth daily, Disp: 30 tablet, Rfl: 1    tiZANidine (ZANAFLEX) 4 MG tablet, Take 1 tablet by mouth nightly At bed time, Disp: 90 tablet, Rfl: 1    gabapentin (NEURONTIN) 100 MG capsule, Take 1 capsule by mouth 2 times daily for 90 days. , Disp: 180 capsule, Rfl: 2    zolpidem (AMBIEN) 5 MG tablet, Take 0.5-1 tablets by mouth nightly as needed for Sleep for up to 90 days. , Disp: 60 tablet, Rfl: 0    Estradiol (VAGIFEM) 10 MCG TABS vaginal tablet, Place 1 tablet vaginally daily Daily for 2 weeks then twice weekly. , Disp: 40 tablet, Rfl: 0    furosemide (LASIX) 20 MG tablet, Take 1 tablet by mouth daily, Disp: 30 tablet, Rfl: 0    Cranberry 500 MG CAPS, Take 500 mg by mouth 2 times daily, Disp: , Rfl:     vitamin B-1 (THIAMINE) 100 MG tablet, Take 100 mg by mouth daily, Disp: , Rfl:     NONFORMULARY, Whey Protein Powder, Disp: , Rfl:    ondansetron (ZOFRAN ODT) 4 MG disintegrating tablet, Take 1 tablet by mouth every 8 hours as needed for Nausea, Disp: 10 tablet, Rfl: 0    zinc gluconate 50 MG tablet, TAKE ONE AND ONE-HALF TABLETS (75 MG) DAILY, Disp: 135 tablet, Rfl: 0    Calcium Carbonate Antacid 1000 MG CHEW, Take by mouth Take 20 chewables a day, Disp: , Rfl:     liothyronine (CYTOMEL) 5 MCG tablet, Take 5 mcg by mouth daily , Disp: , Rfl:     Vitamin K, Phytonadione, 100 MCG TABS, Take 3 tablets by mouth daily, Disp: 270 tablet, Rfl: 0    vitamin B-12 (CYANOCOBALAMIN) 500 MCG tablet, Take 1 tablet by mouth daily (Patient taking differently: Take 500 mcg by mouth every 48 hours ), Disp: 30 tablet, Rfl: 3    calcitRIOL (ROCALTROL) 1 MCG/ML solution, Take 15 ml daily, Disp: , Rfl:     Prenatal Multivit-Min-Fe-FA (PRENATAL VITAMINS PO), Take by mouth, Disp: , Rfl:     FOLIC ACID PO, Take 2,422 mg by mouth daily, Disp: , Rfl:     Copper 5 MG CAPS, Take 5 mg by mouth daily, Disp: 90 capsule, Rfl: 0    SYNTHROID 75 MCG tablet, Take 75 mg by mouth daily, Disp: , Rfl:     SYNTHROID 200 MCG tablet, Take 200 mcg by mouth daily, Disp: , Rfl:     Cholecalciferol (VITAMIN D3) 5000 units CAPS, Take 2 capsules by mouth daily, Disp: 60 capsule, Rfl: 0    esomeprazole (NEXIUM) 20 MG delayed release capsule, Take 20 mg by mouth 2 times daily, Disp: , Rfl:     ranitidine (ZANTAC) 150 MG tablet, Take 150 mg by mouth 2 times daily, Disp: , Rfl:     Psyllium-Calcium (METAMUCIL PLUS CALCIUM) CAPS, Take by mouth Take with 8 oz water., Disp: , Rfl:     Probiotic Product (PROBIOTIC PO), Take by mouth, Disp: , Rfl:     vitamin C (ASCORBIC ACID) 500 MG tablet, Take 500 mg by mouth 2 times daily, Disp: , Rfl:     vitamin A 68423 units capsule, Take 25,000 Units by mouth daily, Disp: , Rfl:     Subjective:      Review of Systems   Constitutional: Negative for unexpected weight change. HENT: Negative for ear discharge, ear pain and hearing loss.     Eyes: Negative for discharge and redness. Respiratory: Negative for shortness of breath. Cardiovascular: Negative for palpitations. Gastrointestinal: Negative for anal bleeding and blood in stool. Endocrine: Positive for cold intolerance. Genitourinary: Negative for difficulty urinating. Musculoskeletal: Negative for gait problem. Skin: Negative for color change. Dry skin; Striae   Allergic/Immunologic: Negative for environmental allergies. Psychiatric/Behavioral: Negative for confusion, dysphoric mood, self-injury, sleep disturbance (better back on [place]) and suicidal ideas. The patient is not nervous/anxious. Objective:        Vitals:    05/23/19 0821   BP: 100/60   Site: Right Upper Arm   Position: Sitting   Pulse: 108   Resp: 18   SpO2: 98%   Weight: 112 lb (50.8 kg)   Height: 4' 10.5\" (1.486 m)        Physical Exam   Constitutional: She is oriented to person, place, and time. She does not appear ill. Appears well at this time. HENT:   Head: Normocephalic and atraumatic. Mouth/Throat: Oropharynx is clear and moist.   Eyes: Pupils are equal, round, and reactive to light. Conjunctivae are normal. No scleral icterus. Neck: Neck supple. No thyromegaly present. Cardiovascular: Normal rate, regular rhythm and normal heart sounds. Pulmonary/Chest: Effort normal and breath sounds normal. No respiratory distress. Abdominal: Soft. She exhibits no distension, no fluid wave, no ascites and no mass. There is no tenderness. There is no rebound and no guarding. Skin back to normal.     Genitourinary: Vagina normal and uterus normal. Pelvic exam was performed with patient prone. No labial fusion. There is no rash, tenderness, lesion or injury on the right labia. There is no rash, tenderness or lesion on the left labia. Cervix exhibits no motion tenderness, no discharge and no friability. Right adnexum displays no mass, no tenderness and no fullness.  Left adnexum displays no mass, no tenderness and no fullness. Genitourinary Comments: Atrophy noted. Otherwise normal exam.     Musculoskeletal: She exhibits no edema. Edema gone. Lymphadenopathy:     She has no cervical adenopathy. Neurological: She is alert and oriented to person, place, and time. No cranial nerve deficit. No obvious focal deficit. Skin: Skin is warm and dry. No rash noted. No jaundice noted. Psychiatric: She has a normal mood and affect. Her speech is normal and behavior is normal. Judgment and thought content normal. Cognition and memory are normal.   Vitals reviewed. Lab Results   Component Value Date    WBC 3.5 (L) 04/15/2019    HGB 11.0 (L) 04/15/2019    HCT 34.9 (L) 04/15/2019     04/15/2019    HDL 82 02/12/2018    ALT 22 05/01/2019    AST 47 (H) 05/01/2019     05/01/2019    K 4.7 05/01/2019     05/01/2019    CREATININE 0.5 05/01/2019    BUN 6 (L) 05/01/2019    CO2 24 05/01/2019    TSH 0.011 (L) 01/16/2018    INR 0.94 07/13/2017    LABA1C 4.1 (L) 02/08/2018       Assessment/Plan:   1. Screening for cervical cancer  Performed. - PAP SMEAR    2. Vaginal atrophy  Will trial topical estrogen. - Estradiol (VAGIFEM) 10 MCG TABS vaginal tablet; Place 1 tablet vaginally daily Daily for 2 weeks then twice weekly. Dispense: 40 tablet; Refill: 0    3. Immunity to measles determined by serologic test  Checking titer.    - Rubeola Antibody IgG; Future    4. Edema due to hypoalbuminemia  Markedly improved. Weight down considerably. Continue current regimen for now. May eventually be able to wean as liver heals post stopping alcohol. Follow-up with GI as planned. 5. Gastric bypass status for obesity  Continue supplements. 6. Recurrent cold sores  Doing well. Refilled. - acyclovir (ZOVIRAX) 400 MG tablet; Take 1 tablet by mouth 2 times daily  Dispense: 180 tablet; Refill: 1    7. Depression, unspecified depression type  Stable and well controlled. Refilled regimen.    - DULoxetine (CYMBALTA) 30 MG extended release capsule; Take 1 capsule by mouth daily  Dispense: 90 capsule; Refill: 1  - DULoxetine (CYMBALTA) 60 MG extended release capsule; Take 1 capsule by mouth nightly  Dispense: 90 capsule; Refill: 1    8. Anxiety  Stable and well controlled. Refilled regimen. - DULoxetine (CYMBALTA) 30 MG extended release capsule; Take 1 capsule by mouth daily  Dispense: 90 capsule; Refill: 1  - DULoxetine (CYMBALTA) 60 MG extended release capsule; Take 1 capsule by mouth nightly  Dispense: 90 capsule; Refill: 1    9. Fibromyalgia  Stable and well controlled. Refilled regimen. - DULoxetine (CYMBALTA) 30 MG extended release capsule; Take 1 capsule by mouth daily  Dispense: 90 capsule; Refill: 1  - DULoxetine (CYMBALTA) 60 MG extended release capsule; Take 1 capsule by mouth nightly  Dispense: 90 capsule; Refill: 1  - gabapentin (NEURONTIN) 300 MG capsule; Take 1 capsule by mouth nightly for 180 days. Dispense: 90 capsule; Refill: 1  - tiZANidine (ZANAFLEX) 4 MG tablet; Take 1 tablet by mouth nightly At bed time  Dispense: 90 tablet; Refill: 1    10. Pleural effusion  Resolved. - spironolactone (ALDACTONE) 25 MG tablet; Take 1 tablet by mouth daily  Dispense: 30 tablet; Refill: 1    11. Gastroesophageal reflux disease with esophagitis  Stable and well controlled. Refilled regimen. 12. Alcohol-induced insomnia (HCC)  Will taper regimen slowly. - zolpidem (AMBIEN) 5 MG tablet; Take 0.5-1 tablets by mouth nightly as needed for Sleep for up to 90 days. Dispense: 60 tablet; Refill: 0    13. Need for pneumococcal vaccination  Given.    - PNEUMOVAX 23 subcutaneous/IM (Pneumococcal polysaccharide vaccine 23-valent >= 3yo)    14. Alcoholism ([address]  Congratulated on abstinence. Return for Follow-up chronic conditions.     Orders Placed   Orders Placed This Encounter   Procedures    PNEUMOVAX 23 subcutaneous/IM (Pneumococcal polysaccharide vaccine 23-valent >= 3yo)    PAP SMEAR Patient History:    No LMP recorded. Patient is postmenopausal.  OBGYN Status: Postmenopausal  Past Surgical History:  1996: APPENDECTOMY  1996: CHOLECYSTECTOMY  approx 2013: COLONOSCOPY      Comment:  normal per patient  5/7/2019: COLONOSCOPY; Left      Comment:  COLONOSCOPY POLYPECTOMY SNARE/COLD BIOPSY performed by                Naomi March MD at [facility]  No date: DILATATION, ESOPHAGUS  No date: ENDOSCOPY, COLON, DIAGNOSTIC  No date: FOOT SURGERY  [facility]  No date: LAPAROSCOPY  No date: LEG SURGERY; Right      Comment:  following fracture  08/2017: PARATHYROIDECTOMY  No date: [address]: TUBAL LIGATION  approx 2013: UPPER GASTROINTESTINAL ENDOSCOPY      Comment:  normal per patient except mild erosion  5/7/2019: UPPER GASTROINTESTINAL ENDOSCOPY; Left      Comment:  EGD BIOPSY performed by Naomi March MD at [facility]                [facility]oscopy  No date: WRIST FRACTURE SURGERY; Right      Comment:  pinned      Social History    Tobacco Use      Smoking status: Never Smoker      Smokeless tobacco: Never Used       Order Specific Question:   Collection Type     Answer: Thin Prep     Order Specific Question:   Prior Abnormal Pap Test     Answer:   No     Order Specific Question:   Screening or Diagnostic     Answer:   Screening     Order Specific Question:   HPV Requested?      Answer:   Yes     Order Specific Question:   High Risk Patient     Answer:   N/A    Rubeola Antibody IgG     Standing Status:   Future     Standing Expiration Date:   5/23/2020       Prescriptions given/sent  Orders Placed This Encounter   Medications    acyclovir (ZOVIRAX) 400 MG tablet     Sig: Take 1 tablet by mouth 2 times daily     Dispense:  180 tablet     Refill:  1    DULoxetine (CYMBALTA) 30 MG extended release capsule     Sig: Take 1 capsule by mouth daily     Dispense:  90 capsule     Refill:  1    DULoxetine (CYMBALTA) 60 MG extended release capsule     Sig: Take 1 capsule by mouth nightly     Dispense:  90 capsule     Refill:  1    gabapentin (NEURONTIN) 300 MG capsule     Sig: Take 1 capsule by mouth nightly for 180 days. Dispense:  90 capsule     Refill:  1    magnesium oxide (MAG-OX) 400 (241.3 Mg) MG TABS tablet     Sig: Take 1 tablet by mouth 2 times daily     Dispense:  180 tablet     Refill:  1    potassium chloride (KLOR-CON 10) 10 MEQ extended release tablet     Sig: Take 1 tablet by mouth 2 times daily     Dispense:  180 tablet     Refill:  1    spironolactone (ALDACTONE) 25 MG tablet     Sig: Take 1 tablet by mouth daily     Dispense:  30 tablet     Refill:  1    tiZANidine (ZANAFLEX) 4 MG tablet     Sig: Take 1 tablet by mouth nightly At bed time     Dispense:  90 tablet     Refill:  1    gabapentin (NEURONTIN) 100 MG capsule     Sig: Take 1 capsule by mouth 2 times daily for 90 days. Dispense:  180 capsule     Refill:  2    zolpidem (AMBIEN) 5 MG tablet     Sig: Take 0.5-1 tablets by mouth nightly as needed for Sleep for up to 90 days. Dispense:  60 tablet     Refill:  0    Estradiol (VAGIFEM) 10 MCG TABS vaginal tablet     Sig: Place 1 tablet vaginally daily Daily for 2 weeks then twice weekly. Dispense:  40 tablet     Refill:  0       Patient given educational materials - see patient instructions. Discussed use, benefit, and side effects of prescribed medications. All patient questions answered. Pt voiced understanding.              Electronically signed by Dayton Cole MD on 5/23/2019 at 9:38 AM

## 2019-05-26 LAB — RUBEOLA IGG: > 300 AU/ML

## 2019-05-28 DIAGNOSIS — J90 PLEURAL EFFUSION: ICD-10-CM

## 2019-05-28 DIAGNOSIS — E88.09 EDEMA DUE TO HYPOALBUMINEMIA: Primary | ICD-10-CM

## 2019-05-30 ENCOUNTER — NURSE ONLY (OUTPATIENT)
Dept: LAB | Age: 59
End: 2019-05-30

## 2019-05-30 LAB — CYTOLOGY THIN PREP PAP: NORMAL

## 2019-05-30 RX ORDER — SPIRONOLACTONE 25 MG/1
25 TABLET ORAL DAILY
Qty: 90 TABLET | Refills: 1 | Status: SHIPPED | OUTPATIENT
Start: 2019-05-30 | End: 2019-01-01 | Stop reason: ALTCHOICE

## 2019-05-30 RX ORDER — FUROSEMIDE 20 MG/1
20 TABLET ORAL DAILY
Qty: 90 TABLET | Refills: 1 | Status: SHIPPED | OUTPATIENT
Start: 2019-05-30 | End: 2019-01-01 | Stop reason: ALTCHOICE

## 2019-05-31 NOTE — TELEPHONE ENCOUNTER
Left message notifying patient refills were sent to pharmacy and to call the office if she had any questions.

## 2019-06-02 LAB — ANTI-SMITH: 0 AU/ML (ref 0–40)

## 2019-06-21 ENCOUNTER — NURSE ONLY (OUTPATIENT)
Dept: FAMILY MEDICINE CLINIC | Age: 59
End: 2019-06-21
Payer: COMMERCIAL

## 2019-06-21 DIAGNOSIS — E83.51 HYPOCALCEMIA: ICD-10-CM

## 2019-06-21 LAB — CALCIUM SERPL-MCNC: 7.6 MG/DL (ref 8.5–10.5)

## 2019-06-21 PROCEDURE — 36415 COLL VENOUS BLD VENIPUNCTURE: CPT | Performed by: FAMILY MEDICINE

## 2019-07-26 ENCOUNTER — NURSE ONLY (OUTPATIENT)
Dept: FAMILY MEDICINE CLINIC | Age: 59
End: 2019-07-26
Payer: COMMERCIAL

## 2019-07-26 DIAGNOSIS — M19.90 OSTEOARTHRITIS, UNSPECIFIED OSTEOARTHRITIS TYPE, UNSPECIFIED SITE: ICD-10-CM

## 2019-07-26 LAB — CALCIUM SERPL-MCNC: 5.2 MG/DL (ref 8.5–10.5)

## 2019-07-26 PROCEDURE — 36415 COLL VENOUS BLD VENIPUNCTURE: CPT | Performed by: NURSE PRACTITIONER

## 2019-07-30 DIAGNOSIS — N95.2 VAGINAL ATROPHY: ICD-10-CM

## 2019-07-30 DIAGNOSIS — F10.982 ALCOHOL-INDUCED INSOMNIA (HCC): ICD-10-CM

## 2019-07-30 RX ORDER — ESTRADIOL 10 UG/1
INSERT VAGINAL
Qty: 40 TABLET | Refills: 0 | Status: SHIPPED | OUTPATIENT
Start: 2019-07-30 | End: 2019-01-01 | Stop reason: SDUPTHER

## 2019-07-30 RX ORDER — ZOLPIDEM TARTRATE 5 MG/1
2.5-5 TABLET ORAL NIGHTLY PRN
Qty: 60 TABLET | Refills: 0 | Status: SHIPPED | OUTPATIENT
Start: 2019-07-30 | End: 2019-01-01 | Stop reason: SDUPTHER

## 2019-08-12 DIAGNOSIS — E60 LOW ZINC LEVEL: ICD-10-CM

## 2019-08-12 DIAGNOSIS — Z98.84 GASTRIC BYPASS STATUS FOR OBESITY: Chronic | ICD-10-CM

## 2019-08-15 RX ORDER — CHOLECALCIFEROL (VITAMIN D3) 125 MCG
CAPSULE ORAL
Qty: 30 TABLET | Refills: 3 | Status: SHIPPED | OUTPATIENT
Start: 2019-08-15 | End: 2020-01-01

## 2019-08-15 RX ORDER — ZINC GLUCONATE 50 MG
TABLET ORAL
Qty: 135 TABLET | Refills: 0 | Status: SHIPPED | OUTPATIENT
Start: 2019-08-15

## 2019-09-06 ENCOUNTER — NURSE ONLY (OUTPATIENT)
Dept: FAMILY MEDICINE CLINIC | Age: 59
End: 2019-09-06
Payer: COMMERCIAL

## 2019-09-06 DIAGNOSIS — E83.51 HYPOCALCEMIA: ICD-10-CM

## 2019-09-06 LAB — CALCIUM SERPL-MCNC: 8.1 MG/DL (ref 8.5–10.5)

## 2019-09-06 PROCEDURE — 36415 COLL VENOUS BLD VENIPUNCTURE: CPT | Performed by: NURSE PRACTITIONER

## 2019-10-17 PROBLEM — N39.0 UTI (URINARY TRACT INFECTION): Status: ACTIVE | Noted: 2019-01-01

## 2019-10-18 PROBLEM — E83.51 HYPOCALCEMIA: Status: ACTIVE | Noted: 2019-01-01

## 2019-10-18 PROBLEM — E44.0 MODERATE MALNUTRITION (HCC): Chronic | Status: ACTIVE | Noted: 2019-01-01

## 2019-10-18 PROBLEM — E83.42 HYPOMAGNESEMIA: Status: ACTIVE | Noted: 2019-01-01

## 2019-10-19 PROBLEM — D61.818 PANCYTOPENIA (HCC): Status: ACTIVE | Noted: 2019-01-01

## 2019-10-19 PROBLEM — E20.8 OTHER HYPOPARATHYROIDISM (HCC): Status: ACTIVE | Noted: 2019-01-01

## 2019-10-24 PROBLEM — R00.2 PALPITATIONS: Status: ACTIVE | Noted: 2019-01-01

## 2019-11-18 PROBLEM — N39.0 UTI (URINARY TRACT INFECTION): Status: RESOLVED | Noted: 2019-01-01 | Resolved: 2019-01-01

## 2019-12-06 PROBLEM — R82.71 ASYMPTOMATIC BACTERIURIA: Status: ACTIVE | Noted: 2019-01-01

## 2020-01-01 ENCOUNTER — HOSPITAL ENCOUNTER (INPATIENT)
Age: 60
LOS: 3 days | DRG: 871 | End: 2020-09-14
Attending: FAMILY MEDICINE | Admitting: INTERNAL MEDICINE
Payer: COMMERCIAL

## 2020-01-01 ENCOUNTER — HOSPITAL ENCOUNTER (OUTPATIENT)
Dept: GENERAL RADIOLOGY | Age: 60
Discharge: HOME | End: 2020-07-06
Payer: COMMERCIAL

## 2020-01-01 ENCOUNTER — HOSPITAL ENCOUNTER (EMERGENCY)
Age: 60
Discharge: HOME | End: 2020-09-05
Attending: EMERGENCY MEDICINE
Payer: COMMERCIAL

## 2020-01-01 ENCOUNTER — APPOINTMENT (OUTPATIENT)
Dept: MRI IMAGING | Age: 60
DRG: 477 | End: 2020-01-01
Payer: COMMERCIAL

## 2020-01-01 ENCOUNTER — HOSPITAL ENCOUNTER (OUTPATIENT)
Dept: NURSING | Age: 60
Discharge: HOME | End: 2020-02-28
Payer: COMMERCIAL

## 2020-01-01 ENCOUNTER — HOSPITAL ENCOUNTER (OUTPATIENT)
Age: 60
Setting detail: SPECIMEN
Discharge: HOME | End: 2020-07-05
Payer: COMMERCIAL

## 2020-01-01 ENCOUNTER — APPOINTMENT (OUTPATIENT)
Dept: GENERAL RADIOLOGY | Age: 60
DRG: 871 | End: 2020-01-01
Payer: COMMERCIAL

## 2020-01-01 ENCOUNTER — HOSPITAL ENCOUNTER (OUTPATIENT)
Age: 60
Setting detail: SPECIMEN
Discharge: HOME | End: 2020-04-27
Payer: COMMERCIAL

## 2020-01-01 ENCOUNTER — HOSPITAL ENCOUNTER (OUTPATIENT)
Age: 60
Setting detail: SPECIMEN
Discharge: HOME | End: 2020-06-29
Payer: COMMERCIAL

## 2020-01-01 ENCOUNTER — HOSPITAL ENCOUNTER (OUTPATIENT)
Dept: NURSING | Age: 60
Discharge: HOME | End: 2020-01-16
Payer: COMMERCIAL

## 2020-01-01 ENCOUNTER — TELEPHONE (OUTPATIENT)
Dept: FAMILY MEDICINE CLINIC | Age: 60
End: 2020-01-01

## 2020-01-01 ENCOUNTER — HOSPITAL ENCOUNTER (OUTPATIENT)
Dept: WOMENS IMAGING | Age: 60
Discharge: HOME | End: 2020-08-04
Payer: COMMERCIAL

## 2020-01-01 ENCOUNTER — HOSPITAL ENCOUNTER (OUTPATIENT)
Age: 60
End: 2020-01-01
Payer: COMMERCIAL

## 2020-01-01 ENCOUNTER — HOSPITAL ENCOUNTER (OUTPATIENT)
Age: 60
Setting detail: SPECIMEN
Discharge: HOME | End: 2020-07-20
Payer: COMMERCIAL

## 2020-01-01 ENCOUNTER — HOSPITAL ENCOUNTER (OUTPATIENT)
Dept: NURSING | Age: 60
Discharge: HOME | End: 2020-03-30
Payer: COMMERCIAL

## 2020-01-01 ENCOUNTER — HOSPITAL ENCOUNTER (OUTPATIENT)
Age: 60
Setting detail: SPECIMEN
Discharge: HOME | End: 2020-05-04
Payer: COMMERCIAL

## 2020-01-01 ENCOUNTER — HOSPITAL ENCOUNTER (OUTPATIENT)
Age: 60
Setting detail: SPECIMEN
Discharge: HOME | End: 2020-07-27
Payer: COMMERCIAL

## 2020-01-01 ENCOUNTER — NURSE ONLY (OUTPATIENT)
Dept: LAB | Age: 60
End: 2020-01-01

## 2020-01-01 ENCOUNTER — APPOINTMENT (OUTPATIENT)
Dept: INTERVENTIONAL RADIOLOGY/VASCULAR | Age: 60
DRG: 477 | End: 2020-01-01
Payer: COMMERCIAL

## 2020-01-01 ENCOUNTER — HOSPITAL ENCOUNTER (OUTPATIENT)
Dept: NURSING | Age: 60
Discharge: HOME | End: 2020-01-15
Payer: COMMERCIAL

## 2020-01-01 ENCOUNTER — HOSPITAL ENCOUNTER (OUTPATIENT)
Dept: NURSING | Age: 60
Discharge: HOME | End: 2020-02-20
Payer: COMMERCIAL

## 2020-01-01 ENCOUNTER — HOSPITAL ENCOUNTER (INPATIENT)
Age: 60
LOS: 5 days | Discharge: HOME HEALTH | DRG: 947 | End: 2020-02-18
Attending: FAMILY MEDICINE | Admitting: FAMILY MEDICINE
Payer: COMMERCIAL

## 2020-01-01 ENCOUNTER — HOSPITAL ENCOUNTER (OUTPATIENT)
Age: 60
Setting detail: SPECIMEN
Discharge: HOME | End: 2020-06-23
Payer: COMMERCIAL

## 2020-01-01 ENCOUNTER — VIRTUAL VISIT (OUTPATIENT)
Dept: FAMILY MEDICINE CLINIC | Age: 60
End: 2020-01-01
Payer: COMMERCIAL

## 2020-01-01 ENCOUNTER — CARE COORDINATION (OUTPATIENT)
Dept: FAMILY MEDICINE CLINIC | Age: 60
End: 2020-01-01

## 2020-01-01 ENCOUNTER — TELEPHONE (OUTPATIENT)
Dept: WOUND CARE | Age: 60
End: 2020-01-01

## 2020-01-01 ENCOUNTER — HOSPITAL ENCOUNTER (INPATIENT)
Age: 60
LOS: 11 days | Discharge: NURSING, REHAB OR LONG-TERM CARE | DRG: 871 | End: 2020-02-13
Attending: FAMILY MEDICINE | Admitting: INTERNAL MEDICINE
Payer: COMMERCIAL

## 2020-01-01 ENCOUNTER — HOSPITAL ENCOUNTER (OUTPATIENT)
Dept: NURSING | Age: 60
Discharge: HOME | End: 2020-05-19
Payer: COMMERCIAL

## 2020-01-01 ENCOUNTER — HOSPITAL ENCOUNTER (OUTPATIENT)
Dept: NURSING | Age: 60
End: 2020-01-01
Payer: COMMERCIAL

## 2020-01-01 ENCOUNTER — HOSPITAL ENCOUNTER (OUTPATIENT)
Dept: NURSING | Age: 60
Discharge: HOME | End: 2020-01-23
Payer: COMMERCIAL

## 2020-01-01 ENCOUNTER — HOSPITAL ENCOUNTER (OUTPATIENT)
Age: 60
Setting detail: SPECIMEN
Discharge: HOME | End: 2020-06-01
Payer: COMMERCIAL

## 2020-01-01 ENCOUNTER — HOSPITAL ENCOUNTER (OUTPATIENT)
Dept: AUDIOLOGY | Age: 60
Discharge: HOME | End: 2020-01-17
Payer: COMMERCIAL

## 2020-01-01 ENCOUNTER — APPOINTMENT (OUTPATIENT)
Dept: CT IMAGING | Age: 60
DRG: 871 | End: 2020-01-01
Payer: COMMERCIAL

## 2020-01-01 ENCOUNTER — HOSPITAL ENCOUNTER (OUTPATIENT)
Age: 60
Setting detail: SPECIMEN
Discharge: HOME | End: 2020-06-15
Payer: COMMERCIAL

## 2020-01-01 ENCOUNTER — HOSPITAL ENCOUNTER (OUTPATIENT)
Dept: CT IMAGING | Age: 60
Discharge: HOME | End: 2020-07-06
Payer: COMMERCIAL

## 2020-01-01 ENCOUNTER — HOSPITAL ENCOUNTER (OUTPATIENT)
Age: 60
Discharge: HOME | End: 2020-01-04
Payer: COMMERCIAL

## 2020-01-01 ENCOUNTER — HOSPITAL ENCOUNTER (OUTPATIENT)
Dept: MRI IMAGING | Age: 60
Discharge: HOME | DRG: 477 | End: 2020-04-20
Payer: COMMERCIAL

## 2020-01-01 ENCOUNTER — HOSPITAL ENCOUNTER (OUTPATIENT)
Dept: GENERAL RADIOLOGY | Age: 60
Discharge: HOME | End: 2020-01-15
Payer: COMMERCIAL

## 2020-01-01 ENCOUNTER — HOSPITAL ENCOUNTER (OUTPATIENT)
Dept: WOMENS IMAGING | Age: 60
End: 2020-01-01
Payer: COMMERCIAL

## 2020-01-01 ENCOUNTER — HOSPITAL ENCOUNTER (OUTPATIENT)
Dept: NURSING | Age: 60
Discharge: HOME | End: 2020-04-06
Payer: COMMERCIAL

## 2020-01-01 ENCOUNTER — APPOINTMENT (OUTPATIENT)
Dept: GENERAL RADIOLOGY | Age: 60
End: 2020-01-01
Payer: COMMERCIAL

## 2020-01-01 ENCOUNTER — HOSPITAL ENCOUNTER (OUTPATIENT)
Dept: INTERVENTIONAL RADIOLOGY/VASCULAR | Age: 60
Discharge: HOME | End: 2020-01-15
Payer: COMMERCIAL

## 2020-01-01 ENCOUNTER — APPOINTMENT (OUTPATIENT)
Dept: INTERVENTIONAL RADIOLOGY/VASCULAR | Age: 60
DRG: 871 | End: 2020-01-01
Payer: COMMERCIAL

## 2020-01-01 ENCOUNTER — HOSPITAL ENCOUNTER (OUTPATIENT)
Dept: INTERVENTIONAL RADIOLOGY/VASCULAR | Age: 60
Discharge: HOME | End: 2020-05-26
Payer: COMMERCIAL

## 2020-01-01 ENCOUNTER — HOSPITAL ENCOUNTER (OUTPATIENT)
Dept: INTERVENTIONAL RADIOLOGY/VASCULAR | Age: 60
Discharge: HOME | End: 2020-01-16
Payer: COMMERCIAL

## 2020-01-01 ENCOUNTER — HOSPITAL ENCOUNTER (OUTPATIENT)
Dept: NURSING | Age: 60
Discharge: HOME | End: 2020-02-24
Payer: COMMERCIAL

## 2020-01-01 ENCOUNTER — HOSPITAL ENCOUNTER (OUTPATIENT)
Dept: NURSING | Age: 60
Discharge: HOME | End: 2020-03-23
Payer: COMMERCIAL

## 2020-01-01 ENCOUNTER — HOSPITAL ENCOUNTER (OUTPATIENT)
Dept: NURSING | Age: 60
Discharge: HOME | End: 2020-01-30
Payer: COMMERCIAL

## 2020-01-01 ENCOUNTER — HOSPITAL ENCOUNTER (OUTPATIENT)
Dept: NURSING | Age: 60
Discharge: HOME | End: 2020-01-27
Payer: COMMERCIAL

## 2020-01-01 ENCOUNTER — HOSPITAL ENCOUNTER (OUTPATIENT)
Age: 60
Discharge: HOME | End: 2020-03-16
Payer: COMMERCIAL

## 2020-01-01 ENCOUNTER — OFFICE VISIT (OUTPATIENT)
Dept: FAMILY MEDICINE CLINIC | Age: 60
End: 2020-01-01
Payer: COMMERCIAL

## 2020-01-01 ENCOUNTER — TELEMEDICINE (OUTPATIENT)
Dept: FAMILY MEDICINE CLINIC | Age: 60
End: 2020-01-01
Payer: COMMERCIAL

## 2020-01-01 ENCOUNTER — HOSPITAL ENCOUNTER (OUTPATIENT)
Dept: NURSING | Age: 60
Discharge: HOME | End: 2020-01-14
Payer: COMMERCIAL

## 2020-01-01 ENCOUNTER — HOSPITAL ENCOUNTER (EMERGENCY)
Age: 60
Discharge: HOME | End: 2020-08-27
Attending: FAMILY MEDICINE
Payer: COMMERCIAL

## 2020-01-01 ENCOUNTER — HOSPITAL ENCOUNTER (OUTPATIENT)
Dept: NURSING | Age: 60
Discharge: HOME | End: 2020-03-02
Payer: COMMERCIAL

## 2020-01-01 ENCOUNTER — HOSPITAL ENCOUNTER (OUTPATIENT)
Dept: NURSING | Age: 60
Discharge: HOME | DRG: 477 | End: 2020-04-20
Payer: COMMERCIAL

## 2020-01-01 ENCOUNTER — HOSPITAL ENCOUNTER (OUTPATIENT)
Age: 60
Setting detail: SPECIMEN
Discharge: HOME | End: 2020-08-03
Payer: COMMERCIAL

## 2020-01-01 ENCOUNTER — HOSPITAL ENCOUNTER (INPATIENT)
Age: 60
LOS: 4 days | Discharge: HOME HEALTH | DRG: 477 | End: 2020-04-24
Attending: EMERGENCY MEDICINE | Admitting: INTERNAL MEDICINE
Payer: COMMERCIAL

## 2020-01-01 ENCOUNTER — HOSPITAL ENCOUNTER (OUTPATIENT)
Dept: NURSING | Age: 60
Discharge: HOME | End: 2020-04-13
Payer: COMMERCIAL

## 2020-01-01 ENCOUNTER — HOSPITAL ENCOUNTER (OUTPATIENT)
Dept: NURSING | Age: 60
Discharge: HOME | End: 2020-02-19
Payer: COMMERCIAL

## 2020-01-01 ENCOUNTER — HOSPITAL ENCOUNTER (OUTPATIENT)
Age: 60
Setting detail: SPECIMEN
Discharge: HOME | End: 2020-09-02
Payer: COMMERCIAL

## 2020-01-01 ENCOUNTER — HOSPITAL ENCOUNTER (OUTPATIENT)
Dept: NURSING | Age: 60
Discharge: HOME | End: 2020-01-20
Payer: COMMERCIAL

## 2020-01-01 ENCOUNTER — HOSPITAL ENCOUNTER (OUTPATIENT)
Age: 60
Setting detail: SPECIMEN
Discharge: HOME | End: 2020-05-18
Payer: COMMERCIAL

## 2020-01-01 ENCOUNTER — HOSPITAL ENCOUNTER (OUTPATIENT)
Dept: NURSING | Age: 60
Discharge: HOME | End: 2020-01-22
Payer: COMMERCIAL

## 2020-01-01 ENCOUNTER — HOSPITAL ENCOUNTER (OUTPATIENT)
Dept: NURSING | Age: 60
Discharge: HOME | End: 2020-01-28
Payer: COMMERCIAL

## 2020-01-01 ENCOUNTER — HOSPITAL ENCOUNTER (OUTPATIENT)
Dept: WOMENS IMAGING | Age: 60
Discharge: HOME | End: 2020-05-22
Payer: COMMERCIAL

## 2020-01-01 ENCOUNTER — HOSPITAL ENCOUNTER (OUTPATIENT)
Dept: NURSING | Age: 60
Discharge: HOME | End: 2020-03-09
Payer: COMMERCIAL

## 2020-01-01 ENCOUNTER — HOSPITAL ENCOUNTER (OUTPATIENT)
Age: 60
Discharge: HOME | End: 2020-07-06
Payer: COMMERCIAL

## 2020-01-01 ENCOUNTER — OFFICE VISIT (OUTPATIENT)
Dept: ENT CLINIC | Age: 60
End: 2020-01-01
Payer: COMMERCIAL

## 2020-01-01 ENCOUNTER — HOSPITAL ENCOUNTER (OUTPATIENT)
Age: 60
Setting detail: SPECIMEN
Discharge: HOME | End: 2020-07-13
Payer: COMMERCIAL

## 2020-01-01 ENCOUNTER — HOSPITAL ENCOUNTER (OUTPATIENT)
Dept: WOUND CARE | Age: 60
Discharge: HOME | End: 2020-05-06
Payer: COMMERCIAL

## 2020-01-01 ENCOUNTER — HOSPITAL ENCOUNTER (OUTPATIENT)
Age: 60
Setting detail: SPECIMEN
Discharge: HOME | End: 2020-06-08
Payer: COMMERCIAL

## 2020-01-01 VITALS
DIASTOLIC BLOOD PRESSURE: 76 MMHG | HEIGHT: 58 IN | OXYGEN SATURATION: 96 % | HEART RATE: 70 BPM | BODY MASS INDEX: 22.4 KG/M2 | RESPIRATION RATE: 16 BRPM | SYSTOLIC BLOOD PRESSURE: 126 MMHG | TEMPERATURE: 97.6 F | WEIGHT: 106.7 LBS

## 2020-01-01 VITALS
SYSTOLIC BLOOD PRESSURE: 127 MMHG | TEMPERATURE: 98.2 F | HEART RATE: 145 BPM | BODY MASS INDEX: 26.89 KG/M2 | HEIGHT: 58 IN | RESPIRATION RATE: 26 BRPM | OXYGEN SATURATION: 99 % | DIASTOLIC BLOOD PRESSURE: 76 MMHG | WEIGHT: 128.09 LBS

## 2020-01-01 VITALS
HEART RATE: 63 BPM | TEMPERATURE: 97.8 F | SYSTOLIC BLOOD PRESSURE: 113 MMHG | BODY MASS INDEX: 17.14 KG/M2 | WEIGHT: 82 LBS | DIASTOLIC BLOOD PRESSURE: 80 MMHG | OXYGEN SATURATION: 100 % | RESPIRATION RATE: 18 BRPM

## 2020-01-01 VITALS
DIASTOLIC BLOOD PRESSURE: 72 MMHG | SYSTOLIC BLOOD PRESSURE: 131 MMHG | OXYGEN SATURATION: 100 % | BODY MASS INDEX: 17.67 KG/M2 | RESPIRATION RATE: 18 BRPM | WEIGHT: 86 LBS | HEART RATE: 71 BPM

## 2020-01-01 VITALS
DIASTOLIC BLOOD PRESSURE: 89 MMHG | SYSTOLIC BLOOD PRESSURE: 127 MMHG | RESPIRATION RATE: 18 BRPM | TEMPERATURE: 96.9 F | HEART RATE: 92 BPM | OXYGEN SATURATION: 94 %

## 2020-01-01 VITALS
DIASTOLIC BLOOD PRESSURE: 88 MMHG | SYSTOLIC BLOOD PRESSURE: 134 MMHG | HEIGHT: 58 IN | WEIGHT: 96 LBS | OXYGEN SATURATION: 97 % | HEART RATE: 90 BPM | BODY MASS INDEX: 20.15 KG/M2 | RESPIRATION RATE: 16 BRPM

## 2020-01-01 VITALS
DIASTOLIC BLOOD PRESSURE: 96 MMHG | TEMPERATURE: 96.9 F | HEART RATE: 92 BPM | RESPIRATION RATE: 18 BRPM | SYSTOLIC BLOOD PRESSURE: 155 MMHG

## 2020-01-01 VITALS
OXYGEN SATURATION: 100 % | TEMPERATURE: 97.1 F | HEART RATE: 87 BPM | DIASTOLIC BLOOD PRESSURE: 65 MMHG | SYSTOLIC BLOOD PRESSURE: 141 MMHG | RESPIRATION RATE: 18 BRPM

## 2020-01-01 VITALS
TEMPERATURE: 97.2 F | SYSTOLIC BLOOD PRESSURE: 139 MMHG | RESPIRATION RATE: 18 BRPM | HEART RATE: 99 BPM | DIASTOLIC BLOOD PRESSURE: 94 MMHG | OXYGEN SATURATION: 100 %

## 2020-01-01 VITALS
DIASTOLIC BLOOD PRESSURE: 78 MMHG | BODY MASS INDEX: 17.34 KG/M2 | WEIGHT: 86 LBS | OXYGEN SATURATION: 98 % | HEIGHT: 59 IN | RESPIRATION RATE: 18 BRPM | SYSTOLIC BLOOD PRESSURE: 118 MMHG | HEART RATE: 80 BPM

## 2020-01-01 VITALS
TEMPERATURE: 97 F | RESPIRATION RATE: 18 BRPM | HEART RATE: 65 BPM | DIASTOLIC BLOOD PRESSURE: 88 MMHG | OXYGEN SATURATION: 97 % | SYSTOLIC BLOOD PRESSURE: 136 MMHG

## 2020-01-01 VITALS
OXYGEN SATURATION: 99 % | RESPIRATION RATE: 16 BRPM | DIASTOLIC BLOOD PRESSURE: 70 MMHG | TEMPERATURE: 96.4 F | SYSTOLIC BLOOD PRESSURE: 136 MMHG | HEART RATE: 72 BPM

## 2020-01-01 VITALS
DIASTOLIC BLOOD PRESSURE: 68 MMHG | SYSTOLIC BLOOD PRESSURE: 122 MMHG | HEART RATE: 105 BPM | OXYGEN SATURATION: 100 % | WEIGHT: 105.8 LBS | TEMPERATURE: 98.4 F | BODY MASS INDEX: 22.21 KG/M2 | HEIGHT: 58 IN | RESPIRATION RATE: 16 BRPM

## 2020-01-01 VITALS
SYSTOLIC BLOOD PRESSURE: 95 MMHG | BODY MASS INDEX: 17.42 KG/M2 | DIASTOLIC BLOOD PRESSURE: 59 MMHG | TEMPERATURE: 98.1 F | HEIGHT: 58 IN | HEART RATE: 82 BPM | WEIGHT: 83 LBS | RESPIRATION RATE: 16 BRPM

## 2020-01-01 VITALS
RESPIRATION RATE: 18 BRPM | OXYGEN SATURATION: 97 % | HEART RATE: 79 BPM | DIASTOLIC BLOOD PRESSURE: 72 MMHG | TEMPERATURE: 97.3 F | SYSTOLIC BLOOD PRESSURE: 127 MMHG

## 2020-01-01 VITALS
DIASTOLIC BLOOD PRESSURE: 88 MMHG | HEART RATE: 88 BPM | SYSTOLIC BLOOD PRESSURE: 138 MMHG | WEIGHT: 91.2 LBS | BODY MASS INDEX: 18.39 KG/M2 | RESPIRATION RATE: 14 BRPM | HEIGHT: 59 IN | TEMPERATURE: 98 F

## 2020-01-01 VITALS
WEIGHT: 104 LBS | BODY MASS INDEX: 21.83 KG/M2 | RESPIRATION RATE: 18 BRPM | TEMPERATURE: 98.5 F | OXYGEN SATURATION: 100 % | HEART RATE: 100 BPM | SYSTOLIC BLOOD PRESSURE: 115 MMHG | DIASTOLIC BLOOD PRESSURE: 80 MMHG | HEIGHT: 58 IN

## 2020-01-01 VITALS
SYSTOLIC BLOOD PRESSURE: 135 MMHG | DIASTOLIC BLOOD PRESSURE: 65 MMHG | TEMPERATURE: 97.2 F | HEART RATE: 105 BPM | RESPIRATION RATE: 18 BRPM | OXYGEN SATURATION: 100 %

## 2020-01-01 VITALS
BODY MASS INDEX: 22.14 KG/M2 | HEART RATE: 77 BPM | RESPIRATION RATE: 18 BRPM | HEIGHT: 58 IN | OXYGEN SATURATION: 95 % | SYSTOLIC BLOOD PRESSURE: 140 MMHG | TEMPERATURE: 99.6 F | WEIGHT: 105.5 LBS | DIASTOLIC BLOOD PRESSURE: 62 MMHG

## 2020-01-01 VITALS
HEART RATE: 77 BPM | RESPIRATION RATE: 18 BRPM | OXYGEN SATURATION: 100 % | DIASTOLIC BLOOD PRESSURE: 74 MMHG | SYSTOLIC BLOOD PRESSURE: 137 MMHG

## 2020-01-01 VITALS
DIASTOLIC BLOOD PRESSURE: 65 MMHG | SYSTOLIC BLOOD PRESSURE: 140 MMHG | HEART RATE: 81 BPM | TEMPERATURE: 98.1 F | RESPIRATION RATE: 18 BRPM

## 2020-01-01 VITALS
DIASTOLIC BLOOD PRESSURE: 75 MMHG | RESPIRATION RATE: 18 BRPM | BODY MASS INDEX: 18.99 KG/M2 | SYSTOLIC BLOOD PRESSURE: 148 MMHG | OXYGEN SATURATION: 99 % | HEART RATE: 94 BPM | HEIGHT: 59 IN | TEMPERATURE: 96.5 F | WEIGHT: 94.2 LBS

## 2020-01-01 VITALS
SYSTOLIC BLOOD PRESSURE: 152 MMHG | RESPIRATION RATE: 18 BRPM | TEMPERATURE: 98.2 F | HEART RATE: 81 BPM | DIASTOLIC BLOOD PRESSURE: 84 MMHG

## 2020-01-01 VITALS
RESPIRATION RATE: 18 BRPM | SYSTOLIC BLOOD PRESSURE: 150 MMHG | TEMPERATURE: 97.3 F | DIASTOLIC BLOOD PRESSURE: 96 MMHG | OXYGEN SATURATION: 97 % | HEART RATE: 100 BPM

## 2020-01-01 VITALS — SYSTOLIC BLOOD PRESSURE: 100 MMHG | BODY MASS INDEX: 17.31 KG/M2 | DIASTOLIC BLOOD PRESSURE: 60 MMHG | WEIGHT: 82.8 LBS

## 2020-01-01 VITALS
DIASTOLIC BLOOD PRESSURE: 88 MMHG | HEART RATE: 92 BPM | OXYGEN SATURATION: 97 % | WEIGHT: 117 LBS | TEMPERATURE: 98.2 F | BODY MASS INDEX: 24.56 KG/M2 | HEIGHT: 58 IN | RESPIRATION RATE: 18 BRPM | SYSTOLIC BLOOD PRESSURE: 104 MMHG

## 2020-01-01 VITALS
TEMPERATURE: 98.2 F | RESPIRATION RATE: 18 BRPM | DIASTOLIC BLOOD PRESSURE: 85 MMHG | SYSTOLIC BLOOD PRESSURE: 143 MMHG | OXYGEN SATURATION: 100 % | HEART RATE: 90 BPM

## 2020-01-01 VITALS — DIASTOLIC BLOOD PRESSURE: 95 MMHG | HEART RATE: 96 BPM | TEMPERATURE: 97.4 F | SYSTOLIC BLOOD PRESSURE: 138 MMHG

## 2020-01-01 VITALS
DIASTOLIC BLOOD PRESSURE: 92 MMHG | HEART RATE: 99 BPM | TEMPERATURE: 98.2 F | RESPIRATION RATE: 18 BRPM | SYSTOLIC BLOOD PRESSURE: 158 MMHG

## 2020-01-01 VITALS
TEMPERATURE: 98 F | HEART RATE: 78 BPM | OXYGEN SATURATION: 100 % | DIASTOLIC BLOOD PRESSURE: 67 MMHG | RESPIRATION RATE: 18 BRPM | SYSTOLIC BLOOD PRESSURE: 136 MMHG

## 2020-01-01 VITALS
DIASTOLIC BLOOD PRESSURE: 100 MMHG | TEMPERATURE: 97.2 F | BODY MASS INDEX: 20.06 KG/M2 | WEIGHT: 96 LBS | HEART RATE: 94 BPM | SYSTOLIC BLOOD PRESSURE: 154 MMHG | OXYGEN SATURATION: 99 % | RESPIRATION RATE: 16 BRPM

## 2020-01-01 VITALS
TEMPERATURE: 98.2 F | HEART RATE: 96 BPM | DIASTOLIC BLOOD PRESSURE: 80 MMHG | SYSTOLIC BLOOD PRESSURE: 124 MMHG | RESPIRATION RATE: 18 BRPM

## 2020-01-01 VITALS
DIASTOLIC BLOOD PRESSURE: 59 MMHG | TEMPERATURE: 97.1 F | HEART RATE: 80 BPM | OXYGEN SATURATION: 96 % | SYSTOLIC BLOOD PRESSURE: 100 MMHG | RESPIRATION RATE: 16 BRPM

## 2020-01-01 VITALS
RESPIRATION RATE: 16 BRPM | OXYGEN SATURATION: 98 % | DIASTOLIC BLOOD PRESSURE: 87 MMHG | SYSTOLIC BLOOD PRESSURE: 168 MMHG | TEMPERATURE: 97.1 F | HEART RATE: 89 BPM

## 2020-01-01 DIAGNOSIS — E83.51 HYPOCALCEMIA: Primary | ICD-10-CM

## 2020-01-01 DIAGNOSIS — R52 PAIN: ICD-10-CM

## 2020-01-01 DIAGNOSIS — E83.42 HYPOMAGNESEMIA: ICD-10-CM

## 2020-01-01 DIAGNOSIS — F10.20 ALCOHOLISM (HCC): ICD-10-CM

## 2020-01-01 DIAGNOSIS — Z98.84 HISTORY OF GASTRIC BYPASS: ICD-10-CM

## 2020-01-01 LAB
ABO: NORMAL
ABO: NORMAL
ABSOLUTE RETIC #: 17 THOU/MM3 (ref 20–115)
ACINETOBACTER BAUMANNII FILM ARRAY: NOT DETECTED
ACINETOBACTER CALCOACETICUS-BAUMANNII BY PCR: NOT DETECTED
ADENOVIRUS BY PCR: NOT DETECTED
ADENOVIRUS F 40 41 PCR: NOT DETECTED
AEROBIC CULTURE: NORMAL
ALBUMIN SERPL-MCNC: 1.1 G/DL (ref 3.5–5.1)
ALBUMIN SERPL-MCNC: 1.2 GM/DL (ref 3.4–5)
ALBUMIN SERPL-MCNC: 1.4 G/DL (ref 3.5–5.1)
ALBUMIN SERPL-MCNC: 1.6 G/DL (ref 3.5–5.1)
ALBUMIN SERPL-MCNC: 1.7 G/DL (ref 3.5–5.1)
ALBUMIN SERPL-MCNC: 1.7 G/DL (ref 3.5–5.1)
ALBUMIN SERPL-MCNC: 1.8 G/DL (ref 3.5–5.1)
ALBUMIN SERPL-MCNC: 2 G/DL (ref 3.5–5.1)
ALBUMIN SERPL-MCNC: 2.1 G/DL (ref 3.5–5.1)
ALBUMIN SERPL-MCNC: 2.4 G/DL (ref 3.5–5.1)
ALBUMIN SERPL-MCNC: 2.6 G/DL (ref 3.5–5.1)
ALBUMIN SERPL-MCNC: 2.7 G/DL (ref 3.5–5.1)
ALBUMIN SERPL-MCNC: 2.7 G/DL (ref 3.5–5.1)
ALBUMIN SERPL-MCNC: 2.9 G/DL (ref 3.5–5.1)
ALBUMIN SERPL-MCNC: 3.1 G/DL (ref 3.5–5.1)
ALBUMIN SERPL-MCNC: 3.4 G/DL (ref 3.5–5.1)
ALBUMIN SERPL-MCNC: 3.5 G/DL (ref 3.5–5.1)
ALBUMIN SERPL-MCNC: 3.5 G/DL (ref 3.5–5.1)
ALBUMIN SERPL-MCNC: 3.6 G/DL (ref 3.5–5.1)
ALBUMIN SERPL-MCNC: 3.6 G/DL (ref 3.5–5.1)
ALBUMIN SERPL-MCNC: 3.8 G/DL (ref 3.5–5.1)
ALBUMIN SERPL-MCNC: 3.8 G/DL (ref 3.5–5.1)
ALBUMIN SERPL-MCNC: 3.9 G/DL (ref 3.5–5.1)
ALBUMIN SERPL-MCNC: 4 G/DL (ref 3.5–5.1)
ALBUMIN SERPL-MCNC: 4.1 G/DL (ref 3.5–5.1)
ALLEN TEST: ABNORMAL
ALLEN TEST: POSITIVE
ALLEN TEST: POSITIVE
ALP BLD-CCNC: 102 U/L (ref 38–126)
ALP BLD-CCNC: 102 U/L (ref 38–126)
ALP BLD-CCNC: 105 U/L (ref 38–126)
ALP BLD-CCNC: 107 U/L (ref 38–126)
ALP BLD-CCNC: 108 U/L (ref 38–126)
ALP BLD-CCNC: 108 U/L (ref 38–126)
ALP BLD-CCNC: 109 U/L (ref 38–126)
ALP BLD-CCNC: 114 U/L (ref 38–126)
ALP BLD-CCNC: 121 U/L (ref 38–126)
ALP BLD-CCNC: 123 U/L (ref 38–126)
ALP BLD-CCNC: 126 U/L (ref 38–126)
ALP BLD-CCNC: 130 U/L (ref 38–126)
ALP BLD-CCNC: 145 U/L (ref 38–126)
ALP BLD-CCNC: 146 U/L (ref 38–126)
ALP BLD-CCNC: 152 U/L (ref 46–116)
ALP BLD-CCNC: 156 U/L (ref 38–126)
ALP BLD-CCNC: 159 U/L (ref 38–126)
ALP BLD-CCNC: 51 U/L (ref 38–126)
ALP BLD-CCNC: 52 U/L (ref 38–126)
ALP BLD-CCNC: 54 U/L (ref 38–126)
ALP BLD-CCNC: 58 U/L (ref 38–126)
ALP BLD-CCNC: 62 U/L (ref 38–126)
ALP BLD-CCNC: 66 U/L (ref 38–126)
ALP BLD-CCNC: 69 U/L (ref 38–126)
ALP BLD-CCNC: 86 U/L (ref 38–126)
ALP BLD-CCNC: 89 U/L (ref 38–126)
ALP BLD-CCNC: 90 U/L (ref 38–126)
ALP BLD-CCNC: 92 U/L (ref 38–126)
ALP BLD-CCNC: 97 U/L (ref 38–126)
ALT SERPL-CCNC: 10 U/L (ref 11–66)
ALT SERPL-CCNC: 11 U/L (ref 11–66)
ALT SERPL-CCNC: 12 U/L (ref 11–66)
ALT SERPL-CCNC: 12 U/L (ref 11–66)
ALT SERPL-CCNC: 13 U/L (ref 11–66)
ALT SERPL-CCNC: 13 U/L (ref 11–66)
ALT SERPL-CCNC: 14 U/L (ref 11–66)
ALT SERPL-CCNC: 14 U/L (ref 11–66)
ALT SERPL-CCNC: 18 U/L (ref 11–66)
ALT SERPL-CCNC: 18 U/L (ref 11–66)
ALT SERPL-CCNC: 20 U/L (ref 11–66)
ALT SERPL-CCNC: 20 U/L (ref 11–66)
ALT SERPL-CCNC: 21 U/L (ref 11–66)
ALT SERPL-CCNC: 21 U/L (ref 11–66)
ALT SERPL-CCNC: 23 U/L (ref 11–66)
ALT SERPL-CCNC: 23 U/L (ref 11–66)
ALT SERPL-CCNC: 24 U/L (ref 11–66)
ALT SERPL-CCNC: 24 U/L (ref 11–66)
ALT SERPL-CCNC: 26 U/L (ref 11–66)
ALT SERPL-CCNC: 31 U/L (ref 11–66)
ALT SERPL-CCNC: 31 U/L (ref 14–63)
ALT SERPL-CCNC: 33 U/L (ref 11–66)
ALT SERPL-CCNC: 35 U/L (ref 11–66)
ALT SERPL-CCNC: 37 U/L (ref 11–66)
ALT SERPL-CCNC: 51 U/L (ref 11–66)
ALT SERPL-CCNC: 54 U/L (ref 11–66)
ALT SERPL-CCNC: 85 U/L (ref 11–66)
AMMONIA: 107 UMOL/L (ref 11–60)
AMMONIA: 23 UMOL/L (ref 11–60)
AMMONIA: 27 UMOL/L (ref 11–60)
AMMONIA: 79 UMOL/L (ref 11–60)
AMMONIA: 86 UMOL/L (ref 11–60)
AMMONIA: 97 UMOL/L (ref 11–60)
ANAEROBIC CULTURE: NORMAL
ANION GAP SERPL CALCULATED.3IONS-SCNC: 10 MEQ/L (ref 8–16)
ANION GAP SERPL CALCULATED.3IONS-SCNC: 11 MEQ/L (ref 8–16)
ANION GAP SERPL CALCULATED.3IONS-SCNC: 12 MEQ/L (ref 8–16)
ANION GAP SERPL CALCULATED.3IONS-SCNC: 13 MEQ/L (ref 8–16)
ANION GAP SERPL CALCULATED.3IONS-SCNC: 14 MEQ/L (ref 8–16)
ANION GAP SERPL CALCULATED.3IONS-SCNC: 18 MEQ/L (ref 8–16)
ANION GAP SERPL CALCULATED.3IONS-SCNC: 22 MEQ/L (ref 8–16)
ANION GAP SERPL CALCULATED.3IONS-SCNC: 29 MEQ/L (ref 8–16)
ANION GAP SERPL CALCULATED.3IONS-SCNC: 29 MEQ/L (ref 8–16)
ANION GAP SERPL CALCULATED.3IONS-SCNC: 3 MEQ/L (ref 8–16)
ANION GAP SERPL CALCULATED.3IONS-SCNC: 31 MEQ/L (ref 8–16)
ANION GAP SERPL CALCULATED.3IONS-SCNC: 32 MEQ/L (ref 8–16)
ANION GAP SERPL CALCULATED.3IONS-SCNC: 33 MEQ/L (ref 8–16)
ANION GAP SERPL CALCULATED.3IONS-SCNC: 33 MEQ/L (ref 8–16)
ANION GAP SERPL CALCULATED.3IONS-SCNC: 35 MEQ/L (ref 8–16)
ANION GAP SERPL CALCULATED.3IONS-SCNC: 7 MEQ/L (ref 8–16)
ANION GAP SERPL CALCULATED.3IONS-SCNC: 8 MEQ/L (ref 8–16)
ANION GAP SERPL CALCULATED.3IONS-SCNC: 9 MEQ/L (ref 8–16)
ANION GAP SERPL CALCULATED.3IONS-SCNC: 9 MEQ/L (ref 8–16)
ANION GAP: 5 MEQ/L (ref 8–16)
ANISOCYTOSIS: PRESENT
ANTIBODY SCREEN: NORMAL
ANTIBODY SCREEN: NORMAL
APTT: 34.7 SECONDS (ref 22–38)
APTT: 35.4 SECONDS (ref 22–38)
APTT: 41.2 SECONDS (ref 22–38)
APTT: 47.2 SECONDS (ref 22–38)
APTT: 54.1 SECONDS (ref 22–38)
APTT: 57.7 SECONDS (ref 22–38)
AST SERPL-CCNC: 18 U/L (ref 5–40)
AST SERPL-CCNC: 18 U/L (ref 5–40)
AST SERPL-CCNC: 19 U/L (ref 5–40)
AST SERPL-CCNC: 19 U/L (ref 5–40)
AST SERPL-CCNC: 192 U/L (ref 5–40)
AST SERPL-CCNC: 20 U/L (ref 5–40)
AST SERPL-CCNC: 22 U/L (ref 5–40)
AST SERPL-CCNC: 25 U/L (ref 5–40)
AST SERPL-CCNC: 26 U/L (ref 5–40)
AST SERPL-CCNC: 26 U/L (ref 5–40)
AST SERPL-CCNC: 31 U/L (ref 5–40)
AST SERPL-CCNC: 31 U/L (ref 5–40)
AST SERPL-CCNC: 317 U/L (ref 5–40)
AST SERPL-CCNC: 32 U/L (ref 5–40)
AST SERPL-CCNC: 320 U/L (ref 5–40)
AST SERPL-CCNC: 33 U/L (ref 5–40)
AST SERPL-CCNC: 38 U/L (ref 5–40)
AST SERPL-CCNC: 43 U/L (ref 5–40)
AST SERPL-CCNC: 446 U/L (ref 5–40)
AST SERPL-CCNC: 46 U/L (ref 15–37)
AST SERPL-CCNC: 46 U/L (ref 5–40)
AST SERPL-CCNC: 46 U/L (ref 5–40)
AST SERPL-CCNC: 49 U/L (ref 5–40)
AST SERPL-CCNC: 54 U/L (ref 5–40)
AST SERPL-CCNC: 55 U/L (ref 5–40)
AST SERPL-CCNC: 59 U/L (ref 5–40)
AST SERPL-CCNC: 62 U/L (ref 5–40)
AST SERPL-CCNC: 77 U/L (ref 5–40)
AST SERPL-CCNC: 99 U/L (ref 5–40)
ASTROVIRUS PCR: NOT DETECTED
BACTERIA: ABNORMAL
BACTERIA: ABNORMAL /HPF
BASE EXCESS (CALCULATED): -10.9 MMOL/L (ref -2.5–2.5)
BASE EXCESS (CALCULATED): -13.4 MMOL/L (ref -2.5–2.5)
BASE EXCESS (CALCULATED): -15.1 MMOL/L (ref -2.5–2.5)
BASE EXCESS (CALCULATED): -16.8 MMOL/L (ref -2.5–2.5)
BASE EXCESS (CALCULATED): -17.8 MMOL/L (ref -2.5–2.5)
BASE EXCESS (CALCULATED): -18.5 MMOL/L (ref -2.5–2.5)
BASE EXCESS (CALCULATED): -18.7 MMOL/L (ref -2.5–2.5)
BASE EXCESS (CALCULATED): -19.5 MMOL/L (ref -2.5–2.5)
BASE EXCESS (CALCULATED): -20.6 MMOL/L (ref -2.5–2.5)
BASE EXCESS (CALCULATED): -5.6 MMOL/L (ref -2.5–2.5)
BASE EXCESS (CALCULATED): -8 MMOL/L (ref -2.5–2.5)
BASE EXCESS (CALCULATED): 2.1 MMOL/L (ref -2.5–2.5)
BASE EXCESS (CALCULATED): 8.9 MMOL/L (ref -2.5–2.5)
BASE EXCESS MIXED: -12.9 MMOL/L (ref -2–3)
BASE EXCESS MIXED: -5.4 MMOL/L (ref -2–3)
BASOPHILS # BLD: 0 %
BASOPHILS # BLD: 0.1 %
BASOPHILS # BLD: 0.2 %
BASOPHILS # BLD: 0.3 %
BASOPHILS # BLD: 0.3 %
BASOPHILS # BLD: 0.4 %
BASOPHILS # BLD: 0.5 %
BASOPHILS # BLD: 0.5 %
BASOPHILS # BLD: 0.6 %
BASOPHILS ABSOLUTE: 0 THOU/MM3 (ref 0–0.1)
BILIRUB SERPL-MCNC: 0.4 MG/DL (ref 0.2–1)
BILIRUB SERPL-MCNC: 0.4 MG/DL (ref 0.3–1.2)
BILIRUB SERPL-MCNC: 0.5 MG/DL (ref 0.3–1.2)
BILIRUB SERPL-MCNC: 0.6 MG/DL (ref 0.3–1.2)
BILIRUB SERPL-MCNC: 0.8 MG/DL (ref 0.3–1.2)
BILIRUB SERPL-MCNC: 0.9 MG/DL (ref 0.3–1.2)
BILIRUB SERPL-MCNC: 1.2 MG/DL (ref 0.3–1.2)
BILIRUB SERPL-MCNC: 1.3 MG/DL (ref 0.3–1.2)
BILIRUB SERPL-MCNC: 1.3 MG/DL (ref 0.3–1.2)
BILIRUB SERPL-MCNC: 1.9 MG/DL (ref 0.3–1.2)
BILIRUB SERPL-MCNC: 1.9 MG/DL (ref 0.3–1.2)
BILIRUB SERPL-MCNC: 2 MG/DL (ref 0.3–1.2)
BILIRUB SERPL-MCNC: 2.1 MG/DL (ref 0.3–1.2)
BILIRUB SERPL-MCNC: 2.1 MG/DL (ref 0.3–1.2)
BILIRUB SERPL-MCNC: 2.3 MG/DL (ref 0.3–1.2)
BILIRUB SERPL-MCNC: 2.6 MG/DL (ref 0.3–1.2)
BILIRUB SERPL-MCNC: 2.9 MG/DL (ref 0.3–1.2)
BILIRUBIN DIRECT: 0.8 MG/DL (ref 0–0.3)
BILIRUBIN DIRECT: 1.2 MG/DL (ref 0–0.3)
BILIRUBIN DIRECT: 1.2 MG/DL (ref 0–0.3)
BILIRUBIN DIRECT: < 0.2 MG/DL (ref 0–0.3)
BILIRUBIN URINE: ABNORMAL
BILIRUBIN URINE: NEGATIVE
BILIRUBIN URINE: NEGATIVE
BLOOD CULTURE, ROUTINE: ABNORMAL
BLOOD CULTURE, ROUTINE: NORMAL
BLOOD, URINE: NEGATIVE
BODY FLUID RBC: NORMAL /CUMM
BOTTLE TYPE: ABNORMAL
BOTTLE TYPE: ABNORMAL
BOTTLE TYPE: NORMAL
BUN BLDV-MCNC: 10 MG/DL (ref 7–22)
BUN BLDV-MCNC: 11 MG/DL (ref 7–22)
BUN BLDV-MCNC: 11 MG/DL (ref 7–22)
BUN BLDV-MCNC: 12 MG/DL (ref 7–22)
BUN BLDV-MCNC: 13 MG/DL (ref 7–22)
BUN BLDV-MCNC: 14 MG/DL (ref 7–22)
BUN BLDV-MCNC: 3 MG/DL (ref 7–22)
BUN BLDV-MCNC: 3 MG/DL (ref 7–22)
BUN BLDV-MCNC: 4 MG/DL (ref 7–22)
BUN BLDV-MCNC: 5 MG/DL (ref 7–22)
BUN BLDV-MCNC: 5 MG/DL (ref 7–22)
BUN BLDV-MCNC: 6 MG/DL (ref 7–18)
BUN BLDV-MCNC: 6 MG/DL (ref 7–22)
BUN BLDV-MCNC: 7 MG/DL (ref 7–22)
BUN BLDV-MCNC: 8 MG/DL (ref 7–22)
BUN BLDV-MCNC: 9 MG/DL (ref 7–22)
BUN BLDV-MCNC: 9 MG/DL (ref 7–22)
BUN BLDV-MCNC: < 2 MG/DL (ref 7–22)
C-REACTIVE PROTEIN: 0.05 MG/DL (ref 0–1)
C-REACTIVE PROTEIN: 0.06 MG/DL (ref 0–1)
C-REACTIVE PROTEIN: 0.06 MG/DL (ref 0–1)
C-REACTIVE PROTEIN: 0.08 MG/DL (ref 0–1)
CALCIUM IONIZED: 0.87 MMOL/L (ref 1.12–1.32)
CALCIUM IONIZED: 0.9 MMOL/L (ref 1.12–1.32)
CALCIUM IONIZED: 0.92 MMOL/L (ref 1.12–1.32)
CALCIUM IONIZED: 0.95 MMOL/L (ref 1.12–1.32)
CALCIUM IONIZED: 0.95 MMOL/L (ref 1.12–1.32)
CALCIUM IONIZED: 0.96 MMOL/L (ref 1.12–1.32)
CALCIUM IONIZED: 0.96 MMOL/L (ref 1.12–1.32)
CALCIUM IONIZED: 0.98 MMOL/L (ref 1.12–1.32)
CALCIUM IONIZED: 0.98 MMOL/L (ref 1.12–1.32)
CALCIUM IONIZED: 1 MMOL/L (ref 1.12–1.32)
CALCIUM IONIZED: 1.02 MMOL/L (ref 1.12–1.32)
CALCIUM IONIZED: 1.03 MMOL/L (ref 1.12–1.32)
CALCIUM IONIZED: 1.08 MMOL/L (ref 1.12–1.32)
CALCIUM IONIZED: 1.09 MMOL/L (ref 1.12–1.32)
CALCIUM IONIZED: 1.1 MMOL/L (ref 1.12–1.32)
CALCIUM IONIZED: 1.14 MMOL/L (ref 1.12–1.32)
CALCIUM IONIZED: 1.14 MMOL/L (ref 1.12–1.32)
CALCIUM IONIZED: 1.15 MMOL/L (ref 1.12–1.32)
CALCIUM IONIZED: 1.17 MMOL/L (ref 1.12–1.32)
CALCIUM IONIZED: 1.19 MMOL/L (ref 1.12–1.32)
CALCIUM IONIZED: 1.22 MMOL/L (ref 1.12–1.32)
CALCIUM IONIZED: 1.24 MMOL/L (ref 1.12–1.32)
CALCIUM IONIZED: 1.31 MMOL/L (ref 1.12–1.32)
CALCIUM IONIZED: 1.35 MMOL/L (ref 1.12–1.32)
CALCIUM IONIZED: 1.43 MMOL/L (ref 1.12–1.32)
CALCIUM IONIZED: 1.5 MMOL/L (ref 1.12–1.32)
CALCIUM IONIZED: 1.55 MMOL/L (ref 1.12–1.32)
CALCIUM IONIZED: 1.55 MMOL/L (ref 1.12–1.32)
CALCIUM IONIZED: 1.61 MMOL/L (ref 1.12–1.32)
CALCIUM SERPL-MCNC: 10 MG/DL (ref 8.5–10.5)
CALCIUM SERPL-MCNC: 10.6 MG/DL (ref 8.5–10.5)
CALCIUM SERPL-MCNC: 10.7 MG/DL (ref 8.5–10.5)
CALCIUM SERPL-MCNC: 10.8 MG/DL (ref 8.5–10.5)
CALCIUM SERPL-MCNC: 11.4 MG/DL (ref 8.5–10.5)
CALCIUM SERPL-MCNC: 12 MG/DL (ref 8.5–10.5)
CALCIUM SERPL-MCNC: 12.2 MG/DL (ref 8.5–10.5)
CALCIUM SERPL-MCNC: 6.5 MG/DL (ref 8.5–10.5)
CALCIUM SERPL-MCNC: 6.6 MG/DL (ref 8.5–10.5)
CALCIUM SERPL-MCNC: 6.9 MG/DL (ref 8.5–10.5)
CALCIUM SERPL-MCNC: 7 MG/DL (ref 8.5–10.5)
CALCIUM SERPL-MCNC: 7.2 MG/DL (ref 8.5–10.5)
CALCIUM SERPL-MCNC: 7.4 MG/DL (ref 8.5–10.5)
CALCIUM SERPL-MCNC: 7.5 MG/DL (ref 8.5–10.5)
CALCIUM SERPL-MCNC: 7.6 MG/DL (ref 8.5–10.5)
CALCIUM SERPL-MCNC: 7.7 MG/DL (ref 8.5–10.5)
CALCIUM SERPL-MCNC: 7.8 MG/DL (ref 8.5–10.5)
CALCIUM SERPL-MCNC: 7.9 MG/DL (ref 8.5–10.5)
CALCIUM SERPL-MCNC: 8 MG/DL (ref 8.5–10.5)
CALCIUM SERPL-MCNC: 8.2 MG/DL (ref 8.5–10.5)
CALCIUM SERPL-MCNC: 8.3 MG/DL (ref 8.5–10.5)
CALCIUM SERPL-MCNC: 8.3 MG/DL (ref 8.5–10.5)
CALCIUM SERPL-MCNC: 8.4 MG/DL (ref 8.5–10.5)
CALCIUM SERPL-MCNC: 8.4 MG/DL (ref 8.5–10.5)
CALCIUM SERPL-MCNC: 8.5 MG/DL (ref 8.5–10.5)
CALCIUM SERPL-MCNC: 8.5 MG/DL (ref 8.5–10.5)
CALCIUM SERPL-MCNC: 8.7 MG/DL (ref 8.5–10.5)
CALCIUM SERPL-MCNC: 8.8 MG/DL (ref 8.5–10.5)
CALCIUM SERPL-MCNC: 8.9 MG/DL (ref 8.5–10.5)
CALCIUM SERPL-MCNC: 9 MG/DL (ref 8.5–10.5)
CALCIUM SERPL-MCNC: 9.2 MG/DL (ref 8.5–10.5)
CALCIUM SERPL-MCNC: 9.3 MG/DL (ref 8.5–10.5)
CALCIUM SERPL-MCNC: 9.3 MG/DL (ref 8.5–10.5)
CALCIUM SERPL-MCNC: 9.4 MG/DL (ref 8.5–10.5)
CALCIUM SERPL-MCNC: 9.5 MG/DL (ref 8.5–10.5)
CALCIUM SERPL-MCNC: 9.6 MG/DL (ref 8.5–10.5)
CALCIUM SERPL-MCNC: 9.9 MG/DL (ref 8.5–10.5)
CALCIUM URINE: 30.5 MG/DL
CALCIUM URINE: 92 MG/24HR (ref 100–240)
CAMPYLOBACTER PCR: NOT DETECTED
CANDIDA ALBICANS FILM ARRAY: NOT DETECTED
CANDIDA GLABRATA FILM ARRAY: NOT DETECTED
CANDIDA KRUSEI FILM ARRAY: NOT DETECTED
CANDIDA PARAPSILOSIS FILM ARRAY: NOT DETECTED
CANDIDA TROPICALIS FILM ARRAY: NOT DETECTED
CARBAPENEM RESITANT FILM ARRAY: ABNORMAL
CARBAPENEM RESITANT FILM ARRAY: NORMAL
CARBAPENEM RESITANT FILM ARRAY: NOT DETECTED
CASTS 2: ABNORMAL /LPF
CASTS UA: ABNORMAL /LPF
CASTS: ABNORMAL /LPF
CASTS: ABNORMAL /LPF
CHARACTER, BODY FLUID: NORMAL
CHARACTER, URINE: CLEAR
CHLAMYDIA PNEUMONIAE BY PCR: NOT DETECTED
CHLORIDE BLD-SCNC: 100 MEQ/L (ref 98–111)
CHLORIDE BLD-SCNC: 101 MEQ/L (ref 98–111)
CHLORIDE BLD-SCNC: 103 MEQ/L (ref 98–111)
CHLORIDE BLD-SCNC: 104 MEQ/L (ref 98–111)
CHLORIDE BLD-SCNC: 104 MEQ/L (ref 98–111)
CHLORIDE BLD-SCNC: 105 MEQ/L (ref 98–111)
CHLORIDE BLD-SCNC: 106 MEQ/L (ref 98–111)
CHLORIDE BLD-SCNC: 106 MEQ/L (ref 98–111)
CHLORIDE BLD-SCNC: 107 MEQ/L (ref 98–111)
CHLORIDE BLD-SCNC: 107 MEQ/L (ref 98–111)
CHLORIDE BLD-SCNC: 108 MEQ/L (ref 98–111)
CHLORIDE BLD-SCNC: 109 MEQ/L (ref 98–111)
CHLORIDE BLD-SCNC: 110 MEQ/L (ref 98–111)
CHLORIDE BLD-SCNC: 110 MEQ/L (ref 98–111)
CHLORIDE BLD-SCNC: 111 MEQ/L (ref 98–107)
CHLORIDE BLD-SCNC: 112 MEQ/L (ref 98–111)
CHLORIDE BLD-SCNC: 92 MEQ/L (ref 98–111)
CHLORIDE BLD-SCNC: 94 MEQ/L (ref 98–111)
CHLORIDE BLD-SCNC: 95 MEQ/L (ref 98–111)
CHLORIDE BLD-SCNC: 96 MEQ/L (ref 98–111)
CHLORIDE BLD-SCNC: 98 MEQ/L (ref 98–111)
CHLORIDE BLD-SCNC: 99 MEQ/L (ref 98–111)
CLOSTRIDIUM DIFFICILE, PCR: NOT DETECTED
CO2: 13 MEQ/L (ref 23–33)
CO2: 14 MEQ/L (ref 23–33)
CO2: 18 MEQ/L (ref 23–33)
CO2: 19 MEQ/L (ref 23–33)
CO2: 20 MEQ/L (ref 23–33)
CO2: 21 MEQ/L (ref 23–33)
CO2: 21 MEQ/L (ref 23–33)
CO2: 22 MEQ/L (ref 23–33)
CO2: 23 MEQ/L (ref 23–33)
CO2: 24 MEQ/L (ref 23–33)
CO2: 27 MEQ/L (ref 21–32)
CO2: 27 MEQ/L (ref 23–33)
CO2: 29 MEQ/L (ref 23–33)
CO2: 29 MEQ/L (ref 23–33)
CO2: 30 MEQ/L (ref 23–33)
CO2: 31 MEQ/L (ref 23–33)
CO2: 6 MEQ/L (ref 23–33)
CO2: 6 MEQ/L (ref 23–33)
CO2: 7 MEQ/L (ref 23–33)
CO2: 7 MEQ/L (ref 23–33)
CO2: 8 MEQ/L (ref 23–33)
CO2: 8 MEQ/L (ref 23–33)
CO2: 9 MEQ/L (ref 23–33)
COLLECTED BY:: ABNORMAL
COLOR: ABNORMAL
COLOR: ABNORMAL
COLOR: NORMAL
COLOR: YELLOW
COPPER: 61.8 UG/DL (ref 80–155)
CORONAVIRUS PCR: NOT DETECTED
CREAT SERPL-MCNC: 0.3 MG/DL (ref 0.4–1.2)
CREAT SERPL-MCNC: 0.4 MG/DL (ref 0.4–1.2)
CREAT SERPL-MCNC: 0.5 MG/DL (ref 0.4–1.2)
CREAT SERPL-MCNC: 0.6 MG/DL (ref 0.4–1.2)
CREAT SERPL-MCNC: 0.7 MG/DL (ref 0.6–1.3)
CREAT SERPL-MCNC: 0.8 MG/DL (ref 0.4–1.2)
CREAT SERPL-MCNC: 0.8 MG/DL (ref 0.4–1.2)
CREAT SERPL-MCNC: 0.9 MG/DL (ref 0.4–1.2)
CREAT SERPL-MCNC: 1 MG/DL (ref 0.4–1.2)
CREAT SERPL-MCNC: 1 MG/DL (ref 0.4–1.2)
CREAT SERPL-MCNC: 1.1 MG/DL (ref 0.4–1.2)
CREAT SERPL-MCNC: 1.1 MG/DL (ref 0.4–1.2)
CREAT SERPL-MCNC: 1.2 MG/DL (ref 0.4–1.2)
CREATININE URINE: 0.4 GM/24HR
CREATININE URINE: 146.2 MG/DL
CRENATED RBC'S: ABNORMAL
CRYPTOSPORIDIUM PCR: NOT DETECTED
CRYSTALS, UA: ABNORMAL
CRYSTALS: ABNORMAL
CYCLOSPORA CAYETANENSIS PCR: NOT DETECTED
D-DIMER QUANTITATIVE: 1565 NG/ML FEU (ref 0–500)
D-DIMER QUANTITATIVE: 7115 NG/ML FEU (ref 0–500)
DEVICE: ABNORMAL
DIFFERENTIAL TYPE: ABNORMAL
DIFFERENTIAL TYPE: ABNORMAL
DOHLE BODIES: PRESENT
E COLI 0157 PCR: NORMAL
E COLI ENTEROAGGREGATIVE PCR: NOT DETECTED
E COLI ENTEROPATHOGENIC PCR: NOT DETECTED
E COLI ENTEROTOXIGENIC PCR: NOT DETECTED
E COLI SHIGA LIKE TOXIN PCR: NOT DETECTED
E COLI SHIGELLA/ENTEROINVASIVE PCR: NOT DETECTED
E HISTOLYTICA GI FILM ARRAY: NOT DETECTED
EKG ATRIAL RATE: 100 BPM
EKG ATRIAL RATE: 101 BPM
EKG ATRIAL RATE: 120 BPM
EKG ATRIAL RATE: 139 BPM
EKG ATRIAL RATE: 143 BPM
EKG ATRIAL RATE: 148 BPM
EKG ATRIAL RATE: 89 BPM
EKG ATRIAL RATE: 92 BPM
EKG ATRIAL RATE: 92 BPM
EKG P AXIS: 17 DEGREES
EKG P AXIS: 24 DEGREES
EKG P AXIS: 42 DEGREES
EKG P AXIS: 44 DEGREES
EKG P AXIS: 47 DEGREES
EKG P AXIS: 49 DEGREES
EKG P AXIS: 53 DEGREES
EKG P AXIS: 57 DEGREES
EKG P AXIS: 60 DEGREES
EKG P-R INTERVAL: 116 MS
EKG P-R INTERVAL: 118 MS
EKG P-R INTERVAL: 130 MS
EKG P-R INTERVAL: 134 MS
EKG P-R INTERVAL: 134 MS
EKG P-R INTERVAL: 136 MS
EKG P-R INTERVAL: 138 MS
EKG P-R INTERVAL: 144 MS
EKG P-R INTERVAL: 152 MS
EKG Q-T INTERVAL: 264 MS
EKG Q-T INTERVAL: 264 MS
EKG Q-T INTERVAL: 302 MS
EKG Q-T INTERVAL: 316 MS
EKG Q-T INTERVAL: 318 MS
EKG Q-T INTERVAL: 330 MS
EKG Q-T INTERVAL: 342 MS
EKG Q-T INTERVAL: 348 MS
EKG Q-T INTERVAL: 356 MS
EKG QRS DURATION: 48 MS
EKG QRS DURATION: 54 MS
EKG QRS DURATION: 54 MS
EKG QRS DURATION: 56 MS
EKG QRS DURATION: 60 MS
EKG QRS DURATION: 60 MS
EKG QRS DURATION: 62 MS
EKG QRS DURATION: 64 MS
EKG QRS DURATION: 84 MS
EKG QTC CALCULATION (BAZETT): 390 MS
EKG QTC CALCULATION (BAZETT): 407 MS
EKG QTC CALCULATION (BAZETT): 408 MS
EKG QTC CALCULATION (BAZETT): 414 MS
EKG QTC CALCULATION (BAZETT): 423 MS
EKG QTC CALCULATION (BAZETT): 443 MS
EKG QTC CALCULATION (BAZETT): 449 MS
EKG QTC CALCULATION (BAZETT): 459 MS
EKG QTC CALCULATION (BAZETT): 459 MS
EKG R AXIS: -12 DEGREES
EKG R AXIS: -16 DEGREES
EKG R AXIS: -2 DEGREES
EKG R AXIS: -4 DEGREES
EKG R AXIS: -5 DEGREES
EKG R AXIS: -6 DEGREES
EKG R AXIS: 10 DEGREES
EKG R AXIS: 10 DEGREES
EKG R AXIS: 6 DEGREES
EKG T AXIS: 163 DEGREES
EKG T AXIS: 3 DEGREES
EKG T AXIS: 4 DEGREES
EKG T AXIS: 45 DEGREES
EKG T AXIS: 46 DEGREES
EKG T AXIS: 48 DEGREES
EKG T AXIS: 60 DEGREES
EKG T AXIS: 69 DEGREES
EKG T AXIS: 84 DEGREES
EKG VENTRICULAR RATE: 100 BPM
EKG VENTRICULAR RATE: 101 BPM
EKG VENTRICULAR RATE: 120 BPM
EKG VENTRICULAR RATE: 139 BPM
EKG VENTRICULAR RATE: 143 BPM
EKG VENTRICULAR RATE: 148 BPM
EKG VENTRICULAR RATE: 89 BPM
EKG VENTRICULAR RATE: 92 BPM
EKG VENTRICULAR RATE: 92 BPM
ENTERBACTER CLOACAE FILM ARRAY: NOT DETECTED
ENTERBACTERIACEAE FILM ARRAY: DETECTED
ENTERBACTERIACEAE FILM ARRAY: NOT DETECTED
ENTERBACTERIACEAE FILM ARRAY: NOT DETECTED
ENTEROBACTER CLOACAE COMPLEX BY PCR: NOT DETECTED
ENTEROCOCCUS FILM ARRAY: NOT DETECTED
EOSINOPHIL # BLD: 0 %
EOSINOPHIL # BLD: 0.1 %
EOSINOPHIL # BLD: 0.2 %
EOSINOPHIL # BLD: 0.2 %
EOSINOPHIL # BLD: 0.3 %
EOSINOPHIL # BLD: 0.3 %
EOSINOPHIL # BLD: 0.4 %
EOSINOPHIL # BLD: 0.4 %
EOSINOPHIL # BLD: 0.5 %
EOSINOPHIL # BLD: 0.6 %
EOSINOPHIL # BLD: 0.7 %
EOSINOPHIL # BLD: 0.8 %
EOSINOPHIL # BLD: 0.8 %
EOSINOPHIL # BLD: 0.9 %
EOSINOPHIL # BLD: 0.9 %
EOSINOPHIL # BLD: 1.9 %
EOSINOPHIL # BLD: 2.7 %
EOSINOPHILS ABSOLUTE: 0 THOU/MM3 (ref 0–0.4)
EOSINOPHILS ABSOLUTE: 0.1 THOU/MM3 (ref 0–0.4)
EOSINOPHILS ABSOLUTE: 0.2 THOU/MM3 (ref 0–0.4)
EOSINOPHILS ABSOLUTE: 0.2 THOU/MM3 (ref 0–0.4)
EPITHELIAL CELLS, UA: ABNORMAL /HPF
EPITHELIAL CELLS, UA: ABNORMAL /HPF
ERYTHROCYTE [DISTWIDTH] IN BLOOD BY AUTOMATED COUNT: 12.7 % (ref 11.5–14.5)
ERYTHROCYTE [DISTWIDTH] IN BLOOD BY AUTOMATED COUNT: 12.8 % (ref 11.5–14.5)
ERYTHROCYTE [DISTWIDTH] IN BLOOD BY AUTOMATED COUNT: 12.9 % (ref 11.5–14.5)
ERYTHROCYTE [DISTWIDTH] IN BLOOD BY AUTOMATED COUNT: 12.9 % (ref 11.5–14.5)
ERYTHROCYTE [DISTWIDTH] IN BLOOD BY AUTOMATED COUNT: 13 % (ref 11.5–14.5)
ERYTHROCYTE [DISTWIDTH] IN BLOOD BY AUTOMATED COUNT: 13 % (ref 11.5–14.5)
ERYTHROCYTE [DISTWIDTH] IN BLOOD BY AUTOMATED COUNT: 13.1 % (ref 11.5–14.5)
ERYTHROCYTE [DISTWIDTH] IN BLOOD BY AUTOMATED COUNT: 13.2 % (ref 11.5–14.5)
ERYTHROCYTE [DISTWIDTH] IN BLOOD BY AUTOMATED COUNT: 13.3 % (ref 11.5–14.5)
ERYTHROCYTE [DISTWIDTH] IN BLOOD BY AUTOMATED COUNT: 13.6 % (ref 11.5–14.5)
ERYTHROCYTE [DISTWIDTH] IN BLOOD BY AUTOMATED COUNT: 13.7 % (ref 11.5–14.5)
ERYTHROCYTE [DISTWIDTH] IN BLOOD BY AUTOMATED COUNT: 13.7 % (ref 11.5–14.5)
ERYTHROCYTE [DISTWIDTH] IN BLOOD BY AUTOMATED COUNT: 14.3 % (ref 11.5–14.5)
ERYTHROCYTE [DISTWIDTH] IN BLOOD BY AUTOMATED COUNT: 14.3 % (ref 11.5–14.5)
ERYTHROCYTE [DISTWIDTH] IN BLOOD BY AUTOMATED COUNT: 14.4 % (ref 11.5–14.5)
ERYTHROCYTE [DISTWIDTH] IN BLOOD BY AUTOMATED COUNT: 14.5 % (ref 11.5–14.5)
ERYTHROCYTE [DISTWIDTH] IN BLOOD BY AUTOMATED COUNT: 14.9 % (ref 11.5–14.5)
ERYTHROCYTE [DISTWIDTH] IN BLOOD BY AUTOMATED COUNT: 15 % (ref 11.5–14.5)
ERYTHROCYTE [DISTWIDTH] IN BLOOD BY AUTOMATED COUNT: 15.4 % (ref 11.5–14.5)
ERYTHROCYTE [DISTWIDTH] IN BLOOD BY AUTOMATED COUNT: 17.1 % (ref 11.5–14.5)
ERYTHROCYTE [DISTWIDTH] IN BLOOD BY AUTOMATED COUNT: 17.2 % (ref 11.5–14.5)
ERYTHROCYTE [DISTWIDTH] IN BLOOD BY AUTOMATED COUNT: 17.5 % (ref 11.5–14.5)
ERYTHROCYTE [DISTWIDTH] IN BLOOD BY AUTOMATED COUNT: 17.6 % (ref 11.5–14.5)
ERYTHROCYTE [DISTWIDTH] IN BLOOD BY AUTOMATED COUNT: 17.8 % (ref 11.5–14.5)
ERYTHROCYTE [DISTWIDTH] IN BLOOD BY AUTOMATED COUNT: 18.6 % (ref 11.5–14.5)
ERYTHROCYTE [DISTWIDTH] IN BLOOD BY AUTOMATED COUNT: 19.2 % (ref 11.5–14.5)
ERYTHROCYTE [DISTWIDTH] IN BLOOD BY AUTOMATED COUNT: 19.8 % (ref 11.5–14.5)
ERYTHROCYTE [DISTWIDTH] IN BLOOD BY AUTOMATED COUNT: 20 % (ref 11.5–14.5)
ERYTHROCYTE [DISTWIDTH] IN BLOOD BY AUTOMATED COUNT: 46 FL (ref 35–45)
ERYTHROCYTE [DISTWIDTH] IN BLOOD BY AUTOMATED COUNT: 46.1 FL (ref 35–45)
ERYTHROCYTE [DISTWIDTH] IN BLOOD BY AUTOMATED COUNT: 46.2 FL (ref 35–45)
ERYTHROCYTE [DISTWIDTH] IN BLOOD BY AUTOMATED COUNT: 46.2 FL (ref 35–45)
ERYTHROCYTE [DISTWIDTH] IN BLOOD BY AUTOMATED COUNT: 46.4 FL (ref 35–45)
ERYTHROCYTE [DISTWIDTH] IN BLOOD BY AUTOMATED COUNT: 46.5 FL (ref 35–45)
ERYTHROCYTE [DISTWIDTH] IN BLOOD BY AUTOMATED COUNT: 47.1 FL (ref 35–45)
ERYTHROCYTE [DISTWIDTH] IN BLOOD BY AUTOMATED COUNT: 47.4 FL (ref 35–45)
ERYTHROCYTE [DISTWIDTH] IN BLOOD BY AUTOMATED COUNT: 47.6 FL (ref 35–45)
ERYTHROCYTE [DISTWIDTH] IN BLOOD BY AUTOMATED COUNT: 47.7 FL (ref 35–45)
ERYTHROCYTE [DISTWIDTH] IN BLOOD BY AUTOMATED COUNT: 48 FL (ref 35–45)
ERYTHROCYTE [DISTWIDTH] IN BLOOD BY AUTOMATED COUNT: 48.1 FL (ref 35–45)
ERYTHROCYTE [DISTWIDTH] IN BLOOD BY AUTOMATED COUNT: 48.3 FL (ref 35–45)
ERYTHROCYTE [DISTWIDTH] IN BLOOD BY AUTOMATED COUNT: 48.5 FL (ref 35–45)
ERYTHROCYTE [DISTWIDTH] IN BLOOD BY AUTOMATED COUNT: 48.5 FL (ref 35–45)
ERYTHROCYTE [DISTWIDTH] IN BLOOD BY AUTOMATED COUNT: 48.6 FL (ref 35–45)
ERYTHROCYTE [DISTWIDTH] IN BLOOD BY AUTOMATED COUNT: 48.9 FL (ref 35–45)
ERYTHROCYTE [DISTWIDTH] IN BLOOD BY AUTOMATED COUNT: 49.2 FL (ref 35–45)
ERYTHROCYTE [DISTWIDTH] IN BLOOD BY AUTOMATED COUNT: 49.2 FL (ref 35–45)
ERYTHROCYTE [DISTWIDTH] IN BLOOD BY AUTOMATED COUNT: 49.3 FL (ref 35–45)
ERYTHROCYTE [DISTWIDTH] IN BLOOD BY AUTOMATED COUNT: 49.8 FL (ref 35–45)
ERYTHROCYTE [DISTWIDTH] IN BLOOD BY AUTOMATED COUNT: 50.2 FL (ref 35–45)
ERYTHROCYTE [DISTWIDTH] IN BLOOD BY AUTOMATED COUNT: 51.8 FL (ref 35–45)
ERYTHROCYTE [DISTWIDTH] IN BLOOD BY AUTOMATED COUNT: 51.8 FL (ref 35–45)
ERYTHROCYTE [DISTWIDTH] IN BLOOD BY AUTOMATED COUNT: 52.1 FL (ref 35–45)
ERYTHROCYTE [DISTWIDTH] IN BLOOD BY AUTOMATED COUNT: 58.1 FL (ref 35–45)
ERYTHROCYTE [DISTWIDTH] IN BLOOD BY AUTOMATED COUNT: 61.1 FL (ref 35–45)
ERYTHROCYTE [DISTWIDTH] IN BLOOD BY AUTOMATED COUNT: 63.2 FL (ref 35–45)
ERYTHROCYTE [DISTWIDTH] IN BLOOD BY AUTOMATED COUNT: 63.7 FL (ref 35–45)
ERYTHROCYTE [DISTWIDTH] IN BLOOD BY AUTOMATED COUNT: 64.3 FL (ref 35–45)
ERYTHROCYTE [DISTWIDTH] IN BLOOD BY AUTOMATED COUNT: 69.1 FL (ref 35–45)
ERYTHROCYTE [DISTWIDTH] IN BLOOD BY AUTOMATED COUNT: 70 FL (ref 35–45)
ERYTHROCYTE [DISTWIDTH] IN BLOOD BY AUTOMATED COUNT: 70.1 FL (ref 35–45)
ERYTHROCYTE [DISTWIDTH] IN BLOOD BY AUTOMATED COUNT: 70.7 FL (ref 35–45)
ERYTHROCYTE [DISTWIDTH] IN BLOOD BY AUTOMATED COUNT: 71.7 FL (ref 35–45)
ERYTHROCYTE [DISTWIDTH] IN BLOOD BY AUTOMATED COUNT: 72.4 FL (ref 35–45)
ERYTHROCYTE [DISTWIDTH] IN BLOOD BY AUTOMATED COUNT: 73.4 FL (ref 35–45)
ESCHERICHIA COLI BY PCR: NOT DETECTED
ESCHERICHIA COLI FILM ARRAY: NOT DETECTED
ETHYL ALCOHOL, SERUM: 0.05 %
FERRITIN: 222 NG/ML (ref 10–291)
FERRITIN: 438 NG/ML (ref 10–291)
FIBRIN SPLIT PRODUCTS: < 5 MCG/ML
FIBRINOGEN: 137 MG/100ML (ref 155–475)
FIBRINOGEN: 169 MG/100ML (ref 155–475)
FILM ARRAY ADENOVIRUS: NOT DETECTED
FILM ARRAY BORDETELLA PERTUSSIS: NOT DETECTED
FILM ARRAY CHLAMYDOPHILIA PNEUMONIAE: NOT DETECTED
FILM ARRAY CORONAVIRUS 229E: NOT DETECTED
FILM ARRAY CORONAVIRUS HKU1: NOT DETECTED
FILM ARRAY CORONAVIRUS NL63: NOT DETECTED
FILM ARRAY CORONAVIRUS OC43: NOT DETECTED
FILM ARRAY INFLUENZA A VIRUS 09H1: NORMAL
FILM ARRAY INFLUENZA A VIRUS H1: NORMAL
FILM ARRAY INFLUENZA A VIRUS H3: NORMAL
FILM ARRAY INFLUENZA A VIRUS: NOT DETECTED
FILM ARRAY INFLUENZA B: NOT DETECTED
FILM ARRAY METAPNEUMOVIRUS: NOT DETECTED
FILM ARRAY MYCOPLASMA PNEUMONIAE: NOT DETECTED
FILM ARRAY PARAINFLUENZA VIRUS 1: NOT DETECTED
FILM ARRAY PARAINFLUENZA VIRUS 2: NOT DETECTED
FILM ARRAY PARAINFLUENZA VIRUS 3: NOT DETECTED
FILM ARRAY PARAINFLUENZA VIRUS 4: NOT DETECTED
FILM ARRAY RESPIRATORY SYNCITIAL VIRUS: NOT DETECTED
FILM ARRAY RHINOVIRUS/ENTEROVIRUS: NOT DETECTED
FIO2, MIXED VENOUS: 100
FIO2, MIXED VENOUS: 60
FLU A ANTIGEN: NEGATIVE
FLU B ANTIGEN: NEGATIVE
FOLATE: 19 NG/ML (ref 4.8–24.2)
FOLATE: 19.7 NG/ML (ref 4.8–24.2)
GFR SERPL CREATININE-BSD FRML MDRD: 46 ML/MIN/1.73M2
GFR SERPL CREATININE-BSD FRML MDRD: 50 ML/MIN/1.73M2
GFR SERPL CREATININE-BSD FRML MDRD: 50 ML/MIN/1.73M2
GFR SERPL CREATININE-BSD FRML MDRD: 56 ML/MIN/1.73M2
GFR SERPL CREATININE-BSD FRML MDRD: 56 ML/MIN/1.73M2
GFR SERPL CREATININE-BSD FRML MDRD: 63 ML/MIN/1.73M2
GFR SERPL CREATININE-BSD FRML MDRD: 73 ML/MIN/1.73M2
GFR SERPL CREATININE-BSD FRML MDRD: 73 ML/MIN/1.73M2
GFR SERPL CREATININE-BSD FRML MDRD: > 90 ML/MIN/1.73M2
GFR, ESTIMATED: > 90 ML/MIN/1.73M2
GIARDIA LAMBLIA PCR: NOT DETECTED
GLUCOSE BLD-MCNC: 101 MG/DL (ref 70–108)
GLUCOSE BLD-MCNC: 107 MG/DL (ref 70–108)
GLUCOSE BLD-MCNC: 108 MG/DL (ref 70–108)
GLUCOSE BLD-MCNC: 110 MG/DL (ref 70–108)
GLUCOSE BLD-MCNC: 111 MG/DL (ref 70–108)
GLUCOSE BLD-MCNC: 123 MG/DL (ref 70–108)
GLUCOSE BLD-MCNC: 128 MG/DL (ref 70–108)
GLUCOSE BLD-MCNC: 134 MG/DL (ref 70–108)
GLUCOSE BLD-MCNC: 148 MG/DL (ref 70–108)
GLUCOSE BLD-MCNC: 154 MG/DL (ref 70–108)
GLUCOSE BLD-MCNC: 169 MG/DL (ref 70–108)
GLUCOSE BLD-MCNC: 172 MG/DL (ref 70–108)
GLUCOSE BLD-MCNC: 202 MG/DL (ref 70–108)
GLUCOSE BLD-MCNC: 269 MG/DL (ref 70–108)
GLUCOSE BLD-MCNC: 53 MG/DL (ref 70–108)
GLUCOSE BLD-MCNC: 57 MG/DL (ref 70–108)
GLUCOSE BLD-MCNC: 67 MG/DL (ref 70–108)
GLUCOSE BLD-MCNC: 77 MG/DL (ref 70–108)
GLUCOSE BLD-MCNC: 79 MG/DL (ref 70–108)
GLUCOSE BLD-MCNC: 82 MG/DL (ref 70–108)
GLUCOSE BLD-MCNC: 82 MG/DL (ref 70–108)
GLUCOSE BLD-MCNC: 83 MG/DL (ref 70–108)
GLUCOSE BLD-MCNC: 83 MG/DL (ref 70–108)
GLUCOSE BLD-MCNC: 84 MG/DL (ref 70–108)
GLUCOSE BLD-MCNC: 85 MG/DL (ref 70–108)
GLUCOSE BLD-MCNC: 88 MG/DL (ref 74–106)
GLUCOSE BLD-MCNC: 89 MG/DL (ref 70–108)
GLUCOSE BLD-MCNC: 90 MG/DL (ref 70–108)
GLUCOSE BLD-MCNC: 90 MG/DL (ref 70–108)
GLUCOSE BLD-MCNC: 91 MG/DL (ref 70–108)
GLUCOSE BLD-MCNC: 92 MG/DL (ref 70–108)
GLUCOSE BLD-MCNC: 92 MG/DL (ref 70–108)
GLUCOSE BLD-MCNC: 93 MG/DL (ref 70–108)
GLUCOSE BLD-MCNC: 93 MG/DL (ref 70–108)
GLUCOSE BLD-MCNC: 94 MG/DL (ref 70–108)
GLUCOSE BLD-MCNC: 94 MG/DL (ref 70–108)
GLUCOSE BLD-MCNC: 95 MG/DL (ref 70–108)
GLUCOSE BLD-MCNC: 96 MG/DL (ref 70–108)
GLUCOSE BLD-MCNC: 98 MG/DL (ref 70–108)
GLUCOSE BLD-MCNC: 99 MG/DL (ref 70–108)
GLUCOSE URINE: NEGATIVE MG/DL
GLUCOSE URINE: NEGATIVE MG/DL
GLUCOSE, URINE: NEGATIVE MG/DL
GLUCOSE, WHOLE BLOOD: 108 MG/DL (ref 70–108)
GLUCOSE, WHOLE BLOOD: 124 MG/DL (ref 70–108)
GLUCOSE, WHOLE BLOOD: 128 MG/DL (ref 70–108)
GLUCOSE, WHOLE BLOOD: 149 MG/DL (ref 70–108)
GLUCOSE, WHOLE BLOOD: 166 MG/DL (ref 70–108)
GLUCOSE, WHOLE BLOOD: 188 MG/DL (ref 70–108)
GLUCOSE, WHOLE BLOOD: 94 MG/DL (ref 70–108)
GRAM STAIN RESULT: ABNORMAL
GRAM STAIN RESULT: NORMAL
HAEMOPHILUS INFLUENZA FILM ARRAY: NOT DETECTED
HAEMOPHILUS INFLUENZAE BY PCR: NOT DETECTED
HCO3, MIXED: 13 MMOL/L (ref 23–28)
HCO3, MIXED: 20 MMOL/L (ref 23–28)
HCO3: 10 MMOL/L (ref 23–28)
HCO3: 11 MMOL/L (ref 23–28)
HCO3: 14 MMOL/L (ref 23–28)
HCO3: 17 MMOL/L (ref 23–28)
HCO3: 18 MMOL/L (ref 23–28)
HCO3: 26 MMOL/L (ref 23–28)
HCO3: 33 MMOL/L (ref 23–28)
HCO3: 7 MMOL/L (ref 23–28)
HCO3: 8 MMOL/L (ref 23–28)
HCT VFR BLD CALC: 20.8 % (ref 37–47)
HCT VFR BLD CALC: 21.2 % (ref 37–47)
HCT VFR BLD CALC: 21.2 % (ref 37–47)
HCT VFR BLD CALC: 22.5 % (ref 37–47)
HCT VFR BLD CALC: 22.7 % (ref 37–47)
HCT VFR BLD CALC: 23 % (ref 37–47)
HCT VFR BLD CALC: 23.7 % (ref 37–47)
HCT VFR BLD CALC: 23.8 % (ref 37–47)
HCT VFR BLD CALC: 24.1 % (ref 37–47)
HCT VFR BLD CALC: 24.1 % (ref 37–47)
HCT VFR BLD CALC: 24.2 % (ref 37–47)
HCT VFR BLD CALC: 24.4 % (ref 37–47)
HCT VFR BLD CALC: 24.5 % (ref 37–47)
HCT VFR BLD CALC: 24.8 % (ref 37–47)
HCT VFR BLD CALC: 25.1 % (ref 37–47)
HCT VFR BLD CALC: 25.6 % (ref 37–47)
HCT VFR BLD CALC: 25.9 % (ref 37–47)
HCT VFR BLD CALC: 26 % (ref 37–47)
HCT VFR BLD CALC: 26 % (ref 37–47)
HCT VFR BLD CALC: 26.1 % (ref 37–47)
HCT VFR BLD CALC: 26.7 % (ref 37–47)
HCT VFR BLD CALC: 27 % (ref 37–47)
HCT VFR BLD CALC: 27.2 % (ref 37–47)
HCT VFR BLD CALC: 27.4 % (ref 37–47)
HCT VFR BLD CALC: 27.5 % (ref 37–47)
HCT VFR BLD CALC: 27.8 % (ref 37–47)
HCT VFR BLD CALC: 28.6 % (ref 37–47)
HCT VFR BLD CALC: 28.9 % (ref 37–47)
HCT VFR BLD CALC: 29.1 % (ref 37–47)
HCT VFR BLD CALC: 30 % (ref 37–47)
HCT VFR BLD CALC: 30.5 % (ref 37–47)
HCT VFR BLD CALC: 30.7 % (ref 37–47)
HCT VFR BLD CALC: 31.1 % (ref 37–47)
HCT VFR BLD CALC: 31.1 % (ref 37–47)
HCT VFR BLD CALC: 31.4 % (ref 37–47)
HCT VFR BLD CALC: 32.6 % (ref 37–47)
HCT VFR BLD CALC: 32.6 % (ref 37–47)
HCT VFR BLD CALC: 33.6 % (ref 37–47)
HCT VFR BLD CALC: 34.2 % (ref 37–47)
HEMOCCULT STL QL: NEGATIVE
HEMOCCULT STL QL: NEGATIVE
HEMOGLOBIN: 10 GM/DL (ref 12–16)
HEMOGLOBIN: 10.1 GM/DL (ref 12–16)
HEMOGLOBIN: 10.5 GM/DL (ref 12–16)
HEMOGLOBIN: 10.6 GM/DL (ref 12–16)
HEMOGLOBIN: 11 GM/DL (ref 12–16)
HEMOGLOBIN: 11.1 GM/DL (ref 12–16)
HEMOGLOBIN: 6 GM/DL (ref 12–16)
HEMOGLOBIN: 6.3 GM/DL (ref 12–16)
HEMOGLOBIN: 6.7 GM/DL (ref 12–16)
HEMOGLOBIN: 6.9 GM/DL (ref 12–16)
HEMOGLOBIN: 7.1 GM/DL (ref 12–16)
HEMOGLOBIN: 7.2 GM/DL (ref 12–16)
HEMOGLOBIN: 7.3 GM/DL (ref 12–16)
HEMOGLOBIN: 7.4 GM/DL (ref 12–16)
HEMOGLOBIN: 7.8 GM/DL (ref 12–16)
HEMOGLOBIN: 7.9 GM/DL (ref 12–16)
HEMOGLOBIN: 8 GM/DL (ref 12–16)
HEMOGLOBIN: 8.2 GM/DL (ref 12–16)
HEMOGLOBIN: 8.4 GM/DL (ref 12–16)
HEMOGLOBIN: 8.5 GM/DL (ref 12–16)
HEMOGLOBIN: 8.6 GM/DL (ref 12–16)
HEMOGLOBIN: 8.7 GM/DL (ref 12–16)
HEMOGLOBIN: 8.8 GM/DL (ref 12–16)
HEMOGLOBIN: 8.9 GM/DL (ref 12–16)
HEMOGLOBIN: 9 GM/DL (ref 12–16)
HEMOGLOBIN: 9.1 GM/DL (ref 12–16)
HEMOGLOBIN: 9.5 GM/DL (ref 12–16)
HEMOGLOBIN: 9.6 GM/DL (ref 12–16)
HEMOGLOBIN: 9.7 GM/DL (ref 12–16)
HEMOGLOBIN: 9.8 GM/DL (ref 12–16)
HOURS COLLECTED: 24 HRS
HOURS COLLECTED: 24 HRS
HYPOCHROMIA: PRESENT
HYPOCHROMIA: PRESENT
ICTOTEST: NEGATIVE
IFIO2: 100
IFIO2: 35
IFIO2: 5
IFIO2: 90
IFIO2: 90
IMMATURE GRANS (ABS): 0 THOU/MM3 (ref 0–0.07)
IMMATURE GRANS (ABS): 0.01 THOU/MM3 (ref 0–0.07)
IMMATURE GRANS (ABS): 0.02 THOU/MM3 (ref 0–0.07)
IMMATURE GRANS (ABS): 0.03 THOU/MM3 (ref 0–0.07)
IMMATURE GRANS (ABS): 0.04 THOU/MM3 (ref 0–0.07)
IMMATURE GRANS (ABS): 0.04 THOU/MM3 (ref 0–0.07)
IMMATURE GRANS (ABS): 0.05 THOU/MM3 (ref 0–0.07)
IMMATURE GRANS (ABS): 0.06 THOU/MM3 (ref 0–0.07)
IMMATURE GRANS (ABS): 0.07 THOU/MM3 (ref 0–0.07)
IMMATURE GRANS (ABS): 0.09 THOU/MM3 (ref 0–0.07)
IMMATURE GRANS (ABS): 0.1 THOU/MM3 (ref 0–0.07)
IMMATURE GRANULOCYTES: 0 %
IMMATURE GRANULOCYTES: 0.1 %
IMMATURE GRANULOCYTES: 0.2 %
IMMATURE GRANULOCYTES: 0.3 %
IMMATURE GRANULOCYTES: 0.4 %
IMMATURE GRANULOCYTES: 0.5 %
IMMATURE GRANULOCYTES: 0.6 %
IMMATURE GRANULOCYTES: 0.8 %
IMMATURE GRANULOCYTES: 0.8 %
IMMATURE RETIC FRACT: 13.6 % (ref 3–15.9)
INFLUENZA A BY PCR: NOT DETECTED
INFLUENZA B BY PCR: NOT DETECTED
INR BLD: 1.1 (ref 0.85–1.13)
INR BLD: 1.12 (ref 0.85–1.13)
INR BLD: 1.18 (ref 0.85–1.13)
INR BLD: 1.25 (ref 0.85–1.13)
INR BLD: 1.75 (ref 0.85–1.13)
INR BLD: 1.76 (ref 0.85–1.13)
INR BLD: 1.83 (ref 0.85–1.13)
INR BLD: 1.88 (ref 0.85–1.13)
INR BLD: 1.92 (ref 0.85–1.13)
INR BLD: 2.41 (ref 0.85–1.13)
INSULIN, RANDOM OR FASTING: 1.8 MU/L
IONIZED CA: 1.37 MMOL/L (ref 1.12–1.32)
IRON: 106 UG/DL (ref 50–170)
IRON: 13 UG/DL (ref 50–170)
IRON: 69 UG/DL (ref 50–170)
KETONES, URINE: ABNORMAL
KETONES, URINE: NEGATIVE
KETONES, URINE: NEGATIVE
KLEBSIELLA AEROGENES BY PCR: NOT DETECTED
KLEBSIELLA OXYTOCA BY PCR: NOT DETECTED
KLEBSIELLA OXYTOCA FILM ARRAY: NOT DETECTED
KLEBSIELLA PNEUMONIAE FILM ARRAY: NOT DETECTED
KLEBSIELLA PNEUMONIAE GROUP BY PCR: NOT DETECTED
LACTIC ACID, SEPSIS: 1.2 MMOL/L (ref 0.5–1.9)
LACTIC ACID, SEPSIS: 1.6 MMOL/L (ref 0.5–1.9)
LACTIC ACID, SEPSIS: 2.5 MMOL/L (ref 0.5–1.9)
LACTIC ACID, SEPSIS: 2.9 MMOL/L (ref 0.5–1.9)
LACTIC ACID: 1.3 MMOL/L (ref 0.5–2.2)
LACTIC ACID: 17.6 MMOL/L (ref 0.5–2.2)
LACTIC ACID: 19 MMOL/L (ref 0.5–2.2)
LACTIC ACID: 19.8 MMOL/L (ref 0.5–2.2)
LACTIC ACID: 2.1 MMOL/L (ref 0.5–2.2)
LACTIC ACID: 2.5 MMOL/L (ref 0.5–2.2)
LACTIC ACID: 2.8 MMOL/L (ref 0.5–2.2)
LACTIC ACID: 20.6 MMOL/L (ref 0.5–2.2)
LACTIC ACID: 23.6 MMOL/L (ref 0.5–2.2)
LACTIC ACID: 23.7 MMOL/L (ref 0.5–2.2)
LACTIC ACID: 25.8 MMOL/L (ref 0.5–2.2)
LACTIC ACID: 3 MMOL/L (ref 0.5–2.2)
LACTIC ACID: 3.3 MMOL/L (ref 0.5–2.2)
LACTIC ACID: 3.6 MMOL/L (ref 0.5–2.2)
LACTIC ACID: 4.5 MMOL/L (ref 0.5–2.2)
LACTIC ACID: 4.6 MMOL/L (ref 0.5–2.2)
LACTIC ACID: 6 MMOL/L (ref 0.5–2.2)
LACTIC ACID: 8.8 MMOL/L (ref 0.5–2.2)
LACTIC ACID: 9.8 MMOL/L (ref 0.5–2.2)
LEGIONELLA PNEUMOPHILIA BY PCR: NOT DETECTED
LEUKOCYTE EST, POC: ABNORMAL
LEUKOCYTE ESTERASE, URINE: ABNORMAL
LEUKOCYTE ESTERASE, URINE: NEGATIVE
LIPASE: 3.6 U/L (ref 5.6–51.3)
LIPASE: 4.3 U/L (ref 5.6–51.3)
LIPASE: 4.8 U/L (ref 5.6–51.3)
LISTERIA MONOCYTOGENES FILM ARRAY: NOT DETECTED
LYMPHOCYTES # BLD: 10.6 %
LYMPHOCYTES # BLD: 10.7 %
LYMPHOCYTES # BLD: 11.7 %
LYMPHOCYTES # BLD: 13.2 %
LYMPHOCYTES # BLD: 13.5 %
LYMPHOCYTES # BLD: 13.6 %
LYMPHOCYTES # BLD: 13.9 %
LYMPHOCYTES # BLD: 14.3 %
LYMPHOCYTES # BLD: 15.9 %
LYMPHOCYTES # BLD: 16.5 %
LYMPHOCYTES # BLD: 16.6 %
LYMPHOCYTES # BLD: 19.1 %
LYMPHOCYTES # BLD: 22.5 %
LYMPHOCYTES # BLD: 27.6 %
LYMPHOCYTES # BLD: 30.1 %
LYMPHOCYTES # BLD: 31.1 %
LYMPHOCYTES # BLD: 32.9 %
LYMPHOCYTES # BLD: 34.6 %
LYMPHOCYTES # BLD: 35.2 %
LYMPHOCYTES # BLD: 37 %
LYMPHOCYTES # BLD: 37.8 %
LYMPHOCYTES # BLD: 37.8 %
LYMPHOCYTES # BLD: 39.5 %
LYMPHOCYTES # BLD: 39.7 %
LYMPHOCYTES # BLD: 40.8 %
LYMPHOCYTES # BLD: 41.8 %
LYMPHOCYTES # BLD: 44.4 %
LYMPHOCYTES # BLD: 44.6 %
LYMPHOCYTES # BLD: 46.3 %
LYMPHOCYTES # BLD: 5.9 %
LYMPHOCYTES # BLD: 52.1 %
LYMPHOCYTES # BLD: 54.1 %
LYMPHOCYTES # BLD: 55.8 %
LYMPHOCYTES # BLD: 8.6 %
LYMPHOCYTES ABSOLUTE: 0.5 THOU/MM3 (ref 1–4.8)
LYMPHOCYTES ABSOLUTE: 0.6 THOU/MM3 (ref 1–4.8)
LYMPHOCYTES ABSOLUTE: 0.6 THOU/MM3 (ref 1–4.8)
LYMPHOCYTES ABSOLUTE: 0.8 THOU/MM3 (ref 1–4.8)
LYMPHOCYTES ABSOLUTE: 0.9 THOU/MM3 (ref 1–4.8)
LYMPHOCYTES ABSOLUTE: 1 THOU/MM3 (ref 1–4.8)
LYMPHOCYTES ABSOLUTE: 1.1 THOU/MM3 (ref 1–4.8)
LYMPHOCYTES ABSOLUTE: 1.2 THOU/MM3 (ref 1–4.8)
LYMPHOCYTES ABSOLUTE: 1.2 THOU/MM3 (ref 1–4.8)
LYMPHOCYTES ABSOLUTE: 1.3 THOU/MM3 (ref 1–4.8)
LYMPHOCYTES ABSOLUTE: 1.3 THOU/MM3 (ref 1–4.8)
LYMPHOCYTES ABSOLUTE: 1.4 THOU/MM3 (ref 1–4.8)
LYMPHOCYTES ABSOLUTE: 1.5 THOU/MM3 (ref 1–4.8)
LYMPHOCYTES ABSOLUTE: 1.6 THOU/MM3 (ref 1–4.8)
LYMPHOCYTES ABSOLUTE: 1.7 THOU/MM3 (ref 1–4.8)
LYMPHOCYTES ABSOLUTE: 1.8 THOU/MM3 (ref 1–4.8)
LYMPHOCYTES ABSOLUTE: 2 THOU/MM3 (ref 1–4.8)
LYMPHOCYTES ABSOLUTE: 2.1 THOU/MM3 (ref 1–4.8)
LYMPHOCYTES ABSOLUTE: 2.3 THOU/MM3 (ref 1–4.8)
LYMPHOCYTES ABSOLUTE: 2.3 THOU/MM3 (ref 1–4.8)
LYMPHOCYTES ABSOLUTE: 2.8 THOU/MM3 (ref 1–4.8)
LYMPHOCYTES ABSOLUTE: 2.9 THOU/MM3 (ref 1–4.8)
LYMPHOCYTES ABSOLUTE: 3.2 THOU/MM3 (ref 1–4.8)
LYMPHOCYTES ABSOLUTE: 4 THOU/MM3 (ref 1–4.8)
LYMPHOCYTES ABSOLUTE: 4.7 THOU/MM3 (ref 1–4.8)
LYMPHOCYTES ABSOLUTE: 4.7 THOU/MM3 (ref 1–4.8)
LYMPHOCYTES ABSOLUTE: 5.2 THOU/MM3 (ref 1–4.8)
LYMPHOCYTES ABSOLUTE: 5.2 THOU/MM3 (ref 1–4.8)
MACROCYTES: PRESENT
MAGNESIUM: 1 MG/DL (ref 1.6–2.4)
MAGNESIUM: 1.2 MG/DL (ref 1.6–2.4)
MAGNESIUM: 1.3 MG/DL (ref 1.6–2.4)
MAGNESIUM: 1.3 MG/DL (ref 1.6–2.4)
MAGNESIUM: 1.4 MG/DL (ref 1.6–2.4)
MAGNESIUM: 1.4 MG/DL (ref 1.6–2.4)
MAGNESIUM: 1.5 MG/DL (ref 1.6–2.4)
MAGNESIUM: 1.6 MG/DL (ref 1.6–2.4)
MAGNESIUM: 1.7 MG/DL (ref 1.6–2.4)
MAGNESIUM: 1.7 MG/DL (ref 1.8–2.4)
MAGNESIUM: 1.8 MG/DL (ref 1.6–2.4)
MAGNESIUM: 2 MG/DL (ref 1.6–2.4)
MAGNESIUM: 2.1 MG/DL (ref 1.6–2.4)
MAGNESIUM: 2.2 MG/DL (ref 1.6–2.4)
MAGNESIUM: 2.2 MG/DL (ref 1.6–2.4)
MAGNESIUM: 2.3 MG/DL (ref 1.6–2.4)
MAGNESIUM: 2.5 MG/DL (ref 1.6–2.4)
MCH RBC QN AUTO: 30.6 PG (ref 26–33)
MCH RBC QN AUTO: 30.6 PG (ref 26–33)
MCH RBC QN AUTO: 30.8 PG (ref 26–33)
MCH RBC QN AUTO: 31 PG (ref 26–33)
MCH RBC QN AUTO: 31 PG (ref 26–33)
MCH RBC QN AUTO: 31.1 PG (ref 26–33)
MCH RBC QN AUTO: 31.2 PG (ref 26–33)
MCH RBC QN AUTO: 31.2 PG (ref 26–33)
MCH RBC QN AUTO: 31.3 PG (ref 26–33)
MCH RBC QN AUTO: 31.3 PG (ref 26–33)
MCH RBC QN AUTO: 31.4 PG (ref 26–33)
MCH RBC QN AUTO: 31.5 PG (ref 26–33)
MCH RBC QN AUTO: 31.6 PG (ref 26–33)
MCH RBC QN AUTO: 31.8 PG (ref 26–33)
MCH RBC QN AUTO: 31.9 PG (ref 26–33)
MCH RBC QN AUTO: 32.1 PG (ref 26–33)
MCH RBC QN AUTO: 32.3 PG (ref 26–33)
MCH RBC QN AUTO: 32.3 PG (ref 26–33)
MCH RBC QN AUTO: 32.4 PG (ref 26–33)
MCH RBC QN AUTO: 32.7 PG (ref 26–33)
MCH RBC QN AUTO: 32.8 PG (ref 26–33)
MCH RBC QN AUTO: 33.5 PG (ref 26–33)
MCHC RBC AUTO-ENTMCNC: 28.8 GM/DL (ref 32.2–35.5)
MCHC RBC AUTO-ENTMCNC: 29.5 GM/DL (ref 32.2–35.5)
MCHC RBC AUTO-ENTMCNC: 29.7 GM/DL (ref 32.2–35.5)
MCHC RBC AUTO-ENTMCNC: 29.7 GM/DL (ref 32.2–35.5)
MCHC RBC AUTO-ENTMCNC: 30.4 GM/DL (ref 32.2–35.5)
MCHC RBC AUTO-ENTMCNC: 30.7 GM/DL (ref 32.2–35.5)
MCHC RBC AUTO-ENTMCNC: 31.1 GM/DL (ref 32.2–35.5)
MCHC RBC AUTO-ENTMCNC: 31.3 GM/DL (ref 32.2–35.5)
MCHC RBC AUTO-ENTMCNC: 31.4 GM/DL (ref 32.2–35.5)
MCHC RBC AUTO-ENTMCNC: 31.5 GM/DL (ref 32.2–35.5)
MCHC RBC AUTO-ENTMCNC: 31.6 GM/DL (ref 32.2–35.5)
MCHC RBC AUTO-ENTMCNC: 31.7 GM/DL (ref 32.2–35.5)
MCHC RBC AUTO-ENTMCNC: 31.8 GM/DL (ref 32.2–35.5)
MCHC RBC AUTO-ENTMCNC: 31.8 GM/DL (ref 32.2–35.5)
MCHC RBC AUTO-ENTMCNC: 31.9 GM/DL (ref 32.2–35.5)
MCHC RBC AUTO-ENTMCNC: 32 GM/DL (ref 32.2–35.5)
MCHC RBC AUTO-ENTMCNC: 32 GM/DL (ref 32.2–35.5)
MCHC RBC AUTO-ENTMCNC: 32.2 GM/DL (ref 32.2–35.5)
MCHC RBC AUTO-ENTMCNC: 32.3 GM/DL (ref 32.2–35.5)
MCHC RBC AUTO-ENTMCNC: 32.4 GM/DL (ref 32.2–35.5)
MCHC RBC AUTO-ENTMCNC: 32.5 GM/DL (ref 32.2–35.5)
MCHC RBC AUTO-ENTMCNC: 32.6 GM/DL (ref 32.2–35.5)
MCHC RBC AUTO-ENTMCNC: 32.7 GM/DL (ref 32.2–35.5)
MCHC RBC AUTO-ENTMCNC: 32.8 GM/DL (ref 32.2–35.5)
MCHC RBC AUTO-ENTMCNC: 32.8 GM/DL (ref 32.2–35.5)
MCHC RBC AUTO-ENTMCNC: 33 GM/DL (ref 32.2–35.5)
MCHC RBC AUTO-ENTMCNC: 33.2 GM/DL (ref 32.2–35.5)
MCHC RBC AUTO-ENTMCNC: 33.3 GM/DL (ref 32.2–35.5)
MCHC RBC AUTO-ENTMCNC: 33.8 GM/DL (ref 32.2–35.5)
MCHC RBC AUTO-ENTMCNC: 34 GM/DL (ref 32.2–35.5)
MCV RBC AUTO: 100 FL (ref 81–99)
MCV RBC AUTO: 100.4 FL (ref 81–99)
MCV RBC AUTO: 100.7 FL (ref 81–99)
MCV RBC AUTO: 101.3 FL (ref 81–99)
MCV RBC AUTO: 101.5 FL (ref 81–99)
MCV RBC AUTO: 102.6 FL (ref 81–99)
MCV RBC AUTO: 103.1 FL (ref 81–99)
MCV RBC AUTO: 103.8 FL (ref 81–99)
MCV RBC AUTO: 108.1 FL (ref 81–99)
MCV RBC AUTO: 108.2 FL (ref 81–99)
MCV RBC AUTO: 111.8 FL (ref 81–99)
MCV RBC AUTO: 92.4 FL (ref 81–99)
MCV RBC AUTO: 93.4 FL (ref 81–99)
MCV RBC AUTO: 94.9 FL (ref 81–99)
MCV RBC AUTO: 95 FL (ref 81–99)
MCV RBC AUTO: 95.9 FL (ref 81–99)
MCV RBC AUTO: 96.1 FL (ref 81–99)
MCV RBC AUTO: 96.1 FL (ref 81–99)
MCV RBC AUTO: 96.4 FL (ref 81–99)
MCV RBC AUTO: 97.3 FL (ref 81–99)
MCV RBC AUTO: 97.3 FL (ref 81–99)
MCV RBC AUTO: 97.6 FL (ref 81–99)
MCV RBC AUTO: 97.7 FL (ref 81–99)
MCV RBC AUTO: 98.1 FL (ref 81–99)
MCV RBC AUTO: 98.4 FL (ref 81–99)
MCV RBC AUTO: 98.7 FL (ref 81–99)
MCV RBC AUTO: 98.7 FL (ref 81–99)
MCV RBC AUTO: 98.8 FL (ref 81–99)
MCV RBC AUTO: 98.9 FL (ref 81–99)
MCV RBC AUTO: 99.2 FL (ref 81–99)
MCV RBC AUTO: 99.3 FL (ref 81–99)
MCV RBC AUTO: 99.4 FL (ref 81–99)
MCV RBC AUTO: 99.4 FL (ref 81–99)
MCV RBC AUTO: 99.7 FL (ref 81–99)
METAPNEUMOVIRUS BY PCR: NOT DETECTED
METHICILLIN RESISTANT FILM ARRAY: ABNORMAL
METHICILLIN RESISTANT FILM ARRAY: DETECTED
METHICILLIN RESISTANT FILM ARRAY: NORMAL
MISC. #1 REFERENCE GROUP TEST: NORMAL
MISCELLANEOUS 2: ABNORMAL
MISCELLANEOUS LAB TEST RESULT: ABNORMAL
MODE: ABNORMAL
MONOCYTES # BLD: 1.3 %
MONOCYTES # BLD: 10 %
MONOCYTES # BLD: 12.8 %
MONOCYTES # BLD: 14.1 %
MONOCYTES # BLD: 2.4 %
MONOCYTES # BLD: 2.6 %
MONOCYTES # BLD: 2.6 %
MONOCYTES # BLD: 2.7 %
MONOCYTES # BLD: 2.7 %
MONOCYTES # BLD: 2.8 %
MONOCYTES # BLD: 3 %
MONOCYTES # BLD: 3 %
MONOCYTES # BLD: 3.2 %
MONOCYTES # BLD: 3.3 %
MONOCYTES # BLD: 3.3 %
MONOCYTES # BLD: 3.4 %
MONOCYTES # BLD: 3.5 %
MONOCYTES # BLD: 3.8 %
MONOCYTES # BLD: 4.3 %
MONOCYTES # BLD: 4.5 %
MONOCYTES # BLD: 4.7 %
MONOCYTES # BLD: 4.8 %
MONOCYTES # BLD: 5.3 %
MONOCYTES # BLD: 5.3 %
MONOCYTES # BLD: 5.6 %
MONOCYTES # BLD: 5.8 %
MONOCYTES # BLD: 6 %
MONOCYTES # BLD: 6.2 %
MONOCYTES # BLD: 6.3 %
MONOCYTES # BLD: 6.5 %
MONOCYTES # BLD: 6.6 %
MONOCYTES # BLD: 6.6 %
MONOCYTES # BLD: 7.8 %
MONOCYTES # BLD: 8.1 %
MONOCYTES ABSOLUTE: 0.1 THOU/MM3 (ref 0.4–1.3)
MONOCYTES ABSOLUTE: 0.2 THOU/MM3 (ref 0.4–1.3)
MONOCYTES ABSOLUTE: 0.3 THOU/MM3 (ref 0.4–1.3)
MONOCYTES ABSOLUTE: 0.4 THOU/MM3 (ref 0.4–1.3)
MONOCYTES ABSOLUTE: 0.5 THOU/MM3 (ref 0.4–1.3)
MONOCYTES ABSOLUTE: 0.6 THOU/MM3 (ref 0.4–1.3)
MONOCYTES ABSOLUTE: 0.7 THOU/MM3 (ref 0.4–1.3)
MONONUCLEAR CELLS BODY FLUID: 44 %
MORAXELLA CATARRHALIS BY PCR: NOT DETECTED
MRSA SCREEN RT-PCR: POSITIVE
MYCOPLASMA PNEUMONIAE BY PCR: NOT DETECTED
NEISSERIA MENIGITIDIS FILM ARRAY: NOT DETECTED
NITRITE, URINE: NEGATIVE
NITRITE, URINE: NEGATIVE
NITRITE, URINE: POSITIVE
NOROVIRUS GI GII PCR: NOT DETECTED
NUCLEATED RED BLOOD CELLS: 0 /100 WBC
NUCLEATED RED BLOOD CELLS: 1 /100 WBC
NUCLEATED RED BLOOD CELLS: 1 /100 WBC
O2 SAT, MIXED: 39 %
O2 SAT, MIXED: 50 %
O2 SATURATION: 87 %
O2 SATURATION: 89 %
O2 SATURATION: 90 %
O2 SATURATION: 92 %
O2 SATURATION: 93 %
O2 SATURATION: 95 %
O2 SATURATION: 95 %
O2 SATURATION: 96 %
O2 SATURATION: 97 %
O2 SATURATION: 98 %
O2 SATURATION: 99 %
ORGANISM: ABNORMAL
OSMOLALITY CALCULATION: 264.9 MOSMOL/KG (ref 275–300)
OSMOLALITY CALCULATION: 267.9 MOSMOL/KG (ref 275–300)
OSMOLALITY CALCULATION: 273 MOSMOL/KG (ref 275–300)
OSMOLALITY CALCULATION: 278.2 MOSMOL/KG (ref 275–300)
OSMOLALITY CALCULATION: 282.8 MOSMOL/KG (ref 275–300)
OSMOLALITY URINE: 351 MOSMOL/KG (ref 250–750)
OSMOLALITY: 275 MOSMOL/KG (ref 275–295)
PARAINFLUENZA VIRUS BY PCR: NOT DETECTED
PATHOLOGIST REVIEW: ABNORMAL
PATHOLOGIST REVIEW: ABNORMAL
PATHOLOGIST REVIEW: NORMAL
PCO2, MIXED VENOUS: 28 MMHG (ref 41–51)
PCO2, MIXED VENOUS: 35 MMHG (ref 41–51)
PCO2: 17 MMHG (ref 35–45)
PCO2: 17 MMHG (ref 35–45)
PCO2: 18 MMHG (ref 35–45)
PCO2: 20 MMHG (ref 35–45)
PCO2: 20 MMHG (ref 35–45)
PCO2: 22 MMHG (ref 35–45)
PCO2: 22 MMHG (ref 35–45)
PCO2: 23 MMHG (ref 35–45)
PCO2: 27 MMHG (ref 35–45)
PCO2: 28 MMHG (ref 35–45)
PCO2: 30 MMHG (ref 35–45)
PCO2: 38 MMHG (ref 35–45)
PCO2: 42 MMHG (ref 35–45)
PH BLOOD GAS: 7.11 (ref 7.35–7.45)
PH BLOOD GAS: 7.17 (ref 7.35–7.45)
PH BLOOD GAS: 7.2 (ref 7.35–7.45)
PH BLOOD GAS: 7.22 (ref 7.35–7.45)
PH BLOOD GAS: 7.25 (ref 7.35–7.45)
PH BLOOD GAS: 7.27 (ref 7.35–7.45)
PH BLOOD GAS: 7.28 (ref 7.35–7.45)
PH BLOOD GAS: 7.31 (ref 7.35–7.45)
PH BLOOD GAS: 7.32 (ref 7.35–7.45)
PH BLOOD GAS: 7.35 (ref 7.35–7.45)
PH BLOOD GAS: 7.42 (ref 7.35–7.45)
PH BLOOD GAS: 7.45 (ref 7.35–7.45)
PH BLOOD GAS: 7.5 (ref 7.35–7.45)
PH UA: 6 (ref 5–9)
PH UA: 6 (ref 5–9)
PH UA: 7 (ref 5–9)
PH, MIXED: 7.27 (ref 7.31–7.41)
PH, MIXED: 7.35 (ref 7.31–7.41)
PHOSPHORUS: 2.1 MG/DL (ref 2.4–4.7)
PHOSPHORUS: 2.2 MG/DL (ref 2.4–4.7)
PHOSPHORUS: 2.2 MG/DL (ref 2.4–4.7)
PHOSPHORUS: 2.3 MG/DL (ref 2.4–4.7)
PHOSPHORUS: 2.4 MG/DL (ref 2.4–4.7)
PHOSPHORUS: 2.6 MG/DL (ref 2.4–4.7)
PHOSPHORUS: 2.7 MG/DL (ref 2.4–4.7)
PHOSPHORUS: 2.8 MG/DL (ref 2.4–4.7)
PHOSPHORUS: 3 MG/DL (ref 2.4–4.7)
PHOSPHORUS: 3.1 MG/DL (ref 2.4–4.7)
PHOSPHORUS: 3.2 MG/DL (ref 2.4–4.7)
PHOSPHORUS: 3.3 MG/DL (ref 2.4–4.7)
PHOSPHORUS: 3.5 MG/DL (ref 2.4–4.7)
PHOSPHORUS: 3.5 MG/DL (ref 2.4–4.7)
PHOSPHORUS: 3.8 MG/DL (ref 2.4–4.7)
PHOSPHORUS: 3.9 MG/DL (ref 2.4–4.7)
PHOSPHORUS: 4 MG/DL (ref 2.4–4.7)
PHOSPHORUS: 4.1 MG/DL (ref 2.4–4.7)
PHOSPHORUS: 4.2 MG/DL (ref 2.4–4.7)
PHOSPHORUS: 5.6 MG/DL (ref 2.4–4.7)
PIP: 22 CMH2O
PIP: 26 CMH2O
PLATELET # BLD: 100 THOU/MM3 (ref 130–400)
PLATELET # BLD: 101 THOU/MM3 (ref 130–400)
PLATELET # BLD: 102 THOU/MM3 (ref 130–400)
PLATELET # BLD: 120 THOU/MM3 (ref 130–400)
PLATELET # BLD: 122 THOU/MM3 (ref 130–400)
PLATELET # BLD: 125 THOU/MM3 (ref 130–400)
PLATELET # BLD: 128 THOU/MM3 (ref 130–400)
PLATELET # BLD: 139 THOU/MM3 (ref 130–400)
PLATELET # BLD: 142 THOU/MM3 (ref 130–400)
PLATELET # BLD: 161 THOU/MM3 (ref 130–400)
PLATELET # BLD: 164 THOU/MM3 (ref 130–400)
PLATELET # BLD: 164 THOU/MM3 (ref 130–400)
PLATELET # BLD: 173 THOU/MM3 (ref 130–400)
PLATELET # BLD: 181 THOU/MM3 (ref 130–400)
PLATELET # BLD: 187 THOU/MM3 (ref 130–400)
PLATELET # BLD: 189 THOU/MM3 (ref 130–400)
PLATELET # BLD: 191 THOU/MM3 (ref 130–400)
PLATELET # BLD: 197 THOU/MM3 (ref 130–400)
PLATELET # BLD: 198 THOU/MM3 (ref 130–400)
PLATELET # BLD: 202 THOU/MM3 (ref 130–400)
PLATELET # BLD: 203 THOU/MM3 (ref 130–400)
PLATELET # BLD: 203 THOU/MM3 (ref 130–400)
PLATELET # BLD: 223 THOU/MM3 (ref 130–400)
PLATELET # BLD: 231 THOU/MM3 (ref 130–400)
PLATELET # BLD: 246 THOU/MM3 (ref 130–400)
PLATELET # BLD: 246 THOU/MM3 (ref 130–400)
PLATELET # BLD: 248 THOU/MM3 (ref 130–400)
PLATELET # BLD: 252 THOU/MM3 (ref 130–400)
PLATELET # BLD: 264 THOU/MM3 (ref 130–400)
PLATELET # BLD: 266 THOU/MM3 (ref 130–400)
PLATELET # BLD: 34 THOU/MM3 (ref 130–400)
PLATELET # BLD: 43 THOU/MM3 (ref 130–400)
PLATELET # BLD: 66 THOU/MM3 (ref 130–400)
PLATELET # BLD: 75 THOU/MM3 (ref 130–400)
PLATELET # BLD: 83 THOU/MM3 (ref 130–400)
PLATELET # BLD: 86 THOU/MM3 (ref 130–400)
PLATELET # BLD: 98 THOU/MM3 (ref 130–400)
PLATELET ESTIMATE: ABNORMAL
PLATELET ESTIMATE: ADEQUATE
PLATELET ESTIMATE: ADEQUATE
PLESIOMONAS SHIGELLOIDES PCR: NOT DETECTED
PMV BLD AUTO: 10.7 FL (ref 9.4–12.4)
PMV BLD AUTO: 10.9 FL (ref 9.4–12.4)
PMV BLD AUTO: 11 FL (ref 9.4–12.4)
PMV BLD AUTO: 11.1 FL (ref 9.4–12.4)
PMV BLD AUTO: 11.1 FL (ref 9.4–12.4)
PMV BLD AUTO: 11.2 FL (ref 9.4–12.4)
PMV BLD AUTO: 11.2 FL (ref 9.4–12.4)
PMV BLD AUTO: 11.3 FL (ref 9.4–12.4)
PMV BLD AUTO: 11.4 FL (ref 9.4–12.4)
PMV BLD AUTO: 11.5 FL (ref 9.4–12.4)
PMV BLD AUTO: 11.6 FL (ref 9.4–12.4)
PMV BLD AUTO: 11.7 FL (ref 9.4–12.4)
PMV BLD AUTO: 11.8 FL (ref 9.4–12.4)
PMV BLD AUTO: 11.9 FL (ref 9.4–12.4)
PMV BLD AUTO: 11.9 FL (ref 9.4–12.4)
PMV BLD AUTO: 12 FL (ref 9.4–12.4)
PMV BLD AUTO: 12.2 FL (ref 9.4–12.4)
PMV BLD AUTO: 12.3 FL (ref 9.4–12.4)
PO2 MIXED: 25 MMHG (ref 25–40)
PO2 MIXED: 28 MMHG (ref 25–40)
PO2: 103 MMHG (ref 71–104)
PO2: 108 MMHG (ref 71–104)
PO2: 110 MMHG (ref 71–104)
PO2: 113 MMHG (ref 71–104)
PO2: 137 MMHG (ref 71–104)
PO2: 56 MMHG (ref 71–104)
PO2: 57 MMHG (ref 71–104)
PO2: 70 MMHG (ref 71–104)
PO2: 74 MMHG (ref 71–104)
PO2: 78 MMHG (ref 71–104)
PO2: 86 MMHG (ref 71–104)
PO2: 88 MMHG (ref 71–104)
PO2: 93 MMHG (ref 71–104)
POC CALCIUM: 10.9 MG/DL (ref 8.5–10.1)
POC CALCIUM: 8.2 MG/DL (ref 8.5–10.1)
POC CALCIUM: 8.2 MG/DL (ref 8.5–10.1)
POC CALCIUM: 8.6 MG/DL (ref 8.5–10.1)
POIKILOCYTES: ABNORMAL
POIKILOCYTES: ABNORMAL
POLYMORPHONUCLEAR CELLS BODY FLUID: 56 %
POTASSIUM REFLEX MAGNESIUM: 2.9 MEQ/L (ref 3.5–5.2)
POTASSIUM REFLEX MAGNESIUM: 3.1 MEQ/L (ref 3.5–5.2)
POTASSIUM REFLEX MAGNESIUM: 3.2 MEQ/L (ref 3.5–5.2)
POTASSIUM REFLEX MAGNESIUM: 3.2 MEQ/L (ref 3.5–5.2)
POTASSIUM REFLEX MAGNESIUM: 3.3 MEQ/L (ref 3.5–5.2)
POTASSIUM REFLEX MAGNESIUM: 3.4 MEQ/L (ref 3.5–5.2)
POTASSIUM REFLEX MAGNESIUM: 3.5 MEQ/L (ref 3.5–5.2)
POTASSIUM REFLEX MAGNESIUM: 3.5 MEQ/L (ref 3.5–5.2)
POTASSIUM REFLEX MAGNESIUM: 3.6 MEQ/L (ref 3.5–5.2)
POTASSIUM REFLEX MAGNESIUM: 3.7 MEQ/L (ref 3.5–5.2)
POTASSIUM REFLEX MAGNESIUM: 3.7 MEQ/L (ref 3.5–5.2)
POTASSIUM REFLEX MAGNESIUM: 3.8 MEQ/L (ref 3.5–5.2)
POTASSIUM REFLEX MAGNESIUM: 3.8 MEQ/L (ref 3.5–5.2)
POTASSIUM REFLEX MAGNESIUM: 3.9 MEQ/L (ref 3.5–5.2)
POTASSIUM REFLEX MAGNESIUM: 3.9 MEQ/L (ref 3.5–5.2)
POTASSIUM REFLEX MAGNESIUM: 4.1 MEQ/L (ref 3.5–5.2)
POTASSIUM REFLEX MAGNESIUM: 4.2 MEQ/L (ref 3.5–5.2)
POTASSIUM REFLEX MAGNESIUM: 4.3 MEQ/L (ref 3.5–5.2)
POTASSIUM REFLEX MAGNESIUM: 4.3 MEQ/L (ref 3.5–5.2)
POTASSIUM REFLEX MAGNESIUM: 4.4 MEQ/L (ref 3.5–5.2)
POTASSIUM REFLEX MAGNESIUM: 4.5 MEQ/L (ref 3.5–5.2)
POTASSIUM SERPL-SCNC: 3.2 MEQ/L (ref 3.5–5.2)
POTASSIUM SERPL-SCNC: 3.3 MEQ/L (ref 3.5–5.2)
POTASSIUM SERPL-SCNC: 3.3 MEQ/L (ref 3.5–5.2)
POTASSIUM SERPL-SCNC: 3.6 MEQ/L (ref 3.5–5.2)
POTASSIUM SERPL-SCNC: 3.7 MEQ/L (ref 3.5–5.2)
POTASSIUM SERPL-SCNC: 3.7 MEQ/L (ref 3.5–5.2)
POTASSIUM SERPL-SCNC: 3.8 MEQ/L (ref 3.5–5.1)
POTASSIUM SERPL-SCNC: 3.8 MEQ/L (ref 3.5–5.2)
POTASSIUM SERPL-SCNC: 3.9 MEQ/L (ref 3.5–5.2)
POTASSIUM SERPL-SCNC: 3.9 MEQ/L (ref 3.5–5.2)
POTASSIUM SERPL-SCNC: 4 MEQ/L (ref 3.5–5.2)
POTASSIUM SERPL-SCNC: 4.1 MEQ/L (ref 3.5–5.2)
POTASSIUM SERPL-SCNC: 4.2 MEQ/L (ref 3.5–5.2)
POTASSIUM SERPL-SCNC: 4.3 MEQ/L (ref 3.5–5.2)
POTASSIUM SERPL-SCNC: 4.3 MEQ/L (ref 3.5–5.2)
POTASSIUM SERPL-SCNC: 4.5 MEQ/L (ref 3.5–5.2)
POTASSIUM SERPL-SCNC: 4.6 MEQ/L (ref 3.5–5.2)
POTASSIUM SERPL-SCNC: 4.7 MEQ/L (ref 3.5–5.2)
POTASSIUM SERPL-SCNC: 4.7 MEQ/L (ref 3.5–5.2)
POTASSIUM SERPL-SCNC: 5 MEQ/L (ref 3.5–5.2)
POTASSIUM SERPL-SCNC: 5.1 MEQ/L (ref 3.5–5.1)
POTASSIUM SERPL-SCNC: 5.1 MEQ/L (ref 3.5–5.2)
PREALBUMIN: 7.6 MG/DL (ref 20–40)
PRO-BNP: 3005 PG/ML (ref 0–900)
PRO-BNP: 3663 PG/ML (ref 0–900)
PRO-BNP: 623.7 PG/ML (ref 0–900)
PRO-BNP: 661.2 PG/ML (ref 0–900)
PRO-BNP: ABNORMAL PG/ML (ref 0–900)
PROCALCITONIN: 1.09 NG/ML (ref 0.01–0.09)
PROCALCITONIN: 1.98 NG/ML (ref 0.01–0.09)
PROCALCITONIN: 2.29 NG/ML (ref 0.01–0.09)
PROTEIN UA: ABNORMAL MG/DL
PROTEIN UA: NEGATIVE
PROTEIN UA: NEGATIVE
PROTEUS FILM ARRAY: DETECTED
PROTEUS FILM ARRAY: NOT DETECTED
PROTEUS FILM ARRAY: NOT DETECTED
PROTEUS SPECIES BY PCR: NOT DETECTED
PSEUDOMONAS AERUGINOSA BY PCR: NOT DETECTED
PSEUDOMONAS AERUGINOSA FILM ARRAY: NOT DETECTED
RBC # BLD: 1.86 MILL/MM3 (ref 4.2–5.4)
RBC # BLD: 1.96 MILL/MM3 (ref 4.2–5.4)
RBC # BLD: 2.1 MILL/MM3 (ref 4.2–5.4)
RBC # BLD: 2.21 MILL/MM3 (ref 4.2–5.4)
RBC # BLD: 2.28 MILL/MM3 (ref 4.2–5.4)
RBC # BLD: 2.31 MILL/MM3 (ref 4.2–5.4)
RBC # BLD: 2.33 MILL/MM3 (ref 4.2–5.4)
RBC # BLD: 2.36 MILL/MM3 (ref 4.2–5.4)
RBC # BLD: 2.4 MILL/MM3 (ref 4.2–5.4)
RBC # BLD: 2.41 MILL/MM3 (ref 4.2–5.4)
RBC # BLD: 2.48 MILL/MM3 (ref 4.2–5.4)
RBC # BLD: 2.51 MILL/MM3 (ref 4.2–5.4)
RBC # BLD: 2.54 MILL/MM3 (ref 4.2–5.4)
RBC # BLD: 2.55 MILL/MM3 (ref 4.2–5.4)
RBC # BLD: 2.58 MILL/MM3 (ref 4.2–5.4)
RBC # BLD: 2.6 MILL/MM3 (ref 4.2–5.4)
RBC # BLD: 2.63 MILL/MM3 (ref 4.2–5.4)
RBC # BLD: 2.65 MILL/MM3 (ref 4.2–5.4)
RBC # BLD: 2.72 MILL/MM3 (ref 4.2–5.4)
RBC # BLD: 2.74 MILL/MM3 (ref 4.2–5.4)
RBC # BLD: 2.74 MILL/MM3 (ref 4.2–5.4)
RBC # BLD: 2.75 MILL/MM3 (ref 4.2–5.4)
RBC # BLD: 2.77 MILL/MM3 (ref 4.2–5.4)
RBC # BLD: 2.77 MILL/MM3 (ref 4.2–5.4)
RBC # BLD: 2.81 MILL/MM3 (ref 4.2–5.4)
RBC # BLD: 2.81 MILL/MM3 (ref 4.2–5.4)
RBC # BLD: 2.86 MILL/MM3 (ref 4.2–5.4)
RBC # BLD: 2.9 MILL/MM3 (ref 4.2–5.4)
RBC # BLD: 2.91 MILL/MM3 (ref 4.2–5.4)
RBC # BLD: 2.99 MILL/MM3 (ref 4.2–5.4)
RBC # BLD: 3.01 MILL/MM3 (ref 4.2–5.4)
RBC # BLD: 3.12 MILL/MM3 (ref 4.2–5.4)
RBC # BLD: 3.16 MILL/MM3 (ref 4.2–5.4)
RBC # BLD: 3.17 MILL/MM3 (ref 4.2–5.4)
RBC # BLD: 3.36 MILL/MM3 (ref 4.2–5.4)
RBC # BLD: 3.38 MILL/MM3 (ref 4.2–5.4)
RBC # BLD: 3.44 MILL/MM3 (ref 4.2–5.4)
RBC URINE: ABNORMAL /HPF
RBC URINE: ABNORMAL /HPF
RENAL EPITHELIAL, UA: ABNORMAL
RENAL EPITHELIAL, UA: ABNORMAL
RESISTANT GENE CTX-M BY PCR: NORMAL
RESISTANT GENE IMP BY PCR: NORMAL
RESISTANT GENE KPC BY PCR: NORMAL
RESISTANT GENE MECA/C & MREJ BY PCR: NORMAL
RESISTANT GENE NDM BY PCR: NORMAL
RESISTANT GENE OXA-48-LIKE BY PCR: NORMAL
RESISTANT GENE VIM BY PCR: NORMAL
RESPIRATORY CULTURE: ABNORMAL
RESPIRATORY CULTURE: ABNORMAL
RESPIRATORY SYNCYTIAL VIRUS BY PCR: NOT DETECTED
RETIC HEMOGLOBIN: 35.9 PG (ref 28.2–35.7)
RETICULOCYTE ABSOLUTE COUNT: 0.8 % (ref 0.5–2)
RH FACTOR: NORMAL
RH FACTOR: NORMAL
RHINOVIRUS ENTEROVIRUS PCR: NOT DETECTED
ROTAVIRUS A PCR: NOT DETECTED
ROULEAUX: SLIGHT
SALMONELLA PCR: NOT DETECTED
SAPOVIRUS PCR: NOT DETECTED
SARS-COV-2, NAAT: NOT DETECTED
SCAN OF BLOOD SMEAR: NORMAL
SEDIMENTATION RATE, ERYTHROCYTE: 1 MM/HR (ref 0–20)
SEDIMENTATION RATE, ERYTHROCYTE: 2 MM/HR (ref 0–20)
SEG NEUTROPHILS: 37.3 %
SEG NEUTROPHILS: 40.3 %
SEG NEUTROPHILS: 41.4 %
SEG NEUTROPHILS: 45.6 %
SEG NEUTROPHILS: 47.1 %
SEG NEUTROPHILS: 47.2 %
SEG NEUTROPHILS: 48 %
SEG NEUTROPHILS: 50.4 %
SEG NEUTROPHILS: 53.6 %
SEG NEUTROPHILS: 54.2 %
SEG NEUTROPHILS: 54.4 %
SEG NEUTROPHILS: 55 %
SEG NEUTROPHILS: 56 %
SEG NEUTROPHILS: 56.6 %
SEG NEUTROPHILS: 57.2 %
SEG NEUTROPHILS: 58.2 %
SEG NEUTROPHILS: 60.8 %
SEG NEUTROPHILS: 64.2 %
SEG NEUTROPHILS: 66.2 %
SEG NEUTROPHILS: 68.2 %
SEG NEUTROPHILS: 75.1 %
SEG NEUTROPHILS: 77.8 %
SEG NEUTROPHILS: 80 %
SEG NEUTROPHILS: 80.2 %
SEG NEUTROPHILS: 82.1 %
SEG NEUTROPHILS: 82.7 %
SEG NEUTROPHILS: 83.2 %
SEG NEUTROPHILS: 83.2 %
SEG NEUTROPHILS: 83.3 %
SEG NEUTROPHILS: 83.4 %
SEG NEUTROPHILS: 85.8 %
SEG NEUTROPHILS: 86.5 %
SEG NEUTROPHILS: 87.6 %
SEG NEUTROPHILS: 90.8 %
SEGMENTED NEUTROPHILS ABSOLUTE COUNT: 1.6 THOU/MM3 (ref 1.8–7.7)
SEGMENTED NEUTROPHILS ABSOLUTE COUNT: 11.6 THOU/MM3 (ref 1.8–7.7)
SEGMENTED NEUTROPHILS ABSOLUTE COUNT: 12.8 THOU/MM3 (ref 1.8–7.7)
SEGMENTED NEUTROPHILS ABSOLUTE COUNT: 2.1 THOU/MM3 (ref 1.8–7.7)
SEGMENTED NEUTROPHILS ABSOLUTE COUNT: 2.1 THOU/MM3 (ref 1.8–7.7)
SEGMENTED NEUTROPHILS ABSOLUTE COUNT: 2.3 THOU/MM3 (ref 1.8–7.7)
SEGMENTED NEUTROPHILS ABSOLUTE COUNT: 2.4 THOU/MM3 (ref 1.8–7.7)
SEGMENTED NEUTROPHILS ABSOLUTE COUNT: 2.6 THOU/MM3 (ref 1.8–7.7)
SEGMENTED NEUTROPHILS ABSOLUTE COUNT: 2.8 THOU/MM3 (ref 1.8–7.7)
SEGMENTED NEUTROPHILS ABSOLUTE COUNT: 2.9 THOU/MM3 (ref 1.8–7.7)
SEGMENTED NEUTROPHILS ABSOLUTE COUNT: 2.9 THOU/MM3 (ref 1.8–7.7)
SEGMENTED NEUTROPHILS ABSOLUTE COUNT: 3.1 THOU/MM3 (ref 1.8–7.7)
SEGMENTED NEUTROPHILS ABSOLUTE COUNT: 3.2 THOU/MM3 (ref 1.8–7.7)
SEGMENTED NEUTROPHILS ABSOLUTE COUNT: 3.3 THOU/MM3 (ref 1.8–7.7)
SEGMENTED NEUTROPHILS ABSOLUTE COUNT: 3.5 THOU/MM3 (ref 1.8–7.7)
SEGMENTED NEUTROPHILS ABSOLUTE COUNT: 3.8 THOU/MM3 (ref 1.8–7.7)
SEGMENTED NEUTROPHILS ABSOLUTE COUNT: 3.8 THOU/MM3 (ref 1.8–7.7)
SEGMENTED NEUTROPHILS ABSOLUTE COUNT: 3.9 THOU/MM3 (ref 1.8–7.7)
SEGMENTED NEUTROPHILS ABSOLUTE COUNT: 3.9 THOU/MM3 (ref 1.8–7.7)
SEGMENTED NEUTROPHILS ABSOLUTE COUNT: 4.3 THOU/MM3 (ref 1.8–7.7)
SEGMENTED NEUTROPHILS ABSOLUTE COUNT: 4.3 THOU/MM3 (ref 1.8–7.7)
SEGMENTED NEUTROPHILS ABSOLUTE COUNT: 4.8 THOU/MM3 (ref 1.8–7.7)
SEGMENTED NEUTROPHILS ABSOLUTE COUNT: 4.9 THOU/MM3 (ref 1.8–7.7)
SEGMENTED NEUTROPHILS ABSOLUTE COUNT: 5.8 THOU/MM3 (ref 1.8–7.7)
SEGMENTED NEUTROPHILS ABSOLUTE COUNT: 6.4 THOU/MM3 (ref 1.8–7.7)
SEGMENTED NEUTROPHILS ABSOLUTE COUNT: 6.6 THOU/MM3 (ref 1.8–7.7)
SEGMENTED NEUTROPHILS ABSOLUTE COUNT: 6.6 THOU/MM3 (ref 1.8–7.7)
SEGMENTED NEUTROPHILS ABSOLUTE COUNT: 7.1 THOU/MM3 (ref 1.8–7.7)
SEGMENTED NEUTROPHILS ABSOLUTE COUNT: 7.4 THOU/MM3 (ref 1.8–7.7)
SEGMENTED NEUTROPHILS ABSOLUTE COUNT: 7.4 THOU/MM3 (ref 1.8–7.7)
SEGMENTED NEUTROPHILS ABSOLUTE COUNT: 8.1 THOU/MM3 (ref 1.8–7.7)
SEGMENTED NEUTROPHILS ABSOLUTE COUNT: 8.5 THOU/MM3 (ref 1.8–7.7)
SEGMENTED NEUTROPHILS ABSOLUTE COUNT: 8.6 THOU/MM3 (ref 1.8–7.7)
SEGMENTED NEUTROPHILS ABSOLUTE COUNT: 9.9 THOU/MM3 (ref 1.8–7.7)
SERRATIA MARCESCENS BY PCR: NOT DETECTED
SERRATIA MARCESCENS FILM ARRAY: NOT DETECTED
SET PEEP: 14 MMHG
SET PEEP: 6 MMHG
SET PEEP: 8 MMHG
SET PRESS SUPP: 18 CMH2O
SET PRESS SUPP: 18 CMH2O
SET RESPIRATORY RATE: 12 BPM
SODIUM BLD-SCNC: 128 MEQ/L (ref 135–145)
SODIUM BLD-SCNC: 131 MEQ/L (ref 135–145)
SODIUM BLD-SCNC: 131 MEQ/L (ref 135–145)
SODIUM BLD-SCNC: 132 MEQ/L (ref 135–145)
SODIUM BLD-SCNC: 132 MEQ/L (ref 135–145)
SODIUM BLD-SCNC: 133 MEQ/L (ref 135–145)
SODIUM BLD-SCNC: 134 MEQ/L (ref 135–145)
SODIUM BLD-SCNC: 135 MEQ/L (ref 135–145)
SODIUM BLD-SCNC: 137 MEQ/L (ref 135–145)
SODIUM BLD-SCNC: 138 MEQ/L (ref 135–145)
SODIUM BLD-SCNC: 139 MEQ/L (ref 135–145)
SODIUM BLD-SCNC: 140 MEQ/L (ref 135–145)
SODIUM BLD-SCNC: 140 MEQ/L (ref 135–145)
SODIUM BLD-SCNC: 141 MEQ/L (ref 135–145)
SODIUM BLD-SCNC: 143 MEQ/L (ref 135–145)
SODIUM BLD-SCNC: 143 MEQ/L (ref 136–145)
SODIUM BLD-SCNC: 144 MEQ/L (ref 135–145)
SODIUM BLD-SCNC: 146 MEQ/L (ref 135–145)
SODIUM URINE: 21 MEQ/L
SOURCE OF BLOOD CULTURE: ABNORMAL
SOURCE OF BLOOD CULTURE: ABNORMAL
SOURCE OF BLOOD CULTURE: NORMAL
SOURCE, BLOOD GAS: ABNORMAL
SOURCE: NORMAL
SPECIFIC GRAVITY UA: 1.02 (ref 1–1.03)
SPECIFIC GRAVITY, URINE: 1.01 (ref 1–1.03)
SPECIFIC GRAVITY, URINE: 1.01 (ref 1–1.03)
SPECIMEN ACCEPTABILITY: NORMAL
SPECIMEN: NORMAL
STAPH AUREUS BY PCR: NOT DETECTED
STAPH AUREUS FILM ARRAY: NOT DETECTED
STAPHYLOCOCCUS FILM ARRAY: DETECTED
STAPHYLOCOCCUS FILM ARRAY: NOT DETECTED
STAPHYLOCOCCUS FILM ARRAY: NOT DETECTED
STREP AGALACTIAE BY PCR: NOT DETECTED
STREP AGALACTIAE FILM ARRAY: NOT DETECTED
STREP PNEUMONIAE BY PCR: NOT DETECTED
STREP PNEUMONIAE FILM ARRAY: NOT DETECTED
STREP PYOCGENES FILM ARRAY: NOT DETECTED
STREP PYOGENES BY PCR: NOT DETECTED
STREPTOCOCCUS FILM ARRAY: NOT DETECTED
T4 FREE: 1.05 NG/DL (ref 0.93–1.76)
T4 FREE: 1.26 NG/DL (ref 0.93–1.76)
T4 FREE: 1.26 NG/DL (ref 0.93–1.76)
T4 FREE: 1.54 NG/DL (ref 0.93–1.76)
T4 FREE: 1.8 NG/DL (ref 0.93–1.76)
T4 FREE: 1.82 NG/DL (ref 0.93–1.76)
T4 FREE: 1.82 NG/DL (ref 0.93–1.76)
T4 FREE: 1.89 NG/DL (ref 0.93–1.76)
TARGET CELLS: ABNORMAL
TOTAL CK: 26 U/L (ref 30–135)
TOTAL IRON BINDING CAPACITY: 77 UG/DL (ref 171–450)
TOTAL IRON BINDING CAPACITY: < 123 UG/DL (ref 171–450)
TOTAL IRON BINDING CAPACITY: < 86 UG/DL (ref 171–450)
TOTAL NUCLEATED CELLS BODY FLUID: 400 /CUMM (ref 0–500)
TOTAL PROTEIN: 2.6 G/DL (ref 6.1–8)
TOTAL PROTEIN: 3.5 G/DL (ref 6.1–8)
TOTAL PROTEIN: 3.8 G/DL (ref 6.1–8)
TOTAL PROTEIN: 3.8 G/DL (ref 6.1–8)
TOTAL PROTEIN: 3.8 GM/DL (ref 6.4–8.2)
TOTAL PROTEIN: 3.9 G/DL (ref 6.1–8)
TOTAL PROTEIN: 4.1 G/DL (ref 6.1–8)
TOTAL PROTEIN: 4.2 G/DL (ref 6.1–8)
TOTAL PROTEIN: 4.4 G/DL (ref 6.1–8)
TOTAL PROTEIN: 4.5 G/DL (ref 6.1–8)
TOTAL PROTEIN: 4.8 G/DL (ref 6.1–8)
TOTAL PROTEIN: 4.9 G/DL (ref 6.1–8)
TOTAL PROTEIN: 5 G/DL (ref 6.1–8)
TOTAL PROTEIN: 5 G/DL (ref 6.1–8)
TOTAL PROTEIN: 5.1 G/DL (ref 6.1–8)
TOTAL PROTEIN: 5.2 G/DL (ref 6.1–8)
TOTAL PROTEIN: 5.3 G/DL (ref 6.1–8)
TOTAL PROTEIN: 5.4 G/DL (ref 6.1–8)
TOTAL PROTEIN: 5.4 G/DL (ref 6.1–8)
TOTAL PROTEIN: 5.5 G/DL (ref 6.1–8)
TOTAL PROTEIN: 5.6 G/DL (ref 6.1–8)
TOTAL PROTEIN: 5.8 G/DL (ref 6.1–8)
TOTAL PROTEIN: 6.2 G/DL (ref 6.1–8)
TOTAL VOLUME RECEIVED BODY FLUID: < 1 ML
TROPONIN T: 0.02 NG/ML
TROPONIN T: < 0.01 NG/ML
TSH SERPL DL<=0.05 MIU/L-ACNC: 0.01 UIU/ML (ref 0.4–4.2)
TSH SERPL DL<=0.05 MIU/L-ACNC: 0.32 UIU/ML (ref 0.4–4.2)
URINE CULTURE, ROUTINE: ABNORMAL
URINE VOLUME MEASURE: 300 ML
URINE VOLUME, 24 HOUR: 300 ML
UROBILINOGEN, URINE: 0.2 EU/DL (ref 0–1)
UROBILINOGEN, URINE: 0.2 EU/DL (ref 0–1)
UROBILINOGEN, URINE: 1 EU/DL (ref 0–1)
VANCOMYCIN RESISTANT ENTEROCOCCUS: NEGATIVE
VANCOMYCIN RESISTANT FILM ARRAY: ABNORMAL
VANCOMYCIN RESISTANT FILM ARRAY: ABNORMAL
VANCOMYCIN RESISTANT FILM ARRAY: NORMAL
VIBRIO CHOLERAE PCR: NOT DETECTED
VIBRIO PCR: NOT DETECTED
VITAMIN B-12: > 2000 PG/ML (ref 211–911)
VITAMIN B1 WHOLE BLOOD: 172 NMOL/L (ref 70–180)
VITAMIN B2: 84 NMOL/L (ref 5–50)
VITAMIN B6: 9.4 NMOL/L (ref 20–125)
VITAMIN B7: NORMAL
VITAMIN D 1,25-DIHYDROXY: 39.7 PG/ML (ref 19.9–79.3)
WBC # BLD: 10.2 THOU/MM3 (ref 4.8–10.8)
WBC # BLD: 10.8 THOU/MM3 (ref 4.8–10.8)
WBC # BLD: 11.5 THOU/MM3 (ref 4.8–10.8)
WBC # BLD: 11.6 THOU/MM3 (ref 4.8–10.8)
WBC # BLD: 12.3 THOU/MM3 (ref 4.8–10.8)
WBC # BLD: 13 THOU/MM3 (ref 4.8–10.8)
WBC # BLD: 13.2 THOU/MM3 (ref 4.8–10.8)
WBC # BLD: 14.1 THOU/MM3 (ref 4.8–10.8)
WBC # BLD: 3.5 THOU/MM3 (ref 4.8–10.8)
WBC # BLD: 3.9 THOU/MM3 (ref 4.8–10.8)
WBC # BLD: 4.2 THOU/MM3 (ref 4.8–10.8)
WBC # BLD: 4.4 THOU/MM3 (ref 4.8–10.8)
WBC # BLD: 4.6 THOU/MM3 (ref 4.8–10.8)
WBC # BLD: 4.7 THOU/MM3 (ref 4.8–10.8)
WBC # BLD: 4.8 THOU/MM3 (ref 4.8–10.8)
WBC # BLD: 4.9 THOU/MM3 (ref 4.8–10.8)
WBC # BLD: 5 THOU/MM3 (ref 4.8–10.8)
WBC # BLD: 5.2 THOU/MM3 (ref 4.8–10.8)
WBC # BLD: 5.7 THOU/MM3 (ref 4.8–10.8)
WBC # BLD: 5.8 THOU/MM3 (ref 4.8–10.8)
WBC # BLD: 6.1 THOU/MM3 (ref 4.8–10.8)
WBC # BLD: 6.2 THOU/MM3 (ref 4.8–10.8)
WBC # BLD: 6.4 THOU/MM3 (ref 4.8–10.8)
WBC # BLD: 6.5 THOU/MM3 (ref 4.8–10.8)
WBC # BLD: 8 THOU/MM3 (ref 4.8–10.8)
WBC # BLD: 8.5 THOU/MM3 (ref 4.8–10.8)
WBC # BLD: 8.6 THOU/MM3 (ref 4.8–10.8)
WBC # BLD: 8.7 THOU/MM3 (ref 4.8–10.8)
WBC # BLD: 8.8 THOU/MM3 (ref 4.8–10.8)
WBC # BLD: 9.1 THOU/MM3 (ref 4.8–10.8)
WBC # BLD: 9.5 THOU/MM3 (ref 4.8–10.8)
WBC # BLD: 9.7 THOU/MM3 (ref 4.8–10.8)
WBC # BLD: 9.7 THOU/MM3 (ref 4.8–10.8)
WBC UA: ABNORMAL /HPF
WBC UA: ABNORMAL /HPF
YEAST: ABNORMAL
YEAST: ABNORMAL
YERSINIA ENTEROCOLITICA PCR: NOT DETECTED
ZINC: 78 UG/DL (ref 60–120)

## 2020-01-01 PROCEDURE — 99214 OFFICE O/P EST MOD 30 MIN: CPT | Performed by: FAMILY MEDICINE

## 2020-01-01 PROCEDURE — 2580000003 HC RX 258: Performed by: INTERNAL MEDICINE

## 2020-01-01 PROCEDURE — 97110 THERAPEUTIC EXERCISES: CPT

## 2020-01-01 PROCEDURE — 83735 ASSAY OF MAGNESIUM: CPT

## 2020-01-01 PROCEDURE — 97535 SELF CARE MNGMENT TRAINING: CPT

## 2020-01-01 PROCEDURE — 87641 MR-STAPH DNA AMP PROBE: CPT

## 2020-01-01 PROCEDURE — 6370000000 HC RX 637 (ALT 250 FOR IP): Performed by: INTERNAL MEDICINE

## 2020-01-01 PROCEDURE — 2500000003 HC RX 250 WO HCPCS: Performed by: INTERNAL MEDICINE

## 2020-01-01 PROCEDURE — 83880 ASSAY OF NATRIURETIC PEPTIDE: CPT

## 2020-01-01 PROCEDURE — P9047 ALBUMIN (HUMAN), 25%, 50ML: HCPCS | Performed by: INTERNAL MEDICINE

## 2020-01-01 PROCEDURE — 93005 ELECTROCARDIOGRAM TRACING: CPT | Performed by: PHYSICIAN ASSISTANT

## 2020-01-01 PROCEDURE — 97530 THERAPEUTIC ACTIVITIES: CPT

## 2020-01-01 PROCEDURE — 71275 CT ANGIOGRAPHY CHEST: CPT

## 2020-01-01 PROCEDURE — 6370000000 HC RX 637 (ALT 250 FOR IP): Performed by: NURSE PRACTITIONER

## 2020-01-01 PROCEDURE — 2709999900 HC NON-CHARGEABLE SUPPLY

## 2020-01-01 PROCEDURE — 2060000000 HC ICU INTERMEDIATE R&B

## 2020-01-01 PROCEDURE — 85025 COMPLETE CBC W/AUTO DIFF WBC: CPT

## 2020-01-01 PROCEDURE — 6360000002 HC RX W HCPCS: Performed by: INTERNAL MEDICINE

## 2020-01-01 PROCEDURE — 82948 REAGENT STRIP/BLOOD GLUCOSE: CPT

## 2020-01-01 PROCEDURE — 6360000002 HC RX W HCPCS: Performed by: PHYSICIAN ASSISTANT

## 2020-01-01 PROCEDURE — 2580000003 HC RX 258: Performed by: PHYSICIAN ASSISTANT

## 2020-01-01 PROCEDURE — 36591 DRAW BLOOD OFF VENOUS DEVICE: CPT

## 2020-01-01 PROCEDURE — 82607 VITAMIN B-12: CPT

## 2020-01-01 PROCEDURE — 36592 COLLECT BLOOD FROM PICC: CPT

## 2020-01-01 PROCEDURE — APPSS180 APP SPLIT SHARED TIME > 60 MINUTES: Performed by: NURSE PRACTITIONER

## 2020-01-01 PROCEDURE — 84484 ASSAY OF TROPONIN QUANT: CPT

## 2020-01-01 PROCEDURE — A9579 GAD-BASE MR CONTRAST NOS,1ML: HCPCS | Performed by: PHYSICIAN ASSISTANT

## 2020-01-01 PROCEDURE — 82947 ASSAY GLUCOSE BLOOD QUANT: CPT

## 2020-01-01 PROCEDURE — 94002 VENT MGMT INPAT INIT DAY: CPT

## 2020-01-01 PROCEDURE — 93010 ELECTROCARDIOGRAM REPORT: CPT | Performed by: NUCLEAR MEDICINE

## 2020-01-01 PROCEDURE — 82310 ASSAY OF CALCIUM: CPT

## 2020-01-01 PROCEDURE — 80053 COMPREHEN METABOLIC PANEL: CPT

## 2020-01-01 PROCEDURE — 99233 SBSQ HOSP IP/OBS HIGH 50: CPT | Performed by: PHYSICIAN ASSISTANT

## 2020-01-01 PROCEDURE — 87081 CULTURE SCREEN ONLY: CPT

## 2020-01-01 PROCEDURE — 82330 ASSAY OF CALCIUM: CPT

## 2020-01-01 PROCEDURE — 87500 VANOMYCIN DNA AMP PROBE: CPT

## 2020-01-01 PROCEDURE — 85018 HEMOGLOBIN: CPT

## 2020-01-01 PROCEDURE — 96365 THER/PROPH/DIAG IV INF INIT: CPT

## 2020-01-01 PROCEDURE — 97162 PT EVAL MOD COMPLEX 30 MIN: CPT

## 2020-01-01 PROCEDURE — 87541 LEGION PNEUMO DNA AMP PROB: CPT

## 2020-01-01 PROCEDURE — S0630 REMOVAL OF SUTURES: HCPCS | Performed by: FAMILY MEDICINE

## 2020-01-01 PROCEDURE — 2580000003 HC RX 258: Performed by: NURSE PRACTITIONER

## 2020-01-01 PROCEDURE — 36415 COLL VENOUS BLD VENIPUNCTURE: CPT

## 2020-01-01 PROCEDURE — 86923 COMPATIBILITY TEST ELECTRIC: CPT

## 2020-01-01 PROCEDURE — 89050 BODY FLUID CELL COUNT: CPT

## 2020-01-01 PROCEDURE — 99232 SBSQ HOSP IP/OBS MODERATE 35: CPT | Performed by: INTERNAL MEDICINE

## 2020-01-01 PROCEDURE — U0002 COVID-19 LAB TEST NON-CDC: HCPCS

## 2020-01-01 PROCEDURE — 84439 ASSAY OF FREE THYROXINE: CPT

## 2020-01-01 PROCEDURE — 82652 VIT D 1 25-DIHYDROXY: CPT

## 2020-01-01 PROCEDURE — 99495 TRANSJ CARE MGMT MOD F2F 14D: CPT | Performed by: FAMILY MEDICINE

## 2020-01-01 PROCEDURE — 2700000000 HC OXYGEN THERAPY PER DAY

## 2020-01-01 PROCEDURE — 72157 MRI CHEST SPINE W/O & W/DYE: CPT

## 2020-01-01 PROCEDURE — 6360000002 HC RX W HCPCS: Performed by: FAMILY MEDICINE

## 2020-01-01 PROCEDURE — 2500000003 HC RX 250 WO HCPCS: Performed by: FAMILY MEDICINE

## 2020-01-01 PROCEDURE — 99282 EMERGENCY DEPT VISIT SF MDM: CPT

## 2020-01-01 PROCEDURE — 83550 IRON BINDING TEST: CPT

## 2020-01-01 PROCEDURE — 85027 COMPLETE CBC AUTOMATED: CPT

## 2020-01-01 PROCEDURE — 62267 INTERDISCAL PERQ ASPIR DX: CPT

## 2020-01-01 PROCEDURE — 97116 GAIT TRAINING THERAPY: CPT

## 2020-01-01 PROCEDURE — 83525 ASSAY OF INSULIN: CPT

## 2020-01-01 PROCEDURE — 80048 BASIC METABOLIC PNL TOTAL CA: CPT

## 2020-01-01 PROCEDURE — 93005 ELECTROCARDIOGRAM TRACING: CPT | Performed by: EMERGENCY MEDICINE

## 2020-01-01 PROCEDURE — 94761 N-INVAS EAR/PLS OXIMETRY MLT: CPT

## 2020-01-01 PROCEDURE — 6370000000 HC RX 637 (ALT 250 FOR IP): Performed by: PHYSICIAN ASSISTANT

## 2020-01-01 PROCEDURE — 87801 DETECT AGNT MULT DNA AMPLI: CPT

## 2020-01-01 PROCEDURE — 82140 ASSAY OF AMMONIA: CPT

## 2020-01-01 PROCEDURE — 83935 ASSAY OF URINE OSMOLALITY: CPT

## 2020-01-01 PROCEDURE — 85610 PROTHROMBIN TIME: CPT

## 2020-01-01 PROCEDURE — 94760 N-INVAS EAR/PLS OXIMETRY 1: CPT

## 2020-01-01 PROCEDURE — 85362 FIBRIN DEGRADATION PRODUCTS: CPT

## 2020-01-01 PROCEDURE — 36597 REPOSITION VENOUS CATHETER: CPT | Performed by: RADIOLOGY

## 2020-01-01 PROCEDURE — 83605 ASSAY OF LACTIC ACID: CPT

## 2020-01-01 PROCEDURE — P9017 PLASMA 1 DONOR FRZ W/IN 8 HR: HCPCS

## 2020-01-01 PROCEDURE — 87040 BLOOD CULTURE FOR BACTERIA: CPT

## 2020-01-01 PROCEDURE — 82803 BLOOD GASES ANY COMBINATION: CPT

## 2020-01-01 PROCEDURE — 81003 URINALYSIS AUTO W/O SCOPE: CPT

## 2020-01-01 PROCEDURE — 94640 AIRWAY INHALATION TREATMENT: CPT

## 2020-01-01 PROCEDURE — 87077 CULTURE AEROBIC IDENTIFY: CPT

## 2020-01-01 PROCEDURE — 85014 HEMATOCRIT: CPT

## 2020-01-01 PROCEDURE — 99233 SBSQ HOSP IP/OBS HIGH 50: CPT | Performed by: INTERNAL MEDICINE

## 2020-01-01 PROCEDURE — 99285 EMERGENCY DEPT VISIT HI MDM: CPT

## 2020-01-01 PROCEDURE — P9047 ALBUMIN (HUMAN), 25%, 50ML: HCPCS | Performed by: NURSE PRACTITIONER

## 2020-01-01 PROCEDURE — 99239 HOSP IP/OBS DSCHRG MGMT >30: CPT | Performed by: INTERNAL MEDICINE

## 2020-01-01 PROCEDURE — 6360000002 HC RX W HCPCS: Performed by: NURSE PRACTITIONER

## 2020-01-01 PROCEDURE — 87070 CULTURE OTHR SPECIMN AEROBIC: CPT

## 2020-01-01 PROCEDURE — 0097U HC GI PTHGN MULT REV TRANS & AMP PRB TECH 22 TRGT: CPT

## 2020-01-01 PROCEDURE — 0JPT3WZ REMOVAL OF TOTALLY IMPLANTABLE VASCULAR ACCESS DEVICE FROM TRUNK SUBCUTANEOUS TISSUE AND FASCIA, PERCUTANEOUS APPROACH: ICD-10-PCS | Performed by: RADIOLOGY

## 2020-01-01 PROCEDURE — 6370000000 HC RX 637 (ALT 250 FOR IP): Performed by: EMERGENCY MEDICINE

## 2020-01-01 PROCEDURE — 84100 ASSAY OF PHOSPHORUS: CPT

## 2020-01-01 PROCEDURE — 6360000002 HC RX W HCPCS

## 2020-01-01 PROCEDURE — 82533 TOTAL CORTISOL: CPT

## 2020-01-01 PROCEDURE — 2500000003 HC RX 250 WO HCPCS: Performed by: PHYSICIAN ASSISTANT

## 2020-01-01 PROCEDURE — 71046 X-RAY EXAM CHEST 2 VIEWS: CPT

## 2020-01-01 PROCEDURE — 77002 NEEDLE LOCALIZATION BY XRAY: CPT

## 2020-01-01 PROCEDURE — 87147 CULTURE TYPE IMMUNOLOGIC: CPT

## 2020-01-01 PROCEDURE — 51798 US URINE CAPACITY MEASURE: CPT

## 2020-01-01 PROCEDURE — 84132 ASSAY OF SERUM POTASSIUM: CPT

## 2020-01-01 PROCEDURE — 86140 C-REACTIVE PROTEIN: CPT

## 2020-01-01 PROCEDURE — 71045 X-RAY EXAM CHEST 1 VIEW: CPT

## 2020-01-01 PROCEDURE — 2500000003 HC RX 250 WO HCPCS

## 2020-01-01 PROCEDURE — 84300 ASSAY OF URINE SODIUM: CPT

## 2020-01-01 PROCEDURE — 99291 CRITICAL CARE FIRST HOUR: CPT | Performed by: INTERNAL MEDICINE

## 2020-01-01 PROCEDURE — 85385 FIBRINOGEN ANTIGEN: CPT

## 2020-01-01 PROCEDURE — 2000000000 HC ICU R&B

## 2020-01-01 PROCEDURE — 94660 CPAP INITIATION&MGMT: CPT

## 2020-01-01 PROCEDURE — 84591 ASSAY OF NOS VITAMIN: CPT

## 2020-01-01 PROCEDURE — 99212 OFFICE O/P EST SF 10 MIN: CPT

## 2020-01-01 PROCEDURE — 82248 BILIRUBIN DIRECT: CPT

## 2020-01-01 PROCEDURE — 94003 VENT MGMT INPAT SUBQ DAY: CPT

## 2020-01-01 PROCEDURE — 87186 SC STD MICRODIL/AGAR DIL: CPT

## 2020-01-01 PROCEDURE — 87581 M.PNEUMON DNA AMP PROBE: CPT

## 2020-01-01 PROCEDURE — 2580000003 HC RX 258: Performed by: ORTHOPAEDIC SURGERY

## 2020-01-01 PROCEDURE — 0BH17EZ INSERTION OF ENDOTRACHEAL AIRWAY INTO TRACHEA, VIA NATURAL OR ARTIFICIAL OPENING: ICD-10-PCS | Performed by: INTERNAL MEDICINE

## 2020-01-01 PROCEDURE — 84630 ASSAY OF ZINC: CPT

## 2020-01-01 PROCEDURE — 82272 OCCULT BLD FECES 1-3 TESTS: CPT

## 2020-01-01 PROCEDURE — 81001 URINALYSIS AUTO W/SCOPE: CPT

## 2020-01-01 PROCEDURE — 36600 WITHDRAWAL OF ARTERIAL BLOOD: CPT

## 2020-01-01 PROCEDURE — 82525 ASSAY OF COPPER: CPT

## 2020-01-01 PROCEDURE — 77003 FLUOROGUIDE FOR SPINE INJECT: CPT

## 2020-01-01 PROCEDURE — 2500000003 HC RX 250 WO HCPCS: Performed by: NURSE PRACTITIONER

## 2020-01-01 PROCEDURE — 90945 DIALYSIS ONE EVALUATION: CPT

## 2020-01-01 PROCEDURE — 85730 THROMBOPLASTIN TIME PARTIAL: CPT

## 2020-01-01 PROCEDURE — 93005 ELECTROCARDIOGRAM TRACING: CPT | Performed by: FAMILY MEDICINE

## 2020-01-01 PROCEDURE — 6370000000 HC RX 637 (ALT 250 FOR IP)

## 2020-01-01 PROCEDURE — 99291 CRITICAL CARE FIRST HOUR: CPT | Performed by: FAMILY MEDICINE

## 2020-01-01 PROCEDURE — 2580000003 HC RX 258: Performed by: FAMILY MEDICINE

## 2020-01-01 PROCEDURE — 87086 URINE CULTURE/COLONY COUNT: CPT

## 2020-01-01 PROCEDURE — 84134 ASSAY OF PREALBUMIN: CPT

## 2020-01-01 PROCEDURE — 83540 ASSAY OF IRON: CPT

## 2020-01-01 PROCEDURE — 6360000004 HC RX CONTRAST MEDICATION: Performed by: FAMILY MEDICINE

## 2020-01-01 PROCEDURE — 74177 CT ABD & PELVIS W/CONTRAST: CPT

## 2020-01-01 PROCEDURE — 84145 PROCALCITONIN (PCT): CPT

## 2020-01-01 PROCEDURE — 6370000000 HC RX 637 (ALT 250 FOR IP): Performed by: FAMILY MEDICINE

## 2020-01-01 PROCEDURE — 86901 BLOOD TYPING SEROLOGIC RH(D): CPT

## 2020-01-01 PROCEDURE — 6360000002 HC RX W HCPCS: Performed by: ORTHOPAEDIC SURGERY

## 2020-01-01 PROCEDURE — G0279 TOMOSYNTHESIS, MAMMO: HCPCS

## 2020-01-01 PROCEDURE — P9016 RBC LEUKOCYTES REDUCED: HCPCS

## 2020-01-01 PROCEDURE — 6360000002 HC RX W HCPCS: Performed by: RADIOLOGY

## 2020-01-01 PROCEDURE — 92567 TYMPANOMETRY: CPT | Performed by: AUDIOLOGIST

## 2020-01-01 PROCEDURE — 97166 OT EVAL MOD COMPLEX 45 MIN: CPT

## 2020-01-01 PROCEDURE — 36620 INSERTION CATHETER ARTERY: CPT

## 2020-01-01 PROCEDURE — 84207 ASSAY OF VITAMIN B-6: CPT

## 2020-01-01 PROCEDURE — P9047 ALBUMIN (HUMAN), 25%, 50ML: HCPCS | Performed by: PHYSICIAN ASSISTANT

## 2020-01-01 PROCEDURE — 37799 UNLISTED PX VASCULAR SURGERY: CPT

## 2020-01-01 PROCEDURE — 84425 ASSAY OF VITAMIN B-1: CPT

## 2020-01-01 PROCEDURE — 1200000000 HC SEMI PRIVATE

## 2020-01-01 PROCEDURE — P9047 ALBUMIN (HUMAN), 25%, 50ML: HCPCS | Performed by: FAMILY MEDICINE

## 2020-01-01 PROCEDURE — 20225 BONE BIOPSY TROCAR/NDL DEEP: CPT

## 2020-01-01 PROCEDURE — 87486 CHLMYD PNEUM DNA AMP PROBE: CPT

## 2020-01-01 PROCEDURE — 82565 ASSAY OF CREATININE: CPT

## 2020-01-01 PROCEDURE — 86850 RBC ANTIBODY SCREEN: CPT

## 2020-01-01 PROCEDURE — 99223 1ST HOSP IP/OBS HIGH 75: CPT | Performed by: NURSE PRACTITIONER

## 2020-01-01 PROCEDURE — 31500 INSERT EMERGENCY AIRWAY: CPT | Performed by: NURSE PRACTITIONER

## 2020-01-01 PROCEDURE — A9579 GAD-BASE MR CONTRAST NOS,1ML: HCPCS | Performed by: FAMILY MEDICINE

## 2020-01-01 PROCEDURE — 82570 ASSAY OF URINE CREATININE: CPT

## 2020-01-01 PROCEDURE — 96361 HYDRATE IV INFUSION ADD-ON: CPT

## 2020-01-01 PROCEDURE — 93010 ELECTROCARDIOGRAM REPORT: CPT | Performed by: INTERNAL MEDICINE

## 2020-01-01 PROCEDURE — 83690 ASSAY OF LIPASE: CPT

## 2020-01-01 PROCEDURE — 0098U HC RESPIRPTHGN MULT REV TRANS & AMP PRB TECH 14 TRGT: CPT

## 2020-01-01 PROCEDURE — 94770 HC ETCO2 MONITOR DAILY: CPT

## 2020-01-01 PROCEDURE — 36430 TRANSFUSION BLD/BLD COMPNT: CPT

## 2020-01-01 PROCEDURE — 93005 ELECTROCARDIOGRAM TRACING: CPT | Performed by: NURSE PRACTITIONER

## 2020-01-01 PROCEDURE — 51701 INSERT BLADDER CATHETER: CPT

## 2020-01-01 PROCEDURE — 87075 CULTR BACTERIA EXCEPT BLOOD: CPT

## 2020-01-01 PROCEDURE — 1290000000 HC SEMI PRIVATE OTHER R&B

## 2020-01-01 PROCEDURE — 1200000003 HC TELEMETRY R&B

## 2020-01-01 PROCEDURE — 87205 SMEAR GRAM STAIN: CPT

## 2020-01-01 PROCEDURE — 96368 THER/DIAG CONCURRENT INF: CPT

## 2020-01-01 PROCEDURE — 87631 RESP VIRUS 3-5 TARGETS: CPT

## 2020-01-01 PROCEDURE — 82340 ASSAY OF CALCIUM IN URINE: CPT

## 2020-01-01 PROCEDURE — 72158 MRI LUMBAR SPINE W/O & W/DYE: CPT

## 2020-01-01 PROCEDURE — 99255 IP/OBS CONSLTJ NEW/EST HI 80: CPT | Performed by: INTERNAL MEDICINE

## 2020-01-01 PROCEDURE — 99283 EMERGENCY DEPT VISIT LOW MDM: CPT

## 2020-01-01 PROCEDURE — P9041 ALBUMIN (HUMAN),5%, 50ML: HCPCS | Performed by: NURSE PRACTITIONER

## 2020-01-01 PROCEDURE — 84252 ASSAY OF VITAMIN B-2: CPT

## 2020-01-01 PROCEDURE — 82746 ASSAY OF FOLIC ACID SERUM: CPT

## 2020-01-01 PROCEDURE — 85651 RBC SED RATE NONAUTOMATED: CPT

## 2020-01-01 PROCEDURE — 99284 EMERGENCY DEPT VISIT MOD MDM: CPT

## 2020-01-01 PROCEDURE — 84443 ASSAY THYROID STIM HORMONE: CPT

## 2020-01-01 PROCEDURE — 5A1945Z RESPIRATORY VENTILATION, 24-96 CONSECUTIVE HOURS: ICD-10-PCS | Performed by: INTERNAL MEDICINE

## 2020-01-01 PROCEDURE — 97165 OT EVAL LOW COMPLEX 30 MIN: CPT

## 2020-01-01 PROCEDURE — 36590 REMOVAL TUNNELED CV CATH: CPT

## 2020-01-01 PROCEDURE — 99213 OFFICE O/P EST LOW 20 MIN: CPT | Performed by: INTERNAL MEDICINE

## 2020-01-01 PROCEDURE — 77080 DXA BONE DENSITY AXIAL: CPT

## 2020-01-01 PROCEDURE — 85379 FIBRIN DEGRADATION QUANT: CPT

## 2020-01-01 PROCEDURE — 0QB03ZX EXCISION OF LUMBAR VERTEBRA, PERCUTANEOUS APPROACH, DIAGNOSTIC: ICD-10-PCS | Performed by: RADIOLOGY

## 2020-01-01 PROCEDURE — 70450 CT HEAD/BRAIN W/O DYE: CPT

## 2020-01-01 PROCEDURE — 96374 THER/PROPH/DIAG INJ IV PUSH: CPT

## 2020-01-01 PROCEDURE — 96372 THER/PROPH/DIAG INJ SC/IM: CPT

## 2020-01-01 PROCEDURE — 99222 1ST HOSP IP/OBS MODERATE 55: CPT | Performed by: PSYCHIATRY & NEUROLOGY

## 2020-01-01 PROCEDURE — P9012 CRYOPRECIPITATE EACH UNIT: HCPCS

## 2020-01-01 PROCEDURE — 2580000003 HC RX 258: Performed by: RADIOLOGY

## 2020-01-01 PROCEDURE — 82728 ASSAY OF FERRITIN: CPT

## 2020-01-01 PROCEDURE — 99396 PREV VISIT EST AGE 40-64: CPT | Performed by: FAMILY MEDICINE

## 2020-01-01 PROCEDURE — 87804 INFLUENZA ASSAY W/OPTIC: CPT

## 2020-01-01 PROCEDURE — G0480 DRUG TEST DEF 1-7 CLASSES: HCPCS

## 2020-01-01 PROCEDURE — 80076 HEPATIC FUNCTION PANEL: CPT

## 2020-01-01 PROCEDURE — 90945 DIALYSIS ONE EVALUATION: CPT | Performed by: INTERNAL MEDICINE

## 2020-01-01 PROCEDURE — 85046 RETICYTE/HGB CONCENTRATE: CPT

## 2020-01-01 PROCEDURE — 82550 ASSAY OF CK (CPK): CPT

## 2020-01-01 PROCEDURE — 02HV33Z INSERTION OF INFUSION DEVICE INTO SUPERIOR VENA CAVA, PERCUTANEOUS APPROACH: ICD-10-PCS | Performed by: INTERNAL MEDICINE

## 2020-01-01 PROCEDURE — 76000 FLUOROSCOPY <1 HR PHYS/QHP: CPT | Performed by: RADIOLOGY

## 2020-01-01 PROCEDURE — 92557 COMPREHENSIVE HEARING TEST: CPT | Performed by: AUDIOLOGIST

## 2020-01-01 PROCEDURE — 6360000002 HC RX W HCPCS: Performed by: EMERGENCY MEDICINE

## 2020-01-01 PROCEDURE — 88305 TISSUE EXAM BY PATHOLOGIST: CPT

## 2020-01-01 PROCEDURE — 86900 BLOOD TYPING SEROLOGIC ABO: CPT

## 2020-01-01 PROCEDURE — 99255 IP/OBS CONSLTJ NEW/EST HI 80: CPT | Performed by: PHYSICIAN ASSISTANT

## 2020-01-01 PROCEDURE — 2580000003 HC RX 258: Performed by: EMERGENCY MEDICINE

## 2020-01-01 PROCEDURE — 6360000004 HC RX CONTRAST MEDICATION: Performed by: PHYSICIAN ASSISTANT

## 2020-01-01 PROCEDURE — 97161 PT EVAL LOW COMPLEX 20 MIN: CPT

## 2020-01-01 PROCEDURE — 99242 OFF/OP CONSLTJ NEW/EST SF 20: CPT | Performed by: OTOLARYNGOLOGY

## 2020-01-01 PROCEDURE — 73723 MRI JOINT LWR EXTR W/O&W/DYE: CPT

## 2020-01-01 PROCEDURE — 87798 DETECT AGENT NOS DNA AMP: CPT

## 2020-01-01 PROCEDURE — 83930 ASSAY OF BLOOD OSMOLALITY: CPT

## 2020-01-01 RX ORDER — CHOLECALCIFEROL (VITAMIN D3) 125 MCG
CAPSULE ORAL
COMMUNITY
Start: 2020-01-01 | End: 2020-01-01

## 2020-01-01 RX ORDER — DULOXETIN HYDROCHLORIDE 60 MG/1
60 CAPSULE, DELAYED RELEASE ORAL NIGHTLY
Status: CANCELLED | OUTPATIENT
Start: 2020-01-01

## 2020-01-01 RX ORDER — HEPARIN SODIUM (PORCINE) LOCK FLUSH IV SOLN 100 UNIT/ML 100 UNIT/ML
500 SOLUTION INTRAVENOUS PRN
Status: CANCELLED | OUTPATIENT
Start: 2020-01-01

## 2020-01-01 RX ORDER — FENTANYL CITRATE 50 UG/ML
INJECTION, SOLUTION INTRAMUSCULAR; INTRAVENOUS
Status: COMPLETED | OUTPATIENT
Start: 2020-01-01 | End: 2020-01-01

## 2020-01-01 RX ORDER — ONDANSETRON 2 MG/ML
4 INJECTION INTRAMUSCULAR; INTRAVENOUS EVERY 6 HOURS PRN
Status: DISCONTINUED | OUTPATIENT
Start: 2020-01-01 | End: 2020-01-01 | Stop reason: HOSPADM

## 2020-01-01 RX ORDER — LANOLIN ALCOHOL/MO/W.PET/CERES
100 CREAM (GRAM) TOPICAL DAILY
Status: DISCONTINUED | OUTPATIENT
Start: 2020-01-01 | End: 2020-01-01 | Stop reason: HOSPADM

## 2020-01-01 RX ORDER — HEPARIN SODIUM (PORCINE) LOCK FLUSH IV SOLN 100 UNIT/ML 100 UNIT/ML
500 SOLUTION INTRAVENOUS DAILY
Status: DISCONTINUED | OUTPATIENT
Start: 2020-01-01 | End: 2020-01-01 | Stop reason: SDUPTHER

## 2020-01-01 RX ORDER — LANOLIN ALCOHOL/MO/W.PET/CERES
500 CREAM (GRAM) TOPICAL DAILY
Status: DISCONTINUED | OUTPATIENT
Start: 2020-01-01 | End: 2020-01-01

## 2020-01-01 RX ORDER — LORAZEPAM 2 MG/ML
4 INJECTION INTRAMUSCULAR
Status: DISCONTINUED | OUTPATIENT
Start: 2020-01-01 | End: 2020-01-01

## 2020-01-01 RX ORDER — DULOXETIN HYDROCHLORIDE 60 MG/1
60 CAPSULE, DELAYED RELEASE ORAL NIGHTLY
Status: DISCONTINUED | OUTPATIENT
Start: 2020-01-01 | End: 2020-01-01 | Stop reason: HOSPADM

## 2020-01-01 RX ORDER — METOPROLOL TARTRATE 5 MG/5ML
INJECTION INTRAVENOUS
Status: DISCONTINUED
Start: 2020-01-01 | End: 2020-01-01

## 2020-01-01 RX ORDER — 0.9 % SODIUM CHLORIDE 0.9 %
1000 INTRAVENOUS SOLUTION INTRAVENOUS ONCE
Status: COMPLETED | OUTPATIENT
Start: 2020-01-01 | End: 2020-01-01

## 2020-01-01 RX ORDER — GABAPENTIN 100 MG/1
100 CAPSULE ORAL DAILY
Status: CANCELLED | OUTPATIENT
Start: 2020-01-01

## 2020-01-01 RX ORDER — FOLIC ACID 1 MG/1
800 TABLET ORAL DAILY
Status: DISCONTINUED | OUTPATIENT
Start: 2020-01-01 | End: 2020-01-01 | Stop reason: DRUGHIGH

## 2020-01-01 RX ORDER — MAGNESIUM CHLORIDE 64 MG
1 TABLET, DELAYED RELEASE (ENTERIC COATED) ORAL 2 TIMES DAILY
Status: DISCONTINUED | OUTPATIENT
Start: 2020-01-01 | End: 2020-01-01 | Stop reason: HOSPADM

## 2020-01-01 RX ORDER — MAGNESIUM SULFATE IN WATER 40 MG/ML
4 INJECTION, SOLUTION INTRAVENOUS ONCE
Status: COMPLETED | OUTPATIENT
Start: 2020-01-01 | End: 2020-01-01

## 2020-01-01 RX ORDER — HEPARIN SODIUM (PORCINE) LOCK FLUSH IV SOLN 100 UNIT/ML 100 UNIT/ML
500 SOLUTION INTRAVENOUS PRN
Status: DISCONTINUED | OUTPATIENT
Start: 2020-01-01 | End: 2020-01-01 | Stop reason: HOSPADM

## 2020-01-01 RX ORDER — ALBUMIN (HUMAN) 12.5 G/50ML
25 SOLUTION INTRAVENOUS EVERY 6 HOURS
Status: DISCONTINUED | OUTPATIENT
Start: 2020-01-01 | End: 2020-01-01

## 2020-01-01 RX ORDER — ESTRADIOL 10 UG/1
10 INSERT VAGINAL
Qty: 26 TABLET | Refills: 1 | Status: SHIPPED | OUTPATIENT
Start: 2020-01-01 | End: 2020-01-01

## 2020-01-01 RX ORDER — BACITRACIN, NEOMYCIN, POLYMYXIN B 400; 3.5; 5 [USP'U]/G; MG/G; [USP'U]/G
OINTMENT TOPICAL
Status: ON HOLD | COMMUNITY
Start: 2020-01-01 | End: 2020-01-01

## 2020-01-01 RX ORDER — GABAPENTIN 300 MG/1
300 CAPSULE ORAL NIGHTLY
Status: DISCONTINUED | OUTPATIENT
Start: 2020-01-01 | End: 2020-01-01 | Stop reason: HOSPADM

## 2020-01-01 RX ORDER — METOPROLOL TARTRATE 5 MG/5ML
INJECTION INTRAVENOUS
Status: DISCONTINUED
Start: 2020-01-01 | End: 2020-01-01 | Stop reason: WASHOUT

## 2020-01-01 RX ORDER — POLYETHYLENE GLYCOL 3350 17 G/17G
17 POWDER, FOR SOLUTION ORAL DAILY PRN
Status: DISCONTINUED | OUTPATIENT
Start: 2020-01-01 | End: 2020-01-01 | Stop reason: HOSPADM

## 2020-01-01 RX ORDER — PROPOFOL 10 MG/ML
INJECTION, EMULSION INTRAVENOUS
Status: COMPLETED | OUTPATIENT
Start: 2020-01-01 | End: 2020-01-01

## 2020-01-01 RX ORDER — METOPROLOL TARTRATE 5 MG/5ML
INJECTION INTRAVENOUS
Status: COMPLETED
Start: 2020-01-01 | End: 2020-01-01

## 2020-01-01 RX ORDER — BUMETANIDE 0.25 MG/ML
1 INJECTION, SOLUTION INTRAMUSCULAR; INTRAVENOUS 2 TIMES DAILY
Status: DISCONTINUED | OUTPATIENT
Start: 2020-01-01 | End: 2020-01-01

## 2020-01-01 RX ORDER — CALCITRIOL 0.25 UG/1
0.5 CAPSULE, LIQUID FILLED ORAL 4 TIMES DAILY
Status: DISCONTINUED | OUTPATIENT
Start: 2020-01-01 | End: 2020-01-01

## 2020-01-01 RX ORDER — ONDANSETRON 4 MG/1
4 TABLET, ORALLY DISINTEGRATING ORAL EVERY 8 HOURS PRN
Status: DISCONTINUED | OUTPATIENT
Start: 2020-01-01 | End: 2020-01-01 | Stop reason: SDUPTHER

## 2020-01-01 RX ORDER — CEFUROXIME AXETIL 250 MG/1
500 TABLET ORAL EVERY 12 HOURS SCHEDULED
Status: DISCONTINUED | OUTPATIENT
Start: 2020-01-01 | End: 2020-01-01 | Stop reason: HOSPADM

## 2020-01-01 RX ORDER — OXYCODONE HYDROCHLORIDE AND ACETAMINOPHEN 5; 325 MG/1; MG/1
1 TABLET ORAL EVERY 6 HOURS PRN
Status: DISCONTINUED | OUTPATIENT
Start: 2020-01-01 | End: 2020-01-01 | Stop reason: HOSPADM

## 2020-01-01 RX ORDER — AMILORIDE HYDROCHLORIDE 5 MG/1
5 TABLET ORAL DAILY
COMMUNITY
End: 2020-01-01 | Stop reason: DRUGHIGH

## 2020-01-01 RX ORDER — ACYCLOVIR 400 MG/1
400 TABLET ORAL 2 TIMES DAILY
Status: DISCONTINUED | OUTPATIENT
Start: 2020-01-01 | End: 2020-01-01 | Stop reason: HOSPADM

## 2020-01-01 RX ORDER — OYSTER SHELL CALCIUM WITH VITAMIN D 500; 200 MG/1; [IU]/1
1 TABLET, FILM COATED ORAL
Status: DISCONTINUED | OUTPATIENT
Start: 2020-01-01 | End: 2020-01-01

## 2020-01-01 RX ORDER — OXYCODONE HYDROCHLORIDE AND ACETAMINOPHEN 5; 325 MG/1; MG/1
1 TABLET ORAL EVERY 6 HOURS PRN
Status: CANCELLED | OUTPATIENT
Start: 2020-01-01

## 2020-01-01 RX ORDER — CEFAZOLIN SODIUM 1 G/50ML
1 INJECTION, SOLUTION INTRAVENOUS
Status: COMPLETED | OUTPATIENT
Start: 2020-01-01 | End: 2020-01-01

## 2020-01-01 RX ORDER — GABAPENTIN 100 MG/1
100 CAPSULE ORAL DAILY
Qty: 180 CAPSULE | Refills: 1 | Status: SHIPPED | OUTPATIENT
Start: 2020-01-01 | End: 2020-01-01 | Stop reason: SDUPTHER

## 2020-01-01 RX ORDER — TIZANIDINE 4 MG/1
4 TABLET ORAL NIGHTLY
Status: CANCELLED | OUTPATIENT
Start: 2020-01-01

## 2020-01-01 RX ORDER — SODIUM CHLORIDE 0.9 % (FLUSH) 0.9 %
10 SYRINGE (ML) INJECTION PRN
Status: CANCELLED | OUTPATIENT
Start: 2020-01-01

## 2020-01-01 RX ORDER — POTASSIUM CHLORIDE 750 MG/1
40 TABLET, FILM COATED, EXTENDED RELEASE ORAL ONCE
Status: COMPLETED | OUTPATIENT
Start: 2020-01-01 | End: 2020-01-01

## 2020-01-01 RX ORDER — CALCITRIOL 0.25 UG/1
0.25 CAPSULE, LIQUID FILLED ORAL 2 TIMES DAILY
Status: DISCONTINUED | OUTPATIENT
Start: 2020-01-01 | End: 2020-01-01

## 2020-01-01 RX ORDER — BUMETANIDE 1 MG/1
1 TABLET ORAL DAILY
Status: CANCELLED | OUTPATIENT
Start: 2020-01-01

## 2020-01-01 RX ORDER — GABAPENTIN 300 MG/1
300 CAPSULE ORAL NIGHTLY
Status: CANCELLED | OUTPATIENT
Start: 2020-01-01

## 2020-01-01 RX ORDER — LORAZEPAM 2 MG/1
4 TABLET ORAL
Status: DISCONTINUED | OUTPATIENT
Start: 2020-01-01 | End: 2020-01-01

## 2020-01-01 RX ORDER — SODIUM CHLORIDE 0.9 % (FLUSH) 0.9 %
10 SYRINGE (ML) INJECTION PRN
Status: DISCONTINUED | OUTPATIENT
Start: 2020-01-01 | End: 2020-01-01 | Stop reason: HOSPADM

## 2020-01-01 RX ORDER — BUMETANIDE 0.25 MG/ML
1 INJECTION, SOLUTION INTRAMUSCULAR; INTRAVENOUS ONCE
Status: COMPLETED | OUTPATIENT
Start: 2020-01-01 | End: 2020-01-01

## 2020-01-01 RX ORDER — SPIRONOLACTONE 25 MG/1
50 TABLET ORAL DAILY
Status: DISCONTINUED | OUTPATIENT
Start: 2020-01-01 | End: 2020-01-01 | Stop reason: HOSPADM

## 2020-01-01 RX ORDER — DULOXETIN HYDROCHLORIDE 30 MG/1
CAPSULE, DELAYED RELEASE ORAL
Qty: 90 CAPSULE | Refills: 0 | Status: SHIPPED | OUTPATIENT
Start: 2020-01-01

## 2020-01-01 RX ORDER — MAGNESIUM CHLORIDE 71.5 G/G
1 TABLET ORAL 4 TIMES DAILY
Qty: 3 TABLET | Refills: 0
Start: 2020-01-01

## 2020-01-01 RX ORDER — SODIUM CHLORIDE 0.9 % (FLUSH) 0.9 %
10 SYRINGE (ML) INJECTION EVERY 12 HOURS SCHEDULED
Status: DISCONTINUED | OUTPATIENT
Start: 2020-01-01 | End: 2020-01-01 | Stop reason: HOSPADM

## 2020-01-01 RX ORDER — LORAZEPAM 2 MG/ML
2 INJECTION INTRAMUSCULAR
Status: DISCONTINUED | OUTPATIENT
Start: 2020-01-01 | End: 2020-01-01

## 2020-01-01 RX ORDER — 0.9 % SODIUM CHLORIDE 0.9 %
20 INTRAVENOUS SOLUTION INTRAVENOUS ONCE
Status: DISCONTINUED | OUTPATIENT
Start: 2020-01-01 | End: 2020-01-01 | Stop reason: HOSPADM

## 2020-01-01 RX ORDER — ALBUMIN (HUMAN) 12.5 G/50ML
50 SOLUTION INTRAVENOUS DAILY
Status: DISCONTINUED | OUTPATIENT
Start: 2020-01-01 | End: 2020-01-01 | Stop reason: HOSPADM

## 2020-01-01 RX ORDER — CHOLECALCIFEROL (VITAMIN D3) 125 MCG
CAPSULE ORAL
Qty: 30 TABLET | Refills: 11 | Status: SHIPPED | OUTPATIENT
Start: 2020-01-01

## 2020-01-01 RX ORDER — TIZANIDINE 4 MG/1
4 TABLET ORAL NIGHTLY
Qty: 90 TABLET | Refills: 1 | Status: SHIPPED | OUTPATIENT
Start: 2020-01-01

## 2020-01-01 RX ORDER — LEVOTHYROXINE SODIUM 0.12 MG/1
250 TABLET ORAL DAILY
Status: DISCONTINUED | OUTPATIENT
Start: 2020-01-01 | End: 2020-01-01 | Stop reason: HOSPADM

## 2020-01-01 RX ORDER — ACETAMINOPHEN 325 MG/1
650 TABLET ORAL EVERY 4 HOURS PRN
Status: CANCELLED | OUTPATIENT
Start: 2020-01-01

## 2020-01-01 RX ORDER — BUMETANIDE 1 MG/1
1 TABLET ORAL DAILY
Qty: 14 TABLET | Refills: 0 | Status: SHIPPED | OUTPATIENT
Start: 2020-01-01 | End: 2020-01-01 | Stop reason: SDUPTHER

## 2020-01-01 RX ORDER — OXYCODONE HYDROCHLORIDE AND ACETAMINOPHEN 5; 325 MG/1; MG/1
1 TABLET ORAL EVERY 8 HOURS PRN
Qty: 21 TABLET | Refills: 0 | Status: SHIPPED | OUTPATIENT
Start: 2020-01-01 | End: 2020-01-01

## 2020-01-01 RX ORDER — HEPARIN SODIUM (PORCINE) LOCK FLUSH IV SOLN 100 UNIT/ML 100 UNIT/ML
500 SOLUTION INTRAVENOUS PRN
Status: CANCELLED
Start: 2020-01-01

## 2020-01-01 RX ORDER — IBUPROFEN 200 MG
TABLET ORAL ONCE
Status: COMPLETED | OUTPATIENT
Start: 2020-01-01 | End: 2020-01-01

## 2020-01-01 RX ORDER — SODIUM CHLORIDE 0.9 % (FLUSH) 0.9 %
10 SYRINGE (ML) INJECTION PRN
Status: CANCELLED
Start: 2020-01-01

## 2020-01-01 RX ORDER — 0.9 % SODIUM CHLORIDE 0.9 %
10 VIAL (ML) INJECTION PRN
Status: CANCELLED
Start: 2020-01-01

## 2020-01-01 RX ORDER — 0.9 % SODIUM CHLORIDE 0.9 %
10 VIAL (ML) INJECTION PRN
Status: DISCONTINUED | OUTPATIENT
Start: 2020-01-01 | End: 2020-01-01 | Stop reason: HOSPADM

## 2020-01-01 RX ORDER — KETAMINE HCL IN NACL, ISO-OSM 100MG/10ML
SYRINGE (ML) INJECTION
Status: COMPLETED | OUTPATIENT
Start: 2020-01-01 | End: 2020-01-01

## 2020-01-01 RX ORDER — MAGNESIUM SULFATE IN WATER 40 MG/ML
2 INJECTION, SOLUTION INTRAVENOUS ONCE
Status: COMPLETED | OUTPATIENT
Start: 2020-01-01 | End: 2020-01-01

## 2020-01-01 RX ORDER — FENTANYL CITRATE 50 UG/ML
50 INJECTION, SOLUTION INTRAMUSCULAR; INTRAVENOUS ONCE
Status: COMPLETED | OUTPATIENT
Start: 2020-01-01 | End: 2020-01-01

## 2020-01-01 RX ORDER — LEVOTHYROXINE SODIUM 0.1 MG/1
200 TABLET ORAL DAILY
Status: DISCONTINUED | OUTPATIENT
Start: 2020-01-01 | End: 2020-01-01 | Stop reason: HOSPADM

## 2020-01-01 RX ORDER — DULOXETIN HYDROCHLORIDE 30 MG/1
30 CAPSULE, DELAYED RELEASE ORAL DAILY
Status: DISCONTINUED | OUTPATIENT
Start: 2020-01-01 | End: 2020-01-01 | Stop reason: HOSPADM

## 2020-01-01 RX ORDER — FOLIC ACID 1 MG/1
1500 TABLET ORAL DAILY
Status: DISCONTINUED | OUTPATIENT
Start: 2020-01-01 | End: 2020-01-01 | Stop reason: HOSPADM

## 2020-01-01 RX ORDER — VITAMIN B COMPLEX
2000 TABLET ORAL DAILY
Status: DISCONTINUED | OUTPATIENT
Start: 2020-01-01 | End: 2020-01-01 | Stop reason: HOSPADM

## 2020-01-01 RX ORDER — SODIUM CHLORIDE 0.9 % (FLUSH) 0.9 %
10 SYRINGE (ML) INJECTION PRN
Status: DISCONTINUED | OUTPATIENT
Start: 2020-01-01 | End: 2020-01-01 | Stop reason: SDUPTHER

## 2020-01-01 RX ORDER — ACETAMINOPHEN 325 MG/1
650 TABLET ORAL EVERY 4 HOURS PRN
Status: DISCONTINUED | OUTPATIENT
Start: 2020-01-01 | End: 2020-01-01 | Stop reason: HOSPADM

## 2020-01-01 RX ORDER — MAGNESIUM CHLORIDE 64 MG
1 TABLET, DELAYED RELEASE (ENTERIC COATED) ORAL 2 TIMES DAILY
Status: DISCONTINUED | OUTPATIENT
Start: 2020-01-01 | End: 2020-01-01 | Stop reason: ALTCHOICE

## 2020-01-01 RX ORDER — LEVOTHYROXINE SODIUM 50 MCG
50 TABLET ORAL DAILY
Qty: 30 TABLET | Refills: 0 | Status: SHIPPED | OUTPATIENT
Start: 2020-01-01

## 2020-01-01 RX ORDER — NYSTATIN 100000 U/G
CREAM TOPICAL DAILY
Qty: 30 G | Refills: 1 | Status: SHIPPED | OUTPATIENT
Start: 2020-01-01 | End: 2020-01-01

## 2020-01-01 RX ORDER — BRAN 5 G
5 WAFER ORAL DAILY
Status: DISCONTINUED | OUTPATIENT
Start: 2020-01-01 | End: 2020-01-01 | Stop reason: HOSPADM

## 2020-01-01 RX ORDER — CYCLOBENZAPRINE HCL 10 MG
10 TABLET ORAL 3 TIMES DAILY PRN
Status: DISCONTINUED | OUTPATIENT
Start: 2020-01-01 | End: 2020-01-01

## 2020-01-01 RX ORDER — MAGNESIUM CHLORIDE 64 MG
1 TABLET, DELAYED RELEASE (ENTERIC COATED) ORAL 2 TIMES DAILY
Status: CANCELLED | OUTPATIENT
Start: 2020-01-01

## 2020-01-01 RX ORDER — DULOXETIN HYDROCHLORIDE 60 MG/1
60 CAPSULE, DELAYED RELEASE ORAL NIGHTLY
Qty: 90 CAPSULE | Refills: 1 | Status: SHIPPED | OUTPATIENT
Start: 2020-01-01

## 2020-01-01 RX ORDER — BUMETANIDE 1 MG/1
0.5 TABLET ORAL DAILY
Status: DISCONTINUED | OUTPATIENT
Start: 2020-01-01 | End: 2020-01-01 | Stop reason: HOSPADM

## 2020-01-01 RX ORDER — TIZANIDINE 4 MG/1
4 TABLET ORAL NIGHTLY
Qty: 90 TABLET | Refills: 1 | Status: SHIPPED | OUTPATIENT
Start: 2020-01-01 | End: 2020-01-01 | Stop reason: SDUPTHER

## 2020-01-01 RX ORDER — TIZANIDINE 4 MG/1
4 TABLET ORAL NIGHTLY
Status: DISCONTINUED | OUTPATIENT
Start: 2020-01-01 | End: 2020-01-01 | Stop reason: HOSPADM

## 2020-01-01 RX ORDER — LACTULOSE 10 G/15ML
30 SOLUTION ORAL 2 TIMES DAILY
Status: DISCONTINUED | OUTPATIENT
Start: 2020-01-01 | End: 2020-01-01 | Stop reason: HOSPADM

## 2020-01-01 RX ORDER — CEFUROXIME AXETIL 250 MG/1
500 TABLET ORAL EVERY 12 HOURS SCHEDULED
Status: COMPLETED | OUTPATIENT
Start: 2020-01-01 | End: 2020-01-01

## 2020-01-01 RX ORDER — POLYVINYL ALCOHOL 14 MG/ML
1 SOLUTION/ DROPS OPHTHALMIC EVERY 4 HOURS PRN
Status: DISCONTINUED | OUTPATIENT
Start: 2020-01-01 | End: 2020-01-01 | Stop reason: CLARIF

## 2020-01-01 RX ORDER — CALCITRIOL 0.25 UG/1
0.25 CAPSULE, LIQUID FILLED ORAL DAILY
Status: CANCELLED | OUTPATIENT
Start: 2020-01-01

## 2020-01-01 RX ORDER — DULOXETIN HYDROCHLORIDE 30 MG/1
30 CAPSULE, DELAYED RELEASE ORAL DAILY
Status: DISCONTINUED | OUTPATIENT
Start: 2020-01-01 | End: 2020-01-01 | Stop reason: CLARIF

## 2020-01-01 RX ORDER — GABAPENTIN 100 MG/1
100 CAPSULE ORAL DAILY
Status: DISCONTINUED | OUTPATIENT
Start: 2020-01-01 | End: 2020-01-01 | Stop reason: HOSPADM

## 2020-01-01 RX ORDER — 0.9 % SODIUM CHLORIDE 0.9 %
500 INTRAVENOUS SOLUTION INTRAVENOUS ONCE
Status: COMPLETED | OUTPATIENT
Start: 2020-01-01 | End: 2020-01-01

## 2020-01-01 RX ORDER — DEXTROSE MONOHYDRATE 50 MG/ML
100 INJECTION, SOLUTION INTRAVENOUS PRN
Status: DISCONTINUED | OUTPATIENT
Start: 2020-01-01 | End: 2020-01-01 | Stop reason: HOSPADM

## 2020-01-01 RX ORDER — ACETAMINOPHEN 160 MG
1 TABLET,DISINTEGRATING ORAL
COMMUNITY
Start: 2020-01-01 | End: 2020-01-01

## 2020-01-01 RX ORDER — ACYCLOVIR 400 MG/1
400 TABLET ORAL 2 TIMES DAILY
Qty: 180 TABLET | Refills: 1 | Status: SHIPPED | OUTPATIENT
Start: 2020-01-01 | End: 2020-01-01

## 2020-01-01 RX ORDER — ALBUMIN (HUMAN) 12.5 G/50ML
25 SOLUTION INTRAVENOUS ONCE
Status: COMPLETED | OUTPATIENT
Start: 2020-01-01 | End: 2020-01-01

## 2020-01-01 RX ORDER — 0.9 % SODIUM CHLORIDE 0.9 %
20 INTRAVENOUS SOLUTION INTRAVENOUS ONCE
Status: COMPLETED | OUTPATIENT
Start: 2020-01-01 | End: 2020-01-01

## 2020-01-01 RX ORDER — DULOXETIN HYDROCHLORIDE 30 MG/1
CAPSULE, DELAYED RELEASE ORAL
Qty: 90 CAPSULE | Refills: 0 | Status: SHIPPED | OUTPATIENT
Start: 2020-01-01 | End: 2020-01-01 | Stop reason: SDUPTHER

## 2020-01-01 RX ORDER — FAMOTIDINE 20 MG/1
20 TABLET, FILM COATED ORAL 2 TIMES DAILY
Status: CANCELLED | OUTPATIENT
Start: 2020-01-01

## 2020-01-01 RX ORDER — NYSTATIN 100000 U/G
CREAM TOPICAL
COMMUNITY
Start: 2019-01-01 | End: 2020-01-01 | Stop reason: SDUPTHER

## 2020-01-01 RX ORDER — TRIAMCINOLONE ACETONIDE 1 MG/G
CREAM TOPICAL
COMMUNITY
Start: 2019-01-01 | End: 2020-01-01 | Stop reason: ALTCHOICE

## 2020-01-01 RX ORDER — GABAPENTIN 300 MG/1
300 CAPSULE ORAL NIGHTLY
Qty: 90 CAPSULE | Refills: 1 | Status: SHIPPED | OUTPATIENT
Start: 2020-01-01 | End: 2021-01-12

## 2020-01-01 RX ORDER — FUROSEMIDE 10 MG/ML
20 INJECTION INTRAMUSCULAR; INTRAVENOUS ONCE
Status: COMPLETED | OUTPATIENT
Start: 2020-01-01 | End: 2020-01-01

## 2020-01-01 RX ORDER — CEFUROXIME AXETIL 250 MG/1
500 TABLET ORAL EVERY 12 HOURS SCHEDULED
Status: CANCELLED | OUTPATIENT
Start: 2020-01-01 | End: 2020-01-01

## 2020-01-01 RX ORDER — THIAMINE MONONITRATE (VIT B1) 100 MG
100 TABLET ORAL DAILY
Status: DISCONTINUED | OUTPATIENT
Start: 2020-01-01 | End: 2020-01-01 | Stop reason: HOSPADM

## 2020-01-01 RX ORDER — PHYTONADIONE (VIT K1) 100 MCG
3 TABLET ORAL DAILY
Status: DISCONTINUED | OUTPATIENT
Start: 2020-01-01 | End: 2020-01-01 | Stop reason: RX

## 2020-01-01 RX ORDER — BUMETANIDE 1 MG/1
1 TABLET ORAL DAILY
Qty: 30 TABLET | Refills: 0 | Status: SHIPPED | OUTPATIENT
Start: 2020-01-01 | End: 2020-01-01 | Stop reason: SDUPTHER

## 2020-01-01 RX ORDER — CALCITRIOL 0.25 UG/1
0.5 CAPSULE, LIQUID FILLED ORAL 2 TIMES DAILY
Status: DISCONTINUED | OUTPATIENT
Start: 2020-01-01 | End: 2020-01-01 | Stop reason: HOSPADM

## 2020-01-01 RX ORDER — FUROSEMIDE 10 MG/ML
40 INJECTION INTRAMUSCULAR; INTRAVENOUS ONCE
Status: COMPLETED | OUTPATIENT
Start: 2020-01-01 | End: 2020-01-01

## 2020-01-01 RX ORDER — HEPARIN SODIUM (PORCINE) LOCK FLUSH IV SOLN 100 UNIT/ML 100 UNIT/ML
500 SOLUTION INTRAVENOUS ONCE
Status: COMPLETED | OUTPATIENT
Start: 2020-01-01 | End: 2020-01-01

## 2020-01-01 RX ORDER — POTASSIUM CHLORIDE 20 MEQ/1
40 TABLET, EXTENDED RELEASE ORAL ONCE
Status: COMPLETED | OUTPATIENT
Start: 2020-01-01 | End: 2020-01-01

## 2020-01-01 RX ORDER — FENTANYL CITRATE 50 UG/ML
INJECTION, SOLUTION INTRAMUSCULAR; INTRAVENOUS
Status: DISPENSED
Start: 2020-01-01 | End: 2020-01-01

## 2020-01-01 RX ORDER — ACYCLOVIR 400 MG/1
TABLET ORAL
Qty: 180 TABLET | Refills: 0 | Status: SHIPPED | OUTPATIENT
Start: 2020-01-01 | End: 2020-01-01 | Stop reason: SDUPTHER

## 2020-01-01 RX ORDER — PHYTONADIONE (VIT K1) 100 MCG
100 TABLET ORAL DAILY
Status: DISCONTINUED | OUTPATIENT
Start: 2020-01-01 | End: 2020-01-01 | Stop reason: HOSPADM

## 2020-01-01 RX ORDER — SPIRONOLACTONE 25 MG/1
50 TABLET ORAL ONCE
Status: COMPLETED | OUTPATIENT
Start: 2020-01-01 | End: 2020-01-01

## 2020-01-01 RX ORDER — FAMOTIDINE 20 MG/1
20 TABLET, FILM COATED ORAL 2 TIMES DAILY
Status: DISCONTINUED | OUTPATIENT
Start: 2020-01-01 | End: 2020-01-01 | Stop reason: CLARIF

## 2020-01-01 RX ORDER — ONDANSETRON 2 MG/ML
4 INJECTION INTRAMUSCULAR; INTRAVENOUS EVERY 6 HOURS PRN
Status: DISCONTINUED | OUTPATIENT
Start: 2020-01-01 | End: 2020-01-01 | Stop reason: SDUPTHER

## 2020-01-01 RX ORDER — PHENOBARBITAL SODIUM 65 MG/ML
260 INJECTION INTRAMUSCULAR
Status: DISCONTINUED | OUTPATIENT
Start: 2020-01-01 | End: 2020-01-01 | Stop reason: HOSPADM

## 2020-01-01 RX ORDER — ACETAMINOPHEN 325 MG/1
650 TABLET ORAL ONCE
Status: COMPLETED | OUTPATIENT
Start: 2020-01-01 | End: 2020-01-01

## 2020-01-01 RX ORDER — VITAMIN B COMPLEX
2000 TABLET ORAL DAILY
Status: CANCELLED | OUTPATIENT
Start: 2020-01-01

## 2020-01-01 RX ORDER — MIDAZOLAM HYDROCHLORIDE 1 MG/ML
1 INJECTION INTRAMUSCULAR; INTRAVENOUS ONCE
Status: COMPLETED | OUTPATIENT
Start: 2020-01-01 | End: 2020-01-01

## 2020-01-01 RX ORDER — ONDANSETRON 4 MG/1
4 TABLET, FILM COATED ORAL EVERY 8 HOURS PRN
COMMUNITY
End: 2020-01-01 | Stop reason: CLARIF

## 2020-01-01 RX ORDER — BUMETANIDE 1 MG/1
1 TABLET ORAL DAILY
Status: DISCONTINUED | OUTPATIENT
Start: 2020-01-01 | End: 2020-01-01 | Stop reason: HOSPADM

## 2020-01-01 RX ORDER — BUMETANIDE 1 MG/1
TABLET ORAL
Qty: 90 TABLET | Refills: 1 | Status: SHIPPED | OUTPATIENT
Start: 2020-01-01

## 2020-01-01 RX ORDER — POTASSIUM CHLORIDE 29.8 MG/ML
20 INJECTION INTRAVENOUS PRN
Status: DISCONTINUED | OUTPATIENT
Start: 2020-01-01 | End: 2020-01-01 | Stop reason: HOSPADM

## 2020-01-01 RX ORDER — OXYCODONE HYDROCHLORIDE AND ACETAMINOPHEN 5; 325 MG/1; MG/1
1 TABLET ORAL EVERY 8 HOURS PRN
COMMUNITY
End: 2020-01-01 | Stop reason: ALTCHOICE

## 2020-01-01 RX ORDER — ESTRADIOL 10 UG/1
10 INSERT VAGINAL
Status: DISCONTINUED | OUTPATIENT
Start: 2020-01-01 | End: 2020-01-01 | Stop reason: RX

## 2020-01-01 RX ORDER — POTASSIUM CHLORIDE 20 MEQ/1
60 TABLET, EXTENDED RELEASE ORAL ONCE
Status: COMPLETED | OUTPATIENT
Start: 2020-01-01 | End: 2020-01-01

## 2020-01-01 RX ORDER — ALBUMIN (HUMAN) 12.5 G/50ML
50 SOLUTION INTRAVENOUS DAILY
Status: CANCELLED | OUTPATIENT
Start: 2020-01-01

## 2020-01-01 RX ORDER — FUROSEMIDE 20 MG/1
20 TABLET ORAL DAILY
Qty: 30 TABLET | Refills: 0 | Status: SHIPPED | OUTPATIENT
Start: 2020-01-01 | End: 2020-10-05

## 2020-01-01 RX ORDER — DULOXETIN HYDROCHLORIDE 60 MG/1
60 CAPSULE, DELAYED RELEASE ORAL NIGHTLY
Qty: 90 CAPSULE | Refills: 1 | Status: SHIPPED | OUTPATIENT
Start: 2020-01-01 | End: 2020-01-01 | Stop reason: SDUPTHER

## 2020-01-01 RX ORDER — PANTOPRAZOLE SODIUM 40 MG/1
40 TABLET, DELAYED RELEASE ORAL
Status: DISCONTINUED | OUTPATIENT
Start: 2020-01-01 | End: 2020-01-01 | Stop reason: HOSPADM

## 2020-01-01 RX ORDER — IPRATROPIUM BROMIDE AND ALBUTEROL SULFATE 2.5; .5 MG/3ML; MG/3ML
1 SOLUTION RESPIRATORY (INHALATION)
Status: DISCONTINUED | OUTPATIENT
Start: 2020-01-01 | End: 2020-01-01

## 2020-01-01 RX ORDER — BUMETANIDE 0.25 MG/ML
0.5 INJECTION, SOLUTION INTRAMUSCULAR; INTRAVENOUS 2 TIMES DAILY
Status: DISCONTINUED | OUTPATIENT
Start: 2020-01-01 | End: 2020-01-01

## 2020-01-01 RX ORDER — BUMETANIDE 1 MG/1
1 TABLET ORAL DAILY
Qty: 90 TABLET | Refills: 1 | Status: ON HOLD | OUTPATIENT
Start: 2020-01-01 | End: 2020-01-01 | Stop reason: SDUPTHER

## 2020-01-01 RX ORDER — ALBUMIN (HUMAN) 12.5 G/50ML
25 SOLUTION INTRAVENOUS DAILY
Status: DISCONTINUED | OUTPATIENT
Start: 2020-01-01 | End: 2020-01-01 | Stop reason: HOSPADM

## 2020-01-01 RX ORDER — SODIUM CHLORIDE 9 MG/ML
INJECTION, SOLUTION INTRAVENOUS CONTINUOUS
Status: DISCONTINUED | OUTPATIENT
Start: 2020-01-01 | End: 2020-01-01

## 2020-01-01 RX ORDER — LEVOTHYROXINE SODIUM 0.1 MG/1
200 TABLET ORAL DAILY
Status: DISCONTINUED | OUTPATIENT
Start: 2020-01-01 | End: 2020-01-01 | Stop reason: CLARIF

## 2020-01-01 RX ORDER — PHENOBARBITAL SODIUM 65 MG/ML
130 INJECTION INTRAMUSCULAR
Status: DISCONTINUED | OUTPATIENT
Start: 2020-01-01 | End: 2020-01-01 | Stop reason: HOSPADM

## 2020-01-01 RX ORDER — SODIUM CHLORIDE 9 MG/ML
INJECTION, SOLUTION INTRAVENOUS CONTINUOUS
Status: DISCONTINUED | OUTPATIENT
Start: 2020-01-01 | End: 2020-01-01 | Stop reason: SDUPTHER

## 2020-01-01 RX ORDER — AMILORIDE HYDROCHLORIDE 5 MG/1
TABLET ORAL
Qty: 30 TABLET | Refills: 3 | Status: SHIPPED | OUTPATIENT
Start: 2020-01-01

## 2020-01-01 RX ORDER — FOLIC ACID 1 MG/1
1500 TABLET ORAL DAILY
Status: CANCELLED | OUTPATIENT
Start: 2020-01-01

## 2020-01-01 RX ORDER — GABAPENTIN 300 MG/1
300 CAPSULE ORAL NIGHTLY
Qty: 90 CAPSULE | Refills: 1 | Status: SHIPPED | OUTPATIENT
Start: 2020-01-01 | End: 2020-01-01 | Stop reason: SDUPTHER

## 2020-01-01 RX ORDER — CEFTRIAXONE 500 MG/1
2 INJECTION, POWDER, FOR SOLUTION INTRAMUSCULAR; INTRAVENOUS EVERY 24 HOURS
Qty: 46000 MG | Refills: 0 | Status: SHIPPED | OUTPATIENT
Start: 2020-01-01 | End: 2020-01-01

## 2020-01-01 RX ORDER — SODIUM CHLORIDE 450 MG/100ML
INJECTION, SOLUTION INTRAVENOUS CONTINUOUS
Status: CANCELLED | OUTPATIENT
Start: 2020-01-01

## 2020-01-01 RX ORDER — PROPOFOL 10 MG/ML
INJECTION, EMULSION INTRAVENOUS
Status: DISPENSED
Start: 2020-01-01 | End: 2020-01-01

## 2020-01-01 RX ORDER — METOPROLOL TARTRATE 5 MG/5ML
5 INJECTION INTRAVENOUS ONCE
Status: COMPLETED | OUTPATIENT
Start: 2020-01-01 | End: 2020-01-01

## 2020-01-01 RX ORDER — LOPERAMIDE HYDROCHLORIDE 2 MG/1
4 CAPSULE ORAL 3 TIMES DAILY PRN
Status: DISCONTINUED | OUTPATIENT
Start: 2020-01-01 | End: 2020-01-01 | Stop reason: HOSPADM

## 2020-01-01 RX ORDER — PROMETHAZINE HYDROCHLORIDE 25 MG/1
12.5 TABLET ORAL EVERY 6 HOURS PRN
Status: DISCONTINUED | OUTPATIENT
Start: 2020-01-01 | End: 2020-01-01 | Stop reason: HOSPADM

## 2020-01-01 RX ORDER — OXYCODONE HYDROCHLORIDE AND ACETAMINOPHEN 5; 325 MG/1; MG/1
1 TABLET ORAL EVERY 4 HOURS PRN
Status: DISCONTINUED | OUTPATIENT
Start: 2020-01-01 | End: 2020-01-01

## 2020-01-01 RX ORDER — CALCITRIOL 0.5 UG/1
CAPSULE, LIQUID FILLED ORAL
Qty: 30 CAPSULE | Refills: 3
Start: 2020-01-01

## 2020-01-01 RX ORDER — CALCITRIOL 0.25 UG/1
0.25 CAPSULE, LIQUID FILLED ORAL DAILY
Status: DISCONTINUED | OUTPATIENT
Start: 2020-01-01 | End: 2020-01-01 | Stop reason: HOSPADM

## 2020-01-01 RX ORDER — SENNA PLUS 8.6 MG/1
1 TABLET ORAL NIGHTLY PRN
Status: DISCONTINUED | OUTPATIENT
Start: 2020-01-01 | End: 2020-01-01 | Stop reason: HOSPADM

## 2020-01-01 RX ORDER — SPIRONOLACTONE 25 MG/1
12.5 TABLET ORAL DAILY
Status: DISCONTINUED | OUTPATIENT
Start: 2020-01-01 | End: 2020-01-01 | Stop reason: HOSPADM

## 2020-01-01 RX ORDER — IPRATROPIUM BROMIDE AND ALBUTEROL SULFATE 2.5; .5 MG/3ML; MG/3ML
1 SOLUTION RESPIRATORY (INHALATION) EVERY 4 HOURS PRN
Status: DISCONTINUED | OUTPATIENT
Start: 2020-01-01 | End: 2020-01-01 | Stop reason: HOSPADM

## 2020-01-01 RX ORDER — CYCLOBENZAPRINE HCL 10 MG
5 TABLET ORAL 3 TIMES DAILY PRN
Status: DISCONTINUED | OUTPATIENT
Start: 2020-01-01 | End: 2020-01-01 | Stop reason: HOSPADM

## 2020-01-01 RX ORDER — LEVOTHYROXINE SODIUM 0.05 MG/1
50 TABLET ORAL DAILY
Status: DISCONTINUED | OUTPATIENT
Start: 2020-01-01 | End: 2020-01-01 | Stop reason: HOSPADM

## 2020-01-01 RX ORDER — BRAN 5 G
5 WAFER ORAL DAILY
Status: CANCELLED | OUTPATIENT
Start: 2020-01-01

## 2020-01-01 RX ORDER — NYSTATIN 100000 U/G
30 CREAM TOPICAL
COMMUNITY
Start: 2019-01-01 | End: 2020-01-01

## 2020-01-01 RX ORDER — ACETAMINOPHEN 650 MG/1
650 SUPPOSITORY RECTAL EVERY 6 HOURS PRN
Status: DISCONTINUED | OUTPATIENT
Start: 2020-01-01 | End: 2020-01-01 | Stop reason: HOSPADM

## 2020-01-01 RX ORDER — BUMETANIDE 1 MG/1
1 TABLET ORAL DAILY
Qty: 90 TABLET | Refills: 1 | Status: SHIPPED | OUTPATIENT
Start: 2020-01-01 | End: 2020-01-01 | Stop reason: SDUPTHER

## 2020-01-01 RX ORDER — DEXTROSE MONOHYDRATE 25 G/50ML
25 INJECTION, SOLUTION INTRAVENOUS ONCE
Status: COMPLETED | OUTPATIENT
Start: 2020-01-01 | End: 2020-01-01

## 2020-01-01 RX ORDER — HEPARIN SODIUM (PORCINE) LOCK FLUSH IV SOLN 100 UNIT/ML 100 UNIT/ML
300 SOLUTION INTRAVENOUS ONCE
Status: COMPLETED | OUTPATIENT
Start: 2020-01-01 | End: 2020-01-01

## 2020-01-01 RX ORDER — ACYCLOVIR 400 MG/1
400 TABLET ORAL DAILY
Status: DISCONTINUED | OUTPATIENT
Start: 2020-01-01 | End: 2020-01-01 | Stop reason: DRUGHIGH

## 2020-01-01 RX ORDER — LEVOTHYROXINE SODIUM 0.07 MG/1
75000 TABLET ORAL DAILY
Status: DISCONTINUED | OUTPATIENT
Start: 2020-01-01 | End: 2020-01-01 | Stop reason: CLARIF

## 2020-01-01 RX ORDER — ACETAMINOPHEN 325 MG/1
650 TABLET ORAL EVERY 6 HOURS PRN
Status: DISCONTINUED | OUTPATIENT
Start: 2020-01-01 | End: 2020-01-01 | Stop reason: HOSPADM

## 2020-01-01 RX ORDER — LOPERAMIDE HYDROCHLORIDE 2 MG/1
4 CAPSULE ORAL 4 TIMES DAILY PRN
Status: DISCONTINUED | OUTPATIENT
Start: 2020-01-01 | End: 2020-01-01 | Stop reason: HOSPADM

## 2020-01-01 RX ORDER — PROPOFOL 10 MG/ML
10 INJECTION, EMULSION INTRAVENOUS
Status: DISCONTINUED | OUTPATIENT
Start: 2020-01-01 | End: 2020-01-01 | Stop reason: HOSPADM

## 2020-01-01 RX ORDER — SODIUM CHLORIDE 0.9 % (FLUSH) 0.9 %
10 SYRINGE (ML) INJECTION EVERY 12 HOURS SCHEDULED
Status: DISCONTINUED | OUTPATIENT
Start: 2020-01-01 | End: 2020-01-01 | Stop reason: SDUPTHER

## 2020-01-01 RX ORDER — LORAZEPAM 2 MG/ML
3 INJECTION INTRAMUSCULAR
Status: DISCONTINUED | OUTPATIENT
Start: 2020-01-01 | End: 2020-01-01

## 2020-01-01 RX ORDER — OYSTER SHELL CALCIUM WITH VITAMIN D 500; 200 MG/1; [IU]/1
4 TABLET, FILM COATED ORAL NIGHTLY
Status: DISCONTINUED | OUTPATIENT
Start: 2020-01-01 | End: 2020-01-01

## 2020-01-01 RX ORDER — LORAZEPAM 2 MG/ML
1 INJECTION INTRAMUSCULAR
Status: DISCONTINUED | OUTPATIENT
Start: 2020-01-01 | End: 2020-01-01

## 2020-01-01 RX ORDER — ONDANSETRON 2 MG/ML
4 INJECTION INTRAMUSCULAR; INTRAVENOUS EVERY 6 HOURS PRN
Status: DISCONTINUED | OUTPATIENT
Start: 2020-01-01 | End: 2020-01-01

## 2020-01-01 RX ORDER — DEXAMETHASONE SODIUM PHOSPHATE 4 MG/ML
4 INJECTION, SOLUTION INTRA-ARTICULAR; INTRALESIONAL; INTRAMUSCULAR; INTRAVENOUS; SOFT TISSUE DAILY
Status: DISCONTINUED | OUTPATIENT
Start: 2020-01-01 | End: 2020-01-01 | Stop reason: HOSPADM

## 2020-01-01 RX ORDER — 0.9 % SODIUM CHLORIDE 0.9 %
20 INTRAVENOUS SOLUTION INTRAVENOUS ONCE
Status: DISCONTINUED | OUTPATIENT
Start: 2020-01-01 | End: 2020-01-01

## 2020-01-01 RX ORDER — FAMOTIDINE 20 MG/1
20 TABLET, FILM COATED ORAL 2 TIMES DAILY
Status: DISCONTINUED | OUTPATIENT
Start: 2020-01-01 | End: 2020-01-01 | Stop reason: HOSPADM

## 2020-01-01 RX ORDER — BUMETANIDE 1 MG/1
1 TABLET ORAL DAILY
Qty: 90 TABLET | Refills: 0 | Status: SHIPPED | OUTPATIENT
Start: 2020-01-01 | End: 2020-01-01 | Stop reason: SDUPTHER

## 2020-01-01 RX ORDER — CALCITRIOL 0.5 UG/1
0.5 CAPSULE, LIQUID FILLED ORAL 2 TIMES DAILY
Status: ON HOLD | COMMUNITY
End: 2020-01-01 | Stop reason: SDUPTHER

## 2020-01-01 RX ORDER — LOPERAMIDE HYDROCHLORIDE 2 MG/1
2 CAPSULE ORAL 3 TIMES DAILY PRN
Status: DISCONTINUED | OUTPATIENT
Start: 2020-01-01 | End: 2020-01-01 | Stop reason: HOSPADM

## 2020-01-01 RX ORDER — FOLIC ACID 1 MG/1
1 TABLET ORAL DAILY
Status: DISCONTINUED | OUTPATIENT
Start: 2020-01-01 | End: 2020-01-01 | Stop reason: HOSPADM

## 2020-01-01 RX ORDER — PANTOPRAZOLE SODIUM 40 MG/1
40 TABLET, DELAYED RELEASE ORAL
Status: DISCONTINUED | OUTPATIENT
Start: 2020-01-01 | End: 2020-01-01

## 2020-01-01 RX ORDER — CALCITRIOL 0.25 UG/1
0.5 CAPSULE, LIQUID FILLED ORAL DAILY
Status: DISCONTINUED | OUTPATIENT
Start: 2020-01-01 | End: 2020-01-01 | Stop reason: HOSPADM

## 2020-01-01 RX ORDER — ESTRADIOL 10 UG/1
INSERT VAGINAL
Qty: 40 TABLET | Refills: 3 | Status: SHIPPED | OUTPATIENT
Start: 2020-01-01

## 2020-01-01 RX ORDER — CALCITRIOL 0.25 UG/1
0.5 CAPSULE, LIQUID FILLED ORAL 2 TIMES DAILY
Status: DISCONTINUED | OUTPATIENT
Start: 2020-01-01 | End: 2020-01-01

## 2020-01-01 RX ORDER — SODIUM CHLORIDE 450 MG/100ML
INJECTION, SOLUTION INTRAVENOUS CONTINUOUS
Status: DISCONTINUED | OUTPATIENT
Start: 2020-01-01 | End: 2020-01-01 | Stop reason: HOSPADM

## 2020-01-01 RX ORDER — BUMETANIDE 1 MG/1
TABLET ORAL
Qty: 90 TABLET | Refills: 1
Start: 2020-01-01 | End: 2020-01-01 | Stop reason: SDUPTHER

## 2020-01-01 RX ORDER — POTASSIUM CHLORIDE 750 MG/1
10 CAPSULE, EXTENDED RELEASE ORAL DAILY
COMMUNITY
Start: 2020-01-01

## 2020-01-01 RX ORDER — MULTIVITAMIN WITH IRON
100 TABLET ORAL DAILY
COMMUNITY

## 2020-01-01 RX ORDER — VITAMIN B COMPLEX
10000 TABLET ORAL DAILY
Status: DISCONTINUED | OUTPATIENT
Start: 2020-01-01 | End: 2020-01-01

## 2020-01-01 RX ORDER — ORPHENADRINE CITRATE 30 MG/ML
60 INJECTION INTRAMUSCULAR; INTRAVENOUS ONCE
Status: COMPLETED | OUTPATIENT
Start: 2020-01-01 | End: 2020-01-01

## 2020-01-01 RX ORDER — ZINC GLUCONATE 50 MG
50 TABLET ORAL DAILY
Status: DISCONTINUED | OUTPATIENT
Start: 2020-01-01 | End: 2020-01-01 | Stop reason: RX

## 2020-01-01 RX ORDER — ACYCLOVIR 400 MG/1
TABLET ORAL
Qty: 180 TABLET | Refills: 3 | Status: SHIPPED | OUTPATIENT
Start: 2020-01-01

## 2020-01-01 RX ORDER — FENTANYL CITRATE 50 UG/ML
25 INJECTION, SOLUTION INTRAMUSCULAR; INTRAVENOUS
Status: DISCONTINUED | OUTPATIENT
Start: 2020-01-01 | End: 2020-01-01 | Stop reason: HOSPADM

## 2020-01-01 RX ORDER — CEFUROXIME AXETIL 250 MG/1
500 TABLET ORAL EVERY 12 HOURS SCHEDULED
Status: DISCONTINUED | OUTPATIENT
Start: 2020-01-01 | End: 2020-01-01

## 2020-01-01 RX ORDER — 0.9 % SODIUM CHLORIDE 0.9 %
1000 INTRAVENOUS SOLUTION INTRAVENOUS ONCE
Status: DISCONTINUED | OUTPATIENT
Start: 2020-01-01 | End: 2020-01-01

## 2020-01-01 RX ORDER — LORAZEPAM 1 MG/1
1 TABLET ORAL
Status: DISCONTINUED | OUTPATIENT
Start: 2020-01-01 | End: 2020-01-01

## 2020-01-01 RX ORDER — LANOLIN ALCOHOL/MO/W.PET/CERES
100 CREAM (GRAM) TOPICAL DAILY
Status: CANCELLED | OUTPATIENT
Start: 2020-01-01

## 2020-01-01 RX ORDER — ALBUMIN (HUMAN) 12.5 G/50ML
25 SOLUTION INTRAVENOUS DAILY
Status: DISCONTINUED | OUTPATIENT
Start: 2020-01-01 | End: 2020-01-01

## 2020-01-01 RX ORDER — POTASSIUM CHLORIDE 29.8 MG/ML
20 INJECTION INTRAVENOUS
Status: DISCONTINUED | OUTPATIENT
Start: 2020-01-01 | End: 2020-01-01 | Stop reason: SDUPTHER

## 2020-01-01 RX ORDER — LACTULOSE 10 G/15ML
20 SOLUTION ORAL EVERY 12 HOURS
Status: COMPLETED | OUTPATIENT
Start: 2020-01-01 | End: 2020-01-01

## 2020-01-01 RX ORDER — LEVOTHYROXINE SODIUM 0.12 MG/1
250 TABLET ORAL DAILY
Status: CANCELLED | OUTPATIENT
Start: 2020-01-01

## 2020-01-01 RX ORDER — GABAPENTIN 100 MG/1
100 CAPSULE ORAL DAILY
Qty: 180 CAPSULE | Refills: 1 | Status: SHIPPED | OUTPATIENT
Start: 2020-01-01 | End: 2020-10-14

## 2020-01-01 RX ORDER — POTASSIUM CHLORIDE 29.8 MG/ML
20 INJECTION INTRAVENOUS
Status: COMPLETED | OUTPATIENT
Start: 2020-01-01 | End: 2020-01-01

## 2020-01-01 RX ORDER — CYCLOBENZAPRINE HCL 10 MG
5 TABLET ORAL 3 TIMES DAILY PRN
Status: CANCELLED | OUTPATIENT
Start: 2020-01-01

## 2020-01-01 RX ORDER — NICOTINE POLACRILEX 4 MG
15 LOZENGE BUCCAL PRN
Status: DISCONTINUED | OUTPATIENT
Start: 2020-01-01 | End: 2020-01-01 | Stop reason: HOSPADM

## 2020-01-01 RX ORDER — LORAZEPAM 2 MG/ML
2 INJECTION INTRAMUSCULAR ONCE
Status: COMPLETED | OUTPATIENT
Start: 2020-01-01 | End: 2020-01-01

## 2020-01-01 RX ORDER — DULOXETIN HYDROCHLORIDE 30 MG/1
30 CAPSULE, DELAYED RELEASE ORAL DAILY
Status: CANCELLED | OUTPATIENT
Start: 2020-01-01

## 2020-01-01 RX ORDER — BUMETANIDE 1 MG/1
1 TABLET ORAL DAILY
Status: DISCONTINUED | OUTPATIENT
Start: 2020-01-01 | End: 2020-01-01

## 2020-01-01 RX ORDER — ONDANSETRON 2 MG/ML
4 INJECTION INTRAMUSCULAR; INTRAVENOUS EVERY 6 HOURS PRN
Status: CANCELLED | OUTPATIENT
Start: 2020-01-01

## 2020-01-01 RX ORDER — DILTIAZEM HYDROCHLORIDE 5 MG/ML
10 INJECTION INTRAVENOUS ONCE
Status: COMPLETED | OUTPATIENT
Start: 2020-01-01 | End: 2020-01-01

## 2020-01-01 RX ORDER — KETAMINE HCL IN NACL, ISO-OSM 100MG/10ML
SYRINGE (ML) INJECTION
Status: DISPENSED
Start: 2020-01-01 | End: 2020-01-01

## 2020-01-01 RX ORDER — ACYCLOVIR 400 MG/1
400 TABLET ORAL 2 TIMES DAILY
Status: CANCELLED | OUTPATIENT
Start: 2020-01-01

## 2020-01-01 RX ORDER — AMILORIDE HYDROCHLORIDE 5 MG/1
2.5 TABLET ORAL DAILY
Status: ON HOLD | COMMUNITY
Start: 2020-01-01 | End: 2020-01-01 | Stop reason: SDUPTHER

## 2020-01-01 RX ORDER — DEXTROSE MONOHYDRATE 25 G/50ML
12.5 INJECTION, SOLUTION INTRAVENOUS PRN
Status: DISCONTINUED | OUTPATIENT
Start: 2020-01-01 | End: 2020-01-01 | Stop reason: HOSPADM

## 2020-01-01 RX ORDER — LOPERAMIDE HYDROCHLORIDE 2 MG/1
4 CAPSULE ORAL 4 TIMES DAILY PRN
COMMUNITY
Start: 2020-01-01

## 2020-01-01 RX ORDER — LANOLIN ALCOHOL/MO/W.PET/CERES
500 CREAM (GRAM) TOPICAL DAILY
Status: DISCONTINUED | OUTPATIENT
Start: 2020-01-01 | End: 2020-01-01 | Stop reason: HOSPADM

## 2020-01-01 RX ORDER — SPIRONOLACTONE 25 MG/1
50 TABLET ORAL DAILY
Qty: 30 TABLET | Refills: 0 | Status: SHIPPED | OUTPATIENT
Start: 2020-01-01

## 2020-01-01 RX ORDER — IBUPROFEN 200 MG
1 CAPSULE ORAL DAILY
COMMUNITY

## 2020-01-01 RX ORDER — LORAZEPAM 2 MG/1
2 TABLET ORAL
Status: DISCONTINUED | OUTPATIENT
Start: 2020-01-01 | End: 2020-01-01

## 2020-01-01 RX ORDER — MULTIVITAMIN WITH FOLIC ACID 400 MCG
1 TABLET ORAL DAILY
Status: DISCONTINUED | OUTPATIENT
Start: 2020-01-01 | End: 2020-01-01 | Stop reason: HOSPADM

## 2020-01-01 RX ORDER — SODIUM CHLORIDE 0.9 % (FLUSH) 0.9 %
10 SYRINGE (ML) INJECTION EVERY 12 HOURS SCHEDULED
Status: CANCELLED | OUTPATIENT
Start: 2020-01-01

## 2020-01-01 RX ORDER — CHLORHEXIDINE GLUCONATE 0.12 MG/ML
15 RINSE ORAL 2 TIMES DAILY
Status: DISCONTINUED | OUTPATIENT
Start: 2020-01-01 | End: 2020-01-01 | Stop reason: HOSPADM

## 2020-01-01 RX ORDER — METOPROLOL TARTRATE 5 MG/5ML
5 INJECTION INTRAVENOUS EVERY 6 HOURS
Status: DISCONTINUED | OUTPATIENT
Start: 2020-01-01 | End: 2020-01-01

## 2020-01-01 RX ORDER — POTASSIUM CHLORIDE 20 MEQ/1
20 TABLET, EXTENDED RELEASE ORAL 2 TIMES DAILY
Qty: 60 TABLET | Refills: 1 | Status: SHIPPED
Start: 2020-01-01 | End: 2020-01-01 | Stop reason: DRUGHIGH

## 2020-01-01 RX ORDER — HEPARIN SODIUM (PORCINE) LOCK FLUSH IV SOLN 100 UNIT/ML 100 UNIT/ML
500 SOLUTION INTRAVENOUS DAILY
Status: DISCONTINUED | OUTPATIENT
Start: 2020-01-01 | End: 2020-01-01 | Stop reason: HOSPADM

## 2020-01-01 RX ORDER — IPRATROPIUM BROMIDE AND ALBUTEROL SULFATE 2.5; .5 MG/3ML; MG/3ML
1 SOLUTION RESPIRATORY (INHALATION) EVERY 4 HOURS PRN
Status: CANCELLED | OUTPATIENT
Start: 2020-01-01

## 2020-01-01 RX ORDER — THIAMINE MONONITRATE (VIT B1) 100 MG
100 TABLET ORAL DAILY
Status: CANCELLED | OUTPATIENT
Start: 2020-01-01

## 2020-01-01 RX ORDER — ALBUMIN, HUMAN INJ 5% 5 %
25 SOLUTION INTRAVENOUS ONCE
Status: COMPLETED | OUTPATIENT
Start: 2020-01-01 | End: 2020-01-01

## 2020-01-01 RX ORDER — AMILORIDE HYDROCHLORIDE 5 MG/1
TABLET ORAL
Qty: 30 TABLET | Refills: 3
Start: 2020-01-01 | End: 2020-01-01 | Stop reason: SDUPTHER

## 2020-01-01 RX ADMIN — CALCITRIOL 0.5 MCG: 0.25 CAPSULE ORAL at 20:38

## 2020-01-01 RX ADMIN — MAGNESIUM 64 MG (MAGNESIUM CHLORIDE) TABLET,DELAYED RELEASE 64 MG: at 09:50

## 2020-01-01 RX ADMIN — SODIUM CHLORIDE: 9 INJECTION, SOLUTION INTRAVENOUS at 21:43

## 2020-01-01 RX ADMIN — HEPARIN 500 UNITS: 100 SYRINGE at 11:36

## 2020-01-01 RX ADMIN — MAGNESIUM SULFATE HEPTAHYDRATE 4 G: 40 INJECTION, SOLUTION INTRAVENOUS at 06:59

## 2020-01-01 RX ADMIN — ACYCLOVIR 400 MG: 400 TABLET ORAL at 08:52

## 2020-01-01 RX ADMIN — ALBUMIN (HUMAN) 25 G: 0.25 INJECTION, SOLUTION INTRAVENOUS at 14:05

## 2020-01-01 RX ADMIN — GABAPENTIN 300 MG: 300 CAPSULE ORAL at 20:37

## 2020-01-01 RX ADMIN — GABAPENTIN 100 MG: 100 CAPSULE ORAL at 08:53

## 2020-01-01 RX ADMIN — Medication 5 TABLET: at 08:52

## 2020-01-01 RX ADMIN — FAMOTIDINE 20 MG: 20 TABLET ORAL at 20:11

## 2020-01-01 RX ADMIN — MAGNESIUM 64 MG (MAGNESIUM CHLORIDE) TABLET,DELAYED RELEASE 64 MG: at 20:08

## 2020-01-01 RX ADMIN — CEFAZOLIN SODIUM 1 G: 1 INJECTION, SOLUTION INTRAVENOUS at 08:16

## 2020-01-01 RX ADMIN — VITAMIN D, TAB 1000IU (100/BT) 10000 UNITS: 25 TAB at 08:48

## 2020-01-01 RX ADMIN — LACTULOSE 30 G: 20 SOLUTION ORAL at 08:32

## 2020-01-01 RX ADMIN — ALBUMIN (HUMAN) 50 G: 0.25 INJECTION, SOLUTION INTRAVENOUS at 17:35

## 2020-01-01 RX ADMIN — MAGNESIUM SULFATE HEPTAHYDRATE 2 G: 40 INJECTION, SOLUTION INTRAVENOUS at 12:13

## 2020-01-01 RX ADMIN — VITAMIN D, TAB 1000IU (100/BT) 2000 UNITS: 25 TAB at 08:05

## 2020-01-01 RX ADMIN — CALCIUM GLUCONATE 1 G: 98 INJECTION, SOLUTION INTRAVENOUS at 10:34

## 2020-01-01 RX ADMIN — Medication 20000 UNITS: at 08:25

## 2020-01-01 RX ADMIN — MEROPENEM 1 G: 1 INJECTION, POWDER, FOR SOLUTION INTRAVENOUS at 02:36

## 2020-01-01 RX ADMIN — SODIUM CHLORIDE: 9 INJECTION, SOLUTION INTRAVENOUS at 19:50

## 2020-01-01 RX ADMIN — SODIUM BICARBONATE 50 MEQ: 84 INJECTION, SOLUTION INTRAVENOUS at 21:50

## 2020-01-01 RX ADMIN — DULOXETINE HYDROCHLORIDE 30 MG: 60 CAPSULE, DELAYED RELEASE ORAL at 10:02

## 2020-01-01 RX ADMIN — Medication 100 MG: at 08:31

## 2020-01-01 RX ADMIN — PYRIDOXINE HCL TAB 50 MG 100 MG: 50 TAB at 09:05

## 2020-01-01 RX ADMIN — DULOXETINE HYDROCHLORIDE 30 MG: 30 CAPSULE, DELAYED RELEASE ORAL at 09:22

## 2020-01-01 RX ADMIN — Medication 20000 UNITS: at 08:48

## 2020-01-01 RX ADMIN — LEVOTHYROXINE SODIUM 250 MCG: 125 TABLET ORAL at 05:04

## 2020-01-01 RX ADMIN — FAMOTIDINE 20 MG: 20 TABLET ORAL at 20:35

## 2020-01-01 RX ADMIN — CALCITRIOL 0.25 MCG: 0.25 CAPSULE ORAL at 10:08

## 2020-01-01 RX ADMIN — TIZANIDINE 4 MG: 4 TABLET ORAL at 21:40

## 2020-01-01 RX ADMIN — POTASSIUM CHLORIDE 40 MEQ: 1500 TABLET, EXTENDED RELEASE ORAL at 10:23

## 2020-01-01 RX ADMIN — SODIUM CHLORIDE, PRESERVATIVE FREE 10 ML: 5 INJECTION INTRAVENOUS at 05:34

## 2020-01-01 RX ADMIN — DULOXETINE HYDROCHLORIDE 60 MG: 60 CAPSULE, DELAYED RELEASE ORAL at 21:07

## 2020-01-01 RX ADMIN — BUMETANIDE 1 MG: 1 TABLET ORAL at 10:34

## 2020-01-01 RX ADMIN — FOLIC ACID 1 MG: 1 TABLET ORAL at 08:31

## 2020-01-01 RX ADMIN — ALBUMIN (HUMAN) 25 G: 12.5 INJECTION, SOLUTION INTRAVENOUS at 01:15

## 2020-01-01 RX ADMIN — CALCIUM GLUCONATE 1 G: 98 INJECTION, SOLUTION INTRAVENOUS at 01:20

## 2020-01-01 RX ADMIN — METOPROLOL TARTRATE 25 MG: 25 TABLET, FILM COATED ORAL at 21:41

## 2020-01-01 RX ADMIN — Medication 20000 UNITS: at 09:11

## 2020-01-01 RX ADMIN — Medication 100 MG: at 08:15

## 2020-01-01 RX ADMIN — OXYCODONE HYDROCHLORIDE AND ACETAMINOPHEN 1 TABLET: 5; 325 TABLET ORAL at 12:15

## 2020-01-01 RX ADMIN — BUMETANIDE 0.5 MG: 1 TABLET ORAL at 10:46

## 2020-01-01 RX ADMIN — CALCITRIOL 0.5 MCG: 0.25 CAPSULE ORAL at 22:39

## 2020-01-01 RX ADMIN — ACETAMINOPHEN 650 MG: 325 TABLET ORAL at 14:52

## 2020-01-01 RX ADMIN — METOPROLOL TARTRATE 25 MG: 25 TABLET, FILM COATED ORAL at 08:30

## 2020-01-01 RX ADMIN — ALBUMIN (HUMAN) 25 G: 0.25 INJECTION, SOLUTION INTRAVENOUS at 20:44

## 2020-01-01 RX ADMIN — FAMOTIDINE 20 MG: 20 TABLET ORAL at 22:13

## 2020-01-01 RX ADMIN — PYRIDOXINE HCL TAB 50 MG 100 MG: 50 TAB at 09:37

## 2020-01-01 RX ADMIN — VITAMIN D, TAB 1000IU (100/BT) 2000 UNITS: 25 TAB at 09:00

## 2020-01-01 RX ADMIN — PYRIDOXINE HCL TAB 50 MG 100 MG: 50 TAB at 08:28

## 2020-01-01 RX ADMIN — Medication 10 ML: at 20:16

## 2020-01-01 RX ADMIN — GABAPENTIN 300 MG: 300 CAPSULE ORAL at 20:11

## 2020-01-01 RX ADMIN — MAGNESIUM SULFATE HEPTAHYDRATE 2 G: 40 INJECTION, SOLUTION INTRAVENOUS at 13:15

## 2020-01-01 RX ADMIN — SODIUM CHLORIDE, PRESERVATIVE FREE 10 ML: 5 INJECTION INTRAVENOUS at 13:22

## 2020-01-01 RX ADMIN — MAGNESIUM SULFATE HEPTAHYDRATE 2 G: 40 INJECTION, SOLUTION INTRAVENOUS at 11:00

## 2020-01-01 RX ADMIN — IPRATROPIUM BROMIDE AND ALBUTEROL SULFATE 1 AMPULE: .5; 3 SOLUTION RESPIRATORY (INHALATION) at 13:10

## 2020-01-01 RX ADMIN — METOPROLOL TARTRATE 5 MG: 5 INJECTION INTRAVENOUS at 16:53

## 2020-01-01 RX ADMIN — CALCITRIOL 0.25 MCG: 0.25 CAPSULE ORAL at 09:38

## 2020-01-01 RX ADMIN — SODIUM CHLORIDE 1000 ML: 9 INJECTION, SOLUTION INTRAVENOUS at 19:36

## 2020-01-01 RX ADMIN — LEVOTHYROXINE SODIUM 50 MCG: 50 TABLET ORAL at 06:20

## 2020-01-01 RX ADMIN — OXYCODONE HYDROCHLORIDE AND ACETAMINOPHEN 1 TABLET: 5; 325 TABLET ORAL at 20:15

## 2020-01-01 RX ADMIN — Medication 500 MCG: at 15:53

## 2020-01-01 RX ADMIN — SODIUM CHLORIDE: 9 INJECTION, SOLUTION INTRAVENOUS at 09:00

## 2020-01-01 RX ADMIN — LEVOTHYROXINE SODIUM 200 MCG: 100 TABLET ORAL at 08:32

## 2020-01-01 RX ADMIN — HEPARIN 500 UNITS: 100 SYRINGE at 15:02

## 2020-01-01 RX ADMIN — HEPARIN 500 UNITS: 100 SYRINGE at 16:40

## 2020-01-01 RX ADMIN — MAGNESIUM SULFATE HEPTAHYDRATE 2 G: 40 INJECTION, SOLUTION INTRAVENOUS at 07:46

## 2020-01-01 RX ADMIN — TIZANIDINE 4 MG: 4 TABLET ORAL at 20:35

## 2020-01-01 RX ADMIN — CALCITRIOL 0.5 MCG: 0.25 CAPSULE ORAL at 09:23

## 2020-01-01 RX ADMIN — BUMETANIDE 0.5 MG: 0.25 INJECTION INTRAMUSCULAR; INTRAVENOUS at 21:28

## 2020-01-01 RX ADMIN — GABAPENTIN 100 MG: 100 CAPSULE ORAL at 08:55

## 2020-01-01 RX ADMIN — Medication 100 MG: at 09:39

## 2020-01-01 RX ADMIN — ACETAMINOPHEN 650 MG: 325 TABLET ORAL at 14:33

## 2020-01-01 RX ADMIN — MIDAZOLAM 1 MG: 1 INJECTION INTRAMUSCULAR; INTRAVENOUS at 16:01

## 2020-01-01 RX ADMIN — LACTULOSE 20 G: 20 SOLUTION ORAL at 11:18

## 2020-01-01 RX ADMIN — Medication 20000 UNITS: at 08:30

## 2020-01-01 RX ADMIN — FAMOTIDINE 20 MG: 20 TABLET ORAL at 08:32

## 2020-01-01 RX ADMIN — SODIUM CHLORIDE: 9 INJECTION, SOLUTION INTRAVENOUS at 10:55

## 2020-01-01 RX ADMIN — TIZANIDINE 4 MG: 4 TABLET ORAL at 20:15

## 2020-01-01 RX ADMIN — DULOXETINE HYDROCHLORIDE 30 MG: 30 CAPSULE, DELAYED RELEASE ORAL at 08:07

## 2020-01-01 RX ADMIN — SPIRONOLACTONE 12.5 MG: 25 TABLET ORAL at 08:09

## 2020-01-01 RX ADMIN — METOPROLOL TARTRATE 25 MG: 25 TABLET, FILM COATED ORAL at 20:15

## 2020-01-01 RX ADMIN — ACETAMINOPHEN 650 MG: 325 TABLET ORAL at 10:04

## 2020-01-01 RX ADMIN — DULOXETINE HYDROCHLORIDE 60 MG: 60 CAPSULE, DELAYED RELEASE ORAL at 21:40

## 2020-01-01 RX ADMIN — FOLIC ACID 1500 MCG: 1 TABLET ORAL at 09:12

## 2020-01-01 RX ADMIN — FAMOTIDINE 20 MG: 10 INJECTION INTRAVENOUS at 09:16

## 2020-01-01 RX ADMIN — BUMETANIDE 1 MG: 0.25 INJECTION INTRAMUSCULAR; INTRAVENOUS at 21:45

## 2020-01-01 RX ADMIN — BUMETANIDE 0.5 MG/HR: 0.25 INJECTION INTRAMUSCULAR; INTRAVENOUS at 01:38

## 2020-01-01 RX ADMIN — CALCITRIOL 0.5 MCG: 0.25 CAPSULE ORAL at 21:58

## 2020-01-01 RX ADMIN — CALCIUM GLUCONATE 1 G: 98 INJECTION, SOLUTION INTRAVENOUS at 15:17

## 2020-01-01 RX ADMIN — ACYCLOVIR 400 MG: 400 TABLET ORAL at 20:35

## 2020-01-01 RX ADMIN — Medication 100 MG: at 12:21

## 2020-01-01 RX ADMIN — Medication 100 MG: at 20:10

## 2020-01-01 RX ADMIN — FAMOTIDINE 20 MG: 20 TABLET ORAL at 09:11

## 2020-01-01 RX ADMIN — POTASSIUM CHLORIDE 40 MEQ: 1500 TABLET, EXTENDED RELEASE ORAL at 08:30

## 2020-01-01 RX ADMIN — LOPERAMIDE HYDROCHLORIDE 2 MG: 2 CAPSULE ORAL at 07:39

## 2020-01-01 RX ADMIN — IPRATROPIUM BROMIDE AND ALBUTEROL SULFATE 1 AMPULE: .5; 3 SOLUTION RESPIRATORY (INHALATION) at 07:51

## 2020-01-01 RX ADMIN — GABAPENTIN 100 MG: 100 CAPSULE ORAL at 09:47

## 2020-01-01 RX ADMIN — Medication 100 MG: at 09:11

## 2020-01-01 RX ADMIN — TIZANIDINE 4 MG: 4 TABLET ORAL at 19:54

## 2020-01-01 RX ADMIN — PHENOBARBITAL SODIUM 130 MG: 65 INJECTION INTRAMUSCULAR; INTRAVENOUS at 22:52

## 2020-01-01 RX ADMIN — LEVOTHYROXINE SODIUM 250 MCG: 125 TABLET ORAL at 06:14

## 2020-01-01 RX ADMIN — OXYCODONE HYDROCHLORIDE AND ACETAMINOPHEN 1 TABLET: 5; 325 TABLET ORAL at 13:07

## 2020-01-01 RX ADMIN — CALCITRIOL 0.5 MCG: 0.25 CAPSULE ORAL at 09:46

## 2020-01-01 RX ADMIN — GABAPENTIN 100 MG: 100 CAPSULE ORAL at 09:01

## 2020-01-01 RX ADMIN — IRON SUCROSE 100 MG: 20 INJECTION, SOLUTION INTRAVENOUS at 13:53

## 2020-01-01 RX ADMIN — LACTULOSE 30 G: 20 SOLUTION ORAL at 20:17

## 2020-01-01 RX ADMIN — ACYCLOVIR 400 MG: 400 TABLET ORAL at 21:08

## 2020-01-01 RX ADMIN — FOLIC ACID 1 MG: 1 TABLET ORAL at 22:15

## 2020-01-01 RX ADMIN — ALBUMIN (HUMAN) 25 G: 0.25 INJECTION, SOLUTION INTRAVENOUS at 01:08

## 2020-01-01 RX ADMIN — CALCITRIOL 0.25 MCG: 0.25 CAPSULE ORAL at 08:26

## 2020-01-01 RX ADMIN — LORAZEPAM 2 MG: 2 INJECTION INTRAMUSCULAR; INTRAVENOUS at 21:28

## 2020-01-01 RX ADMIN — Medication 30 MCG/MIN: at 15:26

## 2020-01-01 RX ADMIN — LEVOTHYROXINE SODIUM 50 MCG: 50 TABLET ORAL at 05:09

## 2020-01-01 RX ADMIN — GABAPENTIN 300 MG: 300 CAPSULE ORAL at 20:51

## 2020-01-01 RX ADMIN — ACYCLOVIR 400 MG: 400 TABLET ORAL at 20:34

## 2020-01-01 RX ADMIN — THERA TABS 1 TABLET: TAB at 09:39

## 2020-01-01 RX ADMIN — CALCITRIOL 0.25 MCG: 0.25 CAPSULE ORAL at 08:07

## 2020-01-01 RX ADMIN — GABAPENTIN 300 MG: 300 CAPSULE ORAL at 20:36

## 2020-01-01 RX ADMIN — METOPROLOL TARTRATE 5 MG: 5 INJECTION INTRAVENOUS at 16:26

## 2020-01-01 RX ADMIN — POTASSIUM CHLORIDE 20 MEQ: 29.8 INJECTION, SOLUTION INTRAVENOUS at 07:19

## 2020-01-01 RX ADMIN — PYRIDOXINE HCL TAB 50 MG 100 MG: 50 TAB at 12:21

## 2020-01-01 RX ADMIN — LEVOTHYROXINE SODIUM 50 MCG: 50 TABLET ORAL at 06:39

## 2020-01-01 RX ADMIN — DULOXETINE HYDROCHLORIDE 60 MG: 60 CAPSULE, DELAYED RELEASE ORAL at 21:59

## 2020-01-01 RX ADMIN — ACYCLOVIR 400 MG: 400 TABLET ORAL at 22:41

## 2020-01-01 RX ADMIN — ACYCLOVIR 400 MG: 400 TABLET ORAL at 10:02

## 2020-01-01 RX ADMIN — ALBUMIN (HUMAN) 25 G: 0.25 INJECTION, SOLUTION INTRAVENOUS at 08:31

## 2020-01-01 RX ADMIN — Medication 300 ML/HR: at 07:45

## 2020-01-01 RX ADMIN — OXYCODONE HYDROCHLORIDE AND ACETAMINOPHEN 1 TABLET: 5; 325 TABLET ORAL at 02:58

## 2020-01-01 RX ADMIN — FAMOTIDINE 20 MG: 20 TABLET ORAL at 09:05

## 2020-01-01 RX ADMIN — POTASSIUM CHLORIDE 40 MEQ: 1500 TABLET, EXTENDED RELEASE ORAL at 09:01

## 2020-01-01 RX ADMIN — VITAMIN D, TAB 1000IU (100/BT) 2000 UNITS: 25 TAB at 08:26

## 2020-01-01 RX ADMIN — Medication 20000 UNITS: at 09:49

## 2020-01-01 RX ADMIN — BUMETANIDE 1 MG: 1 TABLET ORAL at 08:10

## 2020-01-01 RX ADMIN — ACYCLOVIR 400 MG: 400 TABLET ORAL at 10:40

## 2020-01-01 RX ADMIN — METOPROLOL TARTRATE 12.5 MG: 25 TABLET ORAL at 19:51

## 2020-01-01 RX ADMIN — Medication 10 ML: at 08:51

## 2020-01-01 RX ADMIN — FUROSEMIDE 20 MG: 10 INJECTION, SOLUTION INTRAMUSCULAR; INTRAVENOUS at 16:40

## 2020-01-01 RX ADMIN — FAMOTIDINE 20 MG: 20 TABLET ORAL at 10:01

## 2020-01-01 RX ADMIN — CALCITRIOL 0.25 MCG: 0.25 CAPSULE ORAL at 21:28

## 2020-01-01 RX ADMIN — SODIUM CHLORIDE 20 ML: 9 INJECTION, SOLUTION INTRAVENOUS at 12:55

## 2020-01-01 RX ADMIN — METOPROLOL TARTRATE 5 MG: 5 INJECTION INTRAVENOUS at 22:30

## 2020-01-01 RX ADMIN — CALCITRIOL 0.5 MCG: 0.25 CAPSULE ORAL at 21:01

## 2020-01-01 RX ADMIN — FAMOTIDINE 20 MG: 20 TABLET ORAL at 21:28

## 2020-01-01 RX ADMIN — CEFUROXIME AXETIL 500 MG: 250 TABLET ORAL at 10:04

## 2020-01-01 RX ADMIN — Medication 100 MG: at 10:07

## 2020-01-01 RX ADMIN — VITAMIN D, TAB 1000IU (100/BT) 10000 UNITS: 25 TAB at 08:54

## 2020-01-01 RX ADMIN — VITAMIN D, TAB 1000IU (100/BT) 2000 UNITS: 25 TAB at 08:30

## 2020-01-01 RX ADMIN — MIDAZOLAM 1 MG: 1 INJECTION INTRAMUSCULAR; INTRAVENOUS at 16:05

## 2020-01-01 RX ADMIN — MIDAZOLAM HYDROCHLORIDE 1 MG: 1 INJECTION INTRAMUSCULAR; INTRAVENOUS at 09:51

## 2020-01-01 RX ADMIN — CALCITRIOL 0.5 MCG: 0.25 CAPSULE ORAL at 13:41

## 2020-01-01 RX ADMIN — DULOXETINE HYDROCHLORIDE 30 MG: 30 CAPSULE, DELAYED RELEASE ORAL at 09:46

## 2020-01-01 RX ADMIN — Medication 100 MG: at 09:06

## 2020-01-01 RX ADMIN — ENOXAPARIN SODIUM 30 MG: 30 INJECTION SUBCUTANEOUS at 08:26

## 2020-01-01 RX ADMIN — METOPROLOL TARTRATE 25 MG: 25 TABLET, FILM COATED ORAL at 10:02

## 2020-01-01 RX ADMIN — ACETAMINOPHEN 650 MG: 325 TABLET ORAL at 21:37

## 2020-01-01 RX ADMIN — POTASSIUM CHLORIDE 40 MEQ: 750 TABLET, FILM COATED, EXTENDED RELEASE ORAL at 08:14

## 2020-01-01 RX ADMIN — Medication 10 ML: at 09:11

## 2020-01-01 RX ADMIN — CEFTRIAXONE SODIUM 2 G: 2 INJECTION, POWDER, FOR SOLUTION INTRAMUSCULAR; INTRAVENOUS at 10:23

## 2020-01-01 RX ADMIN — FAMOTIDINE 20 MG: 20 TABLET ORAL at 21:25

## 2020-01-01 RX ADMIN — FOLIC ACID 1500 MCG: 1 TABLET ORAL at 09:38

## 2020-01-01 RX ADMIN — LOPERAMIDE HYDROCHLORIDE 4 MG: 2 CAPSULE ORAL at 14:14

## 2020-01-01 RX ADMIN — IOPAMIDOL 80 ML: 755 INJECTION, SOLUTION INTRAVENOUS at 12:01

## 2020-01-01 RX ADMIN — ACETAMINOPHEN 650 MG: 325 TABLET ORAL at 06:40

## 2020-01-01 RX ADMIN — CYCLOBENZAPRINE HYDROCHLORIDE 10 MG: 10 TABLET, FILM COATED ORAL at 09:11

## 2020-01-01 RX ADMIN — ACETAMINOPHEN 650 MG: 325 TABLET ORAL at 19:51

## 2020-01-01 RX ADMIN — Medication 15 ML: at 21:54

## 2020-01-01 RX ADMIN — Medication 20 MG: at 05:03

## 2020-01-01 RX ADMIN — Medication 10 ML: at 09:21

## 2020-01-01 RX ADMIN — MAGNESIUM 64 MG (MAGNESIUM CHLORIDE) TABLET,DELAYED RELEASE 64 MG: at 08:29

## 2020-01-01 RX ADMIN — ACETAMINOPHEN 650 MG: 325 TABLET ORAL at 08:44

## 2020-01-01 RX ADMIN — Medication 500 ML/HR: at 04:35

## 2020-01-01 RX ADMIN — Medication 20000 UNITS: at 20:38

## 2020-01-01 RX ADMIN — ALBUMIN (HUMAN) 50 G: 0.25 INJECTION, SOLUTION INTRAVENOUS at 08:51

## 2020-01-01 RX ADMIN — Medication 10 ML: at 10:36

## 2020-01-01 RX ADMIN — FAMOTIDINE 20 MG: 10 INJECTION INTRAVENOUS at 21:30

## 2020-01-01 RX ADMIN — ALBUMIN (HUMAN) 25 G: 0.25 INJECTION, SOLUTION INTRAVENOUS at 09:08

## 2020-01-01 RX ADMIN — PANTOPRAZOLE SODIUM 40 MG: 40 TABLET, DELAYED RELEASE ORAL at 06:16

## 2020-01-01 RX ADMIN — SODIUM CHLORIDE 20 ML: 9 INJECTION, SOLUTION INTRAVENOUS at 01:30

## 2020-01-01 RX ADMIN — DEXAMETHASONE SODIUM PHOSPHATE 4 MG: 4 INJECTION, SOLUTION INTRAMUSCULAR; INTRAVENOUS at 09:16

## 2020-01-01 RX ADMIN — ENOXAPARIN SODIUM 40 MG: 40 INJECTION SUBCUTANEOUS at 08:52

## 2020-01-01 RX ADMIN — Medication 5 TABLET: at 09:13

## 2020-01-01 RX ADMIN — GABAPENTIN 100 MG: 100 CAPSULE ORAL at 08:47

## 2020-01-01 RX ADMIN — ORPHENADRINE CITRATE 60 MG: 30 INJECTION INTRAMUSCULAR; INTRAVENOUS at 07:18

## 2020-01-01 RX ADMIN — HYDROCORTISONE SODIUM SUCCINATE 50 MG: 100 INJECTION, POWDER, FOR SOLUTION INTRAMUSCULAR; INTRAVENOUS at 01:06

## 2020-01-01 RX ADMIN — Medication 50 MEQ: at 08:25

## 2020-01-01 RX ADMIN — ACYCLOVIR 400 MG: 400 TABLET ORAL at 20:36

## 2020-01-01 RX ADMIN — ENOXAPARIN SODIUM 30 MG: 30 INJECTION SUBCUTANEOUS at 09:11

## 2020-01-01 RX ADMIN — VITAMIN D, TAB 1000IU (100/BT) 2000 UNITS: 25 TAB at 09:36

## 2020-01-01 RX ADMIN — Medication 700 ML/HR: at 13:41

## 2020-01-01 RX ADMIN — Medication: at 12:50

## 2020-01-01 RX ADMIN — BUMETANIDE 1 MG: 1 TABLET ORAL at 09:36

## 2020-01-01 RX ADMIN — MAGNESIUM 64 MG (MAGNESIUM CHLORIDE) TABLET,DELAYED RELEASE 64 MG: at 09:13

## 2020-01-01 RX ADMIN — PYRIDOXINE HCL TAB 50 MG 100 MG: 50 TAB at 08:39

## 2020-01-01 RX ADMIN — VASOPRESSIN 0.04 UNITS/MIN: 20 INJECTION INTRAVENOUS at 13:55

## 2020-01-01 RX ADMIN — Medication 500 MCG: at 09:01

## 2020-01-01 RX ADMIN — CEFUROXIME AXETIL 500 MG: 250 TABLET ORAL at 08:26

## 2020-01-01 RX ADMIN — Medication: at 20:33

## 2020-01-01 RX ADMIN — Medication 10 ML: at 10:51

## 2020-01-01 RX ADMIN — MAGNESIUM SULFATE HEPTAHYDRATE 2 G: 40 INJECTION, SOLUTION INTRAVENOUS at 08:38

## 2020-01-01 RX ADMIN — DULOXETINE HYDROCHLORIDE 30 MG: 30 CAPSULE, DELAYED RELEASE ORAL at 09:49

## 2020-01-01 RX ADMIN — ENOXAPARIN SODIUM 30 MG: 30 INJECTION SUBCUTANEOUS at 09:01

## 2020-01-01 RX ADMIN — ACYCLOVIR 400 MG: 400 TABLET ORAL at 09:01

## 2020-01-01 RX ADMIN — VASOPRESSIN 0.04 UNITS/MIN: 20 INJECTION INTRAVENOUS at 04:22

## 2020-01-01 RX ADMIN — ACETAMINOPHEN 650 MG: 325 TABLET ORAL at 20:57

## 2020-01-01 RX ADMIN — Medication 10 ML: at 21:09

## 2020-01-01 RX ADMIN — OXYCODONE HYDROCHLORIDE AND ACETAMINOPHEN 1 TABLET: 5; 325 TABLET ORAL at 20:51

## 2020-01-01 RX ADMIN — MAGNESIUM 64 MG (MAGNESIUM CHLORIDE) TABLET,DELAYED RELEASE 64 MG: at 09:15

## 2020-01-01 RX ADMIN — Medication 5 TABLET: at 09:40

## 2020-01-01 RX ADMIN — PANTOPRAZOLE SODIUM 40 MG: 40 TABLET, DELAYED RELEASE ORAL at 06:09

## 2020-01-01 RX ADMIN — ACYCLOVIR 400 MG: 400 TABLET ORAL at 21:30

## 2020-01-01 RX ADMIN — ENOXAPARIN SODIUM 30 MG: 30 INJECTION SUBCUTANEOUS at 09:22

## 2020-01-01 RX ADMIN — DULOXETINE HYDROCHLORIDE 60 MG: 60 CAPSULE, DELAYED RELEASE ORAL at 20:17

## 2020-01-01 RX ADMIN — METOPROLOL TARTRATE 5 MG: 5 INJECTION INTRAVENOUS at 23:17

## 2020-01-01 RX ADMIN — METOPROLOL TARTRATE 5 MG: 5 INJECTION INTRAVENOUS at 05:21

## 2020-01-01 RX ADMIN — Medication 500 MCG: at 08:49

## 2020-01-01 RX ADMIN — Medication 20000 UNITS: at 10:40

## 2020-01-01 RX ADMIN — TIZANIDINE 4 MG: 4 TABLET ORAL at 19:51

## 2020-01-01 RX ADMIN — VITAMIN D, TAB 1000IU (100/BT) 2000 UNITS: 25 TAB at 10:08

## 2020-01-01 RX ADMIN — MAGNESIUM SULFATE HEPTAHYDRATE 2 G: 40 INJECTION, SOLUTION INTRAVENOUS at 08:50

## 2020-01-01 RX ADMIN — Medication 1 DROP: at 16:50

## 2020-01-01 RX ADMIN — METOPROLOL TARTRATE 5 MG: 5 INJECTION INTRAVENOUS at 10:24

## 2020-01-01 RX ADMIN — FENTANYL CITRATE 100 MCG: 50 INJECTION, SOLUTION INTRAMUSCULAR; INTRAVENOUS at 22:06

## 2020-01-01 RX ADMIN — SODIUM CHLORIDE: 9 INJECTION, SOLUTION INTRAVENOUS at 05:33

## 2020-01-01 RX ADMIN — Medication 5 TABLET: at 09:03

## 2020-01-01 RX ADMIN — TIZANIDINE 4 MG: 4 TABLET ORAL at 21:07

## 2020-01-01 RX ADMIN — OXYCODONE HYDROCHLORIDE AND ACETAMINOPHEN 1 TABLET: 5; 325 TABLET ORAL at 10:43

## 2020-01-01 RX ADMIN — CALCITRIOL 0.5 MCG: 0.25 CAPSULE ORAL at 21:25

## 2020-01-01 RX ADMIN — CYCLOBENZAPRINE HYDROCHLORIDE 10 MG: 10 TABLET, FILM COATED ORAL at 09:07

## 2020-01-01 RX ADMIN — PANTOPRAZOLE SODIUM 40 MG: 40 TABLET, DELAYED RELEASE ORAL at 07:36

## 2020-01-01 RX ADMIN — ACETAMINOPHEN 650 MG: 325 TABLET ORAL at 19:54

## 2020-01-01 RX ADMIN — Medication 10 ML: at 11:47

## 2020-01-01 RX ADMIN — TIZANIDINE 4 MG: 4 TABLET ORAL at 20:37

## 2020-01-01 RX ADMIN — Medication 100 MG: at 08:26

## 2020-01-01 RX ADMIN — GABAPENTIN 100 MG: 100 CAPSULE ORAL at 08:05

## 2020-01-01 RX ADMIN — LEVOTHYROXINE SODIUM 250 MCG: 125 TABLET ORAL at 04:00

## 2020-01-01 RX ADMIN — POTASSIUM CHLORIDE 20 MEQ: 29.8 INJECTION, SOLUTION INTRAVENOUS at 08:26

## 2020-01-01 RX ADMIN — ACYCLOVIR 400 MG: 400 TABLET ORAL at 09:49

## 2020-01-01 RX ADMIN — FOLIC ACID 1500 MCG: 1 TABLET ORAL at 10:14

## 2020-01-01 RX ADMIN — Medication 6 MG: at 20:10

## 2020-01-01 RX ADMIN — CALCITRIOL 0.5 MCG: 0.25 CAPSULE ORAL at 09:07

## 2020-01-01 RX ADMIN — Medication 1 DROP: at 09:44

## 2020-01-01 RX ADMIN — PYRIDOXINE HCL TAB 50 MG 100 MG: 50 TAB at 10:07

## 2020-01-01 RX ADMIN — MAGNESIUM 64 MG (MAGNESIUM CHLORIDE) TABLET,DELAYED RELEASE 64 MG: at 21:10

## 2020-01-01 RX ADMIN — IPRATROPIUM BROMIDE AND ALBUTEROL SULFATE 1 AMPULE: .5; 3 SOLUTION RESPIRATORY (INHALATION) at 19:46

## 2020-01-01 RX ADMIN — DULOXETINE HYDROCHLORIDE 60 MG: 60 CAPSULE, DELAYED RELEASE ORAL at 20:51

## 2020-01-01 RX ADMIN — FENTANYL CITRATE 50 MCG: 50 INJECTION, SOLUTION INTRAMUSCULAR; INTRAVENOUS at 09:51

## 2020-01-01 RX ADMIN — ACYCLOVIR 400 MG: 400 TABLET ORAL at 08:07

## 2020-01-01 RX ADMIN — SODIUM PHOSPHATE, MONOBASIC, MONOHYDRATE 10 MMOL: 276; 142 INJECTION, SOLUTION INTRAVENOUS at 14:54

## 2020-01-01 RX ADMIN — ACYCLOVIR 400 MG: 400 TABLET ORAL at 23:55

## 2020-01-01 RX ADMIN — PYRIDOXINE HCL TAB 50 MG 100 MG: 50 TAB at 09:48

## 2020-01-01 RX ADMIN — MAGNESIUM SULFATE HEPTAHYDRATE 2 G: 40 INJECTION, SOLUTION INTRAVENOUS at 13:45

## 2020-01-01 RX ADMIN — FAMOTIDINE 20 MG: 20 TABLET ORAL at 08:07

## 2020-01-01 RX ADMIN — CEFTRIAXONE SODIUM 2 G: 2 INJECTION, POWDER, FOR SOLUTION INTRAMUSCULAR; INTRAVENOUS at 11:34

## 2020-01-01 RX ADMIN — SODIUM CHLORIDE: 4.5 INJECTION, SOLUTION INTRAVENOUS at 08:00

## 2020-01-01 RX ADMIN — MEROPENEM 1 G: 1 INJECTION, POWDER, FOR SOLUTION INTRAVENOUS at 12:10

## 2020-01-01 RX ADMIN — CALCIUM GLUCONATE 1 G: 98 INJECTION, SOLUTION INTRAVENOUS at 16:30

## 2020-01-01 RX ADMIN — Medication 10 ML: at 21:35

## 2020-01-01 RX ADMIN — PHENYLEPHRINE HYDROCHLORIDE 300 MCG/MIN: 10 INJECTION INTRAVENOUS at 03:59

## 2020-01-01 RX ADMIN — GABAPENTIN 300 MG: 300 CAPSULE ORAL at 20:35

## 2020-01-01 RX ADMIN — Medication 10 ML: at 09:09

## 2020-01-01 RX ADMIN — POTASSIUM CHLORIDE 40 MEQ: 750 TABLET, FILM COATED, EXTENDED RELEASE ORAL at 17:02

## 2020-01-01 RX ADMIN — Medication 1400 ML/HR: at 07:38

## 2020-01-01 RX ADMIN — LEVOTHYROXINE SODIUM 250 MCG: 125 TABLET ORAL at 06:55

## 2020-01-01 RX ADMIN — VITAMIN D, TAB 1000IU (100/BT) 10000 UNITS: 25 TAB at 09:07

## 2020-01-01 RX ADMIN — MAGNESIUM 64 MG (MAGNESIUM CHLORIDE) TABLET,DELAYED RELEASE 64 MG: at 08:38

## 2020-01-01 RX ADMIN — PIPERACILLIN AND TAZOBACTAM 3.38 G: 3; .375 INJECTION, POWDER, LYOPHILIZED, FOR SOLUTION INTRAVENOUS at 06:05

## 2020-01-01 RX ADMIN — FOLIC ACID 1500 MCG: 1 TABLET ORAL at 08:31

## 2020-01-01 RX ADMIN — DULOXETINE HYDROCHLORIDE 60 MG: 60 CAPSULE, DELAYED RELEASE ORAL at 20:10

## 2020-01-01 RX ADMIN — FAMOTIDINE 20 MG: 20 TABLET ORAL at 08:10

## 2020-01-01 RX ADMIN — LEVOTHYROXINE SODIUM 50 MCG: 50 TABLET ORAL at 07:21

## 2020-01-01 RX ADMIN — ACETAMINOPHEN 650 MG: 325 TABLET ORAL at 05:06

## 2020-01-01 RX ADMIN — OXYCODONE HYDROCHLORIDE AND ACETAMINOPHEN 1 TABLET: 5; 325 TABLET ORAL at 20:06

## 2020-01-01 RX ADMIN — ACETAMINOPHEN 650 MG: 325 TABLET ORAL at 19:56

## 2020-01-01 RX ADMIN — SODIUM BICARBONATE 50 MEQ: 84 INJECTION, SOLUTION INTRAVENOUS at 01:02

## 2020-01-01 RX ADMIN — FOLIC ACID 1500 MCG: 1 TABLET ORAL at 09:06

## 2020-01-01 RX ADMIN — GABAPENTIN 300 MG: 300 CAPSULE ORAL at 21:07

## 2020-01-01 RX ADMIN — TIZANIDINE 4 MG: 4 TABLET ORAL at 22:45

## 2020-01-01 RX ADMIN — DILTIAZEM HYDROCHLORIDE 10 MG: 5 INJECTION INTRAVENOUS at 00:13

## 2020-01-01 RX ADMIN — ACETAMINOPHEN 650 MG: 325 TABLET ORAL at 08:10

## 2020-01-01 RX ADMIN — CALCITRIOL 0.5 MCG: 0.25 CAPSULE ORAL at 20:32

## 2020-01-01 RX ADMIN — SODIUM CHLORIDE 500 ML: 9 INJECTION, SOLUTION INTRAVENOUS at 06:27

## 2020-01-01 RX ADMIN — Medication 10 ML: at 12:42

## 2020-01-01 RX ADMIN — PYRIDOXINE HCL TAB 50 MG 100 MG: 50 TAB at 14:29

## 2020-01-01 RX ADMIN — LEVOTHYROXINE SODIUM 50 MCG: 50 TABLET ORAL at 06:17

## 2020-01-01 RX ADMIN — Medication 700 ML/HR: at 13:51

## 2020-01-01 RX ADMIN — HEPARIN 500 UNITS: 100 SYRINGE at 12:02

## 2020-01-01 RX ADMIN — Medication 100 MG: at 08:47

## 2020-01-01 RX ADMIN — GABAPENTIN 300 MG: 300 CAPSULE ORAL at 19:51

## 2020-01-01 RX ADMIN — METOPROLOL TARTRATE 5 MG: 5 INJECTION INTRAVENOUS at 21:26

## 2020-01-01 RX ADMIN — METOPROLOL TARTRATE 25 MG: 25 TABLET, FILM COATED ORAL at 20:25

## 2020-01-01 RX ADMIN — Medication 20 MG: at 05:43

## 2020-01-01 RX ADMIN — Medication 2 MCG/MIN: at 13:44

## 2020-01-01 RX ADMIN — SODIUM CHLORIDE, PRESERVATIVE FREE 10 ML: 5 INJECTION INTRAVENOUS at 20:17

## 2020-01-01 RX ADMIN — SODIUM CHLORIDE 1000 ML: 9 INJECTION, SOLUTION INTRAVENOUS at 04:33

## 2020-01-01 RX ADMIN — Medication: at 03:32

## 2020-01-01 RX ADMIN — FENTANYL CITRATE 50 MCG: 50 INJECTION, SOLUTION INTRAMUSCULAR; INTRAVENOUS at 10:02

## 2020-01-01 RX ADMIN — CALCITRIOL 0.5 MCG: 0.25 CAPSULE ORAL at 09:12

## 2020-01-01 RX ADMIN — METOPROLOL TARTRATE 25 MG: 25 TABLET, FILM COATED ORAL at 08:54

## 2020-01-01 RX ADMIN — ACETAMINOPHEN 650 MG: 325 TABLET ORAL at 14:59

## 2020-01-01 RX ADMIN — Medication 10 ML: at 13:47

## 2020-01-01 RX ADMIN — SODIUM BICARBONATE 50 MEQ: 84 INJECTION, SOLUTION INTRAVENOUS at 01:01

## 2020-01-01 RX ADMIN — OXYCODONE HYDROCHLORIDE AND ACETAMINOPHEN 1 TABLET: 5; 325 TABLET ORAL at 09:07

## 2020-01-01 RX ADMIN — GADOTERIDOL 8 ML: 279.3 INJECTION, SOLUTION INTRAVENOUS at 20:39

## 2020-01-01 RX ADMIN — Medication 500 ML/HR: at 13:53

## 2020-01-01 RX ADMIN — Medication 100 MG: at 08:05

## 2020-01-01 RX ADMIN — VASOPRESSIN 0.04 UNITS/MIN: 20 INJECTION INTRAVENOUS at 22:12

## 2020-01-01 RX ADMIN — SODIUM CHLORIDE, PRESERVATIVE FREE 10 ML: 5 INJECTION INTRAVENOUS at 11:30

## 2020-01-01 RX ADMIN — GABAPENTIN 100 MG: 100 CAPSULE ORAL at 10:33

## 2020-01-01 RX ADMIN — ONDANSETRON 4 MG: 2 INJECTION INTRAMUSCULAR; INTRAVENOUS at 03:39

## 2020-01-01 RX ADMIN — OXYCODONE HYDROCHLORIDE AND ACETAMINOPHEN 1 TABLET: 5; 325 TABLET ORAL at 00:12

## 2020-01-01 RX ADMIN — METOPROLOL TARTRATE 5 MG: 5 INJECTION INTRAVENOUS at 15:15

## 2020-01-01 RX ADMIN — DULOXETINE HYDROCHLORIDE 60 MG: 60 CAPSULE, DELAYED RELEASE ORAL at 21:25

## 2020-01-01 RX ADMIN — GABAPENTIN 300 MG: 300 CAPSULE ORAL at 21:28

## 2020-01-01 RX ADMIN — ACYCLOVIR 400 MG: 400 TABLET ORAL at 21:07

## 2020-01-01 RX ADMIN — HEPARIN SODIUM (PORCINE) LOCK FLUSH IV SOLN 100 UNIT/ML 500 UNITS: 100 SOLUTION at 13:39

## 2020-01-01 RX ADMIN — ACETAMINOPHEN 650 MG: 325 TABLET ORAL at 10:07

## 2020-01-01 RX ADMIN — LOPERAMIDE HYDROCHLORIDE 4 MG: 2 CAPSULE ORAL at 11:56

## 2020-01-01 RX ADMIN — CALCITRIOL 0.5 MCG: 0.25 CAPSULE ORAL at 08:55

## 2020-01-01 RX ADMIN — CALCITRIOL 0.5 MCG: 0.25 CAPSULE ORAL at 10:34

## 2020-01-01 RX ADMIN — Medication 100 MG: at 15:58

## 2020-01-01 RX ADMIN — DULOXETINE HYDROCHLORIDE 60 MG: 60 CAPSULE, DELAYED RELEASE ORAL at 21:28

## 2020-01-01 RX ADMIN — GABAPENTIN 100 MG: 100 CAPSULE ORAL at 10:02

## 2020-01-01 RX ADMIN — VITAMIN D, TAB 1000IU (100/BT) 2000 UNITS: 25 TAB at 08:55

## 2020-01-01 RX ADMIN — VITAMIN D, TAB 1000IU (100/BT) 10000 UNITS: 25 TAB at 08:13

## 2020-01-01 RX ADMIN — SODIUM CHLORIDE 1000 ML: 9 INJECTION, SOLUTION INTRAVENOUS at 07:12

## 2020-01-01 RX ADMIN — CALCIUM GLUCONATE 1 G: 98 INJECTION, SOLUTION INTRAVENOUS at 09:36

## 2020-01-01 RX ADMIN — CALCIUM GLUCONATE 1 G: 98 INJECTION, SOLUTION INTRAVENOUS at 10:33

## 2020-01-01 RX ADMIN — FOLIC ACID 1500 MCG: 1 TABLET ORAL at 09:46

## 2020-01-01 RX ADMIN — MIDAZOLAM HYDROCHLORIDE 1 MG: 1 INJECTION INTRAMUSCULAR; INTRAVENOUS at 10:01

## 2020-01-01 RX ADMIN — FOLIC ACID 1500 MCG: 1 TABLET ORAL at 08:46

## 2020-01-01 RX ADMIN — MAGNESIUM SULFATE HEPTAHYDRATE 2 G: 40 INJECTION, SOLUTION INTRAVENOUS at 06:40

## 2020-01-01 RX ADMIN — GABAPENTIN 100 MG: 100 CAPSULE ORAL at 08:11

## 2020-01-01 RX ADMIN — ACETAMINOPHEN 650 MG: 325 TABLET ORAL at 09:40

## 2020-01-01 RX ADMIN — PYRIDOXINE HCL TAB 50 MG 100 MG: 50 TAB at 08:05

## 2020-01-01 RX ADMIN — HEPARIN 500 UNITS: 100 SYRINGE at 12:52

## 2020-01-01 RX ADMIN — CALCIUM GLUCONATE 1 G: 98 INJECTION, SOLUTION INTRAVENOUS at 05:09

## 2020-01-01 RX ADMIN — OXYCODONE HYDROCHLORIDE AND ACETAMINOPHEN 1 TABLET: 5; 325 TABLET ORAL at 15:27

## 2020-01-01 RX ADMIN — Medication 100 MCG: at 15:50

## 2020-01-01 RX ADMIN — POTASSIUM CHLORIDE 60 MEQ: 1500 TABLET, EXTENDED RELEASE ORAL at 10:44

## 2020-01-01 RX ADMIN — GABAPENTIN 100 MG: 100 CAPSULE ORAL at 09:38

## 2020-01-01 RX ADMIN — ACETAMINOPHEN 650 MG: 325 TABLET ORAL at 00:51

## 2020-01-01 RX ADMIN — DULOXETINE HYDROCHLORIDE 30 MG: 60 CAPSULE, DELAYED RELEASE ORAL at 09:37

## 2020-01-01 RX ADMIN — CALCIUM GLUCONATE 1 G: 98 INJECTION, SOLUTION INTRAVENOUS at 12:17

## 2020-01-01 RX ADMIN — CALCIUM GLUCONATE 1 G: 98 INJECTION, SOLUTION INTRAVENOUS at 11:27

## 2020-01-01 RX ADMIN — ACYCLOVIR 400 MG: 400 TABLET ORAL at 09:39

## 2020-01-01 RX ADMIN — Medication 10 ML: at 22:37

## 2020-01-01 RX ADMIN — GABAPENTIN 100 MG: 100 CAPSULE ORAL at 08:09

## 2020-01-01 RX ADMIN — LEVOTHYROXINE SODIUM 250 MCG: 125 TABLET ORAL at 04:29

## 2020-01-01 RX ADMIN — CEFTRIAXONE SODIUM 2 G: 2 INJECTION, POWDER, FOR SOLUTION INTRAMUSCULAR; INTRAVENOUS at 22:03

## 2020-01-01 RX ADMIN — Medication 5 TABLET: at 08:17

## 2020-01-01 RX ADMIN — Medication 5 UNITS: at 17:20

## 2020-01-01 RX ADMIN — DULOXETINE HYDROCHLORIDE 60 MG: 60 CAPSULE, DELAYED RELEASE ORAL at 19:54

## 2020-01-01 RX ADMIN — OXYCODONE HYDROCHLORIDE AND ACETAMINOPHEN 1 TABLET: 5; 325 TABLET ORAL at 22:57

## 2020-01-01 RX ADMIN — SODIUM CHLORIDE, PRESERVATIVE FREE 10 ML: 5 INJECTION INTRAVENOUS at 21:31

## 2020-01-01 RX ADMIN — Medication 50 MG: at 22:08

## 2020-01-01 RX ADMIN — GABAPENTIN 300 MG: 300 CAPSULE ORAL at 21:59

## 2020-01-01 RX ADMIN — MAGNESIUM 64 MG (MAGNESIUM CHLORIDE) TABLET,DELAYED RELEASE 64 MG: at 20:39

## 2020-01-01 RX ADMIN — CALCIUM GLUCONATE 1 G: 98 INJECTION, SOLUTION INTRAVENOUS at 12:30

## 2020-01-01 RX ADMIN — FOLIC ACID 1500 MCG: 1 TABLET ORAL at 08:10

## 2020-01-01 RX ADMIN — VASOPRESSIN 0.04 UNITS/MIN: 20 INJECTION INTRAVENOUS at 23:20

## 2020-01-01 RX ADMIN — METOPROLOL TARTRATE 5 MG: 5 INJECTION INTRAVENOUS at 04:57

## 2020-01-01 RX ADMIN — OXYCODONE HYDROCHLORIDE AND ACETAMINOPHEN 1 TABLET: 5; 325 TABLET ORAL at 08:19

## 2020-01-01 RX ADMIN — SPIRONOLACTONE 50 MG: 25 TABLET ORAL at 08:31

## 2020-01-01 RX ADMIN — SODIUM CHLORIDE: 9 INJECTION, SOLUTION INTRAVENOUS at 14:34

## 2020-01-01 RX ADMIN — Medication 500 MCG: at 09:06

## 2020-01-01 RX ADMIN — Medication 10 ML: at 12:30

## 2020-01-01 RX ADMIN — OXYCODONE HYDROCHLORIDE AND ACETAMINOPHEN 1 TABLET: 5; 325 TABLET ORAL at 21:13

## 2020-01-01 RX ADMIN — OXYCODONE HYDROCHLORIDE AND ACETAMINOPHEN 1 TABLET: 5; 325 TABLET ORAL at 13:22

## 2020-01-01 RX ADMIN — TIZANIDINE 4 MG: 4 TABLET ORAL at 21:11

## 2020-01-01 RX ADMIN — SPIRONOLACTONE 50 MG: 25 TABLET ORAL at 16:42

## 2020-01-01 RX ADMIN — ACYCLOVIR 400 MG: 400 TABLET ORAL at 20:39

## 2020-01-01 RX ADMIN — ACYCLOVIR 400 MG: 400 TABLET ORAL at 09:23

## 2020-01-01 RX ADMIN — METOPROLOL TARTRATE 12.5 MG: 25 TABLET ORAL at 10:33

## 2020-01-01 RX ADMIN — CALCITRIOL 0.5 MCG: 0.25 CAPSULE ORAL at 22:15

## 2020-01-01 RX ADMIN — CALCIUM GLUCONATE 1 G: 98 INJECTION, SOLUTION INTRAVENOUS at 11:00

## 2020-01-01 RX ADMIN — MAGNESIUM 64 MG (MAGNESIUM CHLORIDE) TABLET,DELAYED RELEASE 64 MG: at 08:50

## 2020-01-01 RX ADMIN — Medication 10 ML: at 11:00

## 2020-01-01 RX ADMIN — HEPARIN 500 UNITS: 100 SYRINGE at 12:42

## 2020-01-01 RX ADMIN — FAMOTIDINE 20 MG: 20 TABLET ORAL at 09:00

## 2020-01-01 RX ADMIN — DULOXETINE HYDROCHLORIDE 60 MG: 60 CAPSULE, DELAYED RELEASE ORAL at 20:06

## 2020-01-01 RX ADMIN — METOPROLOL TARTRATE 5 MG: 5 INJECTION INTRAVENOUS at 16:20

## 2020-01-01 RX ADMIN — ENOXAPARIN SODIUM 30 MG: 30 INJECTION SUBCUTANEOUS at 08:57

## 2020-01-01 RX ADMIN — CALCIUM GLUCONATE 1 G: 98 INJECTION, SOLUTION INTRAVENOUS at 09:12

## 2020-01-01 RX ADMIN — Medication 10 ML: at 13:53

## 2020-01-01 RX ADMIN — Medication 100 MG: at 09:00

## 2020-01-01 RX ADMIN — OXYCODONE HYDROCHLORIDE AND ACETAMINOPHEN 1 TABLET: 5; 325 TABLET ORAL at 07:51

## 2020-01-01 RX ADMIN — CEFTRIAXONE SODIUM 2 G: 2 INJECTION, POWDER, FOR SOLUTION INTRAMUSCULAR; INTRAVENOUS at 10:39

## 2020-01-01 RX ADMIN — FOLIC ACID 1500 MCG: 1 TABLET ORAL at 20:38

## 2020-01-01 RX ADMIN — ACYCLOVIR 400 MG: 400 TABLET ORAL at 09:07

## 2020-01-01 RX ADMIN — Medication 5 TABLET: at 10:10

## 2020-01-01 RX ADMIN — BUMETANIDE 1 MG/HR: 0.25 INJECTION INTRAMUSCULAR; INTRAVENOUS at 08:19

## 2020-01-01 RX ADMIN — CEFTRIAXONE SODIUM 2 G: 2 INJECTION, POWDER, FOR SOLUTION INTRAMUSCULAR; INTRAVENOUS at 11:07

## 2020-01-01 RX ADMIN — GABAPENTIN 300 MG: 300 CAPSULE ORAL at 21:02

## 2020-01-01 RX ADMIN — HEPARIN SODIUM (PORCINE) LOCK FLUSH IV SOLN 100 UNIT/ML 500 UNITS: 100 SOLUTION at 08:20

## 2020-01-01 RX ADMIN — MEROPENEM 1 G: 1 INJECTION, POWDER, FOR SOLUTION INTRAVENOUS at 18:24

## 2020-01-01 RX ADMIN — METOPROLOL TARTRATE 25 MG: 25 TABLET, FILM COATED ORAL at 21:30

## 2020-01-01 RX ADMIN — ACYCLOVIR 400 MG: 400 TABLET ORAL at 20:51

## 2020-01-01 RX ADMIN — CEFTRIAXONE SODIUM 2 G: 2 INJECTION, POWDER, FOR SOLUTION INTRAMUSCULAR; INTRAVENOUS at 11:31

## 2020-01-01 RX ADMIN — PYRIDOXINE HCL TAB 50 MG 100 MG: 50 TAB at 08:54

## 2020-01-01 RX ADMIN — DULOXETINE HYDROCHLORIDE 60 MG: 60 CAPSULE, DELAYED RELEASE ORAL at 21:09

## 2020-01-01 RX ADMIN — CALCITRIOL 0.5 MCG: 0.25 CAPSULE ORAL at 20:37

## 2020-01-01 RX ADMIN — Medication 20000 UNITS: at 09:39

## 2020-01-01 RX ADMIN — DULOXETINE HYDROCHLORIDE 60 MG: 60 CAPSULE, DELAYED RELEASE ORAL at 20:15

## 2020-01-01 RX ADMIN — BUMETANIDE 1 MG: 1 TABLET ORAL at 09:37

## 2020-01-01 RX ADMIN — TIZANIDINE 4 MG: 4 TABLET ORAL at 20:06

## 2020-01-01 RX ADMIN — Medication 5 TABLET: at 09:53

## 2020-01-01 RX ADMIN — FAMOTIDINE 20 MG: 10 INJECTION INTRAVENOUS at 23:57

## 2020-01-01 RX ADMIN — Medication 100 MG: at 14:29

## 2020-01-01 RX ADMIN — THERA TABS 1 TABLET: TAB at 08:55

## 2020-01-01 RX ADMIN — Medication 10 ML: at 11:59

## 2020-01-01 RX ADMIN — FAMOTIDINE 20 MG: 20 TABLET ORAL at 08:26

## 2020-01-01 RX ADMIN — HEPARIN 500 UNITS: 100 SYRINGE at 10:42

## 2020-01-01 RX ADMIN — Medication 20000 UNITS: at 08:16

## 2020-01-01 RX ADMIN — LOPERAMIDE HYDROCHLORIDE 4 MG: 2 CAPSULE ORAL at 22:15

## 2020-01-01 RX ADMIN — IPRATROPIUM BROMIDE AND ALBUTEROL SULFATE 1 AMPULE: .5; 3 SOLUTION RESPIRATORY (INHALATION) at 20:44

## 2020-01-01 RX ADMIN — MAGNESIUM SULFATE IN WATER 2 G: 40 INJECTION, SOLUTION INTRAVENOUS at 23:52

## 2020-01-01 RX ADMIN — MAGNESIUM 64 MG (MAGNESIUM CHLORIDE) TABLET,DELAYED RELEASE 64 MG: at 08:14

## 2020-01-01 RX ADMIN — ACYCLOVIR 400 MG: 400 TABLET ORAL at 08:46

## 2020-01-01 RX ADMIN — GABAPENTIN 300 MG: 300 CAPSULE ORAL at 20:34

## 2020-01-01 RX ADMIN — Medication 100 MG: at 08:55

## 2020-01-01 RX ADMIN — CEFUROXIME AXETIL 500 MG: 250 TABLET ORAL at 10:02

## 2020-01-01 RX ADMIN — METOPROLOL TARTRATE 25 MG: 25 TABLET, FILM COATED ORAL at 10:08

## 2020-01-01 RX ADMIN — METOPROLOL TARTRATE 12.5 MG: 25 TABLET ORAL at 21:58

## 2020-01-01 RX ADMIN — LEVOTHYROXINE SODIUM 50 MCG: 50 TABLET ORAL at 06:09

## 2020-01-01 RX ADMIN — Medication 5 TABLET: at 08:13

## 2020-01-01 RX ADMIN — Medication 100 MG: at 09:46

## 2020-01-01 RX ADMIN — Medication 500 MCG: at 20:10

## 2020-01-01 RX ADMIN — MAGNESIUM 64 MG (MAGNESIUM CHLORIDE) TABLET,DELAYED RELEASE 64 MG: at 20:26

## 2020-01-01 RX ADMIN — CALCIUM GLUCONATE 1 G: 98 INJECTION, SOLUTION INTRAVENOUS at 10:50

## 2020-01-01 RX ADMIN — CALCITRIOL 0.5 MCG: 0.25 CAPSULE ORAL at 20:11

## 2020-01-01 RX ADMIN — ENOXAPARIN SODIUM 30 MG: 30 INJECTION SUBCUTANEOUS at 10:06

## 2020-01-01 RX ADMIN — LEVOTHYROXINE SODIUM 200 MCG: 100 TABLET ORAL at 06:09

## 2020-01-01 RX ADMIN — LEVOTHYROXINE SODIUM 250 MCG: 125 TABLET ORAL at 05:32

## 2020-01-01 RX ADMIN — MAGNESIUM 64 MG (MAGNESIUM CHLORIDE) TABLET,DELAYED RELEASE 64 MG: at 19:56

## 2020-01-01 RX ADMIN — Medication 50 MEQ: at 23:31

## 2020-01-01 RX ADMIN — CALCITRIOL 0.25 MCG: 0.25 CAPSULE ORAL at 08:10

## 2020-01-01 RX ADMIN — Medication 20000 UNITS: at 08:11

## 2020-01-01 RX ADMIN — LORAZEPAM 2 MG: 2 INJECTION INTRAMUSCULAR; INTRAVENOUS at 08:40

## 2020-01-01 RX ADMIN — METOPROLOL TARTRATE 5 MG: 5 INJECTION INTRAVENOUS at 11:18

## 2020-01-01 RX ADMIN — IPRATROPIUM BROMIDE AND ALBUTEROL SULFATE 1 AMPULE: .5; 3 SOLUTION RESPIRATORY (INHALATION) at 15:51

## 2020-01-01 RX ADMIN — ACETAMINOPHEN 650 MG: 325 TABLET ORAL at 10:34

## 2020-01-01 RX ADMIN — HEPARIN 500 UNITS: 100 SYRINGE at 11:59

## 2020-01-01 RX ADMIN — METOPROLOL TARTRATE 5 MG: 5 INJECTION INTRAVENOUS at 10:43

## 2020-01-01 RX ADMIN — DULOXETINE HYDROCHLORIDE 30 MG: 30 CAPSULE, DELAYED RELEASE ORAL at 08:16

## 2020-01-01 RX ADMIN — PYRIDOXINE HCL TAB 50 MG 100 MG: 50 TAB at 09:11

## 2020-01-01 RX ADMIN — DULOXETINE HYDROCHLORIDE 60 MG: 60 CAPSULE, DELAYED RELEASE ORAL at 19:52

## 2020-01-01 RX ADMIN — LEVOTHYROXINE SODIUM 250 MCG: 125 TABLET ORAL at 05:34

## 2020-01-01 RX ADMIN — Medication 50 MG: at 22:07

## 2020-01-01 RX ADMIN — DULOXETINE HYDROCHLORIDE 30 MG: 30 CAPSULE, DELAYED RELEASE ORAL at 10:34

## 2020-01-01 RX ADMIN — Medication 100 MG: at 08:11

## 2020-01-01 RX ADMIN — LEVOTHYROXINE SODIUM 250 MCG: 125 TABLET ORAL at 05:03

## 2020-01-01 RX ADMIN — CEFTRIAXONE SODIUM 2 G: 2 INJECTION, POWDER, FOR SOLUTION INTRAMUSCULAR; INTRAVENOUS at 00:38

## 2020-01-01 RX ADMIN — Medication 5 TABLET: at 09:00

## 2020-01-01 RX ADMIN — LEVOTHYROXINE SODIUM 200 MCG: 100 TABLET ORAL at 06:39

## 2020-01-01 RX ADMIN — CALCIUM GLUCONATE 1 G: 98 INJECTION, SOLUTION INTRAVENOUS at 17:59

## 2020-01-01 RX ADMIN — Medication 500 MCG: at 08:31

## 2020-01-01 RX ADMIN — Medication 10 ML: at 11:25

## 2020-01-01 RX ADMIN — MEROPENEM 1 G: 1 INJECTION, POWDER, FOR SOLUTION INTRAVENOUS at 20:30

## 2020-01-01 RX ADMIN — AZITHROMYCIN MONOHYDRATE 500 MG: 500 INJECTION, POWDER, LYOPHILIZED, FOR SOLUTION INTRAVENOUS at 13:44

## 2020-01-01 RX ADMIN — OXYCODONE HYDROCHLORIDE AND ACETAMINOPHEN 1 TABLET: 5; 325 TABLET ORAL at 03:02

## 2020-01-01 RX ADMIN — Medication 15 ML: at 08:32

## 2020-01-01 RX ADMIN — LEVOTHYROXINE SODIUM 200 MCG: 100 TABLET ORAL at 13:20

## 2020-01-01 RX ADMIN — SODIUM CHLORIDE, PRESERVATIVE FREE 10 ML: 5 INJECTION INTRAVENOUS at 09:34

## 2020-01-01 RX ADMIN — MEROPENEM 1 G: 1 INJECTION, POWDER, FOR SOLUTION INTRAVENOUS at 04:19

## 2020-01-01 RX ADMIN — POTASSIUM CHLORIDE 20 MEQ: 29.8 INJECTION, SOLUTION INTRAVENOUS at 06:26

## 2020-01-01 RX ADMIN — CALCITRIOL 0.5 MCG: 0.25 CAPSULE ORAL at 13:04

## 2020-01-01 RX ADMIN — TIZANIDINE 4 MG: 4 TABLET ORAL at 20:36

## 2020-01-01 RX ADMIN — BUMETANIDE 1 MG: 0.25 INJECTION INTRAMUSCULAR; INTRAVENOUS at 10:34

## 2020-01-01 RX ADMIN — LOPERAMIDE HYDROCHLORIDE 4 MG: 2 CAPSULE ORAL at 22:28

## 2020-01-01 RX ADMIN — HEPARIN 500 UNITS: 100 SYRINGE at 13:27

## 2020-01-01 RX ADMIN — TIZANIDINE 4 MG: 4 TABLET ORAL at 20:51

## 2020-01-01 RX ADMIN — MAGNESIUM 64 MG (MAGNESIUM CHLORIDE) TABLET,DELAYED RELEASE 64 MG: at 10:40

## 2020-01-01 RX ADMIN — METOPROLOL TARTRATE 25 MG: 25 TABLET, FILM COATED ORAL at 08:05

## 2020-01-01 RX ADMIN — PHENOBARBITAL SODIUM 130 MG: 65 INJECTION INTRAMUSCULAR; INTRAVENOUS at 00:34

## 2020-01-01 RX ADMIN — LEVOTHYROXINE SODIUM 50 MCG: 50 TABLET ORAL at 08:31

## 2020-01-01 RX ADMIN — GABAPENTIN 300 MG: 300 CAPSULE ORAL at 22:39

## 2020-01-01 RX ADMIN — DULOXETINE HYDROCHLORIDE 30 MG: 30 CAPSULE, DELAYED RELEASE ORAL at 08:09

## 2020-01-01 RX ADMIN — PROPOFOL 5 MCG/KG/MIN: 10 INJECTION, EMULSION INTRAVENOUS at 21:31

## 2020-01-01 RX ADMIN — CALCIUM GLUCONATE 1 G: 98 INJECTION, SOLUTION INTRAVENOUS at 16:29

## 2020-01-01 RX ADMIN — CALCIUM GLUCONATE 1 G: 98 INJECTION, SOLUTION INTRAVENOUS at 10:54

## 2020-01-01 RX ADMIN — DULOXETINE HYDROCHLORIDE 30 MG: 60 CAPSULE, DELAYED RELEASE ORAL at 13:20

## 2020-01-01 RX ADMIN — DULOXETINE HYDROCHLORIDE 60 MG: 60 CAPSULE, DELAYED RELEASE ORAL at 22:56

## 2020-01-01 RX ADMIN — GABAPENTIN 300 MG: 300 CAPSULE ORAL at 20:15

## 2020-01-01 RX ADMIN — MAGNESIUM 64 MG (MAGNESIUM CHLORIDE) TABLET,DELAYED RELEASE 64 MG: at 20:13

## 2020-01-01 RX ADMIN — Medication 18 MCG/MIN: at 21:51

## 2020-01-01 RX ADMIN — GABAPENTIN 300 MG: 300 CAPSULE ORAL at 21:39

## 2020-01-01 RX ADMIN — TIZANIDINE 4 MG: 4 TABLET ORAL at 20:17

## 2020-01-01 RX ADMIN — LOPERAMIDE HYDROCHLORIDE 4 MG: 2 CAPSULE ORAL at 12:37

## 2020-01-01 RX ADMIN — METOPROLOL TARTRATE 25 MG: 25 TABLET, FILM COATED ORAL at 08:26

## 2020-01-01 RX ADMIN — Medication 10 ML: at 11:02

## 2020-01-01 RX ADMIN — SODIUM CHLORIDE: 9 INJECTION, SOLUTION INTRAVENOUS at 18:48

## 2020-01-01 RX ADMIN — DULOXETINE HYDROCHLORIDE 60 MG: 60 CAPSULE, DELAYED RELEASE ORAL at 22:46

## 2020-01-01 RX ADMIN — CEFUROXIME AXETIL 500 MG: 250 TABLET ORAL at 08:07

## 2020-01-01 RX ADMIN — CALCIUM GLUCONATE 1 G: 98 INJECTION, SOLUTION INTRAVENOUS at 05:44

## 2020-01-01 RX ADMIN — Medication 10 ML: at 12:15

## 2020-01-01 RX ADMIN — LOPERAMIDE HYDROCHLORIDE 4 MG: 2 CAPSULE ORAL at 09:43

## 2020-01-01 RX ADMIN — FOLIC ACID 1500 MCG: 1 TABLET ORAL at 09:01

## 2020-01-01 RX ADMIN — ENOXAPARIN SODIUM 40 MG: 40 INJECTION SUBCUTANEOUS at 09:39

## 2020-01-01 RX ADMIN — MAGNESIUM 64 MG (MAGNESIUM CHLORIDE) TABLET,DELAYED RELEASE 64 MG: at 20:55

## 2020-01-01 RX ADMIN — ACETAMINOPHEN 650 MG: 325 TABLET ORAL at 04:01

## 2020-01-01 RX ADMIN — PANTOPRAZOLE SODIUM 40 MG: 40 TABLET, DELAYED RELEASE ORAL at 06:39

## 2020-01-01 RX ADMIN — FAMOTIDINE 20 MG: 20 TABLET ORAL at 19:54

## 2020-01-01 RX ADMIN — METOPROLOL TARTRATE 25 MG: 25 TABLET, FILM COATED ORAL at 20:35

## 2020-01-01 RX ADMIN — METOPROLOL TARTRATE 5 MG: 5 INJECTION INTRAVENOUS at 21:48

## 2020-01-01 RX ADMIN — Medication 20000 UNITS: at 10:06

## 2020-01-01 RX ADMIN — Medication 1400 ML/HR: at 18:14

## 2020-01-01 RX ADMIN — Medication 10 ML: at 13:19

## 2020-01-01 RX ADMIN — DULOXETINE HYDROCHLORIDE 30 MG: 60 CAPSULE, DELAYED RELEASE ORAL at 08:53

## 2020-01-01 RX ADMIN — CEFTRIAXONE SODIUM 2 G: 2 INJECTION, POWDER, FOR SOLUTION INTRAMUSCULAR; INTRAVENOUS at 11:19

## 2020-01-01 RX ADMIN — LEVOTHYROXINE SODIUM 275 MCG: 150 TABLET ORAL at 07:38

## 2020-01-01 RX ADMIN — GABAPENTIN 100 MG: 100 CAPSULE ORAL at 08:26

## 2020-01-01 RX ADMIN — FAMOTIDINE 20 MG: 20 TABLET ORAL at 20:34

## 2020-01-01 RX ADMIN — IOHEXOL 50 ML: 240 INJECTION, SOLUTION INTRATHECAL; INTRAVASCULAR; INTRAVENOUS; ORAL at 07:57

## 2020-01-01 RX ADMIN — AZITHROMYCIN MONOHYDRATE 500 MG: 500 INJECTION, POWDER, LYOPHILIZED, FOR SOLUTION INTRAVENOUS at 13:26

## 2020-01-01 RX ADMIN — ALBUMIN (HUMAN) 50 G: 0.25 INJECTION, SOLUTION INTRAVENOUS at 10:43

## 2020-01-01 RX ADMIN — LACTULOSE 20 G: 20 SOLUTION ORAL at 23:19

## 2020-01-01 RX ADMIN — CALCITRIOL 0.5 MCG: 0.25 CAPSULE ORAL at 08:47

## 2020-01-01 RX ADMIN — HEPARIN 500 UNITS: 100 SYRINGE at 13:19

## 2020-01-01 RX ADMIN — CALCIUM GLUCONATE 1 G: 98 INJECTION, SOLUTION INTRAVENOUS at 20:30

## 2020-01-01 RX ADMIN — SODIUM CHLORIDE: 9 INJECTION, SOLUTION INTRAVENOUS at 03:48

## 2020-01-01 RX ADMIN — DULOXETINE HYDROCHLORIDE 60 MG: 60 CAPSULE, DELAYED RELEASE ORAL at 20:37

## 2020-01-01 RX ADMIN — MAGNESIUM 64 MG (MAGNESIUM CHLORIDE) TABLET,DELAYED RELEASE 64 MG: at 09:00

## 2020-01-01 RX ADMIN — METOPROLOL TARTRATE 25 MG: 25 TABLET, FILM COATED ORAL at 09:00

## 2020-01-01 RX ADMIN — Medication 10 ML: at 10:42

## 2020-01-01 RX ADMIN — DULOXETINE HYDROCHLORIDE 30 MG: 30 CAPSULE, DELAYED RELEASE ORAL at 08:26

## 2020-01-01 RX ADMIN — CALCITRIOL 0.5 MCG: 0.25 CAPSULE ORAL at 13:18

## 2020-01-01 RX ADMIN — BUMETANIDE 1 MG: 1 TABLET ORAL at 17:20

## 2020-01-01 RX ADMIN — CALCITRIOL 0.25 MCG: 0.25 CAPSULE ORAL at 10:01

## 2020-01-01 RX ADMIN — CEFTRIAXONE SODIUM 2 G: 2 INJECTION, POWDER, FOR SOLUTION INTRAMUSCULAR; INTRAVENOUS at 22:16

## 2020-01-01 RX ADMIN — Medication 100 MG: at 08:56

## 2020-01-01 RX ADMIN — Medication 20000 UNITS: at 14:29

## 2020-01-01 RX ADMIN — ALBUMIN (HUMAN) 50 G: 0.25 INJECTION, SOLUTION INTRAVENOUS at 08:32

## 2020-01-01 RX ADMIN — PYRIDOXINE HCL TAB 50 MG 100 MG: 50 TAB at 09:00

## 2020-01-01 RX ADMIN — GABAPENTIN 300 MG: 300 CAPSULE ORAL at 20:33

## 2020-01-01 RX ADMIN — MAGNESIUM 64 MG (MAGNESIUM CHLORIDE) TABLET,DELAYED RELEASE 64 MG: at 09:40

## 2020-01-01 RX ADMIN — POTASSIUM CHLORIDE 40 MEQ: 1500 TABLET, EXTENDED RELEASE ORAL at 22:43

## 2020-01-01 RX ADMIN — Medication 10 ML: at 20:57

## 2020-01-01 RX ADMIN — Medication 20 MCG/MIN: at 02:12

## 2020-01-01 RX ADMIN — HEPARIN 500 UNITS: 100 SYRINGE at 05:25

## 2020-01-01 RX ADMIN — TIZANIDINE 4 MG: 4 TABLET ORAL at 20:32

## 2020-01-01 RX ADMIN — Medication 10 ML: at 11:43

## 2020-01-01 RX ADMIN — CALCITRIOL 0.5 MCG: 0.25 CAPSULE ORAL at 19:52

## 2020-01-01 RX ADMIN — IPRATROPIUM BROMIDE AND ALBUTEROL SULFATE 1 AMPULE: .5; 3 SOLUTION RESPIRATORY (INHALATION) at 07:41

## 2020-01-01 RX ADMIN — SODIUM CHLORIDE: 9 INJECTION, SOLUTION INTRAVENOUS at 21:19

## 2020-01-01 RX ADMIN — CALCIUM GLUCONATE 1 G: 98 INJECTION, SOLUTION INTRAVENOUS at 12:03

## 2020-01-01 RX ADMIN — ACETAMINOPHEN 650 MG: 325 TABLET ORAL at 22:09

## 2020-01-01 RX ADMIN — Medication 500 MCG: at 09:11

## 2020-01-01 RX ADMIN — GABAPENTIN 100 MG: 100 CAPSULE ORAL at 09:39

## 2020-01-01 RX ADMIN — METOPROLOL TARTRATE 25 MG: 25 TABLET, FILM COATED ORAL at 08:11

## 2020-01-01 RX ADMIN — METOPROLOL TARTRATE 12.5 MG: 25 TABLET ORAL at 20:33

## 2020-01-01 RX ADMIN — MAGNESIUM 64 MG (MAGNESIUM CHLORIDE) TABLET,DELAYED RELEASE 64 MG: at 21:41

## 2020-01-01 RX ADMIN — GABAPENTIN 300 MG: 300 CAPSULE ORAL at 20:06

## 2020-01-01 RX ADMIN — Medication 10 ML: at 10:35

## 2020-01-01 RX ADMIN — Medication 10 ML: at 12:02

## 2020-01-01 RX ADMIN — SODIUM CHLORIDE 20 ML: 9 INJECTION, SOLUTION INTRAVENOUS at 12:39

## 2020-01-01 RX ADMIN — Medication 500 UNITS: at 13:21

## 2020-01-01 RX ADMIN — BUMETANIDE 1 MG: 1 TABLET ORAL at 10:02

## 2020-01-01 RX ADMIN — PYRIDOXINE HCL TAB 50 MG 100 MG: 50 TAB at 08:11

## 2020-01-01 RX ADMIN — ENOXAPARIN SODIUM 30 MG: 30 INJECTION SUBCUTANEOUS at 08:50

## 2020-01-01 RX ADMIN — IPRATROPIUM BROMIDE AND ALBUTEROL SULFATE 1 AMPULE: .5; 3 SOLUTION RESPIRATORY (INHALATION) at 16:25

## 2020-01-01 RX ADMIN — LEVOTHYROXINE SODIUM 250 MCG: 125 TABLET ORAL at 05:40

## 2020-01-01 RX ADMIN — LEVOTHYROXINE SODIUM 250 MCG: 125 TABLET ORAL at 05:43

## 2020-01-01 RX ADMIN — SPIRONOLACTONE 50 MG: 25 TABLET ORAL at 15:40

## 2020-01-01 RX ADMIN — MAGNESIUM 64 MG (MAGNESIUM CHLORIDE) TABLET,DELAYED RELEASE 64 MG: at 20:16

## 2020-01-01 RX ADMIN — GABAPENTIN 100 MG: 100 CAPSULE ORAL at 09:12

## 2020-01-01 RX ADMIN — MAGNESIUM 64 MG (MAGNESIUM CHLORIDE) TABLET,DELAYED RELEASE 64 MG: at 10:12

## 2020-01-01 RX ADMIN — CALCIUM GLUCONATE 1 G: 98 INJECTION, SOLUTION INTRAVENOUS at 12:46

## 2020-01-01 RX ADMIN — Medication 5 TABLET: at 08:29

## 2020-01-01 RX ADMIN — LOPERAMIDE HYDROCHLORIDE 4 MG: 2 CAPSULE ORAL at 04:34

## 2020-01-01 RX ADMIN — CALCITRIOL 0.5 MCG: 0.25 CAPSULE ORAL at 16:29

## 2020-01-01 RX ADMIN — IPRATROPIUM BROMIDE AND ALBUTEROL SULFATE 1 AMPULE: .5; 3 SOLUTION RESPIRATORY (INHALATION) at 12:00

## 2020-01-01 RX ADMIN — OXYCODONE HYDROCHLORIDE AND ACETAMINOPHEN 1 TABLET: 5; 325 TABLET ORAL at 20:35

## 2020-01-01 RX ADMIN — FAMOTIDINE 20 MG: 20 TABLET ORAL at 21:07

## 2020-01-01 RX ADMIN — SODIUM CHLORIDE 500 ML: 9 INJECTION, SOLUTION INTRAVENOUS at 10:46

## 2020-01-01 RX ADMIN — VITAMIN D, TAB 1000IU (100/BT) 2000 UNITS: 25 TAB at 10:02

## 2020-01-01 RX ADMIN — DULOXETINE HYDROCHLORIDE 30 MG: 30 CAPSULE, DELAYED RELEASE ORAL at 10:04

## 2020-01-01 RX ADMIN — Medication 5 TABLET: at 09:43

## 2020-01-01 RX ADMIN — Medication 20000 UNITS: at 10:01

## 2020-01-01 RX ADMIN — VASOPRESSIN 0.04 UNITS/MIN: 20 INJECTION INTRAVENOUS at 09:09

## 2020-01-01 RX ADMIN — FOLIC ACID 1500 MCG: 1 TABLET ORAL at 08:56

## 2020-01-01 RX ADMIN — DULOXETINE HYDROCHLORIDE 60 MG: 60 CAPSULE, DELAYED RELEASE ORAL at 21:01

## 2020-01-01 RX ADMIN — Medication 30 MCG/MIN: at 05:39

## 2020-01-01 RX ADMIN — CEFUROXIME AXETIL 500 MG: 250 TABLET ORAL at 20:51

## 2020-01-01 RX ADMIN — LEVOTHYROXINE SODIUM 250 MCG: 125 TABLET ORAL at 06:49

## 2020-01-01 RX ADMIN — METOPROLOL TARTRATE 25 MG: 25 TABLET, FILM COATED ORAL at 21:11

## 2020-01-01 RX ADMIN — GABAPENTIN 100 MG: 100 CAPSULE ORAL at 09:48

## 2020-01-01 RX ADMIN — Medication 500 ML/HR: at 09:15

## 2020-01-01 RX ADMIN — IPRATROPIUM BROMIDE AND ALBUTEROL SULFATE 1 AMPULE: .5; 3 SOLUTION RESPIRATORY (INHALATION) at 11:38

## 2020-01-01 RX ADMIN — OXYCODONE HYDROCHLORIDE AND ACETAMINOPHEN 1 TABLET: 5; 325 TABLET ORAL at 04:27

## 2020-01-01 RX ADMIN — FOLIC ACID 1500 MCG: 1 TABLET ORAL at 08:26

## 2020-01-01 RX ADMIN — FENTANYL CITRATE 100 MCG: 50 INJECTION, SOLUTION INTRAMUSCULAR; INTRAVENOUS at 22:16

## 2020-01-01 RX ADMIN — Medication 10 ML: at 12:52

## 2020-01-01 RX ADMIN — SODIUM CHLORIDE: 9 INJECTION, SOLUTION INTRAVENOUS at 17:04

## 2020-01-01 RX ADMIN — ALBUMIN (HUMAN) 50 G: 0.25 INJECTION, SOLUTION INTRAVENOUS at 08:27

## 2020-01-01 RX ADMIN — MAGNESIUM 64 MG (MAGNESIUM CHLORIDE) TABLET,DELAYED RELEASE 64 MG: at 21:32

## 2020-01-01 RX ADMIN — ACYCLOVIR 400 MG: 400 TABLET ORAL at 08:26

## 2020-01-01 RX ADMIN — ALBUMIN (HUMAN) 50 G: 0.25 INJECTION, SOLUTION INTRAVENOUS at 10:10

## 2020-01-01 RX ADMIN — MAGNESIUM 64 MG (MAGNESIUM CHLORIDE) TABLET,DELAYED RELEASE 64 MG: at 23:13

## 2020-01-01 RX ADMIN — METOPROLOL TARTRATE 12.5 MG: 25 TABLET, FILM COATED ORAL at 20:05

## 2020-01-01 RX ADMIN — ALBUMIN (HUMAN) 25 G: 0.25 INJECTION, SOLUTION INTRAVENOUS at 09:35

## 2020-01-01 RX ADMIN — IPRATROPIUM BROMIDE AND ALBUTEROL SULFATE 1 AMPULE: .5; 3 SOLUTION RESPIRATORY (INHALATION) at 19:45

## 2020-01-01 RX ADMIN — IRON SUCROSE 200 MG: 20 INJECTION, SOLUTION INTRAVENOUS at 15:53

## 2020-01-01 RX ADMIN — ACYCLOVIR 400 MG: 400 TABLET ORAL at 09:47

## 2020-01-01 RX ADMIN — CALCITRIOL 0.25 MCG: 0.25 CAPSULE ORAL at 08:31

## 2020-01-01 RX ADMIN — LACTULOSE 30 G: 20 SOLUTION ORAL at 22:14

## 2020-01-01 RX ADMIN — ACYCLOVIR 400 MG: 400 TABLET ORAL at 08:09

## 2020-01-01 RX ADMIN — ALBUMIN (HUMAN) 50 G: 0.25 INJECTION, SOLUTION INTRAVENOUS at 09:53

## 2020-01-01 RX ADMIN — ACYCLOVIR 400 MG: 400 TABLET ORAL at 09:36

## 2020-01-01 RX ADMIN — Medication 500 MCG: at 08:13

## 2020-01-01 RX ADMIN — SODIUM CHLORIDE 500 ML: 9 INJECTION, SOLUTION INTRAVENOUS at 03:00

## 2020-01-01 RX ADMIN — CYCLOBENZAPRINE HYDROCHLORIDE 10 MG: 10 TABLET, FILM COATED ORAL at 08:14

## 2020-01-01 RX ADMIN — MAGNESIUM SULFATE IN WATER 2 G: 40 INJECTION, SOLUTION INTRAVENOUS at 10:35

## 2020-01-01 RX ADMIN — ACYCLOVIR 400 MG: 400 TABLET ORAL at 22:15

## 2020-01-01 RX ADMIN — Medication 50 MEQ: at 22:34

## 2020-01-01 RX ADMIN — SPIRONOLACTONE 12.5 MG: 25 TABLET ORAL at 09:22

## 2020-01-01 RX ADMIN — Medication: at 01:07

## 2020-01-01 RX ADMIN — FOLIC ACID 1500 MCG: 1 TABLET ORAL at 08:06

## 2020-01-01 RX ADMIN — CEFUROXIME AXETIL 500 MG: 250 TABLET ORAL at 08:09

## 2020-01-01 RX ADMIN — VITAMIN D, TAB 1000IU (100/BT) 2000 UNITS: 25 TAB at 09:40

## 2020-01-01 RX ADMIN — Medication 1400 ML/HR: at 21:53

## 2020-01-01 RX ADMIN — SPIRONOLACTONE 12.5 MG: 25 TABLET ORAL at 09:47

## 2020-01-01 RX ADMIN — Medication 5 TABLET: at 13:40

## 2020-01-01 RX ADMIN — PROPOFOL 10 MCG/KG/MIN: 10 INJECTION, EMULSION INTRAVENOUS at 22:08

## 2020-01-01 RX ADMIN — DULOXETINE HYDROCHLORIDE 30 MG: 30 CAPSULE, DELAYED RELEASE ORAL at 08:48

## 2020-01-01 RX ADMIN — Medication 10 ML: at 13:26

## 2020-01-01 RX ADMIN — METOPROLOL TARTRATE 5 MG: 5 INJECTION INTRAVENOUS at 22:10

## 2020-01-01 RX ADMIN — OXYCODONE HYDROCHLORIDE AND ACETAMINOPHEN 1 TABLET: 5; 325 TABLET ORAL at 11:35

## 2020-01-01 RX ADMIN — ACYCLOVIR 400 MG: 400 TABLET ORAL at 20:15

## 2020-01-01 RX ADMIN — GABAPENTIN 100 MG: 100 CAPSULE ORAL at 08:18

## 2020-01-01 RX ADMIN — TIZANIDINE 4 MG: 4 TABLET ORAL at 21:57

## 2020-01-01 RX ADMIN — DULOXETINE HYDROCHLORIDE 60 MG: 60 CAPSULE, DELAYED RELEASE ORAL at 20:35

## 2020-01-01 RX ADMIN — MAGNESIUM 64 MG (MAGNESIUM CHLORIDE) TABLET,DELAYED RELEASE 64 MG: at 21:56

## 2020-01-01 RX ADMIN — Medication 5 TABLET: at 10:22

## 2020-01-01 RX ADMIN — DULOXETINE HYDROCHLORIDE 30 MG: 30 CAPSULE, DELAYED RELEASE ORAL at 08:55

## 2020-01-01 RX ADMIN — TIZANIDINE 4 MG: 4 TABLET ORAL at 21:02

## 2020-01-01 RX ADMIN — PANTOPRAZOLE SODIUM 40 MG: 40 TABLET, DELAYED RELEASE ORAL at 07:39

## 2020-01-01 RX ADMIN — METOPROLOL TARTRATE 25 MG: 25 TABLET, FILM COATED ORAL at 10:38

## 2020-01-01 RX ADMIN — LEVOTHYROXINE SODIUM 275 MCG: 150 TABLET ORAL at 09:48

## 2020-01-01 RX ADMIN — SPIRONOLACTONE 12.5 MG: 25 TABLET ORAL at 10:33

## 2020-01-01 RX ADMIN — THERA TABS 1 TABLET: TAB at 09:38

## 2020-01-01 RX ADMIN — VITAMIN D, TAB 1000IU (100/BT) 10000 UNITS: 25 TAB at 20:39

## 2020-01-01 RX ADMIN — Medication 20000 UNITS: at 09:00

## 2020-01-01 RX ADMIN — Medication 10 ML: at 21:27

## 2020-01-01 RX ADMIN — DULOXETINE HYDROCHLORIDE 30 MG: 30 CAPSULE, DELAYED RELEASE ORAL at 09:12

## 2020-01-01 RX ADMIN — Medication 20 MG: at 17:48

## 2020-01-01 RX ADMIN — BUMETANIDE 1 MG/HR: 0.25 INJECTION INTRAMUSCULAR; INTRAVENOUS at 19:05

## 2020-01-01 RX ADMIN — OXYCODONE HYDROCHLORIDE AND ACETAMINOPHEN 1 TABLET: 5; 325 TABLET ORAL at 21:20

## 2020-01-01 RX ADMIN — ACYCLOVIR 400 MG: 400 TABLET ORAL at 20:11

## 2020-01-01 RX ADMIN — Medication 10 ML: at 11:01

## 2020-01-01 RX ADMIN — DULOXETINE HYDROCHLORIDE 30 MG: 60 CAPSULE, DELAYED RELEASE ORAL at 09:38

## 2020-01-01 RX ADMIN — Medication 20000 UNITS: at 09:07

## 2020-01-01 RX ADMIN — OXYCODONE HYDROCHLORIDE AND ACETAMINOPHEN 1 TABLET: 5; 325 TABLET ORAL at 08:36

## 2020-01-01 RX ADMIN — HEPARIN 300 UNITS: 100 SYRINGE at 17:22

## 2020-01-01 RX ADMIN — ACETAMINOPHEN 650 MG: 325 TABLET ORAL at 17:18

## 2020-01-01 RX ADMIN — IPRATROPIUM BROMIDE AND ALBUTEROL SULFATE 1 AMPULE: .5; 3 SOLUTION RESPIRATORY (INHALATION) at 15:49

## 2020-01-01 RX ADMIN — ACETAMINOPHEN 650 MG: 325 TABLET ORAL at 09:43

## 2020-01-01 RX ADMIN — SODIUM CHLORIDE: 9 INJECTION, SOLUTION INTRAVENOUS at 07:45

## 2020-01-01 RX ADMIN — Medication 10 ML: at 08:40

## 2020-01-01 RX ADMIN — SPIRONOLACTONE 12.5 MG: 25 TABLET ORAL at 21:57

## 2020-01-01 RX ADMIN — ACETAMINOPHEN 650 MG: 325 TABLET ORAL at 16:27

## 2020-01-01 RX ADMIN — LORAZEPAM 1 MG: 2 INJECTION INTRAMUSCULAR; INTRAVENOUS at 18:45

## 2020-01-01 RX ADMIN — Medication 100 MG: at 09:38

## 2020-01-01 RX ADMIN — ACYCLOVIR 400 MG: 400 TABLET ORAL at 09:12

## 2020-01-01 RX ADMIN — Medication 20 MG: at 17:19

## 2020-01-01 RX ADMIN — Medication 100 MG: at 20:38

## 2020-01-01 RX ADMIN — GABAPENTIN 100 MG: 100 CAPSULE ORAL at 08:30

## 2020-01-01 RX ADMIN — BUMETANIDE 0.5 MG: 0.25 INJECTION INTRAMUSCULAR; INTRAVENOUS at 08:32

## 2020-01-01 RX ADMIN — LEVOTHYROXINE SODIUM 200 MCG: 100 TABLET ORAL at 06:15

## 2020-01-01 RX ADMIN — CALCIUM GLUCONATE 1 G: 98 INJECTION, SOLUTION INTRAVENOUS at 11:01

## 2020-01-01 RX ADMIN — HEPARIN 500 UNITS: 100 SYRINGE at 05:02

## 2020-01-01 RX ADMIN — GADOTERIDOL 10 ML: 279.3 INJECTION, SOLUTION INTRAVENOUS at 16:32

## 2020-01-01 RX ADMIN — METOPROLOL TARTRATE 25 MG: 25 TABLET, FILM COATED ORAL at 20:51

## 2020-01-01 RX ADMIN — GABAPENTIN 300 MG: 300 CAPSULE ORAL at 21:08

## 2020-01-01 RX ADMIN — ACYCLOVIR 400 MG: 400 TABLET ORAL at 20:17

## 2020-01-01 RX ADMIN — Medication: at 09:37

## 2020-01-01 RX ADMIN — Medication 0.69 G: at 10:15

## 2020-01-01 RX ADMIN — FAMOTIDINE 20 MG: 20 TABLET ORAL at 09:46

## 2020-01-01 RX ADMIN — SODIUM CHLORIDE: 9 INJECTION, SOLUTION INTRAVENOUS at 23:17

## 2020-01-01 RX ADMIN — ACYCLOVIR 400 MG: 400 TABLET ORAL at 08:16

## 2020-01-01 RX ADMIN — FAMOTIDINE 20 MG: 20 TABLET ORAL at 10:06

## 2020-01-01 RX ADMIN — Medication 100 MG: at 15:54

## 2020-01-01 RX ADMIN — LEVOTHYROXINE SODIUM 250 MCG: 125 TABLET ORAL at 05:58

## 2020-01-01 RX ADMIN — CEFTRIAXONE SODIUM 2 G: 2 INJECTION, POWDER, FOR SOLUTION INTRAMUSCULAR; INTRAVENOUS at 10:56

## 2020-01-01 RX ADMIN — TIZANIDINE 4 MG: 4 TABLET ORAL at 20:34

## 2020-01-01 RX ADMIN — MAGNESIUM SULFATE HEPTAHYDRATE 2 G: 40 INJECTION, SOLUTION INTRAVENOUS at 08:18

## 2020-01-01 RX ADMIN — FOLIC ACID 1 MG: 1 TABLET ORAL at 15:54

## 2020-01-01 RX ADMIN — SODIUM CHLORIDE 20 ML: 9 INJECTION, SOLUTION INTRAVENOUS at 09:45

## 2020-01-01 RX ADMIN — METOPROLOL TARTRATE 5 MG: 5 INJECTION INTRAVENOUS at 04:18

## 2020-01-01 RX ADMIN — OXYCODONE HYDROCHLORIDE AND ACETAMINOPHEN 1 TABLET: 5; 325 TABLET ORAL at 21:36

## 2020-01-01 RX ADMIN — ACETAMINOPHEN 650 MG: 325 TABLET ORAL at 16:33

## 2020-01-01 RX ADMIN — GABAPENTIN 300 MG: 300 CAPSULE ORAL at 21:25

## 2020-01-01 RX ADMIN — FUROSEMIDE 40 MG: 10 INJECTION, SOLUTION INTRAMUSCULAR; INTRAVENOUS at 21:04

## 2020-01-01 RX ADMIN — ENOXAPARIN SODIUM 30 MG: 30 INJECTION SUBCUTANEOUS at 10:04

## 2020-01-01 RX ADMIN — IOPAMIDOL 85 ML: 755 INJECTION, SOLUTION INTRAVENOUS at 07:57

## 2020-01-01 RX ADMIN — Medication 20000 UNITS: at 09:46

## 2020-01-01 RX ADMIN — VITAMIN D, TAB 1000IU (100/BT) 10000 UNITS: 25 TAB at 09:11

## 2020-01-01 RX ADMIN — FENTANYL CITRATE 50 MCG: 50 INJECTION, SOLUTION INTRAMUSCULAR; INTRAVENOUS at 16:01

## 2020-01-01 RX ADMIN — CEFTRIAXONE SODIUM 1 G: 1 INJECTION, POWDER, FOR SOLUTION INTRAMUSCULAR; INTRAVENOUS at 20:24

## 2020-01-01 RX ADMIN — ALBUMIN (HUMAN) 25 G: 0.25 INJECTION, SOLUTION INTRAVENOUS at 18:27

## 2020-01-01 RX ADMIN — DULOXETINE HYDROCHLORIDE 60 MG: 60 CAPSULE, DELAYED RELEASE ORAL at 20:34

## 2020-01-01 RX ADMIN — Medication 100 MG: at 08:30

## 2020-01-01 RX ADMIN — FAMOTIDINE 20 MG: 20 TABLET ORAL at 21:14

## 2020-01-01 RX ADMIN — ACYCLOVIR 400 MG: 400 TABLET ORAL at 21:28

## 2020-01-01 RX ADMIN — OXYCODONE HYDROCHLORIDE AND ACETAMINOPHEN 1 TABLET: 5; 325 TABLET ORAL at 09:40

## 2020-01-01 RX ADMIN — Medication 5 TABLET: at 08:40

## 2020-01-01 RX ADMIN — HEPARIN 500 UNITS: 100 SYRINGE at 11:25

## 2020-01-01 RX ADMIN — LACTULOSE 30 G: 20 SOLUTION ORAL at 21:30

## 2020-01-01 RX ADMIN — FAMOTIDINE 20 MG: 20 TABLET ORAL at 08:57

## 2020-01-01 RX ADMIN — Medication 20000 UNITS: at 08:54

## 2020-01-01 RX ADMIN — ENOXAPARIN SODIUM 30 MG: 30 INJECTION SUBCUTANEOUS at 08:07

## 2020-01-01 RX ADMIN — Medication 20000 UNITS: at 08:07

## 2020-01-01 RX ADMIN — CALCITRIOL 0.25 MCG: 0.25 CAPSULE ORAL at 09:00

## 2020-01-01 RX ADMIN — PANTOPRAZOLE SODIUM 40 MG: 40 TABLET, DELAYED RELEASE ORAL at 05:10

## 2020-01-01 RX ADMIN — MAGNESIUM 64 MG (MAGNESIUM CHLORIDE) TABLET,DELAYED RELEASE 64 MG: at 08:10

## 2020-01-01 RX ADMIN — OXYCODONE HYDROCHLORIDE AND ACETAMINOPHEN 1 TABLET: 5; 325 TABLET ORAL at 19:56

## 2020-01-01 RX ADMIN — SODIUM CHLORIDE, PRESERVATIVE FREE 10 ML: 5 INJECTION INTRAVENOUS at 05:26

## 2020-01-01 RX ADMIN — METOPROLOL TARTRATE 25 MG: 25 TABLET, FILM COATED ORAL at 19:53

## 2020-01-01 RX ADMIN — METOPROLOL TARTRATE 25 MG: 25 TABLET, FILM COATED ORAL at 20:34

## 2020-01-01 RX ADMIN — OXYCODONE HYDROCHLORIDE AND ACETAMINOPHEN 1 TABLET: 5; 325 TABLET ORAL at 12:21

## 2020-01-01 RX ADMIN — TIZANIDINE 4 MG: 4 TABLET ORAL at 21:30

## 2020-01-01 RX ADMIN — SODIUM CHLORIDE, PRESERVATIVE FREE 10 ML: 5 INJECTION INTRAVENOUS at 05:33

## 2020-01-01 RX ADMIN — Medication 10 ML: at 11:37

## 2020-01-01 RX ADMIN — SODIUM CHLORIDE 500 ML: 9 INJECTION, SOLUTION INTRAVENOUS at 12:17

## 2020-01-01 RX ADMIN — HEPARIN 500 UNITS: 100 SYRINGE at 05:32

## 2020-01-01 RX ADMIN — CALCIUM GLUCONATE 1 G: 98 INJECTION, SOLUTION INTRAVENOUS at 02:03

## 2020-01-01 RX ADMIN — CALCIUM GLUCONATE 1 G: 98 INJECTION, SOLUTION INTRAVENOUS at 09:33

## 2020-01-01 RX ADMIN — Medication 6 MG: at 15:51

## 2020-01-01 RX ADMIN — VITAMIN D, TAB 1000IU (100/BT) 10000 UNITS: 25 TAB at 09:45

## 2020-01-01 RX ADMIN — ACYCLOVIR 400 MG: 400 TABLET ORAL at 08:32

## 2020-01-01 RX ADMIN — GABAPENTIN 300 MG: 300 CAPSULE ORAL at 19:54

## 2020-01-01 RX ADMIN — METOPROLOL TARTRATE 12.5 MG: 25 TABLET, FILM COATED ORAL at 09:38

## 2020-01-01 RX ADMIN — CEFUROXIME AXETIL 500 MG: 250 TABLET ORAL at 20:34

## 2020-01-01 RX ADMIN — ENOXAPARIN SODIUM 30 MG: 30 INJECTION SUBCUTANEOUS at 09:50

## 2020-01-01 RX ADMIN — PYRIDOXINE HCL TAB 50 MG 100 MG: 50 TAB at 08:55

## 2020-01-01 RX ADMIN — MAGNESIUM 64 MG (MAGNESIUM CHLORIDE) TABLET,DELAYED RELEASE 64 MG: at 20:35

## 2020-01-01 RX ADMIN — ACYCLOVIR 400 MG: 400 TABLET ORAL at 20:18

## 2020-01-01 RX ADMIN — FAMOTIDINE 20 MG: 20 TABLET ORAL at 22:37

## 2020-01-01 RX ADMIN — HEPARIN 500 UNITS: 100 SYRINGE at 11:33

## 2020-01-01 RX ADMIN — FAMOTIDINE 20 MG: 20 TABLET ORAL at 20:38

## 2020-01-01 RX ADMIN — DEXAMETHASONE SODIUM PHOSPHATE 4 MG: 4 INJECTION, SOLUTION INTRAMUSCULAR; INTRAVENOUS at 08:32

## 2020-01-01 RX ADMIN — BUMETANIDE 1 MG: 1 TABLET ORAL at 09:22

## 2020-01-01 RX ADMIN — MAGNESIUM 64 MG (MAGNESIUM CHLORIDE) TABLET,DELAYED RELEASE 64 MG: at 22:37

## 2020-01-01 RX ADMIN — FOLIC ACID 1500 MCG: 1 TABLET ORAL at 08:55

## 2020-01-01 RX ADMIN — HEPARIN 500 UNITS: 100 SYRINGE at 13:22

## 2020-01-01 RX ADMIN — ACYCLOVIR 400 MG: 400 TABLET ORAL at 21:40

## 2020-01-01 RX ADMIN — ACYCLOVIR 400 MG: 400 TABLET ORAL at 10:03

## 2020-01-01 RX ADMIN — ACYCLOVIR 400 MG: 400 TABLET ORAL at 00:39

## 2020-01-01 RX ADMIN — OXYCODONE HYDROCHLORIDE AND ACETAMINOPHEN 1 TABLET: 5; 325 TABLET ORAL at 05:03

## 2020-01-01 RX ADMIN — TIZANIDINE 4 MG: 4 TABLET ORAL at 20:11

## 2020-01-01 RX ADMIN — DULOXETINE HYDROCHLORIDE 60 MG: 60 CAPSULE, DELAYED RELEASE ORAL at 20:32

## 2020-01-01 RX ADMIN — FAMOTIDINE 20 MG: 20 TABLET ORAL at 08:46

## 2020-01-01 RX ADMIN — MAGNESIUM SULFATE 2 G: 2 INJECTION INTRAVENOUS at 13:00

## 2020-01-01 RX ADMIN — ENOXAPARIN SODIUM 30 MG: 30 INJECTION SUBCUTANEOUS at 08:14

## 2020-01-01 RX ADMIN — FAMOTIDINE 20 MG: 20 TABLET ORAL at 08:14

## 2020-01-01 RX ADMIN — CALCIUM GLUCONATE 1 G: 98 INJECTION, SOLUTION INTRAVENOUS at 09:17

## 2020-01-01 RX ADMIN — Medication 10 ML: at 09:36

## 2020-01-01 RX ADMIN — GABAPENTIN 300 MG: 300 CAPSULE ORAL at 20:17

## 2020-01-01 RX ADMIN — LACTULOSE 30 G: 20 SOLUTION ORAL at 15:39

## 2020-01-01 RX ADMIN — DULOXETINE HYDROCHLORIDE 30 MG: 30 CAPSULE, DELAYED RELEASE ORAL at 09:07

## 2020-01-01 RX ADMIN — TIZANIDINE 4 MG: 4 TABLET ORAL at 21:25

## 2020-01-01 RX ADMIN — CEFUROXIME AXETIL 500 MG: 250 TABLET ORAL at 20:18

## 2020-01-01 RX ADMIN — CALCIUM GLUCONATE 1 G: 98 INJECTION, SOLUTION INTRAVENOUS at 11:48

## 2020-01-01 RX ADMIN — CALCITRIOL 0.5 MCG: 0.25 CAPSULE ORAL at 08:10

## 2020-01-01 RX ADMIN — DEXTROSE 50 % IN WATER (D50W) INTRAVENOUS SYRINGE 25 G: at 15:12

## 2020-01-01 RX ADMIN — POTASSIUM CHLORIDE 40 MEQ: 750 TABLET, FILM COATED, EXTENDED RELEASE ORAL at 06:14

## 2020-01-01 RX ADMIN — IPRATROPIUM BROMIDE AND ALBUTEROL SULFATE 1 AMPULE: .5; 3 SOLUTION RESPIRATORY (INHALATION) at 11:40

## 2020-01-01 RX ADMIN — IPRATROPIUM BROMIDE AND ALBUTEROL SULFATE 1 AMPULE: .5; 3 SOLUTION RESPIRATORY (INHALATION) at 15:34

## 2020-01-01 RX ADMIN — MEROPENEM 1 G: 1 INJECTION, POWDER, FOR SOLUTION INTRAVENOUS at 11:44

## 2020-01-01 RX ADMIN — DULOXETINE HYDROCHLORIDE 30 MG: 30 CAPSULE, DELAYED RELEASE ORAL at 09:01

## 2020-01-01 RX ADMIN — Medication 20000 UNITS: at 09:36

## 2020-01-01 RX ADMIN — Medication 1400 ML/HR: at 04:02

## 2020-01-01 RX ADMIN — Medication 500 MCG: at 20:38

## 2020-01-01 RX ADMIN — ACYCLOVIR 400 MG: 400 TABLET ORAL at 19:51

## 2020-01-01 RX ADMIN — Medication 500 MCG: at 09:49

## 2020-01-01 RX ADMIN — ENOXAPARIN SODIUM 30 MG: 30 INJECTION SUBCUTANEOUS at 08:29

## 2020-01-01 RX ADMIN — SODIUM CHLORIDE 1000 ML: 9 INJECTION, SOLUTION INTRAVENOUS at 18:56

## 2020-01-01 RX ADMIN — CEFTRIAXONE SODIUM 1 G: 1 INJECTION, POWDER, FOR SOLUTION INTRAMUSCULAR; INTRAVENOUS at 10:31

## 2020-01-01 RX ADMIN — PYRIDOXINE HCL TAB 50 MG 100 MG: 50 TAB at 20:37

## 2020-01-01 RX ADMIN — SODIUM CHLORIDE, PRESERVATIVE FREE 10 ML: 5 INJECTION INTRAVENOUS at 08:34

## 2020-01-01 RX ADMIN — CALCIUM GLUCONATE 1 G: 98 INJECTION, SOLUTION INTRAVENOUS at 05:05

## 2020-01-01 RX ADMIN — MAGNESIUM 64 MG (MAGNESIUM CHLORIDE) TABLET,DELAYED RELEASE 64 MG: at 10:13

## 2020-01-01 RX ADMIN — ACETAMINOPHEN 650 MG: 325 TABLET ORAL at 08:33

## 2020-01-01 RX ADMIN — LEVOTHYROXINE SODIUM 275 MCG: 150 TABLET ORAL at 07:34

## 2020-01-01 RX ADMIN — GABAPENTIN 100 MG: 100 CAPSULE ORAL at 09:23

## 2020-01-01 RX ADMIN — HEPARIN 500 UNITS: 100 SYRINGE at 13:47

## 2020-01-01 RX ADMIN — METOPROLOL TARTRATE 5 MG: 5 INJECTION INTRAVENOUS at 17:34

## 2020-01-01 RX ADMIN — CEFTRIAXONE SODIUM 1 G: 1 INJECTION, POWDER, FOR SOLUTION INTRAMUSCULAR; INTRAVENOUS at 10:55

## 2020-01-01 RX ADMIN — HEPARIN 500 UNITS: 100 SYRINGE at 11:46

## 2020-01-01 RX ADMIN — ACYCLOVIR 400 MG: 400 TABLET ORAL at 10:34

## 2020-01-01 RX ADMIN — ALBUMIN (HUMAN) 25 G: 0.25 INJECTION, SOLUTION INTRAVENOUS at 13:03

## 2020-01-01 RX ADMIN — GABAPENTIN 100 MG: 100 CAPSULE ORAL at 10:04

## 2020-01-01 RX ADMIN — Medication 100 MG: at 09:49

## 2020-01-01 RX ADMIN — HEPARIN 500 UNITS: 100 SYRINGE at 05:18

## 2020-01-01 RX ADMIN — OXYCODONE HYDROCHLORIDE AND ACETAMINOPHEN 1 TABLET: 5; 325 TABLET ORAL at 21:31

## 2020-01-01 RX ADMIN — PYRIDOXINE HCL TAB 50 MG 100 MG: 50 TAB at 08:14

## 2020-01-01 RX ADMIN — ACETAMINOPHEN 650 MG: 325 TABLET ORAL at 08:05

## 2020-01-01 RX ADMIN — ENOXAPARIN SODIUM 30 MG: 30 INJECTION SUBCUTANEOUS at 08:25

## 2020-01-01 RX ADMIN — ENOXAPARIN SODIUM 40 MG: 40 INJECTION SUBCUTANEOUS at 09:41

## 2020-01-01 RX ADMIN — PROPOFOL 50 MG: 10 INJECTION, EMULSION INTRAVENOUS at 22:09

## 2020-01-01 RX ADMIN — FAMOTIDINE 20 MG: 10 INJECTION INTRAVENOUS at 08:32

## 2020-01-01 RX ADMIN — Medication 10 ML: at 19:05

## 2020-01-01 RX ADMIN — FOLIC ACID 1500 MCG: 1 TABLET ORAL at 10:05

## 2020-01-01 RX ADMIN — CALCITRIOL 0.25 MCG: 0.25 CAPSULE ORAL at 08:53

## 2020-01-01 RX ADMIN — ACYCLOVIR 400 MG: 400 TABLET ORAL at 22:16

## 2020-01-01 RX ADMIN — SODIUM CHLORIDE 500 ML: 9 INJECTION, SOLUTION INTRAVENOUS at 13:45

## 2020-01-01 RX ADMIN — SODIUM CHLORIDE: 9 INJECTION, SOLUTION INTRAVENOUS at 21:52

## 2020-01-01 RX ADMIN — Medication 15 ML: at 23:57

## 2020-01-01 RX ADMIN — DULOXETINE HYDROCHLORIDE 30 MG: 30 CAPSULE, DELAYED RELEASE ORAL at 08:31

## 2020-01-01 RX ADMIN — CEFUROXIME AXETIL 500 MG: 250 TABLET ORAL at 20:35

## 2020-01-01 RX ADMIN — METOPROLOL TARTRATE 12.5 MG: 25 TABLET ORAL at 20:57

## 2020-01-01 RX ADMIN — PYRIDOXINE HCL TAB 50 MG 100 MG: 50 TAB at 09:39

## 2020-01-01 RX ADMIN — OXYCODONE HYDROCHLORIDE AND ACETAMINOPHEN 1 TABLET: 5; 325 TABLET ORAL at 18:36

## 2020-01-01 RX ADMIN — Medication 10 ML: at 11:36

## 2020-01-01 RX ADMIN — GABAPENTIN 100 MG: 100 CAPSULE ORAL at 09:05

## 2020-01-01 RX ADMIN — LEVOTHYROXINE SODIUM 200 MCG: 100 TABLET ORAL at 05:11

## 2020-01-01 RX ADMIN — FOLIC ACID 1500 MCG: 1 TABLET ORAL at 08:15

## 2020-01-01 RX ADMIN — MAGNESIUM 64 MG (MAGNESIUM CHLORIDE) TABLET,DELAYED RELEASE 64 MG: at 09:44

## 2020-01-01 RX ADMIN — DEXTROSE MONOHYDRATE 12.5 G: 500 INJECTION PARENTERAL at 21:16

## 2020-01-01 RX ADMIN — Medication 10 ML: at 08:17

## 2020-01-01 RX ADMIN — CALCIUM GLUCONATE 1 G: 98 INJECTION, SOLUTION INTRAVENOUS at 09:19

## 2020-01-01 RX ADMIN — SODIUM BICARBONATE 50 MEQ: 84 INJECTION, SOLUTION INTRAVENOUS at 23:31

## 2020-01-01 RX ADMIN — VITAMIN D, TAB 1000IU (100/BT) 2000 UNITS: 25 TAB at 08:12

## 2020-01-01 RX ADMIN — PHENYLEPHRINE HYDROCHLORIDE 100 MCG/MIN: 10 INJECTION INTRAVENOUS at 01:07

## 2020-01-01 RX ADMIN — Medication 10 ML: at 11:50

## 2020-01-01 RX ADMIN — ACETAMINOPHEN 650 MG: 325 TABLET ORAL at 09:11

## 2020-01-01 RX ADMIN — SODIUM CHLORIDE 500 ML: 9 INJECTION, SOLUTION INTRAVENOUS at 08:59

## 2020-01-01 RX ADMIN — SODIUM CHLORIDE: 9 INJECTION, SOLUTION INTRAVENOUS at 06:17

## 2020-01-01 RX ADMIN — Medication 5 TABLET: at 08:09

## 2020-01-01 RX ADMIN — MAGNESIUM 64 MG (MAGNESIUM CHLORIDE) TABLET,DELAYED RELEASE 64 MG: at 20:34

## 2020-01-01 RX ADMIN — MAGNESIUM 64 MG (MAGNESIUM CHLORIDE) TABLET,DELAYED RELEASE 64 MG: at 08:18

## 2020-01-01 RX ADMIN — SODIUM CHLORIDE 20 ML: 9 INJECTION, SOLUTION INTRAVENOUS at 05:00

## 2020-01-01 RX ADMIN — Medication 10 ML: at 10:32

## 2020-01-01 RX ADMIN — BUMETANIDE 1 MG: 1 TABLET ORAL at 08:53

## 2020-01-01 RX ADMIN — ACYCLOVIR 400 MG: 400 TABLET ORAL at 08:31

## 2020-01-01 RX ADMIN — ACYCLOVIR 400 MG: 400 TABLET ORAL at 20:05

## 2020-01-01 RX ADMIN — FENTANYL CITRATE 50 MCG: 50 INJECTION, SOLUTION INTRAMUSCULAR; INTRAVENOUS at 16:05

## 2020-01-01 RX ADMIN — CEFTRIAXONE SODIUM 2 G: 2 INJECTION, POWDER, FOR SOLUTION INTRAMUSCULAR; INTRAVENOUS at 22:21

## 2020-01-01 RX ADMIN — CEFUROXIME AXETIL 500 MG: 250 TABLET ORAL at 20:15

## 2020-01-01 RX ADMIN — CALCITRIOL 0.5 MCG: 0.25 CAPSULE ORAL at 15:40

## 2020-01-01 RX ADMIN — FAMOTIDINE 20 MG: 20 TABLET ORAL at 20:16

## 2020-01-01 ASSESSMENT — PAIN DESCRIPTION - PAIN TYPE
TYPE: ACUTE PAIN
TYPE: CHRONIC PAIN
TYPE: ACUTE PAIN
TYPE: CHRONIC PAIN
TYPE: ACUTE PAIN
TYPE: CHRONIC PAIN
TYPE: ACUTE PAIN
TYPE: CHRONIC PAIN
TYPE: ACUTE PAIN
TYPE: CHRONIC PAIN
TYPE: ACUTE PAIN
TYPE: CHRONIC PAIN
TYPE: ACUTE PAIN
TYPE: CHRONIC PAIN
TYPE: ACUTE PAIN
TYPE: CHRONIC PAIN
TYPE: ACUTE PAIN
TYPE: CHRONIC PAIN
TYPE: CHRONIC PAIN
TYPE: ACUTE PAIN
TYPE: CHRONIC PAIN
TYPE: ACUTE PAIN
TYPE: ACUTE PAIN

## 2020-01-01 ASSESSMENT — PAIN DESCRIPTION - FREQUENCY
FREQUENCY: INTERMITTENT
FREQUENCY: CONTINUOUS
FREQUENCY: CONTINUOUS
FREQUENCY: INTERMITTENT
FREQUENCY: INTERMITTENT
FREQUENCY: CONTINUOUS
FREQUENCY: INTERMITTENT
FREQUENCY: CONTINUOUS
FREQUENCY: INTERMITTENT
FREQUENCY: CONTINUOUS
FREQUENCY: CONTINUOUS
FREQUENCY: INTERMITTENT
FREQUENCY: INTERMITTENT
FREQUENCY: CONTINUOUS
FREQUENCY: INTERMITTENT
FREQUENCY: CONTINUOUS
FREQUENCY: INTERMITTENT
FREQUENCY: CONTINUOUS
FREQUENCY: INTERMITTENT
FREQUENCY: CONTINUOUS
FREQUENCY: INTERMITTENT
FREQUENCY: CONTINUOUS
FREQUENCY: INTERMITTENT
FREQUENCY: CONTINUOUS
FREQUENCY: INTERMITTENT

## 2020-01-01 ASSESSMENT — PAIN DESCRIPTION - PROGRESSION
CLINICAL_PROGRESSION: NOT CHANGED
CLINICAL_PROGRESSION: OTHER (COMMENT)
CLINICAL_PROGRESSION: NOT CHANGED
CLINICAL_PROGRESSION: RESOLVED
CLINICAL_PROGRESSION: NOT CHANGED
CLINICAL_PROGRESSION: GRADUALLY WORSENING
CLINICAL_PROGRESSION: GRADUALLY WORSENING
CLINICAL_PROGRESSION: NOT CHANGED
CLINICAL_PROGRESSION: GRADUALLY WORSENING
CLINICAL_PROGRESSION: NOT CHANGED
CLINICAL_PROGRESSION: GRADUALLY WORSENING
CLINICAL_PROGRESSION: NOT CHANGED
CLINICAL_PROGRESSION: GRADUALLY WORSENING
CLINICAL_PROGRESSION: NOT CHANGED
CLINICAL_PROGRESSION: GRADUALLY WORSENING
CLINICAL_PROGRESSION: GRADUALLY WORSENING
CLINICAL_PROGRESSION: NOT CHANGED

## 2020-01-01 ASSESSMENT — PAIN DESCRIPTION - DESCRIPTORS
DESCRIPTORS: ACHING
DESCRIPTORS: BURNING;CRAMPING;SPASM
DESCRIPTORS: ACHING
DESCRIPTORS: CRAMPING;STABBING
DESCRIPTORS: ACHING;CRAMPING
DESCRIPTORS: ACHING
DESCRIPTORS: CRAMPING;STABBING
DESCRIPTORS: ACHING
DESCRIPTORS: ACHING
DESCRIPTORS: ACHING;DISCOMFORT
DESCRIPTORS: ACHING
DESCRIPTORS: CRAMPING;STABBING
DESCRIPTORS: ACHING
DESCRIPTORS: SHARP;CRAMPING
DESCRIPTORS: CRAMPING;STABBING
DESCRIPTORS: ACHING
DESCRIPTORS: ACHING
DESCRIPTORS: SPASM
DESCRIPTORS: ACHING
DESCRIPTORS: BURNING;CRAMPING;SPASM
DESCRIPTORS: CRAMPING;SHARP
DESCRIPTORS: ACHING
DESCRIPTORS: CRAMPING;SHARP
DESCRIPTORS: ACHING
DESCRIPTORS: CRAMPING;STABBING
DESCRIPTORS: CRAMPING;SHARP
DESCRIPTORS: ACHING
DESCRIPTORS: ACHING
DESCRIPTORS: SPASM;BURNING
DESCRIPTORS: ACHING
DESCRIPTORS: ACHING
DESCRIPTORS: CRAMPING;ACHING
DESCRIPTORS: ACHING
DESCRIPTORS: SHARP;CRAMPING
DESCRIPTORS: ACHING

## 2020-01-01 ASSESSMENT — PAIN - FUNCTIONAL ASSESSMENT
PAIN_FUNCTIONAL_ASSESSMENT: 0-10
PAIN_FUNCTIONAL_ASSESSMENT: 0-10
PAIN_FUNCTIONAL_ASSESSMENT: PREVENTS OR INTERFERES SOME ACTIVE ACTIVITIES AND ADLS
PAIN_FUNCTIONAL_ASSESSMENT: ACTIVITIES ARE NOT PREVENTED
PAIN_FUNCTIONAL_ASSESSMENT: PREVENTS OR INTERFERES SOME ACTIVE ACTIVITIES AND ADLS
PAIN_FUNCTIONAL_ASSESSMENT: ACTIVITIES ARE NOT PREVENTED
PAIN_FUNCTIONAL_ASSESSMENT: PREVENTS OR INTERFERES SOME ACTIVE ACTIVITIES AND ADLS
PAIN_FUNCTIONAL_ASSESSMENT: ACTIVITIES ARE NOT PREVENTED
PAIN_FUNCTIONAL_ASSESSMENT: PREVENTS OR INTERFERES WITH MANY ACTIVE NOT PASSIVE ACTIVITIES
PAIN_FUNCTIONAL_ASSESSMENT: ACTIVITIES ARE NOT PREVENTED
PAIN_FUNCTIONAL_ASSESSMENT: PREVENTS OR INTERFERES SOME ACTIVE ACTIVITIES AND ADLS
PAIN_FUNCTIONAL_ASSESSMENT: ACTIVITIES ARE NOT PREVENTED
PAIN_FUNCTIONAL_ASSESSMENT: ACTIVITIES ARE NOT PREVENTED
PAIN_FUNCTIONAL_ASSESSMENT: PREVENTS OR INTERFERES SOME ACTIVE ACTIVITIES AND ADLS
PAIN_FUNCTIONAL_ASSESSMENT: 0-10
PAIN_FUNCTIONAL_ASSESSMENT: 0-10
PAIN_FUNCTIONAL_ASSESSMENT: ACTIVITIES ARE NOT PREVENTED
PAIN_FUNCTIONAL_ASSESSMENT: 0-10
PAIN_FUNCTIONAL_ASSESSMENT: ACTIVITIES ARE NOT PREVENTED
PAIN_FUNCTIONAL_ASSESSMENT: 0-10
PAIN_FUNCTIONAL_ASSESSMENT: PREVENTS OR INTERFERES SOME ACTIVE ACTIVITIES AND ADLS
PAIN_FUNCTIONAL_ASSESSMENT: ACTIVITIES ARE NOT PREVENTED
PAIN_FUNCTIONAL_ASSESSMENT: 0-10
PAIN_FUNCTIONAL_ASSESSMENT: ACTIVITIES ARE NOT PREVENTED
PAIN_FUNCTIONAL_ASSESSMENT: PREVENTS OR INTERFERES SOME ACTIVE ACTIVITIES AND ADLS
PAIN_FUNCTIONAL_ASSESSMENT: ACTIVITIES ARE NOT PREVENTED
PAIN_FUNCTIONAL_ASSESSMENT: 0-10
PAIN_FUNCTIONAL_ASSESSMENT: PREVENTS OR INTERFERES SOME ACTIVE ACTIVITIES AND ADLS

## 2020-01-01 ASSESSMENT — PAIN DESCRIPTION - LOCATION
LOCATION: BACK;NECK
LOCATION: BACK;LEG
LOCATION: BACK
LOCATION: BACK;NECK
LOCATION: BACK
LOCATION: BACK
LOCATION: NECK
LOCATION: BACK
LOCATION: BACK;NECK
LOCATION: BACK
LOCATION: BACK;NECK
LOCATION: BACK
LOCATION: BACK;NECK
LOCATION: BACK;NECK
LOCATION: BACK
LOCATION: NECK;BACK
LOCATION: HEAD
LOCATION: BACK;LEG
LOCATION: BACK
LOCATION: BACK
LOCATION: BACK;NECK
LOCATION: BACK
LOCATION: GENERALIZED
LOCATION: BACK;NECK
LOCATION: BACK
LOCATION: BACK;LEG
LOCATION: BACK
LOCATION: BACK
LOCATION: HEAD
LOCATION: NECK
LOCATION: BACK
LOCATION_2: HEAD
LOCATION: NECK
LOCATION: BACK;NECK
LOCATION: NECK
LOCATION: BACK
LOCATION: HIP
LOCATION: BACK;NECK
LOCATION: BACK
LOCATION: BACK;NECK
LOCATION: BACK
LOCATION: BACK;NECK

## 2020-01-01 ASSESSMENT — PAIN SCALES - GENERAL
PAINLEVEL_OUTOF10: 4
PAINLEVEL_OUTOF10: 4
PAINLEVEL_OUTOF10: 5
PAINLEVEL_OUTOF10: 4
PAINLEVEL_OUTOF10: 5
PAINLEVEL_OUTOF10: 3
PAINLEVEL_OUTOF10: 3
PAINLEVEL_OUTOF10: 5
PAINLEVEL_OUTOF10: 4
PAINLEVEL_OUTOF10: 0
PAINLEVEL_OUTOF10: 6
PAINLEVEL_OUTOF10: 4
PAINLEVEL_OUTOF10: 6
PAINLEVEL_OUTOF10: 2
PAINLEVEL_OUTOF10: 0
PAINLEVEL_OUTOF10: 4
PAINLEVEL_OUTOF10: 0
PAINLEVEL_OUTOF10: 5
PAINLEVEL_OUTOF10: 3
PAINLEVEL_OUTOF10: 5
PAINLEVEL_OUTOF10: 6
PAINLEVEL_OUTOF10: 6
PAINLEVEL_OUTOF10: 0
PAINLEVEL_OUTOF10: 4
PAINLEVEL_OUTOF10: 4
PAINLEVEL_OUTOF10: 0
PAINLEVEL_OUTOF10: 3
PAINLEVEL_OUTOF10: 2
PAINLEVEL_OUTOF10: 8
PAINLEVEL_OUTOF10: 4
PAINLEVEL_OUTOF10: 5
PAINLEVEL_OUTOF10: 2
PAINLEVEL_OUTOF10: 3
PAINLEVEL_OUTOF10: 6
PAINLEVEL_OUTOF10: 5
PAINLEVEL_OUTOF10: 0
PAINLEVEL_OUTOF10: 0
PAINLEVEL_OUTOF10: 3
PAINLEVEL_OUTOF10: 0
PAINLEVEL_OUTOF10: 5
PAINLEVEL_OUTOF10: 0
PAINLEVEL_OUTOF10: 3
PAINLEVEL_OUTOF10: 2
PAINLEVEL_OUTOF10: 5
PAINLEVEL_OUTOF10: 2
PAINLEVEL_OUTOF10: 0
PAINLEVEL_OUTOF10: 4
PAINLEVEL_OUTOF10: 0
PAINLEVEL_OUTOF10: 4
PAINLEVEL_OUTOF10: 4
PAINLEVEL_OUTOF10: 5
PAINLEVEL_OUTOF10: 5
PAINLEVEL_OUTOF10: 6
PAINLEVEL_OUTOF10: 4
PAINLEVEL_OUTOF10: 0
PAINLEVEL_OUTOF10: 0
PAINLEVEL_OUTOF10: 6
PAINLEVEL_OUTOF10: 0
PAINLEVEL_OUTOF10: 3
PAINLEVEL_OUTOF10: 0
PAINLEVEL_OUTOF10: 4
PAINLEVEL_OUTOF10: 0
PAINLEVEL_OUTOF10: 0
PAINLEVEL_OUTOF10: 5
PAINLEVEL_OUTOF10: 10
PAINLEVEL_OUTOF10: 3
PAINLEVEL_OUTOF10: 3
PAINLEVEL_OUTOF10: 2
PAINLEVEL_OUTOF10: 4
PAINLEVEL_OUTOF10: 7
PAINLEVEL_OUTOF10: 4
PAINLEVEL_OUTOF10: 8
PAINLEVEL_OUTOF10: 5
PAINLEVEL_OUTOF10: 0
PAINLEVEL_OUTOF10: 7
PAINLEVEL_OUTOF10: 3
PAINLEVEL_OUTOF10: 0
PAINLEVEL_OUTOF10: 5
PAINLEVEL_OUTOF10: 3
PAINLEVEL_OUTOF10: 0
PAINLEVEL_OUTOF10: 7
PAINLEVEL_OUTOF10: 0
PAINLEVEL_OUTOF10: 2
PAINLEVEL_OUTOF10: 0
PAINLEVEL_OUTOF10: 5
PAINLEVEL_OUTOF10: 5
PAINLEVEL_OUTOF10: 3
PAINLEVEL_OUTOF10: 6
PAINLEVEL_OUTOF10: 7
PAINLEVEL_OUTOF10: 0
PAINLEVEL_OUTOF10: 0
PAINLEVEL_OUTOF10: 4
PAINLEVEL_OUTOF10: 0
PAINLEVEL_OUTOF10: 3
PAINLEVEL_OUTOF10: 4
PAINLEVEL_OUTOF10: 5
PAINLEVEL_OUTOF10: 0
PAINLEVEL_OUTOF10: 7
PAINLEVEL_OUTOF10: 6
PAINLEVEL_OUTOF10: 0
PAINLEVEL_OUTOF10: 5
PAINLEVEL_OUTOF10: 3
PAINLEVEL_OUTOF10: 4
PAINLEVEL_OUTOF10: 3
PAINLEVEL_OUTOF10: 0
PAINLEVEL_OUTOF10: 4
PAINLEVEL_OUTOF10: 3
PAINLEVEL_OUTOF10: 0
PAINLEVEL_OUTOF10: 5
PAINLEVEL_OUTOF10: 0
PAINLEVEL_OUTOF10: 7
PAINLEVEL_OUTOF10: 2
PAINLEVEL_OUTOF10: 5
PAINLEVEL_OUTOF10: 0
PAINLEVEL_OUTOF10: 4
PAINLEVEL_OUTOF10: 3
PAINLEVEL_OUTOF10: 0
PAINLEVEL_OUTOF10: 0
PAINLEVEL_OUTOF10: 2
PAINLEVEL_OUTOF10: 4
PAINLEVEL_OUTOF10: 0
PAINLEVEL_OUTOF10: 4
PAINLEVEL_OUTOF10: 6
PAINLEVEL_OUTOF10: 6
PAINLEVEL_OUTOF10: 0
PAINLEVEL_OUTOF10: 6
PAINLEVEL_OUTOF10: 0
PAINLEVEL_OUTOF10: 4
PAINLEVEL_OUTOF10: 4
PAINLEVEL_OUTOF10: 3
PAINLEVEL_OUTOF10: 0
PAINLEVEL_OUTOF10: 5
PAINLEVEL_OUTOF10: 0
PAINLEVEL_OUTOF10: 3
PAINLEVEL_OUTOF10: 7
PAINLEVEL_OUTOF10: 4
PAINLEVEL_OUTOF10: 0
PAINLEVEL_OUTOF10: 3
PAINLEVEL_OUTOF10: 0
PAINLEVEL_OUTOF10: 4
PAINLEVEL_OUTOF10: 3
PAINLEVEL_OUTOF10: 5
PAINLEVEL_OUTOF10: 0
PAINLEVEL_OUTOF10: 0
PAINLEVEL_OUTOF10: 5
PAINLEVEL_OUTOF10: 0
PAINLEVEL_OUTOF10: 6
PAINLEVEL_OUTOF10: 7
PAINLEVEL_OUTOF10: 6
PAINLEVEL_OUTOF10: 0
PAINLEVEL_OUTOF10: 5
PAINLEVEL_OUTOF10: 6
PAINLEVEL_OUTOF10: 0
PAINLEVEL_OUTOF10: 3
PAINLEVEL_OUTOF10: 3

## 2020-01-01 ASSESSMENT — ENCOUNTER SYMPTOMS
SORE THROAT: 1
VOMITING: 0
ABDOMINAL PAIN: 0
EYE PAIN: 0
VOMITING: 0
BLOOD IN STOOL: 0
VOMITING: 0
COUGH: 1
VOICE CHANGE: 0
SHORTNESS OF BREATH: 0
ABDOMINAL PAIN: 0
SHORTNESS OF BREATH: 0
SINUS PAIN: 0
COLOR CHANGE: 0
CONSTIPATION: 0
NAUSEA: 0
CONSTIPATION: 0
VOMITING: 0
FACIAL SWELLING: 0
ABDOMINAL PAIN: 0
STRIDOR: 0
VOMITING: 0
DIARRHEA: 0
BACK PAIN: 1
APNEA: 0
EYE PAIN: 0
SHORTNESS OF BREATH: 1
CONSTIPATION: 0
EYE REDNESS: 0
BACK PAIN: 1
EYE DISCHARGE: 0
COUGH: 0
SHORTNESS OF BREATH: 0
SHORTNESS OF BREATH: 0
EYE DISCHARGE: 0
EYE ITCHING: 0
EYE PAIN: 1
SHORTNESS OF BREATH: 0
DIARRHEA: 0
NAUSEA: 0
WHEEZING: 0
COUGH: 0
SINUS PRESSURE: 0
SHORTNESS OF BREATH: 1
DIARRHEA: 0
NAUSEA: 0
RHINORRHEA: 1
CONSTIPATION: 0
ABDOMINAL PAIN: 0
SINUS PRESSURE: 0
EYE DISCHARGE: 0
NAUSEA: 0
WHEEZING: 0
SORE THROAT: 0
BACK PAIN: 0
SORE THROAT: 0
DIARRHEA: 0
WHEEZING: 0
CONSTIPATION: 0
SORE THROAT: 0
TROUBLE SWALLOWING: 0
DIARRHEA: 0
DIARRHEA: 0
COUGH: 0
COUGH: 0
SORE THROAT: 0
SINUS PAIN: 0
DIARRHEA: 1
ABDOMINAL PAIN: 0
CONSTIPATION: 0
BACK PAIN: 1
TROUBLE SWALLOWING: 0
SORE THROAT: 1
BACK PAIN: 1
EYE DISCHARGE: 0
DIARRHEA: 0
VOMITING: 0
DIARRHEA: 0
BLOOD IN STOOL: 0
BLOOD IN STOOL: 0
CONSTIPATION: 0
SORE THROAT: 1
COUGH: 0
SINUS PRESSURE: 0
SHORTNESS OF BREATH: 0
TROUBLE SWALLOWING: 0
STRIDOR: 0
NAUSEA: 0
NAUSEA: 0
CONSTIPATION: 0
EYE REDNESS: 0
COUGH: 0
COLOR CHANGE: 0
EYE REDNESS: 0
BACK PAIN: 1
VOMITING: 0
ABDOMINAL PAIN: 0
NAUSEA: 0
VOMITING: 0
CHEST TIGHTNESS: 0
CHEST TIGHTNESS: 0
EYE PAIN: 0
RHINORRHEA: 0
PHOTOPHOBIA: 0
COUGH: 0
COUGH: 0
ABDOMINAL DISTENTION: 0
RHINORRHEA: 0
DIARRHEA: 0
NAUSEA: 0
COUGH: 0
SHORTNESS OF BREATH: 0
SHORTNESS OF BREATH: 1
NAUSEA: 0
VOMITING: 0
WHEEZING: 0
NAUSEA: 0
TROUBLE SWALLOWING: 0
EYE REDNESS: 0
RHINORRHEA: 0
CHOKING: 0
ABDOMINAL PAIN: 0
PHOTOPHOBIA: 0
COLOR CHANGE: 1
SORE THROAT: 0
BACK PAIN: 1
ABDOMINAL PAIN: 0
EYE DISCHARGE: 0
CONSTIPATION: 0
EYE REDNESS: 0

## 2020-01-01 ASSESSMENT — PAIN DESCRIPTION - DIRECTION
RADIATING_TOWARDS: INTO LEGS
RADIATING_TOWARDS: INTO BUTTOCKS

## 2020-01-01 ASSESSMENT — PAIN DESCRIPTION - ORIENTATION
ORIENTATION: LOWER
ORIENTATION: LOWER
ORIENTATION: MID;LOWER
ORIENTATION: LOWER
ORIENTATION: UPPER
ORIENTATION: LOWER
ORIENTATION: MID
ORIENTATION: MID
ORIENTATION: LOWER
ORIENTATION: RIGHT;LEFT
ORIENTATION: MID
ORIENTATION: LOWER
ORIENTATION: LOWER;MID
ORIENTATION: LOWER
ORIENTATION: LEFT;LOWER
ORIENTATION: LEFT;LOWER
ORIENTATION: LOWER
ORIENTATION: RIGHT;LEFT
ORIENTATION: LOWER
ORIENTATION: UPPER
ORIENTATION: LOWER
ORIENTATION: MID
ORIENTATION: LOWER
ORIENTATION: LEFT
ORIENTATION: LOWER
ORIENTATION: RIGHT;LEFT
ORIENTATION: LOWER
ORIENTATION: LOWER
ORIENTATION: UPPER
ORIENTATION: MID
ORIENTATION: LOWER

## 2020-01-01 ASSESSMENT — PULMONARY FUNCTION TESTS
PIF_VALUE: 28
PIF_VALUE: 23
PIF_VALUE: 29
PIF_VALUE: 23
PIF_VALUE: 23
PIF_VALUE: 37
PIF_VALUE: 23
PIF_VALUE: 28

## 2020-01-01 ASSESSMENT — PAIN DESCRIPTION - ONSET
ONSET: ON-GOING
ONSET: SUDDEN
ONSET: ON-GOING

## 2020-01-01 ASSESSMENT — PAIN DESCRIPTION - INTENSITY: RATING_2: 7

## 2020-01-09 NOTE — PROGRESS NOTES
[address] [address]  Dept: [phone]  Dept Fax: [phone]  Loc: [address] GILLES Bull is a 62 y.o. female who presents todayfor her medical conditions/complaints as noted below. Shania Meza is c/o of Annual Exam (had port placed- 12/27/19- card scanned into chart )      :     Has been hospitalized recently for severe hypocalcemia. Was scheduled to start Dain Ana. Taking calcium citrate and calcitriol. Following with Endocrinologist. T4 slightly elevated. Sleeping very well, states that she occasionally uses melatonin but no currently. Mood has been good, some stressful events. Dealing with the port and not having any instructions on how to use it. Some family issues occasionally but now she is doing well. Reflux has been doing well. Has been cooking a lot recently and had no issues. Acyclovir working for cold sores    Gabapentin helping with fibromyalgia pain. Has not got shingles vaccine, too expensive will wait until meets her deductible.      Patient Active Problem List   Diagnosis    TIA (transient ischemic attack)    Hypothyroidism    DVT (deep venous thrombosis) (HCC)    Depression    Anxiety    Fibromyalgia    Fatty liver    Tachycardia    GERD (gastroesophageal reflux disease)    Recurrent cold sores    Irritable bowel    Gastric bypass status for obesity    Insomnia    Ovarian cyst    Muscle weakness (generalized)    Alcoholism (HCC)    Osteoarthritis    Hypocalcemia    Hypomagnesemia    Moderate malnutrition (HCC)    Other hypoparathyroidism (HCC)    Pancytopenia (HCC)    Palpitations    Asymptomatic bacteriuria    Hyperphosphatemia    Prolonged Q-T interval on ECG    Hypoalbuminemia    Tinnitus of both ears    Hx of parathyroidectomy    Normocytic anemia    History of fibromyalgia       The patient is allergic to estrogens; penicillins; sulfa antibiotics; sulfur; and bactrim [sulfamethoxazole-trimethoprim]. Medical History  Ivelisse Cabral has a past medical history of Alcoholism ([facility] [address] Anxiety, Atrophy of vulva, Atyp squam cell of undet signfc cyto smr crvx (ASC-US), Closed fracture of seventh cervical vertebra without spinal cord injury [facility]), Closed fracture of sixth cervical vertebra ([facility] [address] Closed fracture of thoracic vertebra ([facility] [address] Depression, Dry eye syndrome, DVT (deep venous thrombosis) ([facility] [address] Dyspareunia in female, External hemorrhoids without complication, Fibromyalgia, Gastric bypass status for obesity, GERD (gastroesophageal reflux disease), GERD (gastroesophageal reflux disease), Hiatal hernia, History of ITP, Hungry bone syndrome, Hx of blood clots, Hyperparathyroidism ([facility] [address] Hypocalcemia, Hypothyroidism, Insomnia, Irritable bowel, Lactose intolerance, Menopausal syndrome, Osteoarthritis, Osteopenia, Osteopenia, Ovarian cyst, Oxaluria ([facility] [address] Recurrent cold sores, Tachycardia, TIA (transient ischemic attack), and Tubular adenoma of colon. Past SurgicalHistory  The patient  has a past surgical history that includes Wrist fracture surgery (Right); Leg Surgery (Right); Tonsillectomy and adenoidectomy; Gastric bypass surgery (1996); Cholecystectomy (1996); Appendectomy (1996); parathyroidectomy (08/2017); Colonoscopy (approx 2013); Upper gastrointestinal endoscopy (approx 2013); laparoscopy; Foot surgery; Tubal ligation (1996); Endoscopy, colon, diagnostic; fracture surgery; Dilatation, esophagus; Colonoscopy (Left, 5/7/2019); and Upper gastrointestinal endoscopy (Left, 5/7/2019). Family History  This patient's family history includes Asthma in her maternal grandfather; Breast Cancer in her mother and sister; Cancer in her father; Depression in her father and mother; Heart Attack in her maternal grandmother; High Blood Pressure in her father and mother; Mental Illness in her maternal grandmother;  Other in her father, maternal grandfather, maternal grandmother, and mother. Social History  [facility]  reports that she has never smoked. She has never used smokeless tobacco. She reports current alcohol use of about 9.0 standard drinks of alcohol per week. She reports that she does not use drugs. Medications    Current Outpatient Medications:     calcitRIOL (ROCALTROL) 0.5 MCG capsule, Take 0.5 mcg by mouth 4 times daily, Disp: , Rfl:     Calcium Citrate-Vitamin D (CALCIUM CITRATE CHEWY BITE) 500-500 MG-UNIT CHEW, Take by mouth 2 tabs 3 times a day, Disp: , Rfl:     vitamin B-6 (PYRIDOXINE) 100 MG tablet, Take 100 mg by mouth daily, Disp: , Rfl:     aMILoride (MIDAMOR) 5 MG tablet, Take 5 mg by mouth daily , Disp: , Rfl:     acyclovir (ZOVIRAX) 400 MG tablet, Take 1 tablet by mouth 2 times daily, Disp: 180 tablet, Rfl: 1    DULoxetine (CYMBALTA) 30 MG extended release capsule, Take 1 capsule by mouth daily, Disp: 90 capsule, Rfl: 1    DULoxetine (CYMBALTA) 60 MG extended release capsule, Take 1 capsule by mouth nightly, Disp: 90 capsule, Rfl: 1    gabapentin (NEURONTIN) 100 MG capsule, Take 1 capsule by mouth daily for 90 days. , Disp: 90 capsule, Rfl: 0    gabapentin (NEURONTIN) 300 MG capsule, Take 1 capsule by mouth nightly for 180 days. , Disp: 90 capsule, Rfl: 1    Estradiol (VAGIFEM) 10 MCG TABS vaginal tablet, AT THE START OF THERAPY, INSERT 1 TABLET VAGINALLY DAILY FOR 2 WEEKS, THEN USE TWICE WEEKLY THEREAFTER, Disp: 40 tablet, Rfl: 0    metoprolol tartrate (LOPRESSOR) 25 MG tablet, Take 0.5 tablets by mouth 2 times daily, Disp: 30 tablet, Rfl: 3    tiZANidine (ZANAFLEX) 4 MG tablet, Take 1 tablet by mouth nightly At bed time, Disp: 90 tablet, Rfl: 1    calcium elemental (OSCAL) 500 MG TABS tablet, Take 5 tablets by mouth 4 times daily, Disp: 30 tablet, Rfl: 3    zinc gluconate 50 MG tablet, TAKE ONE AND ONE-HALF TABLETS (75 MG) DAILY, Disp: 135 tablet, Rfl: 0    vitamin B-12 (CYANOCOBALAMIN) 500 MCG tablet, Pulse: 90   Resp: 16   SpO2: 97%   Weight: 96 lb (43.5 kg)   Height: 4' 10\" (1.473 m)       Physical Exam  Vitals signs and nursing note reviewed. Constitutional:       General: She is not in acute distress. Appearance: She is well-developed. She is not diaphoretic. HENT:      Head: Normocephalic and atraumatic. Right Ear: External ear normal.      Left Ear: External ear normal.      Nose: Nose normal.   Eyes:      General: No scleral icterus. Right eye: No discharge. Left eye: No discharge. Conjunctiva/sclera: Conjunctivae normal.   Neck:      Musculoskeletal: Normal range of motion. Cardiovascular:      Rate and Rhythm: Normal rate and regular rhythm. Heart sounds: Normal heart sounds. No murmur. Pulmonary:      Effort: Pulmonary effort is normal.      Breath sounds: Normal breath sounds. Skin:     General: Skin is warm and dry. Findings: No erythema or rash. Comments: Infusion port in place on right upper chest, incision with good margins and is healing well. Neurological:      Mental Status: She is alert and oriented to person, place, and time. Cranial Nerves: No cranial nerve deficit. Psychiatric:         Behavior: Behavior normal.         Thought Content: Thought content normal.         Judgment: Judgment normal.       A single white suture was removed from port insertion site without difficulty. Tolerated well. No complications. Lab Results   Component Value Date    WBC 4.2 (L) 12/27/2019    HGB 12.8 12/27/2019    HCT 40.1 12/27/2019     12/27/2019    HDL 82 02/12/2018    ALT 31 12/07/2019    AST 51 (H) 12/07/2019     12/10/2019    K 4.7 01/04/2020     12/10/2019    CREATININE 0.4 12/10/2019    BUN 7 12/10/2019    CO2 24 12/10/2019    TSH 0.006 (L) 12/07/2019    INR 0.94 07/13/2017    LABA1C 4.1 (L) 02/08/2018       [place]:   1.  Encounter for well woman exam  -up to date on health maintenance except for shingles vaccine  -Will have patient get shingles vaccine when more financially feasible. 2. Gastroesophageal reflux disease with esophagitis  -Stable  -Controlled with zantac and nexium. 3. Recurrent cold sores  -Stable  -controlled with acyclovir    4. Hypothyroidism, unspecified type  -Following with Endocrinology  -Stable  -Will continue to follow    5. Fibromyalgia  -Stable  -Continue gabapentin    6. Insomnia, unspecified type  -Sleeping well without the ambien  -Continue melatonin as needed    7. Depression, unspecified depression type  -Mood has been fairly stable  -No concerns at this time, continue current regimen    8. Alcoholism ([facility] [address]  Drinking maybe 1-2 drinks a day, cut way down since the holidays. Will continue to monitor    9. Hypocalcemia  -Following with Endocrinology  -Calcium is slowly increasing with calcium citrate  -Infusion port place by IR on 12/27  -Continue current treatment  -Follow up in 3 months. Return in about 3 months (around 4/9/2020). Orders Placed   No orders of the defined types were placed in this encounter. Prescriptions given/sent  No orders of the defined types were placed in this encounter. Patient given educational materials - see patient instructions. Discussed use, benefit, and side effects of prescribed medications. All patientquestions answered. Pt voiced understanding. Reviewed health maintenance. Electronically signed by Kelley Mcwilliams DO on 1/9/2020 at 2:07 PM     Attending attestation:  I performed a history and physical examination of the patient and discussed management with the resident. I reviewed the resident's note and agree with the documented findings and plan of care. Patient is looking well today. She reports cutting back to 1-2 drinks per day. Sleeping well off ambien. Port site itchy. A dissolvable suture was visible at irritates site and was removed. Chronic issues stable.   Calcium almost normal.  Up to date on wellness except shingles vaccine and is on the list for this. I did photograph the card with information about her port and added it to the chart under the media tab.

## 2020-01-14 NOTE — PROGRESS NOTES
0762 pt arrives to [facility] for IV Calcium infusion and lab draw. All questions and concerns addressed  0911 PATIENT RIGHTS AND RESPONSIBILITIES SHEET OFFERED TO PT TO READ.  0920 attempt to access right upper chest mediport, port appears to be flipped, unable to access. Jose Alejandro Frank RN at bedside assessing port site, advised pt of unable to use RN will call Dr Storm Quigley for order  0930 pt  calls Dr Storm Quigley office for order, spoke with Melchor Larios will notify physician  4008 pt and spouse questioning if infusions can be given locally at [facility]. Spoke with Jovanna Richard, INDU at [facility]. Spoke with Teetee Gautam, Pharmacist, advised of pt wishes. [facility] able to give infusion from their stock. 5901 updated pt on findings with location change, will decide at a later time if they want to transfer care  1000 call light in reach, denies needs, family at bedside  1030 infusion initiated  1100 denies needs, warm blankets given  1115 _m___ Safety:       (Environmental)   [place] to environment   Ensure ID band is correct and in place/ allergy band as needed   Assess for fall risk   Initiate fall precautions as applicable (fall band, side rails, etc.)   Call light within reach   Bed in low position/ wheels locked    _m___ Pain:        Assess pain level and characteristics   Administer analgesics as ordered   Assess effectiveness of pain management and report to MD as needed    _m___ Knowledge Deficit:   Assess baseline knowledge   Provide teaching at level of understanding   Provide teaching via preferred learning method   Evaluate teaching effectiveness    __m__ Hemodynamic/Respiratory Status:       (Pre and Post Procedure Monitoring)   Assess/Monitor vital signs and LOC   Assess Baseline SpO2 prior to any sedation   Obtain weight/height   Assess vital signs/ LOC until patient meets discharge criteria   Monitor procedure site and notify MD of any issues     1120 spoke with Melchor Larios RN with Dr Storm Quigley office. Physician unavailable for orders.  Advised pt and family of findings  [address] TO PT-VERBALIZES UNDERSTANDING  1200 PT DISCHARGED AMBULATORY IN SATISFACTORY CONDITION

## 2020-01-15 NOTE — PROGRESS NOTES
21  Patient received in IR for mediport manipulation  1022 This procedure has been fully reviewed with the patient and written informed consent has been obtained. 1325 Procedure started with .SHANNAN  5385 mediport flipped by Dr. Reagan Aguila, accessed and flushed. 1338 Procedure completed; patient tolerated well. Band aid to mediport site; no bleeding noted. 1441 Patient on stretcher; comfort ensured. 1442 Patient taken to OPN via IR tech.

## 2020-01-15 NOTE — PROGRESS NOTES
4833 pt arrives to OPN for IV Calcium infusion. All questions and concerns addressed. Orders received for chest xray for mediport placement  0906 PATIENT RIGHTS AND RESPONSIBILITIES SHEET OFFERED TO PT TO READ.  0915 call light in reach  0918 portable chest xray complete  0930 denies needs, refreshments given  1005 spoke with Dr Beronica Quintero regarding chest xray results, telephone orders received  [address] spoke with hortensia in xray regarding order  [phone] to xray via wheelchair  21  returns to OPN post xray. Advised pt and family to wait for results, verbalizing understanding  12 ___m_ Safety:       (Environmental)   [place] to environment   Ensure ID band is correct and in place/ allergy band as needed   Assess for fall risk   Initiate fall precautions as applicable (fall band, side rails, etc.)   Call light within reach   Bed in low position/ wheels locked    _m___ Pain:        Assess pain level and characteristics   Administer analgesics as ordered   Assess effectiveness of pain management and report to MD as needed     __mssess baseline knowledge   Provide teaching at level of understanding   Provide teaching via preferred learning method   Evaluate teaching effectiveness    __m__ Hemodynamic/Respiratory Status:       (Pre and Post Procedure Monitoring)   Assess/Monitor vital signs and LOC   Assess Baseline SpO2 prior to any sedation   Obtain weight/height   Assess vital signs/ LOC until patient meets discharge criteria   Monitor procedure site and notify MD of any issues    __m__ Infection-Risk of Central Venous Catheter:   Monitor for infection signs and symptoms (catheter site redness, temperature elevation, etc)   Assess for infection risks   Educate regarding infection prevention   Manage central venous catheter (flushes/ dressing changes per protocol)    1035 WRITTEN DISCHARGE INSTRUCTIONS GIVEN TO PT-VERBALIZES UNDERSTANDING  1110 spoke with Dr Beronica Quintero regarding xray.  Port is flipped,  Shay Rick recommends port manipulation  [phone] spoke with [facility] with Dr Zane Sheriff office, regarding request from radiologist  [phone] telephone orders received and repeated  [phone] spoke with Hector Graf RN in specials, procedure can be done at 1300. Advised pt is on aspirin 81 mg, daily. Ok to complete per Dr Sherlyn Gallardo  305 [facility] spoke with [facility] in scheduling regarding pt to be on interventional radiology schedule. 1140 discussed with pt and .  All agree to complete today  1141 care continues under [facility] # [id number]

## 2020-01-15 NOTE — PROGRESS NOTES
1145 care continues from [facility] # [id number],  as pt is prepped for mediport manipulation. All questions and concerns addressed  1147 PATIENT RIGHTS AND RESPONSIBILITIES SHEET OFFERED TO PT TO READ. 1210 updated Radha in specials regarding pt procedure. No additional orders needed  1215 call light in reach, side rail up x1. Family at bedside  [phone] denies needs  1315 to specials via cart  1350 returns to OPN . Denies pain, call light in reach. Right upper chest bandaid clean and dry.  Requesting discharge, dr meng notified of pt wishes  [phone] _m___ Safety:       (Environmental)   [place] to environment   Ensure ID band is correct and in place/ allergy band as needed   Assess for fall risk   Initiate fall precautions as applicable (fall band, side rails, etc.)   Call light within reach   Bed in low position/ wheels locked    _m___ Pain:        Assess pain level and characteristics   Administer analgesics as ordered   Assess effectiveness of pain management and report to MD as needed    __m__ Knowledge Deficit:   Assess baseline knowledge   Provide teaching at level of understanding   Provide teaching via preferred learning method   Evaluate teaching effectiveness    _m___ Hemodynamic/Respiratory Status:       (Pre and Post Procedure Monitoring)   Assess/Monitor vital signs and LOC   Assess Baseline SpO2 prior to any sedation   Obtain weight/height   Assess vital signs/ LOC until patient meets discharge criteria   Monitor procedure site and notify MD of any issues    _m___ Infection-Risk of Central Venous Catheter:   Monitor for infection signs and symptoms (catheter site redness, temperature elevation, etc)   Assess for infection risks   Educate regarding infection prevention   Manage central venous catheter (flushes/ dressing changes per protocol)      1400WRITTEN DISCHARGE INSTRUCTIONS GIVEN TO PT-VERBALIZES UNDERSTANDING  1415   PT DISCHARGED PER WHEEL CHAIR IN SATISFACTORY CONDITION

## 2020-01-16 NOTE — PROGRESS NOTES
5966 Patient received in IR for mediport manipulation. 5962EGOX procedure has been fully reviewed with the patient and written informed consent has been obtained. 6577 Procedure started with Dr. Senia Roberson and flipped the [address] [facility]. 0939Procedure completed; patient tolerated well. [id number] Patient on cart; comfort ensured. 2817 Patient taken to OPN via cart. 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 Patient has no health concerns.         Current Outpatient Prescr lesions  HEENT: atraumatic, normocephalic,ears and throat are clear  EYES:PERRLA, EOMI, conjunctivae are clear. Wears glasses. NECK: supple,no lymphadenopathy. No thyromegaly or thyroid nodules palpated.   CHEST: no chest tenderness to palpation  LUNGS: C

## 2020-01-16 NOTE — PROGRESS NOTES
1000 pt return from ir per cart. Alert. resp even and easy . Port manipulated per dr meng. Family at bedside. No c/o noted at present.

## 2020-01-16 NOTE — PROGRESS NOTES
2198 Patient received in IR for mediport manipulation. 9039 This procedure has been fully reviewed with the patient and written informed consent has been obtained. 4287 Procedure started with ***  *** Procedure completed; patient tolerated well. (***dressing) to ***; no bleeding noted. *** Patient on ***; comfort ensured. *** Patient taken to *** via ***.

## 2020-01-16 NOTE — PROGRESS NOTES
[address] CALCIUM IV PROCEDURE REVIEWED WITH PT VERBALIZED UNDERSTANDING. Pt rights and responsibilities offered to pt to read. PT PORT UNABLE TO BE ASSESSED AGAIN DUE TO FLIPPING, DR ESCALERA NOTIFIED.  0926 TO XRAY PER CART. _MET___ Safety:       (Environmental)   [place] to environment   Ensure ID band is correct and in place/ allergy band as needed   Assess for fall risk   Initiate fall precautions as applicable (fall band, side rails, etc.)   Call light within reach   Bed in low position/ wheels locked    MET__ Pain:        Assess pain level and characteristics   Administer analgesics as ordered   Assess effectiveness of pain management and report to MD as needed    __MET__ Knowledge Deficit:   Assess baseline knowledge   Provide teaching at level of understanding   Provide teaching via preferred learning method   Evaluate teaching effectiveness    __MET     (Pre and Post Procedure Monitoring)   Assess/Monitor vital signs and Obtain weight/height   Assess vital signs/ LOC until patient meets discharge criteria   Monitor procedure site and notify MD of any issues    __METCatheter:   Monitor for infection signs and symptoms (catheter site redness, temperature elevation, etc)   Assess for infection risks   Educate regarding infection prevention   Manage central venous catheter (flushes/ dressing changes per protocol)   PT HAS INT THAT FLUSHED EASILY SO CALCIUM GIVEN PER INT.

## 2020-01-16 NOTE — PROGRESS NOTES
1000 RETURNED FROM XRAY PORT MANIPULATED.  1029 INFUSION COMPLETE TOLERATED WELL. HOME INSTRUCTIONS TO PT AND FAMILY, VERBALIZED UNDERSTANDING. Nam 2838 WITH FAMILY.

## 2020-01-20 NOTE — PROGRESS NOTES
1152 Patient arrived to [facility] ambulatory for calcium infusion with  at side  PT RIGHTS AND RESPONSIBILITIES OFFERED TO PT.  1245 Patient denies any needs at this time    __m__ Safety:       (Environmental)   [place] to environment   Ensure ID band is correct and in place/ allergy band as needed   Assess for fall risk   Initiate fall precautions as applicable (fall band, side rails, etc.)   Call light within reach   Bed in low position/ wheels locked    _m___ Pain:        Assess pain level and characteristics   Administer analgesics as ordered   Assess effectiveness of pain management and report to MD as needed    _m___ Knowledge Deficit:   Assess baseline knowledge   Provide teaching at level of understanding   Provide teaching via preferred learning method   Evaluate teaching effectiveness    _m___ Hemodynamic/Respiratory Status:       (Pre and Post Procedure Monitoring)   Assess/Monitor vital signs and LOC   Assess Baseline SpO2 prior to any sedation   Obtain weight/height   Assess vital signs/ LOC until patient meets discharge criteria   Monitor procedure site and notify MD of any issues    _m___ Infection-Risk of Central Venous Catheter:   Monitor for infection signs and symptoms (catheter site redness, temperature elevation, etc)   Assess for infection risks   Educate regarding infection prevention   Manage central venous catheter (flushes/ dressing changes per protocol)

## 2020-01-22 NOTE — PROGRESS NOTES
__m__ Safety:       (Environmental)   [place] to environment   Ensure ID band is correct and in place/ allergy band as needed   Assess for fall risk   Initiate fall precautions as applicable (fall band, side rails, etc.)   Call light within reach   Bed in low position/ wheels locked

## 2020-01-23 NOTE — PROGRESS NOTES
Facility-Administered Medications Ordered in Other Visits   Medication Dose Route Frequency Provider Last Rate Last Dose    phosphorus replacement protocol   Other RX Placeholder 880 Melania Cortés MD        metoprolol tartrate (LOPRESSOR) tablet 25 mg  25 mg Oral BID Flor 4077 Leo Cortés MD   25 mg at 02/08/20 1038    sodium phosphate 10 mmol in dextrose 5 % 250 mL IVPB  10 mmol Intravenous Once 880 Melania Cortés MD 62.5 mL/hr at 02/08/20 1454 10 mmol at 02/08/20 1454    calcitRIOL (ROCALTROL) capsule 0.25 mcg  0.25 mcg Oral BID Lissy Rios MD        ipratropium-albuterol (DUONEB) nebulizer solution 1 ampule  1 ampule Inhalation Q4H 880 Melania Cortés MD   1 ampule at 02/08/20 1551    bumetanide (BUMEX) injection 0.5 mg  0.5 mg Intravenous BID Flor Vail MD        Calcium Citrate-Vitamin D 500-500 MG-UNIT CHEW 2 tablet  2 tablet Oral BID Lissy Rios MD   2 tablet at 02/08/20 0852    albumin human 25 % IV solution 50 g  50 g Intravenous Daily Lissy Rios MD   50 g at 02/08/20 0838    magnesium replacement protocol   Other MD Maryam        levothyroxine (SYNTHROID) tablet 250 mcg  250 mcg Oral Daily Lissy Rios MD   250 mcg at 02/08/20 0558    PHENobarbital (LUMINAL) 130 mg in sodium chloride 0.9 % 100 mL IVPB  130 mg Intravenous Q1H PRN Flor Vail MD        Or    PHENobarbital (LUMINAL) 260 mg in sodium chloride 0.9 % 100 mL IVPB  260 mg Intravenous Q1H PRN Flor Vail MD        Or    PHENobarbital (LUMINAL) 538.2 mg in sodium chloride 0.9 % 100 mL IVPB  10 mg/kg Intravenous Daily PRN Flor Vail MD        cefTRIAXone (ROCEPHIN) 2 g IVPB in D5W 50ml minibag  2 g Intravenous Q24H Jose Rafael Meredith MD tablet 64 mg  1 tablet Oral BID Arabella Huerta, APRN - CNP   64 mg at 02/08/20 0850    famotidine (PEPCID) tablet 20 mg  20 mg Oral BID Arabella Huerta, APRN - CNP   20 mg at 02/08/20 0846    tiZANidine (ZANAFLEX) tablet 4 mg  4 mg Oral Nightly Arabella Huerta, APRN - CNP   4 mg at 02/07/20 2011    vitamin A capsule 20,000 Units  20,000 Units Oral Daily Arabella Huerta, APRN - CNP   20,000 Units at 02/08/20 0848    vitamin B-1 (THIAMINE) tablet 100 mg  100 mg Oral Daily Arabella Huerta, APRN - CNP   100 mg at 02/08/20 0847    vitamin B-6 (PYRIDOXINE) tablet 100 mg  100 mg Oral Daily Eulalia A Rethman, APRN - CNP   100 mg at 02/08/20 0854    NONFORMULARY 50 mcg  50 mcg Injection Daily Eulalia A Rethman, APRN - CNP   50 mcg at 02/08/20 1112     Past Medical History:   Diagnosis Date    Alcoholism ([address]     Anxiety     Atrophy of vulva     Atyp squam cell of undet signfc cyto smr crvx (ASC-US)     Closed fracture of seventh cervical vertebra without spinal cord injury ([address]     Closed fracture of sixth cervical vertebra ([address]     [facility] of [facility] vertebra [facility]     Depression     Dry eye syndrome     DVT (deep venous thrombosis) ([facility])     left arm; after contrast for MRI brain    Dyspareunia in female     External hemorrhoids without complication     Fibromyalgia     Gastric bypass status for obesity 2/26/2018    GERD (gastroesophageal reflux disease)     Hiatal hernia     History of ITP     as a senior in high school; no active issues    Hungry bone syndrome     Hx of blood clots     Hyperparathyroidism ([address]     Prior hospitalization with hungry bone syndrome on IV calcium in the past    Hypocalcemia     Hypothyroidism     Insomnia     Irritable bowel     Lactose intolerance     uses lactaid which helps    Menopausal syndrome     Osteoarthritis     Osteopenia     on reclast    Osteopenia     Ovarian cyst     repeat imaging in 9/2018 recommended    [facility]aluria [facility]     Recurrent cold sores     on acyclovir    Tachycardia     due to hyperparathyroidism; on atenolol    TIA (transient ischemic attack)     visible on MRI brain    Tubular adenoma of colon     2019      Past Surgical History:   Procedure Laterality Date    APPENDECTOMY  1996    CHOLECYSTECTOMY  1996    COLONOSCOPY  approx 2013    normal per patient    COLONOSCOPY Left 5/7/2019    COLONOSCOPY POLYPECTOMY SNARE/COLD BIOPSY performed by Bertrand Skelton MD at [address] ESOPHAGUS      ENDOSCOPY, COLON, DIAGNOSTIC      FOOT SURGERY      FRACTURE SURGERY      GASTRIC BYPASS SURGERY  1996    LAPAROSCOPY      LEG SURGERY Right     following fracture    PARATHYROIDECTOMY  08/2017    TONSILLECTOMY AND ADENOIDECTOMY      TUBAL LIGATION  1996    UPPER GASTROINTESTINAL ENDOSCOPY  approx 2013    normal per patient except mild erosion    UPPER GASTROINTESTINAL ENDOSCOPY Left 5/7/2019    EGD BIOPSY performed by Bertrand Skelton MD at [address] Right     pinned     Family History   Problem Relation Age of Onset   [facility] Breast Cancer Mother         twice age 48[de-identified] 79    Other Mother         anxiety, dementia    Depression Mother     High Blood Pressure Mother     Cancer Father         lung    Other Father         Myelodysplastic syndrome, cirrhosis of liver    Depression Father     High Blood Pressure Father     Breast Cancer Sister     Other Maternal Grandmother         Alzheimer's disease    Heart Attack Maternal Grandmother     Mental Illness Maternal Grandmother     Other Maternal Grandfather         asthma, anxiety    Asthma Maternal Grandfather      Social History     Tobacco Use    Smoking status: Never Smoker    Smokeless tobacco: Never Used   Substance Use Topics    Alcohol use:  Yes     Alcohol/week: 9.0 standard drinks     Types: 7 Glasses of wine, 1 Cans of beer, 1 Shots of liquor per week       Subjective:      Review of Systems external ear normal. No drainage or swelling. No middle ear effusion. Tympanic membrane is not perforated or erythematous. Left Ear: Hearing, tympanic membrane, ear canal and external ear normal. No drainage or swelling. No middle ear effusion. Tympanic membrane is not perforated or erythematous. Nose: Nose normal. No septal deviation, mucosal edema or rhinorrhea. Mouth/Throat:      Mouth: Mucous membranes are not pale and not dry. No oral lesions. Pharynx: Uvula midline. No oropharyngeal exudate, posterior oropharyngeal erythema or uvula swelling. Eyes:      Extraocular Movements:      Right eye: Normal extraocular motion and no nystagmus. Left eye: Normal extraocular motion and no nystagmus. Comments: Conjugate gaze   Neck:      Musculoskeletal: Neck supple. Thyroid: No thyroid mass or thyromegaly. Trachea: Trachea and phonation normal. No tracheal deviation. Comments: No adenopathy. Salivary glands not enlarged and normal to palpation    Pulmonary:      Effort: Pulmonary effort is normal.      Breath sounds: No stridor. Neurological:      Mental Status: She is alert and oriented to person, place, and time. Cranial Nerves: No cranial nerve deficit (VIIth N function intact bilat). Psychiatric:         Behavior: Behavior is cooperative. Data:  All of the past medical history, past surgical history, family history,social history, allergies and current medications were reviewed with the patient. Assessment & Plan   Diagnoses and all orders for this visit:     Diagnosis Orders   1. Tinnitus of both ears         The findings were explained and her questions were answered. Options were discussed with her as per the audiologist recommendations. Also there are some dietary supplements that can be taken with roughly 50% incidence of significant benefit. Information was provided. Return PRN. Vik Signs.  Himanshu Decker MD    **This report has been created using voice recognition software. It may contain minor errors which are inherent in voicerecognition technology. **

## 2020-01-23 NOTE — LETTER
[address]  [address] [facility]  Phone: [phone]  Fax: [phone]    Alis Frias MD        February 8, 2020    Radha Lubin MD  [facility]    Patient: Rolanda Quintero   MR Number: [id number]   YOB: 1960   Date of Visit: 1/23/2020     Dear Radha Lubin,    Thank you for the request for consultation for Rolanda Quintero to me for the evaluation of bilateral tinnitus. She has very mild loss in the high frequencies all within the normal range. Her hearing is really excellent and I suggested she try some supplementats. Below are the relevant portions of my assessment and plan of care. Assessment & Plan   Diagnoses and all orders for this visit:     Diagnosis Orders   1. Tinnitus of both ears         The findings were explained and her questions were answered. Options were discussed with her as per the audiologist recommendations. Also there are some dietary supplements that can be taken with roughly 50% incidence of significant benefit. Information was provided. Return PRN. If you have questions, please do not hesitate to call me. I look forward to following Jamison Hoff along with you.     Sincerely,          Alis Frias MD

## 2020-01-23 NOTE — PROGRESS NOTES
___MSafety:       (Environmental)   [place] to environment  [employer] ID band is correct and in place/ allergy band as needed   Assess for fall risk   Initiate fall precautions as applicable (fall band, side rails, etc.)   Call light within reach   Bed in low position/ wheels locked

## 2020-01-28 NOTE — PROGRESS NOTES
_M___ Safety:       (Environmental)   [place] to environment   Ensure ID band is correct and in place/ allergy band as needed   Assess for fall risk   Initiate fall precautions as applicable (fall band, side rails, etc.)   Call light within reach   Bed in low position/ wheels locked    ____

## 2020-01-30 NOTE — PROGRESS NOTES
1050 infusion started and in progress. Family at bedside. Pt remained accessed from Tuesday. Good blood return from port . Flushes easily. Iv started and infusing with no difficutly   1145 discharge instructions reviewed with patient. Stable. Infusion completed. Port deaccessed with no problems. 1155 pt discharged per ambulation to home with family.

## 2020-01-30 NOTE — PROGRESS NOTES
_M_ Safety:       (Environmental)   [place] to environment   Ensure ID band is correct and in place/ allergy band as needed   Assess for fall risk   Initiate fall precautions as applicable (fall band, side rails, etc.)   Call light within reach   Bed in low position/ wheels locked    _M___ Pain:        Assess pain level and characteristics   Administer analgesics as ordered   Assess effectiveness of pain management and report to MD as needed    __M_ Knowledge Deficit:   Assess baseline knowledge   Provide teaching at level of understanding   Provide teaching via preferred learning method   Evaluate teaching effectiveness    __M__ Hemodynamic/Respiratory Status:       (Pre and Post Procedure Monitoring)   Assess/Monitor vital signs and LOC   Assess Baseline SpO2 prior to any sedation   Obtain weight/height   Assess vital signs/ LOC until patient meets discharge criteria   Monitor procedure site and notify MD of any issues    ___Mnfection-Risk of Central Venous Catheter:   Monitor for infection signs and symptoms (catheter site redness, temperature elevation, etc)   Assess for infection risks   Educate regarding infection prevention   Manage central venous catheter (flushes/ dressing changes per protocol)

## 2020-02-02 PROBLEM — A41.9 SEPSIS (HCC): Status: ACTIVE | Noted: 2020-01-01

## 2020-02-02 NOTE — ED NOTES
ED nurse-to-nurse bedside report    Chief Complaint   Patient presents with    Muscle Pain      LOC: alert and orientated to name, place, date  Vital signs   Vitals:    02/02/20 0612 02/02/20 0711   BP: (!) 136/94 (!) 101/58   Pulse: 127 120   Resp: 18 16   Temp: 103.2 °F (39.6 °C)    TempSrc: Rectal    SpO2: 98% 98%   Weight: 94 lb 3.2 oz (42.7 kg)    Height: 4' 10\" (1.473 m)       Pain:    Pain Interventions: tylenol  Pain Goal: 2  Oxygen: No    Current needs required reduce fever   Telemetry: Yes  LDAs:    Continuous Infusions:   Mobility: Requires assistance * 2  Frost Fall Risk Score: No flowsheet data found.   Fall Interventions: side rails  Report given to: [facility], RN  02/02/20 4790

## 2020-02-02 NOTE — H&P
History & Physical        Patient:  Lucia Neal  YOB: 1960    MRN: [id number]     Acct: [de-identified]    PCP: Eliecer Martínez MD    Date of Admission: 2/2/2020    Date of Service: Pt seen/examined on 02/02/20  and Admitted to Inpatient with expected LOS greater than two midnights due to medical therapy. ASSESSMENT/PLAN:    1. Sepsis (POA) with fever 103.2, heart rate 127, hypotension--questioning sources possibly related to pneumonia or some type of viral illness; fever has improved along with blood pressure; closely monitor at this time  2. Lactic acidosis--recheck level at 2:00  3. Pneumonia, right-sided(POA)--Rocephin and Zithromax which was started today in ER and will be continued; procalcitonin 1.98   4. Muscle spasms--at ice/heat  5. Hypomagnesia--replace per protocol  6. Hypocalcemia--replace per protocol  7. Parathyroidectomy in August 2017  8. History of DVTs    Chief Complaint:  Muscle aches     History Of Present Illness:    62 y.o. female who presented to [facility] with muscle aches; she states yesterday she developed muscle cramps that started in her legs and then came up to her back; she still has severe cramps to her left lower back with any type of movement; she also relates to fever and chills; she relates to lightheadedness and dizziness, states she developed a cough today that is nonproductive, denies chest pain, relates to dyspnea on exertion, relates to nausea, states she is lost 85 pounds in the last year and feels it secondary to parathyroidism; in the ER she was given Rocephin and Zithromax for right-sided pneumonia; she had a MediPort placed in December 2019 secondary to her calcium infusions; she is fully awake and alert;  states she looks a little pale but otherwise at her baseline; she is being admitted to the hospital service for further care and evaluation.     Past Medical History:          Diagnosis Date    [facility] ([facility]      UPPER GASTROINTESTINAL ENDOSCOPY  approx 2013    normal per patient except mild erosion    UPPER GASTROINTESTINAL ENDOSCOPY Left 5/7/2019    EGD BIOPSY performed by Jeremy Landry MD at [address] Right     pinned       Medications Prior to Admission:      Prior to Admission medications    Medication Sig Start Date End Date Taking? Authorizing Provider   magnesium oxide (MAG-OX) 400 (241.3 Mg) MG TABS tablet  1/20/20   Historical Provider, MD   DULoxetine (CYMBALTA) 30 MG extended release capsule TAKE 1 CAPSULE DAILY 1/20/20   Aleena Duran MD   acyclovir (ZOVIRAX) 400 MG tablet TAKE 1 TABLET TWICE A DAY 1/20/20   Aleena Duran MD   UNABLE TO FIND Inject 0.5 mcg into the skin daily Natpara injection    Historical Provider, MD   calcitRIOL (ROCALTROL) 0.5 MCG capsule Take 0.5 mcg by mouth 4 times daily    Historical Provider, MD   Calcium Citrate-Vitamin D (CALCIUM CITRATE CHEWY BITE) 500-500 MG-UNIT CHEW Take by mouth 2 tabs 3 times a day    Historical Provider, MD   vitamin B-6 (PYRIDOXINE) 100 MG tablet Take 100 mg by mouth daily    Historical Provider, MD   aMILoride (MIDAMOR) 5 MG tablet Take 5 mg by mouth daily  10/23/19   Historical Provider, MD   DULoxetine (CYMBALTA) 60 MG extended release capsule Take 1 capsule by mouth nightly 11/14/19   Abiola Ho MD   gabapentin (NEURONTIN) 100 MG capsule Take 1 capsule by mouth daily for 90 days. 11/14/19 2/12/20  Abiola Ho MD   gabapentin (NEURONTIN) 300 MG capsule Take 1 capsule by mouth nightly for 180 days.  11/14/19 5/12/20  Abiola Ho MD   Estradiol (VAGIFEM) 10 MCG TABS vaginal tablet AT THE START OF THERAPY, INSERT 1 TABLET VAGINALLY DAILY FOR 2 WEEKS, THEN USE TWICE WEEKLY THEREAFTER 11/14/19   Abiola Ho MD   metoprolol tartrate (LOPRESSOR) 25 MG tablet Take 0.5 tablets by mouth 2 times daily 11/14/19   Abiola Ho MD   tiZANidine (ZANAFLEX) 4 MG tablet Take 1 tablet by mouth nightly At bed time 11/14/19 Aida Tristan MD   calcium elemental (OSCAL) 500 MG TABS tablet Take 5 tablets by mouth 4 times daily 10/20/19   Will Sanders MD   zinc gluconate 50 MG tablet TAKE ONE AND ONE-HALF TABLETS (75 MG) DAILY 8/15/19   Ardie Sacks, MD   vitamin B-12 (CYANOCOBALAMIN) 500 MCG tablet TAKE 1 TABLET DAILY 8/15/19   Ardie Sacks, MD   vitamin B-1 (THIAMINE) 100 MG tablet Take 100 mg by mouth daily    Historical Provider, MD   NONFORMULARY Whey Protein Powder    Historical Provider, MD   ondansetron (ZOFRAN ODT) 4 MG disintegrating tablet Take 1 tablet by mouth every 8 hours as needed for Nausea 3/14/19   Veronica Chow DO   Vitamin K, Phytonadione, 100 MCG TABS Take 3 tablets by mouth daily 4/23/18   Ardie Sacks, MD   FOLIC ACID PO Take 9,769 mg by mouth daily    Historical Provider, MD   Copper 5 MG CAPS Take 5 mg by mouth daily 2/26/18   Ardie Sacks, MD   SYNTHROID 75 MCG tablet Take 75 mg by mouth daily 2/21/18   Historical Provider, MD   SYNTHROID 200 MCG tablet Take 200 mcg by mouth daily 2/16/18   Historical Provider, MD   Cholecalciferol (VITAMIN D3) 5000 units CAPS Take 2 capsules by mouth daily 2/22/18   Ardie Sacks, MD   esomeprazole (NEXIUM) 20 MG delayed release capsule Take 20 mg by mouth 2 times daily    Historical Provider, MD   ranitidine (ZANTAC) 150 MG tablet Take 150 mg by mouth 2 times daily    Historical Provider, MD   vitamin A 23565 units capsule Take 25,000 Units by mouth daily    Historical Provider, MD       Allergies:  Estrogens; Penicillins; Sulfa antibiotics; Sulfur; and Bactrim [sulfamethoxazole-trimethoprim]    Social History:   reports that she has never smoked. She has never used smokeless tobacco. She reports current alcohol use of about 9.0 standard drinks of alcohol per week. She reports that she does not use drugs.     Family History:      Positive as follows:        Problem Relation Age of Onset    Breast Cancer Mother         twice age 48, 79   Bruce Villatoro Other Mother anxiety, dementia    Depression Mother     High Blood Pressure Mother     Cancer Father         lung    Other Father         Myelodysplastic syndrome, cirrhosis of liver    Depression Father     High Blood Pressure Father     Breast Cancer Sister     Other Maternal Grandmother         Alzheimer's disease    Heart Attack Maternal Grandmother     Mental Illness Maternal Grandmother     Other Maternal Grandfather         asthma, anxiety    Asthma Maternal Grandfather        REVIEW OF SYSTEMS:     Constitutional: ROS: positive for - chills, fever or malaise  Head: no headache, no head injury, no migraine   Eyes ROS: denies blurred/double vision  Ears ROS: no hearing difficulty, no tinnitus  Mouth and Throat ROS: no ulceration, dysphagia, dental caries  Psychological ROS: no depression, no anxiety, no panic attacks, denies suicide/homicide ideation  Endocrine ROS: denies polyuria, polydypsia, no heat or cold intolerance  Respiratory ROS: no cough, shortness of breath, or wheezing  Cardiovascular ROS: no chest pain or dyspnea on exertion  Gastrointestinal ROS: no abdominal pain, change in bowel habits, or black or bloody stools  Genito-Urinary ROS: denies dysuria, frequency, urgency; denies hematuria  Musculoskeletal ROS: positive for - pain in left back  Neurological ROS: no syncope, no seizures, no numbness or tingling of hands, no numbness or tingling of feet, no paresis  Dermatology: no skin rash, no eczema  Endocrine: no polyuria, polydypsia, no heat/cold intolerance  Hematology: denies bruising easily, denies bleeding problems, denies clotting disorders    PHYSICAL EXAM:    BP (!) 87/57   Pulse 110   Temp 99.8 °F (37.7 °C) (Rectal)   Resp 18   Ht 4' 10\" (1.473 m)   Wt 94 lb 3.2 oz (42.7 kg)   SpO2 97%   BMI 19.69 kg/m²     General appearance:  No apparent distress, appears older than stated age and cooperative. HEENT:  Normal cephalic, atraumatic without obvious deformity.  Pupils equal, round, and reactive to light. Conjunctivae/corneas clear. Oral membranes dry  Neck: Supple, with full range of motion. No jugular venous distention. Trachea midline. Respiratory:  Normal respiratory effort. Clear to auscultation, bilaterally without Rales/Wheezes/Rhonchi. Cardiovascular:  Regular rate and rhythm with normal S1/S2 without murmurs, rubs or gallops. Abdomen: Soft, non-tender, non-distended with normal bowel sounds. Musculoskeletal:  No clubbing, cyanosis or edema bilaterally. Full range of motion without deformity. Unable to view the posterior portion of the patient's torso as anytime she goes to move she gets severe spasms  Skin: Skin color, texture, turgor normal.    Neurologic:  Neurovascularly intact without any focal sensory/motor deficits. Cranial nerves: II-XII intact, grossly non-focal.  Psychiatric:  Alert and oriented for, thought content appropriate  Capillary Refill: Brisk,< 3 seconds   Peripheral Pulses: +2 palpable, equal bilaterally       Labs:     Recent Labs     02/02/20  0709   WBC 8.6   HGB 10.1*   HCT 31.1*   *     Recent Labs     02/02/20  0709   *   K 3.8   CL 98   CO2 24   BUN 11   CREATININE 0.5   CALCIUM 8.7   PHOS 2.7     Recent Labs     02/02/20  0709   AST 43*   ALT 31   BILIDIR 0.8*   BILITOT 1.3*   ALKPHOS 130*     Recent Labs     02/02/20  0709   INR 1.25*     Recent Labs     02/02/20  0709   CKTOTAL 26*   TROPONINT < 0.010     Procalcitonin:  Recent Labs     02/02/20  0804   PROCAL 1.98*     Lactic Acid: No results for input(s): LACTA in the last 72 hours. Radiology:     Xr Chest Standard (2 Vw)    Result Date: 2/2/2020  FINDINGS: POSTSURGICAL CHANGES: 1. Cholecystectomy. LINES/TUBES/MECHANICAL DEVICES: 1. There is a stable right jugular central venous Mediport with the distal tip within the superior vena cava. TRACHEA/HEART/MEDIASTINUM/HILUM: 1. Stable tracheal and bronchial calcification. LUNG PHAM: Normal. OTHER: None. PNEUMOTHORAX: None.  OSSEOUS STRUCTURES: 1. No acute osseous abnormality. 2. The bony structures are osteopenic. 3. There is mild dextroscoliosis. 4. Small plate spurs at several levels along the spine. 1. No acute cardiopulmonary process. EKG:  I have reviewed the EKG with the following interpretation: Unable to find so we will repeat      Thank you Niecy Rivera MD for the opportunity to be involved in this patient's care.     Electronically signed by ANITA Siegel CNP on 2/2/2020 at 9:22 AM

## 2020-02-02 NOTE — ED PROVIDER NOTES
Depression, unspecified depression type; Anxiety; Fibromyalgia      !! gabapentin (NEURONTIN) 100 MG capsule Take 1 capsule by mouth daily for 90 days. Qty: 90 capsule, Refills: 0    Associated Diagnoses: Fibromyalgia      !! gabapentin (NEURONTIN) 300 MG capsule Take 1 capsule by mouth nightly for 180 days. Qty: 90 capsule, Refills: 1    Associated Diagnoses: Fibromyalgia      Estradiol (VAGIFEM) 10 MCG TABS vaginal tablet AT THE START OF THERAPY, INSERT 1 TABLET VAGINALLY DAILY FOR 2 WEEKS, THEN USE TWICE WEEKLY THEREAFTER  Qty: 40 tablet, Refills: 0      metoprolol tartrate (LOPRESSOR) 25 MG tablet Take 0.5 tablets by mouth 2 times daily  Qty: 30 tablet, Refills: 3    Associated Diagnoses: Palpitations      tiZANidine (ZANAFLEX) 4 MG tablet Take 1 tablet by mouth nightly At bed time  Qty: 90 tablet, Refills: 1    Associated Diagnoses: Fibromyalgia      zinc gluconate 50 MG tablet TAKE ONE AND ONE-HALF TABLETS (75 MG) DAILY  Qty: 135 tablet, Refills: 0    Associated Diagnoses: Gastric bypass status for obesity;  Low zinc level      vitamin B-12 (CYANOCOBALAMIN) 500 MCG tablet TAKE 1 TABLET DAILY  Qty: 30 tablet, Refills: 3    Associated Diagnoses: Gastric bypass status for obesity      vitamin B-1 (THIAMINE) 100 MG tablet Take 100 mg by mouth daily      Vitamin K, Phytonadione, 100 MCG TABS Take 3 tablets by mouth daily  Qty: 270 tablet, Refills: 0    Associated Diagnoses: Low serum vitamin K      FOLIC ACID PO Take 8,062 mg by mouth daily      Copper 5 MG CAPS Take 5 mg by mouth daily  Qty: 90 capsule, Refills: 0    Associated Diagnoses: Low serum copper for age      !! SYNTHROID 76 MCG tablet Take 75 mg by mouth daily      !! SYNTHROID 200 MCG tablet Take 200 mcg by mouth daily      Cholecalciferol (VITAMIN D3) 5000 units CAPS Take 2 capsules by mouth daily  Qty: 60 capsule, Refills: 0    Associated Diagnoses: Personal history of gastric bypass      esomeprazole (NEXIUM) 20 MG delayed release capsule Take 20 mg No erythema or rash. Neurological:      Mental Status: She is alert and oriented to person, place, and time. GCS: GCS eye subscore is 4. GCS verbal subscore is 5. GCS motor subscore is 6. Motor: No abnormal muscle tone. Coordination: Coordination normal.   Psychiatric:         Behavior: Behavior normal.         Thought Content: Thought content normal.          DIFFERENTIAL DIAGNOSIS:   Including but not limited to viral URI, influenza, PNA, UTI, and others. DIAGNOSTIC RESULTS     EKG: All EKG's are interpreted by the Emergency Department Physician who either signs or Co-signs this chart in the absence of a cardiologist.  EKG 12 Lead (Final result)    Component (Lab Inquiry)   Collection Time Result Time Ventricular Rate Atrial Rate P-R Interval QRS Duration Q-T Interval   02/02/20 06:18:03 02/02/20 20:47:07 120 120 130 64 318       Collection Time Result Time QTc Calculation (Bazett) P Axis R Axis T Axis   02/02/20 06:18:03 02/02/20 20:47:07 449 57 -16 69         Final result                Narrative:    Sinus tachycardia  Low voltage QRS, consider pulmonary disease, pericardial effusion, or normal variant  Cannot rule out Anteroseptal infarct (cited on or before 14-MAR-2019)  Abnormal ECG  When compared with ECG of 09-DEC-2019 06:22,  Ventricular rate has increased BY  49 BPM  Questionable change in initial forces of Anterior leads  Confirmed by JUN MASON ([id number]) on 2/2/2020 8:47:04 PM                RADIOLOGY: non-plainfilm images(s) such as CT, Ultrasound and MRI are read by the radiologist.    CT ABDOMEN PELVIS W IV CONTRAST Additional Contrast? None   Final Result   1. Nonobstructive bowel gas pattern with a moderate amount of stool within the colon. 2. There are several small mesenteric and retroperitoneal lymph nodes. There are also small periportal and periceliac lymph nodes. Additional, there are a few small lymph nodes along the gastrohepatic ligament.  Similar lymphadenopathy was seen on the    previous examination. A follow-up CT examination of the abdomen/pelvis with intravenous contrast in 3 months is recommended to confirm stability. 3. There is vague mesenteric edema. There is also persistent nonspecific diffuse subcutaneous edema. 4. Findings related to the chest are dictated on the CTA chest examination report dated 2/2/2020. **This report has been created using voice recognition software. It may contain minor errors which are inherent in voice recognition technology. **      Final report electronically signed by Dr. Dustin Workman on 2/2/2020 12:24 PM      CTA CHEST [address]   Final Result   1. There is no pulmonary embolus. 2. There is mild consolidation at the right posterior costophrenic angle and mild dependent atelectasis at the left posterior costophrenic angle. There is no pleural effusion. Follow-up chest radiographs recommended to confirm complete resolution. 3. There is nonspecific diffuse subcutaneous edema. **This report has been created using voice recognition software. It may contain minor errors which are inherent in voice recognition technology. **      Final report electronically signed by Dr. Dustin Workman on 2/2/2020 12:16 PM      XR CHEST STANDARD (2 VW)   Final Result   1. No acute cardiopulmonary process. **This report has been created using voice recognition software. It may contain minor errors which are inherent in voice recognition technology. **      Final report electronically signed by Dr. Dustin Workman on 2/2/2020 8:07 AM          LABS:     Labs Reviewed   CULTURE BLOOD #1 - Abnormal; Notable for the following components:       Result Value    Blood Culture, Routine No growth-preliminary  (*)     Organism gram negative bacilli (*)     All other components within normal limits    Narrative:     Source: blood-Adult-suboptimal <5.5oz./set volume       Site:  peripheral [facility]             Current Antibiotics: none CULTURE BLOOD #2 - Abnormal; Notable for the following components:    Organism gram negative bacilli (*)     All other components within normal limits    Narrative:     Source: blood-Adult-suboptimal <5.5oz./set volume       Site: single bottle, peripheral vein             Current Antibiotics: none   CBC WITH AUTO DIFFERENTIAL - Abnormal; Notable for the following components:    RBC 3.17 (*)     Hemoglobin 10.1 (*)     Hematocrit 31.1 (*)     RDW-SD 47.4 (*)     Platelets 309 (*)     Lymphocytes Absolute 0.9 (*)     Monocytes Absolute 0.1 (*)     All other components within normal limits   BRAIN NATRIURETIC PEPTIDE - Abnormal; Notable for the following components:    Pro-BNP 3663.0 (*)     All other components within normal limits   PROTIME-INR - Abnormal; Notable for the following components:    INR 1.25 (*)     All other components within normal limits   BASIC METABOLIC PANEL - Abnormal; Notable for the following components:    Sodium 133 (*)     All other components within normal limits   HEPATIC FUNCTION PANEL - Abnormal; Notable for the following components:    Alb 1.7 (*)     Total Bilirubin 1.3 (*)     Bilirubin, Direct 0.8 (*)     Alkaline Phosphatase 130 (*)     AST 43 (*)     Total Protein 4.2 (*)     All other components within normal limits   LIPASE - Abnormal; Notable for the following components:    Lipase 3.6 (*)     All other components within normal limits   MAGNESIUM - Abnormal; Notable for the following components:    Magnesium 1.5 (*)     All other components within normal limits   CK - Abnormal; Notable for the following components:     Total CK 26 (*)     All other components within normal limits   URINE RT REFLEX TO CULTURE - Abnormal; Notable for the following components:    Color, UA DARK YELLOW (*)     All other components within normal limits   TSH WITH REFLEX - Abnormal; Notable for the following components:    TSH 0.006 (*)     All other components within normal limits   PROCALCITONIN - Abnormal; Notable for the following components:    Procalcitonin 1.98 (*)     All other components within normal limits   LACTATE, SEPSIS - Abnormal; Notable for the following components:    Lactic Acid, Sepsis 2.9 (*)     All other components within normal limits   LACTATE, SEPSIS - Abnormal; Notable for the following components:    Lactic Acid, Sepsis 2.5 (*)     All other components within normal limits   OSMOLALITY - Abnormal; Notable for the following components:    Osmolality Calc 264.9 (*)     All other components within normal limits   T4, FREE - Abnormal; Notable for the following components:    T4 Free 1.80 (*)     All other components within normal limits   CALCIUM, IONIZED - Abnormal; Notable for the following components:    Calcium, Ion 1.08 (*)     All other components within normal limits   BASIC METABOLIC PANEL W/ REFLEX TO MG FOR LOW K - Abnormal; Notable for the following components:    Sodium 132 (*)     CO2 21 (*)     Calcium 8.3 (*)     All other components within normal limits   CBC - Abnormal; Notable for the following components:    WBC 11.5 (*)     RBC 2.63 (*)     Hemoglobin 8.4 (*)     Hematocrit 26.1 (*)     MCV 99.2 (*)     RDW-SD 50.2 (*)     Platelets 965 (*)     All other components within normal limits   CALCIUM, IONIZED - Abnormal; Notable for the following components:    Calcium, Ion 1.35 (*)     All other components within normal limits   MAGNESIUM - Abnormal; Notable for the following components:    Magnesium 1.5 (*)     All other components within normal limits   RAPID INFLUENZA A/B ANTIGENS   RESPIRATORY VIRUS ANTIGENS PANEL   TROPONIN   PHOSPHORUS   ANION GAP   GLOMERULAR FILTRATION RATE, ESTIMATED   SCAN OF BLOOD SMEAR   PHOSPHORUS   LACTATE, SEPSIS   BLOOD ID PANEL, MOLECULAR   ANION GAP   GLOMERULAR FILTRATION RATE, ESTIMATED   BLOOD OCCULT STOOL SCREEN #1   HEMOGLOBIN AND HEMATOCRIT, BLOOD   BASIC METABOLIC PANEL W/ REFLEX TO MG FOR LOW K   TROPONIN   TROPONIN EMERGENCY DEPARTMENT COURSE:   Vitals:    Vitals:    02/03/20 0703 02/03/20 0718 02/03/20 0733 02/03/20 0748   BP: 89/62 (!) 84/59 84/61 (!) 86/58   Pulse: 92 89 89 88   Resp:       Temp:       TempSrc:       SpO2:       Weight:       Height:           6:24 AM: The patient was seen and evaluated by myself at bedside. MDM:  The patient was seen and evaluated within the ED today with muscle aches and cramping. Within the department, I observed the patient's temperature to be 103.2F, and her hr was 127 bpm. BP became low during her stay, averaging in the low 58J systolic/mid 59V diastolic. On exam, I appreciated clear lung sounds. There is no chest wall or abdominal tenderness. There is no midline spinal or paraspinal tenderness of the cervical, thoracic, or lumbar spine. The patient appears to be wearing Lidoderm patches. Radiologic studies within the department revealed no acute intrapulmonary process, infiltrations, effusions, or consolidations. Laboratory work revealed an initial lactic acid of 2.9 with a repeat value of 2.5. WBC count was within normal range. Flu swab is negative. TSH is 0.006 with a free T4 of 1.8. Troponin is negative. BNP is 3663. Total bili 1.3, dir bili 0.8, alk phos 130, AST 43. Within the department, the patient was treated with IV saline, Norlfex, and Tylenol. She was given a 30 mL/kg sepsis bolus. She was started on IV Rocephin pending acquiring aUA, as bladder scan showed no urine to catheterize. When a UA was finally obtained, it was negative for a UTI. I observed the patient's condition tO remain stable during the duration of the stay. Temperature at recheck was 98.8F, at which time her HR was 114 bpm and her BP was 107/78. At shift change, Dr. Lorena Pham took over the patient's care. He will review results, order additional tests and labs, treat, consult, and admit or discharge as appropriate.     CRITICAL CARE:   None     CONSULTS:  Dr. Lisbeth Nascimento attending physican    PROCEDURES:  None    FINAL IMPRESSION      1. Fever, unspecified fever cause    2. Hypotension, unspecified hypotension type    3. Pneumonia due to organism          DISPOSITION/PLAN   Dr. Barbara Lira took over the patient's care. He will review results, order additional tests and labs, treat, consult, and admit or discharge as appropriate. PATIENT REFERRED TO:  Shan Rust MD  [address]  [address] [address]  [phone]            DISCHARGE MEDICATIONS:  Current Discharge Medication List          (Please note that portions of this note were completed with a voice recognition program.  Efforts were made to edit the dictations but occasionally words are mis-transcribed.)    The patient was given an opportunity to see the Emergency Attending. The patient voiced understanding that I was a Mid-LevelProvider and was in agreement with being seen independently by myself. Scribe:  Inocente Greenberg 2/2/20 6:24 AM Scribing for and in the presence of Nasreen Villanueva PA-C. Signed by: Inocente Greenberg (Name)Jim 02/03/20 2:52 PM    Provider:  I personally performed the services described in the documentation, reviewed and edited the documentation which was dictated to the scribe in my presence, and it accurately records my words and actions.     Nasreen Villanueva PA-C 2/2/20 2:52 PM       Nasreen Villanueva PA-C  02/03/20 [id number]

## 2020-02-02 NOTE — ED NOTES
ED to inpatient nurses report    Chief Complaint   Patient presents with    Muscle Pain      Present to ED from home  LOC: alert and orientated to name, place, date  Vital signs   Vitals:    02/02/20 0958 02/02/20 1055 02/02/20 1229 02/02/20 1231   BP: (!) 84/56 (!) 82/52 93/63    Pulse: 117 110 108    Resp: 18 20 20 22   Temp:       TempSrc:       SpO2: 97% 97% 97% 97%   Weight:       Height:          Oxygen Baseline Room Air    Current needs required Room Air  LDAs:  Port  Mobility: Unknown  Pending ED orders: None  Present condition: Stable    Electronically signed by Dian Quispe RN on 2/2/2020 at 1:26 PM       Dian Quispe, [address] [place] [address]  02/02/20 1323

## 2020-02-03 NOTE — PROGRESS NOTES
left lower back with any type of movement; she also relates to fever and chills; she relates to lightheadedness and dizziness, states she developed a cough today that is nonproductive, denies chest pain, relates to dyspnea on exertion, relates to nausea, states she is lost 85 pounds in the last year and feels it secondary to parathyroidism; in the ER she was given Rocephin and Zithromax for right-sided pneumonia; she had a MediPort placed in December 2019 secondary to her calcium infusions; she is fully awake and alert;  states she looks a little pale but otherwise at her baseline; she is being admitted to the hospital service for further care and evaluation. Subjective (past 24 hours):   Pt states that she feels okay, not better or worse than when she came in. Pt's BP has been low, but MAP has been above 70. Pt is up 2L. Past medical history, family history, social history and allergies reviewed again and is unchanged since admission. ROS Pt admits to myalgias, chronic SOB, HA. Pt denies CP, abd pain, dysuria.      Medications:  Reviewed    Infusion Medications    sodium chloride 150 mL/hr at 02/02/20 1848     Scheduled Medications    enoxaparin  40 mg Subcutaneous Daily    magnesium replacement protocol   Other RX Placeholder    calcium gluconate IVPB  1 g Intravenous Once    sodium chloride flush  10 mL Intravenous 2 times per day    cefTRIAXone (ROCEPHIN) IV  1 g Intravenous Q24H    azithromycin  500 mg Intravenous Q24H    acyclovir  400 mg Oral BID    calcitRIOL  0.5 mcg Oral 4x Daily    Vitamin D  10,000 Units Oral Daily    DULoxetine  30 mg Oral Daily    DULoxetine  60 mg Oral Nightly    pantoprazole  40 mg Oral QAM AC    folic acid  1,997 mcg Oral Daily    gabapentin  100 mg Oral Daily    gabapentin  300 mg Oral Nightly    magnesium chloride  1 tablet Oral BID    famotidine  20 mg Oral BID    tiZANidine  4 mg Oral Nightly    vitamin A  20,000 Units Oral Daily    vitamin B-1  100 mg Oral Daily    vitamin B-12  500 mcg Oral Daily    vitamin B-6  100 mg Oral Daily    levothyroxine  275 mcg Oral Daily    calcium-vitamin D  4 tablet Oral Nightly    NONFORMULARY 50 mcg  50 mcg Injection Daily     PRN Meds: sodium chloride flush, acetaminophen, ondansetron, oxyCODONE-acetaminophen      Intake/Output Summary (Last 24 hours) at 2/3/2020 0808  Last data filed at 2/2/2020 1230  Gross per 24 hour   Intake 2050 ml   Output --   Net 2050 ml       Diet:  DIET GENERAL;    Exam:  BP (!) 86/58   Pulse 88   Temp 99 °F (37.2 °C) (Oral)   Resp 16   Ht 4' 10\" (1.473 m)   Wt 94 lb 3.2 oz (42.7 kg)   SpO2 97%   BMI 19.69 kg/m²      General appearance: Chronically ill appearing white female, very thin   HEENT: Pupils equal, round, and reactive to light. Conjunctivae/corneas clear. Neck: Supple, with full range of motion. No jugular venous distention. Trachea midline. Respiratory:  Normal respiratory effort on RA. Clear to auscultation, bilaterally without Rales/Wheezes/Rhonchi. Cardiovascular: Regular rate and rhythm with normal S1/S2 without murmurs, rubs or gallops. Abdomen: Soft, non-tender, non-distended with normal bowel sounds. Musculoskeletal: passive and active ROM x 4 extremities. Skin: Skin color, texture, turgor normal.  No rashes or lesions. Mediport, right chest   Neurologic:  Neurovascularly intact without any focal sensory/motor deficits.  Cranial nerves: II-XII intact, grossly non-focal.  Psychiatric: Alert and oriented, thought content appropriate, normal insight  Capillary Refill: Brisk,< 3 seconds   Peripheral Pulses: +2 palpable, equal bilaterally     Labs:   Recent Labs     02/02/20  0709 02/03/20  0400   WBC 8.6 11.5*   HGB 10.1* 8.4*   HCT 31.1* 26.1*   * 102*     Recent Labs     02/02/20  0709 02/03/20  0400   * 132*   K 3.8 3.8   CL 98 103   CO2 24 21*   BUN 11 12   CREATININE 0.5 0.5   CALCIUM 8.7 8.3*   PHOS 2.7 2.6     Recent Labs     02/02/20  0709

## 2020-02-03 NOTE — CARE COORDINATION
edema  Medications:  Scheduled Meds:   enoxaparin  30 mg Subcutaneous Daily    magnesium replacement protocol   Other RX Placeholder    calcium gluconate IVPB  1 g Intravenous Once    albumin human  25 g Intravenous Once    sodium chloride flush  10 mL Intravenous 2 times per day    cefTRIAXone (ROCEPHIN) IV  1 g Intravenous Q24H    azithromycin  500 mg Intravenous Q24H    acyclovir  400 mg Oral BID    calcitRIOL  0.5 mcg Oral 4x Daily    Vitamin D  10,000 Units Oral Daily    DULoxetine  30 mg Oral Daily    DULoxetine  60 mg Oral Nightly    pantoprazole  40 mg Oral QAM AC    folic acid  6,976 mcg Oral Daily    gabapentin  100 mg Oral Daily    gabapentin  300 mg Oral Nightly    magnesium chloride  1 tablet Oral BID    famotidine  20 mg Oral BID    tiZANidine  4 mg Oral Nightly    vitamin A  20,000 Units Oral Daily    vitamin B-1  100 mg Oral Daily    vitamin B-12  500 mcg Oral Daily    vitamin B-6  100 mg Oral Daily    levothyroxine  275 mcg Oral Daily    NONFORMULARY 50 mcg  50 mcg Injection Daily     Continuous Infusions:   sodium chloride 150 mL/hr at 02/02/20 1848      Pertinent Info/Orders/Treatment Plan: Presented with c/o muscle cramps that started in her legs and then came up to her back, fever, chills, lightheadedness, dizziness, nonproductive cough, ORTIZ, and nausea. Tmax 99.6. NSR - 's. On room air. Ox4. Mediport. Blood culture + gram neg bacilli. External urinary catheter. Telemetry, I&O, daily weight, up with assist. IVF, po acyclovir, IV zithromax, calcitriol, IV rocephin, prn flexeril, cymbalta, lovenox, pepcid, folic acid, neurontin, mag chloride tabs, prn percocet, zanaflex, thiamine, vit D, Electrolyte replacement protocols . Received 25g 25% albumin x1 today. Received 3L in fld bolus.  Na+ 133 - now 132, Mg 1.5, LA 2.5 - now 1.2, procal 1.98, ck 26, pro-bnp 3663, trop neg, alb 1.7, alk phos 130, ast 43, bili 1.3, direct bili 0.8, lipase 3.6, tsh 0.006, free T4 1.8, wbc 8.6 - now 11.5, hgb 10.1 - now 8.4, plt 120 - now 102, INR 1.25. Rapid flu was neg. Diet: DIET GENERAL;   Smoking status:  reports that she has never smoked. She has never used smokeless tobacco.   PCP: Joceline Zapata MD  Readmission 30 days or less: no  Readmission Risk Score: 29%    Discharge Planning Evaluation  Current Residence:  Private Residence  Living Arrangements:  Spouse/Significant Other   Support Systems:  Spouse/Significant Other, Children, Family Members  Current Services PTA:     Potential Assistance Needed:  N/A  Potential Assistance Purchasing Medications:  No  Does patient want to participate in local refill/ meds to beds program?  No  Type of Home Care Services:  None  Patient expects to be discharged to:  home with   Expected Discharge date:  02/05/20  Follow Up Appointment: Best Day/ Time: Wednesday PM    Patient Goals/Plan/Treatment Preferences: Spoke with Haroon Alvarenga; states she lives at home with her  and did not use any DME or have any HH services PTA. She drives, cares for herself independently, and has a PCP. Haroon Alvarenga states she plans to return home with her  at discharge, denies needs, and declines [facility] stating she doesn't need it. Transportation/Food Security/Housekeeping Addressed:  No issues identified.     Evaluation: no

## 2020-02-03 NOTE — PROGRESS NOTES
Pharmacy Note  [employer] Result    Betty Hightower is a 62 y.o. female, with a positive blood culture result    [employer] message received from Letitia, laboratory employee on 2/2/2020 at 11:07 PM    First Gram stain result: gram negative bacilli    [employer] BCID result: nothing detected    [employer] BCID and gram stain congruent? No    Suspected source? Unknown if contaminant or not    Patient chart reviewed for signs/symptoms of infection: Yes  BP 93/64   Pulse 94   Temp 99.6 °F (37.6 °C) (Rectal)   Resp 16   Ht 4' 10\" (1.473 m)   Wt 94 lb 3.2 oz (42.7 kg)   SpO2 97%   BMI 19.69 kg/m²   Lab Results   Component Value Date    WBC 8.6 02/02/2020       Allergies reviewed  Estrogens; Penicillins; Sulfa antibiotics; Sulfur; and Bactrim [sulfamethoxazole-trimethoprim]    Renal function reviewed  CrCl cannot be calculated (Unknown ideal weight.). Current antibiotic regimen: Rocephin and Zithromax for possible pneumonia    Intervention needed: No    Individual contacted: Nurse De La Vega    Recommendations: ordered antibiotics should cover if true infection with gram negative bacilli. Will continue current therapy and see if second blood culture grows anything. Recommendations accepted?  Yes    Patricia Shell  2/2/2020 11:07 PM

## 2020-02-03 NOTE — PROGRESS NOTES
Received per cart from ED. A/O- assess done per flowsheet. C/O severe spasms in lower back erick lt side with any movement- cries out in pain. . Refusing to turn at this time to fully assess skin.  here. 1700-Resting in bed- no complaints at this time. 1835 Cont to have intermittent spasms in back with movement- percocet given.

## 2020-02-04 NOTE — CONSULTS
CONSULTATION NOTE :ID       Patient - Artmaciej Rocha,  Age - 62 y.o.    - 1960      Room Number - 4B-06/006-A   N -  [id number]   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 female who was admitted to the hospital with a chief complaints of fever weakness 2 days duration prior to admission. She came from home, she noted weakness fever and pain on her right calf. She reported that she was scratched by her cat last Friday. She had associated spasm on her back pain on her right leg fever and chills. 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 She had history of alcohol abuse history of hypocalcemia following parathyroidectomy gets infusion of calcium regularly. I was asked to see this patient to assist with treatment she has allergy to penicillin and sulfa she is currently on IV ceftriaxone.     PAST MEDICAL  HISTORY       Past Medical History:   Diagnosis Date    Alcoholism ([facility] [place] [address]     Anxiety     Atrophy of vulva     Atyp squam cell of undet signfc cyto smr crvx (ASC-US)     Closed fracture of seventh cervical vertebra without spinal cord injury ([address]     Closed fracture of sixth cervical vertebra (HCC)     Closed fracture of thoracic vertebra (HCC)     Depression     Dry eye syndrome     DVT (deep venous thrombosis) (HCC)     left arm; after contrast for MRI brain    Dyspareunia in female     External hemorrhoids without complication     Fibromyalgia     Gastric bypass status for obesity 2018    GERD (gastroesophageal reflux disease)     GERD (gastroesophageal reflux disease)     Hiatal hernia     History of ITP     as a senior in high school; no active issues    Hungry acyclovir  400 mg Oral BID    calcitRIOL  0.5 mcg Oral 4x Daily    Vitamin D  10,000 Units Oral Daily    DULoxetine  30 mg Oral Daily    DULoxetine  60 mg Oral Nightly    pantoprazole  40 mg Oral QAM AC    folic acid  1,915 mcg Oral Daily    gabapentin  100 mg Oral Daily    gabapentin  300 mg Oral Nightly    magnesium chloride  1 tablet Oral BID    famotidine  20 mg Oral BID    tiZANidine  4 mg Oral Nightly    vitamin A  20,000 Units Oral Daily    vitamin B-1  100 mg Oral Daily    vitamin B-12  500 mcg Oral Daily    vitamin B-6  100 mg Oral Daily    levothyroxine  275 mcg Oral Daily    NONFORMULARY 50 mcg  50 mcg Injection Daily     Continuous Infusions:   sodium chloride 150 mL/hr at 02/04/20 0617     PRN Meds:cyclobenzaprine, sodium chloride flush, acetaminophen, ondansetron, oxyCODONE-acetaminophen  Allergies:   ALLERGIES:    Estrogens; Penicillins; Sulfa antibiotics; Sulfur; and Bactrim [sulfamethoxazole-trimethoprim]        SOCIAL HISTORY:     TOBACCO:   reports that she has never smoked. She has never used smokeless tobacco.     ETOH:   reports current alcohol use of about 9.0 standard drinks of alcohol per week. Patient currently lives with family        FAMILY HISTORY:         Problem Relation Age of Onset    Breast Cancer Mother         twice age 48[de-identified] 79    Other Mother         anxiety, dementia    Depression Mother     High Blood Pressure Mother     Cancer Father         lung    Other Father         Myelodysplastic syndrome, cirrhosis of liver    Depression Father     High Blood Pressure Father     Breast Cancer Sister     Other Maternal Grandmother         Alzheimer's disease    Heart Attack Maternal Grandmother     Mental Illness Maternal Grandmother     Other Maternal Grandfather         asthma, anxiety    Asthma Maternal Grandfather        REVIEW OF SYSTEMS:     Constitutional: + Fever, no night sweats, + fatigue. Head: no head ache , no head injury, no migranes.   Eye: no blurring of vision, no double vision. Ears: no hearing difficulty, no tinnitus  Mouth/throat: no ulceration, dental caries , dysphagia  Lungs: no cough no chest pain  CVS: no palpitation, no chest pain, no shortness of breath  GI: no abdominal pain, no nausea , no vomiting, no constipation  JANETT: no dysuria, frequency and urgency, no hematuria, no kidney stones  Musculoskeletal: Leg pain,  Endocrine: no polyuria, polydipsia, no cold or heat intolerance  Hematology: no anemia, no easy brusing or bleeding, no hx of clotting disorder  Dermatology: no skin rash, no eczema, no pruritis,  Psychiatry: no depression, no anxiety,no panic attacks, no suicide ideation    PHYSICAL EXAM:     /79   Pulse 98   Temp 98.8 °F (37.1 °C) (Rectal)   Resp 23   Ht 4' 10\" (1.473 m)   Wt 118 lb 9.7 oz (53.8 kg)   SpO2 94%   BMI 24.79 kg/m²   General:  Awake, alert, not in distress. HEENT: Pale conjunctiva, unicteric sclera, moist oral mucosa. Chest: Bilateral air entry  Cardiovascular:  RRR ,S1S2, no murmur or gallop. Abdomen:  Soft, non tender to palpation. Extremities: Scratch sheeba on her right leg, no redness no drainage  Skin:  Warm and dry. CNS: Oriented to person place and time        LABS:     CBC:   Recent Labs     02/03/20  0400 02/03/20  1440 02/03/20  2205 02/04/20  0425   WBC 11.5*  --  14.1* 8.7   HGB 8.4* 9.6* 9.1* 8.2*   *  --  101* 86*     BMP:    Recent Labs     02/03/20  1615 02/03/20  2205 02/04/20  0425   * 128* 131*   K 3.1* 3.7  3.7 4.2    101 105   CO2 14* 18* 19*   BUN 10 10 10   CREATININE 0.5 0.6 0.5   GLUCOSE 110* 98 90     Calcium:  Recent Labs     02/04/20  0425   CALCIUM 9.6     Ionized Calcium:No results for input(s): IONCA in the last 72 hours. Magnesium:  Recent Labs     02/04/20  0425   MG 2.0     Phosphorus:  Recent Labs     02/04/20  0425   PHOS 2.4     BNP:No results for input(s): BNP in the last 72 hours.   Glucose:No results for input(s): POCGLU in the last 72

## 2020-02-04 NOTE — PROGRESS NOTES
alert;  states she looks a little pale but otherwise at her baseline; she is being admitted to the hospital service for further care and evaluation. Subjective: no acute events overnight. No fevers, chills, chest pain or sob. Tolerating PO intake.        Medications:  Reviewed    Infusion Medications    sodium chloride 150 mL/hr at 02/04/20 0217     Scheduled Medications    cefTRIAXone (ROCEPHIN) IV  2 g Intravenous Q24H    enoxaparin  30 mg Subcutaneous Daily    magnesium replacement protocol   Other RX Placeholder    Calcium Citrate-Vitamin D  2 tablet Oral 4x Daily    Lipoflavonoid  5 tablet Oral Daily    metoprolol  5 mg Intravenous Q6H    potassium replacement protocol   Other RX Placeholder    sodium chloride flush  10 mL Intravenous 2 times per day    acyclovir  400 mg Oral BID    calcitRIOL  0.5 mcg Oral 4x Daily    Vitamin D  10,000 Units Oral Daily    DULoxetine  30 mg Oral Daily    DULoxetine  60 mg Oral Nightly    pantoprazole  40 mg Oral QAM AC    folic acid  9,877 mcg Oral Daily    gabapentin  100 mg Oral Daily    gabapentin  300 mg Oral Nightly    magnesium chloride  1 tablet Oral BID    famotidine  20 mg Oral BID    tiZANidine  4 mg Oral Nightly    vitamin A  20,000 Units Oral Daily    vitamin B-1  100 mg Oral Daily    vitamin B-12  500 mcg Oral Daily    vitamin B-6  100 mg Oral Daily    levothyroxine  275 mcg Oral Daily    NONFORMULARY 50 mcg  50 mcg Injection Daily     PRN Meds: cyclobenzaprine, sodium chloride flush, acetaminophen, ondansetron, oxyCODONE-acetaminophen      Intake/Output Summary (Last 24 hours) at 2/4/2020 1350  Last data filed at 2/4/2020 1306  Gross per 24 hour   Intake 2438 ml   Output 2225 ml   Net 213 ml       Diet:  DIET GENERAL;    Exam:  /79   Pulse 98   Temp 98.8 °F (37.1 °C) (Rectal)   Resp 23   Ht 4' 10\" (1.473 m)   Wt 118 lb 9.7 oz (53.8 kg)   SpO2 94%   BMI 24.79 kg/m²     General appearance: Chronically ill appearing white female, very thin   HEENT: Pupils equal, round, and reactive to light. Conjunctivae/corneas clear. Neck: Supple, with full range of motion. No jugular venous distention. Trachea midline. Respiratory:  Normal respiratory effort on RA. Clear to auscultation, bilaterally without Rales/Wheezes/Rhonchi. Cardiovascular: Regular rate and rhythm with normal S1/S2 without murmurs, rubs or gallops. Abdomen: Soft, non-tender, non-distended with normal bowel sounds. Musculoskeletal: passive and active ROM x 4 extremities. Skin: Skin color, texture, turgor normal.  No rashes or lesions. Mediport, right chest   Neurologic:  Neurovascularly intact without any focal sensory/motor deficits. Cranial nerves: II-XII intact, grossly non-focal.  Psychiatric: Alert and oriented, thought content appropriate, normal insight  Capillary Refill: Brisk,< 3 seconds   Peripheral Pulses: +2 palpable, equal bilaterally       Labs:   Recent Labs     02/04/20  0425   WBC 8.7   HGB 8.2*   HCT 26.0*   PLT 86*     Recent Labs     02/04/20  0425   *   K 4.2      CO2 19*   BUN 10   CREATININE 0.5   CALCIUM 9.6   PHOS 2.4     Recent Labs     02/02/20  0709  02/04/20  0425   AST 43*   < > 54*   ALT 31   < > 24   BILIDIR 0.8*  --   --    BILITOT 1.3*   < > 0.8   ALKPHOS 130*   < > 114    < > = values in this interval not displayed. Recent Labs     02/02/20  0709   INR 1.25*     Recent Labs     02/02/20  0709   CKTOTAL 26*       Urinalysis:      Lab Results   Component Value Date    NITRU NEGATIVE 02/02/2020    WBCUA 0-2 12/06/2019    BACTERIA FEW 12/06/2019    RBCUA 0-2 12/06/2019    BLOODU NEGATIVE 02/02/2020    SPECGRAV 1.022 04/18/2019    GLUCOSEU NEGATIVE 02/02/2020       Radiology:   All imaging reviewed     Diet: DIET GENERAL;      Code Status: Full Code            Electronically signed by Katarina Myers MD on 2/4/2020 at 1:50 PM

## 2020-02-04 NOTE — FLOWSHEET NOTE
02/04/20 1222   Neurological   Orientation Level Oriented X4  (Having frequent hallucinations)   Patient remains alert and oriented x4, but having hallucinations.  has remained at bedside and states concern over the increase in hallucinations. Patient still can answer any question, but thinks there are bugs on the floor, and a baby drowning in the toilet.  Try to reorient patient but she will still insist that the hallucinations are real.

## 2020-02-04 NOTE — PROGRESS NOTES
8902 assessment complete as charted. Hypotensive. asymptomatic at this time. PA at bedside. 7473 complains of pain and muscle cramping. Requests percocet and flexeril. 1003 reports 4/10 pain in lower back, legs, cramping  1030 reports 4/10, tolerable but not at goal.   1040  at bedside, patient confused at times. Re-orients. States \"pain medicine made me loopy\"  reports not new. \"Does this at times. \" Neuro check without deficits. 1423 return to bed with 1 assist with gait belt. 's  1427 notified PA of sustained elevated heart rate  1440 valsalva maneuver attempted, HR sustained in 170's. Stat labs drawn  1503 notified PA of increased, sustained elevated heart rate. Asymptomatic. BP stable, denies pain. Pulse ox 94% on room air with easy, regular respirations. Oral temp 98.6   1510 rectal temp 100.7  1515 IV lopressor given over 4 min  1610 labs still not resulted, called lab. Reports unable to locate blood. Requested recollect. PA remains at bedside. Acute altered mental status. unable to tell me where she is at. Hallucinating. Correctly identifies pen, stethoscope, bandage scissors. Mid sentence asks if I am her neighbor, reports she is in an office building. NIH 0.  Neuro intact, no deficits. Asked PA if she wanted a code stroke called, declined, order for stat head CT received. 1628 to CT with Josephine GRIGGS   4174 PA read CT results. 1720 disoriented  X 4.  Dr Kaia Jones consulted

## 2020-02-04 NOTE — CONSULTS
nurse.    Past Medical History:        Diagnosis Date    Alcoholism ([address]     Anxiety     Atrophy of vulva     Atyp squam cell of undet signfc cyto smr crvx (ASC-US)     Closed fracture of seventh cervical vertebra without spinal cord injury ([address]     Closed fracture of sixth cervical vertebra (HCC)     Closed fracture of thoracic vertebra (HCC)     Depression     Dry eye syndrome     DVT (deep venous thrombosis) (HCC)     left arm; after contrast for MRI brain    Dyspareunia in female     External hemorrhoids without complication     Fibromyalgia     Gastric bypass status for obesity 2/26/2018    GERD (gastroesophageal reflux disease)     GERD (gastroesophageal reflux disease)     Hiatal hernia     History of ITP     as a senior in high school; no active issues    Hungry bone syndrome     Hx of blood clots     Hyperparathyroidism ([address]     Prior hospitalization with hungry bone syndrome on IV calcium in the past    Hypocalcemia     Hypothyroidism     Insomnia     Irritable bowel     Lactose intolerance     uses lactaid which helps    Menopausal syndrome     Osteoarthritis     Osteopenia     on reclast    Osteopenia     Ovarian cyst     repeat imaging in 9/2018 recommended    Oxaluria ([address]     Recurrent cold sores     on acyclovir    Tachycardia     due to hyperparathyroidism; on atenolol    TIA (transient ischemic attack)     visible on MRI brain    Tubular adenoma of colon     2019           Procedure Laterality Date   [facility]  approx 2013    normal per patient    COLONOSCOPY Left 5/7/2019    COLONOSCOPY POLYPECTOMY SNARE/COLD BIOPSY performed by Cady Hood MD at [facility]    DILATATION, ESOPHAGUS      ENDOSCOPY, COLON, DIAGNOSTIC      FOOT SURGERY      FRACTURE SURGERY      GASTRIC BYPASS SURGERY  1996    LAPAROSCOPY      LEG SURGERY Right     following fracture    PARATHYROIDECTOMY  08/2017    TONSILLECTOMY AND ADENOIDECTOMY      TUBAL LIGATION  1996    UPPER GASTROINTESTINAL ENDOSCOPY  approx 2013    normal per patient except mild erosion    UPPER GASTROINTESTINAL ENDOSCOPY Left 5/7/2019    EGD BIOPSY performed by Malia Frost MD at [address] Right     pinned       Allergies:     Allergies   Allergen Reactions    Estrogens     Penicillins     Sulfa Antibiotics     Sulfur     Bactrim [Sulfamethoxazole-Trimethoprim] Rash        Current Medications:   enoxaparin (LOVENOX) injection 30 mg, Daily  magnesium replacement protocol, RX Placeholder  cyclobenzaprine (FLEXERIL) tablet 10 mg, TID PRN  Calcium Citrate-Vitamin D 500-500 MG-UNIT CHEW 2 tablet, 4x Daily  Lipoflavonoid TABS 5 tablet, Daily  metoprolol (LOPRESSOR) injection 5 mg, Q6H  potassium replacement protocol, RX Placeholder  potassium replacement protocol, RX Placeholder  potassium chloride (KLOR-CON M) extended release tablet 40 mEq, Once  sodium chloride flush 0.9 % injection 10 mL, 2 times per day  sodium chloride flush 0.9 % injection 10 mL, PRN  0.9 % sodium chloride infusion, Continuous  acetaminophen (TYLENOL) tablet 650 mg, Q4H PRN  cefTRIAXone (ROCEPHIN) 1 g IVPB in 50 mL D5W minibag, Q24H  azithromycin (ZITHROMAX) 500 mg in D5W 250ml addavial, Q24H  ondansetron (ZOFRAN) injection 4 mg, Q6H PRN  oxyCODONE-acetaminophen (PERCOCET) 5-325 MG per tablet 1 tablet, Q4H PRN  acyclovir (ZOVIRAX) tablet 400 mg, BID  calcitRIOL (ROCALTROL) capsule 0.5 mcg, 4x Daily  Vitamin D (CHOLECALCIFEROL) tablet 10,000 Units, Daily  DULoxetine (CYMBALTA) extended release capsule 30 mg, Daily  DULoxetine (CYMBALTA) extended release capsule 60 mg, Nightly  pantoprazole (PROTONIX) tablet 40 mg, QAM AC  folic acid (FOLVITE) tablet 1,500 mcg, Daily  gabapentin (NEURONTIN) capsule 100 mg, Daily  gabapentin (NEURONTIN) capsule 300 mg, Nightly  magnesium chloride (MAG DELAY) extended release tablet 64 mg, BID  famotidine AST 43 02/02/2020    ALT 31 02/02/2020     PT/INR:    Lab Results   Component Value Date    PROTIME 10.8 07/13/2017    INR 1.25 02/02/2020     U/A:        Impression:  · Altered mental status, likely related to acute delirium. Patient has numerous medical/metabolic conditions and hemodynamic dysfunction with blood pressure and pulse variability, all likely contributing to delirium. Patient's hypoxemia with PO2 57 on ABG, hyponatremia, anemia, sepsis, electrolyte imbalance are some of the causes. Patient also has significant iatrogenic hyperthyroidism, probably contributing to tachycardia. · Muscle cramps, back cramps, likely related to electrolyte/thyroid dysfunction. Patient Active Problem List   Diagnosis    TIA (transient ischemic attack)    Hypothyroidism    DVT (deep venous thrombosis) (HCC)    Depression    Anxiety    Fibromyalgia    Fatty liver    Tachycardia    GERD (gastroesophageal reflux disease)    Recurrent cold sores    Irritable bowel    Gastric bypass status for obesity    Insomnia    Ovarian cyst    Muscle weakness (generalized)    Alcoholism (HCC)    Osteoarthritis    Hypocalcemia    Hypomagnesemia    Moderate malnutrition (HCC)    Other hypoparathyroidism (HCC)    Pancytopenia (HCC)    Palpitations    Asymptomatic bacteriuria    Hyperphosphatemia    Prolonged Q-T interval on ECG    Hypoalbuminemia    Tinnitus of both ears    Hx of parathyroidectomy    Normocytic anemia    History of fibromyalgia    Sepsis ([facility] [address]        Recommendations:  1. Patient is delirious, with fluctuating mental status, confusion, hallucination. She has multiple reasons to be delirious as mentioned above. I would suggest continue your medical treatment including treatment of sepsis and other medical/metabolic dysfunction and hopefully her mentation will improve. Her examination is nonfocal.  2. No other neurological work-up indicated.   3. Please call neurology if any    It was my pleasure to evaluate Anirudh Garibay today. Please call with questions.     2/3/2020 7:21 PM

## 2020-02-04 NOTE — CARE COORDINATION
2/4/20, 2:59 PM    DISCHARGE ON GOING [address]a [address]. 18. day: 2  Location: -06/006-A Reason for admit: Sepsis ([facility] [address] [A41.9]   Procedure:   2/2 CXR: No acute process  2/2 CTA Chest: Neg for PE; There is mild consolidation at the right posterior costophrenic angle and mild dependent atelectasis at the left posterior costophrenic angle. There is no pleural effusion; There is nonspecific diffuse subcutaneous edema  2/2 CT Abd/pelvis:  Nonobstructive bowel gas pattern with a moderate amount of stool within the colon; There are several small mesenteric and retroperitoneal lymph nodes. There are also small periportal and periceliac lymph nodes. Additional, there are a few small lymph nodes along the gastrohepatic ligament. Similar lymphadenopathy was seen on the previous examination; There is vague mesenteric edema. There is also persistent nonspecific diffuse subcutaneous edema  2/3 CT Head: No acute process  Treatment Plan of Care: Patient was rapid response last night for 's, confused. Blood cultures sent. IV lopressor added. ID consulted; states infection likely d/t recent scratch from her cat. Tmax 101.5. NSR - 's. Sats 94% on 1L O2. Ox4. Mediport. Blood culture + pasteurella multocida. Hospitalist, Neurology, and ID following. External urinary catheter. Telemetry, I&O, daily weight, up with assist. IVF, po acyclovir, calcitriol, IV rocephin, prn flexeril, cymbalta, lovenox, pepcid, folic acid, neurontin, synthroid, mag chloride tabs, IV lopressor Q6H, prn percocet, protonix, zanaflex, thiamine, vit D, Electrolyte replacement protocols. Na+ 131, CO2 19, alb 2, ast 54, wbc down to 8.7, hgb 8.2, plt down to 86. Barriers to Discharge: n/a  PCP: Gilles Cowden, MD  Readmission Risk Score: 27%  Patient Goals/Plan/Treatment Preferences: Home with . Denies needs, declines HH.

## 2020-02-05 NOTE — PROGRESS NOTES
Progress note: Infectious diseases    Patient - Betty Hightower,  Age - 62 y.o.    - 1960      Room Number - 4B-06/006-A   MRN -  [id number]   Acct # - [de-identified]  Date of Admission -  2020  6:01 AM    SUBJECTIVE:   confused  OBJECTIVE   VITALS    height is 4' 10\" (1.473 m) and weight is 118 lb 9.7 oz (53.8 kg). Her oral temperature is 97.9 °F (36.6 °C). Her blood pressure is 104/76 and her pulse is 120. Her respiration is 37 (abnormal) and oxygen saturation is 90%.        Wt Readings from Last 3 Encounters:   20 118 lb 9.7 oz (53.8 kg)   20 94 lb 3.2 oz (42.7 kg)   20 91 lb 3.2 oz (41.4 kg)       I/O (24 Hours)    Intake/Output Summary (Last 24 hours) at 2020 1744  Last data filed at 2020 1610  Gross per 24 hour   Intake 2806.62 ml   Output 810 ml   Net 1996.62 ml       General Appearance  Chronically sick looking, confused  HEENT - normocephalic, atraumatic, paleconjunctiva,  anicteric sclera  Neck - Supple, no mass  Lungs -  Bilateral   air entry, no rhonchi, no wheeze  Cardiovascular - Heart sounds are normal.     Abdomen - soft, not distended, nontender,   Neurologic -confused  Skin - No bruising or bleeding  Extremities - No edema, no cyanosis, clubbing     MEDICATIONS:      [START ON 2020] levothyroxine  250 mcg Oral Daily    cefTRIAXone (ROCEPHIN) IV  2 g Intravenous Q24H    sodium chloride flush  10 mL Intravenous 2 times per day    enoxaparin  30 mg Subcutaneous Daily    magnesium replacement protocol   Other RX Placeholder    [Held by provider] Calcium Citrate-Vitamin D  2 tablet Oral 4x Daily    Lipoflavonoid  5 tablet Oral Daily    metoprolol  5 mg Intravenous Q6H    potassium replacement protocol   Other RX Placeholder    acyclovir  400 mg Oral BID    [Held by provider] calcitRIOL  0.5 mcg Oral 4x Daily    Vitamin D  10,000 Units Oral Daily    DULoxetine 30 mg Oral Daily    DULoxetine  60 mg Oral Nightly    folic acid  3,405 mcg Oral Daily    gabapentin  100 mg Oral Daily    gabapentin  300 mg Oral Nightly    magnesium chloride  1 tablet Oral BID    famotidine  20 mg Oral BID    tiZANidine  4 mg Oral Nightly    vitamin A  20,000 Units Oral Daily    vitamin B-1  100 mg Oral Daily    vitamin B-12  500 mcg Oral Daily    vitamin B-6  100 mg Oral Daily    [Held by provider] NONFORMULARY 50 mcg  50 mcg Injection Daily      sodium chloride 100 mL/hr at 02/05/20 0900     PHENobarbital IVPB **OR** PHENobarbital IVPB **OR** PHENobarbital IVPB, sodium chloride flush, cyclobenzaprine, acetaminophen, ondansetron, oxyCODONE-acetaminophen      LABS:     CBC:   Recent Labs     02/03/20 2205 02/04/20 0425 02/05/20  0538   WBC 14.1* 8.7 4.9   HGB 9.1* 8.2* 8.6*   * 86* 83*     BMP:    Recent Labs     02/03/20 2205 02/04/20 0425 02/05/20  0538   * 131* 134*   K 3.7  3.7 4.2 3.9    105 107   CO2 18* 19* 19*   BUN 10 10 7   CREATININE 0.6 0.5 0.3*   GLUCOSE 98 90 85     Calcium:  Recent Labs     02/05/20  0538   CALCIUM 9.5     Ionized Calcium:No results for input(s): IONCA in the last 72 hours. Magnesium:  Recent Labs     02/05/20  0538   MG 1.5*     Phosphorus:  Recent Labs     02/05/20  0538   PHOS 2.2*     BNP:No results for input(s): BNP in the last 72 hours. Glucose:No results for input(s): POCGLU in the last 72 hours. HgbA1C: No results for input(s): LABA1C in the last 72 hours. INR: No results for input(s): INR in the last 72 hours.   Hepatic:   Recent Labs     02/03/20 2205 02/04/20 0425 02/05/20  0538   ALKPHOS 146* 114 126   ALT 26 24 23   AST 49* 54* 46*   PROT 4.5* 4.1* 3.9*   BILITOT 1.2 0.8 0.6   LABALBU 2.1* 2.0* 1.7*     Amylase and Lipase:  Recent Labs     02/04/20  0425   LACTA 1.3     Lactic Acid:   Recent Labs     02/04/20 0425   LACTA 1.3     Troponin: No results for input(s): CKTOTAL, CKMB, TROPONINI in the last 72 hours.  BNP: No results for input(s): BNP in the last 72 hours. CULTURES:   UA: No results for input(s): SPECGRAV, PHUR, COLORU, CLARITYU, MUCUS, PROTEINU, BLOODU, RBCUA, WBCUA, BACTERIA, NITRU, GLUCOSEU, BILIRUBINUR, UROBILINOGEN, KETUA, LABCAST, LABCASTTY, AMORPHOS in the last 72 hours.     Invalid input(s): CRYSTALS  Micro:   Lab Results   Component Value Date    BC No growth-preliminary  02/03/2020         IMAGING:         Problem list of patient:     Patient Active Problem List   Diagnosis Code    TIA (transient ischemic attack) G45.9    Hypothyroidism E03.9    DVT (deep venous thrombosis) ([facility]) I82.409    Depression F32.9    Anxiety F41.9    Fibromyalgia M79.7    Fatty liver K76.0    Tachycardia R00.0    GERD (gastroesophageal reflux disease) K21.9    Recurrent cold sores B00.1    Irritable bowel K58.9    Gastric bypass status for obesity Z98.84    Insomnia G47.00    Ovarian cyst N83.209    Muscle weakness (generalized) M62.81    Alcoholism ([facility]) F10.20    Osteoarthritis M19.90    Hypocalcemia E83.51    Hypomagnesemia E83.42    Moderate malnutrition ([facility]) E44.0    Other hypoparathyroidism ([facility]) E20.8    Pancytopenia (H[facility]) D61.818    Palpitations R00.2    Asymptomatic bacteriuria R82.71    Hyperphosphatemia E83.39    Prolonged Q-T interval on ECG R94.31    Hypoalbuminemia E88.09    Tinnitus of both ears H93.13    Hx of parathyroidectomy Z90.09    Normocytic anemia D64.9    History of fibromyalgia Z87.39    Sepsis ([facility]) A41.9         ASSESSMENT/PLAN   Sepsis due to [address] related to cat bite  Hx of alcohol abuse: concern for withdrawal: on treatment  Continue current treatment  Discussed with nursing staff      Raphael Sumner MD, [address] 2/5/2020 5:44 PM

## 2020-02-05 NOTE — FLOWSHEET NOTE
02/05/20 6042   Provider Notification   Reason for Communication Evaluate   Provider Name [facility]   Provider Notification Physician Assistant   Method of Communication Secure Message   Response See orders   Notification Time 1305 Tramaine Curtis notified of the critical ICal of 1.61. Calcium held for this am and redraw ordered.

## 2020-02-05 NOTE — CONSULTS
fatigue: Yes/No  Changes in personality: Yes/No  Depression and anxiety: Yes/No  Shaking uncontrollably (seizures): Yes/No  Coarse, brittle hair and nails: Yes/No  Dry skin or lasting (chronic) skin diseases (psoriasis, eczema, or dermatitis): Yes/No  Clouding of the eye lens (cataracts): Yes/No  Abdominal cramping or pain: Yes/No  Low levels of albumin: Yes/No    Causes of low calcium  Problems with the parathyroid glands or surgical removal of the parathyroid glands: Yes/No   Decreased production or improper use of parathyroid hormone: Yes/No  Lack (deficiency) of vitamin D or magnesium or both: Yes/No  Intestinal problems that interfere with nutrient absorption: Yes/No  Alcoholism: Yes/No  Kidney problems: Yes/No  Inflammation of the pancreas (pancreatitis): Yes/No  Severe infections (sepsis): Yes/No  Sarcoidosis: Yes/No  Hemachromatosis: Yes/No  Breakdown of large amounts of muscle fiber: Yes/No  High levels of phosphate in the body: Yes/No  Cancer: Yes/No  Massive blood transfusions which usually occur with severe trauma : Yes/No      Patient's medications, allergies, past medical, surgical, social and family histories were reviewed and updated as appropriate. Review of Systems  Review of Systems - General ROS: negative   Psychological ROS: negative   Hematological and Lymphatic ROS: No history of blood clots or bleeding disorder. Respiratory ROS: no cough, shortness of breath, or wheezing   Cardiovascular ROS: no chest pain or dyspnea on exertion   Gastrointestinal ROS: no abdominal pain, change in bowel habits, or black or bloody stools   Genito-Urinary ROS: no dysuria, trouble voiding, or hematuria   Musculoskeletal ROS: negative   Neurological ROS: no TIA or stroke symptoms   Dermatological ROS: negative    Past Medical, Surgical, Family, Social and Allergy Histories:  I have reviewed the patient's medical history in detail and updated the computerized patient record.    has a past medical history of Skin: warm and dry, no hyperpigmentation, vitiligo, or suspicious lesions   Neuro: normal without focal findings, mental status, speech normal, alert and oriented x3, ANDREZ and reflexes normal and symmetric     Lab Review  Lab Results   Component Value Date    TSH 0.006 02/02/2020   ; No results found for: FREET4;No components found for: FREE T 3;No results found for: TPO; No components found for: THYROID ULTRASOUND      Results for Keren Beverly (MRN [id number]) as of 2/5/2020 12:58   Ref. Range 2/2/2020 07:09   TSH Latest Ref Range: 0.400 - 4.20 uIU/mL 0.006 (L)   T4 Free Latest Ref Range: 0.93 - 1.76 ng/dL 1.80 (H)     Assessment:      Hypothyroidism. This diagnosis was discussed and reviewed with the patient including the advantages of drug therapy.       Plan:     Orders Placed This Encounter   Procedures    Rapid influenza A/B antigens    Culture blood #1    Culture blood #2    Culture blood #1    Culture blood #2    Respiratory Panel, Molecular    XR CHEST STANDARD (2 VW)    CT ABDOMEN PELVIS W IV CONTRAST Additional Contrast? None    CTA CHEST W WO CONTRAST    CT HEAD WO CONTRAST    CBC auto differential    Brain Natriuretic Peptide    Protime-INR    Basic Metabolic Panel    Hepatic function panel    Lipase    Troponin    Magnesium    Phosphorus    CK    Urine reflex to culture    TSH with Reflex    Procalcitonin    Lactate, Sepsis    Anion Gap    Glomerular Filtration Rate, Estimated    Osmolality    T4, Free    Scan of Blood Smear    Calcium, Ionized    Basic Metabolic Panel w/ Reflex to MG    CBC    Ionized Calcium    Phosphorus    Magnesium    Lactate, Sepsis    Blood ID, Molecular    Anion Gap    Glomerular Filtration Rate, Estimated    Hemoglobin and Hematocrit, Blood    Basic Metabolic Panel w/ Reflex to MG    Troponin    Blood Gas, Arterial    Calcium, Ionized    Anion Gap    Glomerular Filtration Rate, Estimated    Magnesium    CBC auto Form     Answer:   Injection [30]     Order Specific Question:   Length of Therapy     Answer:   continuous     Order Specific Question:   Reason for Non-Formulary Order     Answer:   pt supplied     Order Specific Question:   How Soon Needed? (normally takes 72 hrs. to procure)     Answer:   pt has own med    0.9 % sodium chloride bolus    enoxaparin (LOVENOX) injection 30 mg    magnesium replacement protocol    calcium gluconate 1 g in dextrose 5 % 100 mL IVPB    albumin human 25 % IV solution 25 g    cyclobenzaprine (FLEXERIL) tablet 10 mg    magnesium sulfate 2 g in 50 mL IVPB premix    Calcium Citrate-Vitamin D 500-500 MG-UNIT CHEW 2 tablet    Lipoflavonoid TABS 5 tablet    metoprolol (LOPRESSOR) injection 5 mg    metoprolol (LOPRESSOR) 5 MG/5ML injection     Tawanna Rubio: cabinet override    DISCONTD: potassium replacement protocol    potassium replacement protocol    potassium chloride (KLOR-CON M) extended release tablet 40 mEq    acetaminophen (TYLENOL) tablet 650 mg    metoprolol (LOPRESSOR) 5 mg in sodium chloride 0.9 % 50 mL IVPB    DISCONTD: metoprolol (LOPRESSOR) 5 MG/5ML injection     Devang Piña: cabinet override    DISCONTD: metoprolol (LOPRESSOR) 5 MG/5ML injection     Devang Piña: cabinet override    DISCONTD: ampicillin-sulbactam (UNASYN) 1.5 g IVPB minibag    cefTRIAXone (ROCEPHIN) 2 g IVPB in D5W 50ml minibag    sodium chloride flush 0.9 % injection 10 mL    sodium chloride flush 0.9 % injection 10 mL    DISCONTD: ondansetron (ZOFRAN) injection 4 mg    DISCONTD: enoxaparin (LOVENOX) injection 40 mg    OR Linked Order Group     LORazepam (ATIVAN) tablet 1 mg     LORazepam (ATIVAN) injection 1 mg     LORazepam (ATIVAN) tablet 2 mg     LORazepam (ATIVAN) injection 2 mg     LORazepam (ATIVAN) tablet 3 mg     LORazepam (ATIVAN) injection 3 mg     LORazepam (ATIVAN) tablet 4 mg     LORazepam (ATIVAN) injection 4 mg    magnesium sulfate 2 g in 50 mL IVPB premix     No follow-ups on file.   Restarted Netpara and reduced calcium and Vit D3  Needs rehab

## 2020-02-05 NOTE — PROGRESS NOTES
Hospitalist Progress Note      Patient:  Arcenio Rodriguez    Unit/Bed:4B-06/006-A  YOB: 1960  MRN: [id number]   Acct: [de-identified]     PCP: Tayler Rothman MD  Date of Admission: 2/2/2020      Assessment/Plan:     1. Alcohol Abuse: With withdrawal--start phenobarbital protocol> stop CIWA added thiamine and folic acid and multivitamin-seizure precaution aspiration precautions  2. Sepsis (POA): 3/4 SIRS. 2/2 Blood cultures growing Pasteurella Multocida. Will await sensitives. Pt reports having had been scratched by a cat. BP has been low, pt had received 3L of fluid boluses. ID consulted. Cont IV Rocephin. 3. Pasteurella Multocida Bacteremia: likely from cat scratch. Cont IV Rocephin. ID consulted. 4. Acute hypoxic respiratory failure-etiology? Currently at 80 percentage on 1 L wean oxygen as able     5. Hypocalcemaia: 2/2 s/p parathyroidectomy. Ical 1.08, 1.35. Pt receives weekly calcium supplements. Monitor Ca+ levels daily. 2/5--calcium levels are actually high iCal is 1.6 holding  calcium supplements today-we will do an endocrinology consult to aid in the dosing  6. S/P Parathyroidectomy: Removed due to hyperparathyroidism with Hunger Bone Syndrome. 7. History of Gastric Bypass: Aware. 2018. 8. History of TIA: Aware.   9. Hypothyroidism: Continue Synthroid.       Chief Complaint: fever     Hospital Course: 62 y.o. female who presented to [facility] with muscle aches; she states yesterday she developed muscle cramps that started in her legs and then came up to her back; she still has severe cramps to her left lower back with any type of movement; she also relates to fever and chills; she relates to lightheadedness and dizziness, states she developed a cough today that is nonproductive, denies chest pain, relates to dyspnea on exertion, relates to nausea, states she is lost 85 pounds in the last year and feels it secondary to parathyroidism; in the ER she was given Rocephin and Zithromax for right-sided pneumonia; she had a MediPort placed in December 2019 secondary to her calcium infusions; she is fully awake and alert;  states she looks a little pale but otherwise at her baseline; she is being admitted to the hospital service for further care and evaluation. Subjective: no acute events overnight. No fevers, chills, chest pain or sob. Received Ativan just few hours ago and hence is sleepy    Medications:  Reviewed    Infusion Medications    sodium chloride 100 mL/hr at 02/05/20 0900     Scheduled Medications    [START ON 2/6/2020] levothyroxine  250 mcg Oral Daily    cefTRIAXone (ROCEPHIN) IV  2 g Intravenous Q24H    sodium chloride flush  10 mL Intravenous 2 times per day    enoxaparin  30 mg Subcutaneous Daily    magnesium replacement protocol   Other RX Placeholder    [Held by provider] Calcium Citrate-Vitamin D  2 tablet Oral 4x Daily    Lipoflavonoid  5 tablet Oral Daily    metoprolol  5 mg Intravenous Q6H    potassium replacement protocol   Other RX Placeholder    acyclovir  400 mg Oral BID    [Held by provider] calcitRIOL  0.5 mcg Oral 4x Daily    Vitamin D  10,000 Units Oral Daily    DULoxetine  30 mg Oral Daily    DULoxetine  60 mg Oral Nightly    folic acid  3,168 mcg Oral Daily    gabapentin  100 mg Oral Daily    gabapentin  300 mg Oral Nightly    magnesium chloride  1 tablet Oral BID    famotidine  20 mg Oral BID    tiZANidine  4 mg Oral Nightly    vitamin A  20,000 Units Oral Daily    vitamin B-1  100 mg Oral Daily    vitamin B-12  500 mcg Oral Daily    vitamin B-6  100 mg Oral Daily    NONFORMULARY 50 mcg  50 mcg Injection Daily     PRN Meds:  PHENobarbital IVPB **OR** PHENobarbital IVPB **OR** PHENobarbital IVPB, sodium chloride flush, cyclobenzaprine, acetaminophen, ondansetron, oxyCODONE-acetaminophen      Intake/Output Summary (Last 24 hours) at 2/5/2020 1555  Last data filed at 2/5/2020 1423  Gross per 24 hour   Intake

## 2020-02-06 NOTE — PROGRESS NOTES
Utilize [facility] alcohol withdrawal scale (Based on [place] Modified Alcohol Withdrawal Scale). Tabulate score based on classifications including Tremor, Sweating, Hallucination, Orientation, and Agitation. Tremor: 0  Sweatin  Hallucinations: 0  Orientation: 0  Agitation: 0  Total Score: 0  Pt asleep at this time   Action perform as described below     Tremor:  No tremor is 0 points. Tremor on movement is 1 point. Tremor at rest is 2 points. Sweating: No Sweat 0 points. Moist is 1 point. Drenching sweats is 2 points. Hallucinations: No present 0 points. Dissuadable is 1 point. Not dissuadable is 2 points. Orientation: Oriented 0 points. Vague/detached 1 point. Disoriented/no contact 2 points. Agitation: Calm 0 points. Anxious 1 point. Panicky 2 points. Check scale every 2 hours. Discontinue scoring with 4 consecutive scorings of 0. Scale 0: No phenobarbital given. Re-assess every 30 minutes as needed. Scale 1-3: Phenobarbital 130 mg IV over 3 minutes. Re-assess every 30 minutes as needed. May administer every 30 minutes to a maximum dose of phenobarbital 1040 mg in 24 hours! Scale 4-8: Phenobarbital 260 mg IV over 5 minutes. Re-assess every 30 minutes as needed. May administer every 30 minutes to a maximum dose of phenobarbital 1040mg in 24 hours! Scale 9-10: Transfer to ICU (if not already in ICU). Administer 10mg/kg phenobarbital IV over 30 minutes. Maximum dose phenobarbital is 1040mg in 24 hours!

## 2020-02-06 NOTE — PLAN OF CARE
Problem: Falls - Risk of:  Goal: Will remain free from falls  Description  Will remain free from falls  Outcome: Ongoing     Problem: Falls - Risk of:  Goal: Absence of physical injury  Description  Absence of physical injury  Outcome: Ongoing  Note:   No falls or physical injury noted this shift. Fall precautions in place, bed alarm on, call light within reach, hourly rounding done in tanticipation of needs. Problem: Pain:  Goal: Pain level will decrease  Description  Pain level will decrease  Outcome: Ongoing     Problem: Pain:  Goal: Control of acute pain  Description  Control of acute pain  Outcome: Ongoing     Problem: Pain:  Goal: Control of chronic pain  Description  Control of chronic pain  Outcome: Ongoing  Note:   PO tylenol given 1x this shift for reported chronic low back pain rated 5/10, pt repositioned and heating pad applied for comfort. Problem: Discharge Planning:  Goal: Discharged to appropriate level of care  Description  Discharged to appropriate level of care  Outcome: Ongoing  Note:   Pt admit from home, plan to d/c home once medically stable. Problem: Fluid Volume - Deficit:  Goal: Absence of fluid volume deficit signs and symptoms  Description  Absence of fluid volume deficit signs and symptoms  Outcome: Ongoing  Note:   Pt has notable generalized 2+ pitting edema. Will continue to monitor. Problem: Sleep Pattern Disturbance:  Goal: Appears well-rested  Description  Appears well-rested  Outcome: Ongoing  Note:   Pt very drowsy and weak this shift. Problem: Anxiety/Stress:  Goal: Level of anxiety will decrease  Description  Level of anxiety will decrease  Outcome: Ongoing  Note:   Pt has denied anxiety this shift. Problem: Mental Status - Impaired:  Goal: Mental status will be restored to baseline  Description  Mental status will be restored to baseline  Outcome: Ongoing  Note:   Pt has intermittent confusion able to be verbally reoriented.       Problem: Risk for Impaired Skin Integrity  Goal: Tissue integrity - skin and mucous membranes  Description  Structural intactness and normal physiological function of skin and  mucous membranes. Outcome: Ongoing  Note:   Pt experiencing gerneralized weakness, occasional incontinence. Pt turned and repositioned every two hours, supported off of bony prominences with pillows. Care plan reviewed with patient and spouse. Patient and spouse verbalize understanding of the plan of care and contribute to goal setting.

## 2020-02-06 NOTE — PROGRESS NOTES
Utilize [facility] alcohol withdrawal scale (Based on [place] Modified Alcohol Withdrawal Scale). Tabulate score based on classifications including Tremor, Sweating, Hallucination, Orientation, and Agitation. Tremor: 1  Sweatin  Hallucinations: 0  Orientation: 1  Agitation: 1  Total Score: 3  Action perform as described below     Tremor:  No tremor is 0 points. Tremor on movement is 1 point. Tremor at rest is 2 points. Sweating: No Sweat 0 points. Moist is 1 point. Drenching sweats is 2 points. Hallucinations: No present 0 points. Dissuadable is 1 point. Not dissuadable is 2 points. Orientation: Oriented 0 points. Vague/detached 1 point. Disoriented/no contact 2 points. Agitation: Calm 0 points. Anxious 1 point. Panicky 2 points. Check scale every 2 hours. Discontinue scoring with 4 consecutive scorings of 0. Scale 0: No phenobarbital given. Re-assess every 30 minutes as needed. Scale 1-3: Phenobarbital 130 mg IV over 3 minutes. Re-assess every 30 minutes as needed. May administer every 30 minutes to a maximum dose of phenobarbital 1040 mg in 24 hours! Scale 4-8: Phenobarbital 260 mg IV over 5 minutes. Re-assess every 30 minutes as needed. May administer every 30 minutes to a maximum dose of phenobarbital 1040mg in 24 hours! Scale 9-10: Transfer to ICU (if not already in ICU). Administer 10mg/kg phenobarbital IV over 30 minutes. Maximum dose phenobarbital is 1040mg in 24 hours!         Phenobarbital ordered, waiting on dose from pharmacy

## 2020-02-06 NOTE — CARE COORDINATION
2/6/20, 1:49 PM    DISCHARGE ON GOING Sonya Vail. 16. day: 4  Location: -06/006-A Reason for admit: Sepsis ([facility] [address] [A41.9]   Procedure:   2/2 CXR: No acute process  2/2 CTA Chest: Neg for PE; There is mild consolidation at the right posterior costophrenic angle and mild dependent atelectasis at the left posterior costophrenic angle. There is no pleural effusion; There is nonspecific diffuse subcutaneous edema  2/2 CT Abd/pelvis:  Nonobstructive bowel gas pattern with a moderate amount of stool within the colon; There are several small mesenteric and retroperitoneal lymph nodes. There are also small periportal and periceliac lymph nodes. Additional, there are a few small lymph nodes along the gastrohepatic ligament. Similar lymphadenopathy was seen on the previous examination; There is vague mesenteric edema. There is also persistent nonspecific diffuse subcutaneous edema  2/3 CT Head: No acute process  Treatment Plan of Care: Endocrinology consulted for control of Ca+ levels in hypoparathyroid patient. Alcohol withdrawal phenobarbital protocol initiated. Afebrile. 's. On room air. Oriented to person and place. Follows commands. PT/OT. Mediport. Blood culture + pasteurella multocida. Hospitalist and ID following. External urinary catheter. Telemetry, I&O, daily weight, up with assist. IVF, po acyclovir, IV rocephin, prn flexeril, cymbalta, lovenox, pepcid, folic acid, neurontin, lactulose Q12H, synthroid, mag chloride tabs, IV lopressor Q6H, prn percocet, prn phenobarbital, zanaflex, thiamine, vit D, Electrolyte replacement protocols. Na+ 131, K+ 3.4, CO2 19, Mg 1.5, phos 2.3, alb 1.8, wbc 4.4, hgb 8.8, plt up to 100. Barriers to Discharge: n/a  PCP: Joceline Zapata MD  Readmission Risk Score: 29%  Patient Goals/Plan/Treatment Preferences: Home with . Denies needs, declines HH.

## 2020-02-06 NOTE — PROGRESS NOTES
Utilize [facility] alcohol withdrawal scale (Based on [place] Modified Alcohol Withdrawal Scale). Tabulate score based on classifications including Tremor, Sweating, Hallucination, Orientation, and Agitation. Tremor: 0  Sweatin  Hallucinations: 0  Orientation: 0  Agitation: 0  Total Score: 0   Pt asleep at this time  Action perform as described below     Tremor:  No tremor is 0 points. Tremor on movement is 1 point. Tremor at rest is 2 points. Sweating: No Sweat 0 points. Moist is 1 point. Drenching sweats is 2 points. Hallucinations: No present 0 points. Dissuadable is 1 point. Not dissuadable is 2 points. Orientation: Oriented 0 points. Vague/detached 1 point. Disoriented/no contact 2 points. Agitation: Calm 0 points. Anxious 1 point. Panicky 2 points. Check scale every 2 hours. Discontinue scoring with 4 consecutive scorings of 0. Scale 0: No phenobarbital given. Re-assess every 30 minutes as needed. Scale 1-3: Phenobarbital 130 mg IV over 3 minutes. Re-assess every 30 minutes as needed. May administer every 30 minutes to a maximum dose of phenobarbital 1040 mg in 24 hours! Scale 4-8: Phenobarbital 260 mg IV over 5 minutes. Re-assess every 30 minutes as needed. May administer every 30 minutes to a maximum dose of phenobarbital 1040mg in 24 hours! Scale 9-10: Transfer to ICU (if not already in ICU). Administer 10mg/kg phenobarbital IV over 30 minutes. Maximum dose phenobarbital is 1040mg in 24 hours!

## 2020-02-06 NOTE — PROGRESS NOTES
Daily    vitamin B-6  100 mg Oral Daily    NONFORMULARY 50 mcg  50 mcg Injection Daily     PRN Meds: PHENobarbital IVPB **OR** PHENobarbital IVPB **OR** PHENobarbital IVPB, sodium chloride flush, cyclobenzaprine, acetaminophen, ondansetron, oxyCODONE-acetaminophen      Intake/Output Summary (Last 24 hours) at 2/6/2020 1748  Last data filed at 2/6/2020 0407  Gross per 24 hour   Intake 1694.36 ml   Output 475 ml   Net 1219.36 ml       Diet:  DIET GENERAL;    Exam:  BP (!) 130/102   Pulse 101   Temp 98 °F (36.7 °C) (Oral)   Resp 26   Ht 4' 10\" (1.473 m)   Wt 127 lb 13.9 oz (58 kg)   SpO2 96%   BMI 26.72 kg/m²     General appearance: Chronically ill appearing white female, very thin , answers few questions   HEENT: Pupils equal, round, and reactive to light. Conjunctivae/corneas clear. Neck: Supple, with full range of motion. No jugular venous distention. Trachea midline. Respiratory:  Normal respiratory effort on RA. Clear to auscultation, bilaterally without Rales/Wheezes/Rhonchi. Cardiovascular: Regular rate and rhythm with normal S1/S2 without murmurs, rubs or gallops. Abdomen: Soft, non-tender, non-distended with normal bowel sounds. Musculoskeletal: passive and active ROM x 4 extremities. Skin: Skin color, texture, turgor normal.  No rashes or lesions. Mediport, right chest   Neurologic:  Neurovascularly intact without any focal sensory/motor deficits. Cranial nerves: II-XII intact, grossly non-focal.    Capillary Refill: Brisk,< 3 seconds   Peripheral Pulses: +2 palpable, equal bilaterally       Labs:   Recent Labs     02/06/20  0502   WBC 4.4*   HGB 8.8*   HCT 27.5*   *     Recent Labs     02/06/20  0502   *   K 3.4*      CO2 19*   BUN 6*   CREATININE 0.4   CALCIUM 7.8*   PHOS 2.3*     Recent Labs     02/06/20  0502   AST 38   ALT 23   BILITOT 0.5   ALKPHOS 123     No results for input(s): INR in the last 72 hours.   No results for input(s): Johnathan Ko in the last 72

## 2020-02-06 NOTE — FLOWSHEET NOTE
Patient was asleep;  present in room.  had come to offer Eucharist for patient. Will try again later.  was tired. 02/06/20 1010   Encounter Summary   Services provided to: Significant other   Referral/Consult From: Rounding   Support System Spouse; Family members   Continue Visiting Yes  (2/6 pt asleep; spouse presnt)   Complexity of Encounter Low   Length of Encounter 15 minutes   Spiritual/Muslim   Type Spiritual support   Assessment Approachable   Intervention Explored feelings, thoughts, concerns;Nurtured hope;Discussed illness/injury and it's impact   Outcome Comfort;Expressed gratitude;Engaged in conversation

## 2020-02-06 NOTE — PROGRESS NOTES
Progress note: Infectious diseases    Patient - Anirudh Garibay,  Age - 62 y.o.    - 1960      Room Number - 4B-06/006-A   MRN -  [id number]   Acct # - [de-identified]  Date of Admission -  2020  6:01 AM    SUBJECTIVE:   No new issues  OBJECTIVE   VITALS    height is 4' 10\" (1.473 m) and weight is 127 lb 13.9 oz (58 kg). Her oral temperature is 98.7 °F (37.1 °C). Her blood pressure is 113/96 (abnormal) and her pulse is 101. Her respiration is 26 and oxygen saturation is 95%.        Wt Readings from Last 3 Encounters:   20 127 lb 13.9 oz (58 kg)   20 94 lb 3.2 oz (42.7 kg)   20 91 lb 3.2 oz (41.4 kg)       I/O (24 Hours)    Intake/Output Summary (Last 24 hours) at 2020 1624  Last data filed at 2020 0407  Gross per 24 hour   Intake 1694.36 ml   Output 475 ml   Net 1219.36 ml       General Appearance  Chronically sick looking, confused  HEENT - normocephalic, atraumatic, pale conjunctiva,  anicteric sclera  Neck - Supple, no mass  Lungs -  Bilateral   air entry, no rhonchi, no wheeze  Cardiovascular - Heart sounds are normal.     Abdomen - soft, not distended, nontender,   Neurologic -confused  Skin - No bruising or bleeding  Extremities - No edema, no cyanosis, clubbing     MEDICATIONS:      lactulose  20 g Oral Q12H    levothyroxine  250 mcg Oral Daily    cefTRIAXone (ROCEPHIN) IV  2 g Intravenous Q24H    sodium chloride flush  10 mL Intravenous 2 times per day    enoxaparin  30 mg Subcutaneous Daily    magnesium replacement protocol   Other RX Placeholder    [Held by provider] Calcium Citrate-Vitamin D  2 tablet Oral 4x Daily    Lipoflavonoid  5 tablet Oral Daily    metoprolol  5 mg Intravenous Q6H    potassium replacement protocol   Other RX Placeholder    acyclovir  400 mg Oral BID    [Held by provider] calcitRIOL  0.5 mcg Oral 4x Daily    Vitamin D  10,000 Units Oral Daily    reflux disease) K21.9    Recurrent cold sores B00.1    Irritable bowel K58.9    Gastric bypass status for obesity Z98.84    Insomnia G47.00    Ovarian cyst N83.209    Muscle weakness (generalized) M62.81    Alcoholism (HCC) F10.20    Osteoarthritis M19.90    Hypocalcemia E83.51    Hypomagnesemia E83.42    Moderate malnutrition (HCC) E44.0    Other hypoparathyroidism (HCC) E20.8    Pancytopenia (HCC) D61.818    Palpitations R00.2    Asymptomatic bacteriuria R82.71    Hyperphosphatemia E83.39    Prolonged Q-T interval on ECG R94.31    Hypoalbuminemia E88.09    Tinnitus of both ears H93.13    Hx of parathyroidectomy Z90.09    Normocytic anemia D64.9    History of fibromyalgia Z87.39    Sepsis ([facility]) A41.9         ASSESSMENT/PLAN   Sepsis due to Pasteruella related to cat bite  Hx of alcohol abuse:    Continue current treatment        Andi Ortiz MD, FACP 2/6/2020 4:24 PM

## 2020-02-07 NOTE — PLAN OF CARE
Problem: Nutrition  Goal: Optimal nutrition therapy  2/7/2020 1518 by Celia Liu RD, LD  Outcome: Ongoing   Nutrition Problem: Severe malnutrition, In context of acute illness or injury  Intervention: Food and/or Nutrient Delivery: Continue current diet, Start ONS, Vitamin Supplement(Recommend a Multivitamin w/minerals daily d/t severe mal and hx of ETOH abuse. Continue other vitamin supplementation as ordered.  Started Ensure Clear TID)  Nutritional Goals: Pt will consume 50% or more of meals during LOS

## 2020-02-07 NOTE — PROGRESS NOTES
Progress note: Infectious diseases    Patient - Betty Hightower,  Age - 62 y.o.    - 1960      Room Number - 4B-06/006-A   MRN -  [id number]   Acct # - [de-identified]  Date of Admission -  2020  6:01 AM    SUBJECTIVE:   No new issues. She is more awake and able to make good conversation. OBJECTIVE   VITALS    height is 4' 10\" (1.473 m) and weight is 130 lb 15.3 oz (59.4 kg). Her oral temperature is 98.6 °F (37 °C). Her blood pressure is 110/86 and her pulse is 106. Her respiration is 16 and oxygen saturation is 95%.        Wt Readings from Last 3 Encounters:   20 130 lb 15.3 oz (59.4 kg)   20 94 lb 3.2 oz (42.7 kg)   20 91 lb 3.2 oz (41.4 kg)       I/O (24 Hours)    Intake/Output Summary (Last 24 hours) at 2020 1821  Last data filed at 2020 9361  Gross per 24 hour   Intake 2406.75 ml   Output 350 ml   Net 2056.75 ml       General Appearance  Chronically sick looking, oriented  HEENT - normocephalic, atraumatic, pale conjunctiva,  anicteric sclera  Neck - Supple, no mass  Lungs -  Bilateral   air entry, no rhonchi, no wheeze  Cardiovascular - Heart sounds are normal.     Abdomen - soft, not distended, nontender,   Neurologic -oriented  Skin - No bruising or bleeding  Extremities - No edema, no cyanosis, clubbing     MEDICATIONS:      calcitRIOL  0.5 mcg Oral BID    Calcium Citrate-Vitamin D  2 tablet Oral BID    albumin human  50 g Intravenous Daily    magnesium replacement protocol   Other RX Placeholder    levothyroxine  250 mcg Oral Daily    cefTRIAXone (ROCEPHIN) IV  2 g Intravenous Q24H    sodium chloride flush  10 mL Intravenous 2 times per day    enoxaparin  30 mg Subcutaneous Daily    magnesium replacement protocol   Other RX Placeholder    Lipoflavonoid  5 tablet Oral Daily    metoprolol  5 mg Intravenous Q6H    potassium replacement protocol   Other RX Placeholder     Irritable bowel K58.9    Gastric bypass status for obesity Z98.84    Insomnia G47.00    Ovarian cyst N83.209    Muscle weakness (generalized) M62.81    Alcoholism (HCC) F10.20    Osteoarthritis M19.90    Hypocalcemia E83.51    Hypomagnesemia E83.42    Moderate malnutrition (HCC) E44.0    Other hypoparathyroidism (HCC) E20.8    Pancytopenia ([facility]) D61.818    Palpitations R00.2    Asymptomatic bacteriuria R82.71    Hyperphosphatemia E83.39    Prolonged Q-T interval on ECG R94.31    Hypoalbuminemia E88.09    Tinnitus of both ears H93.13    Hx of parathyroidectomy Z90.09    Normocytic anemia D64.9    History of fibromyalgia Z87.39    Sepsis ([facility]) A41.9         ASSESSMENT/PLAN   Sepsis due to [place] 87 related to cat bite: better  Hx of alcohol abuse:    Deconditioning: may need rehabiliation  Discussed on her alcohol use          Barrie Fall MD, FACP 2/7/2020 6:21 PM

## 2020-02-07 NOTE — PLAN OF CARE
Patient has remained in bed this shift. Fall precautions in place for her safety. Nurse checks on the patient every hour. Care plan reviewed with patient. Patient verbalize understanding of the plan of care and contribute to goal setting.

## 2020-02-07 NOTE — PROGRESS NOTES
Hospitalist Progress Note      Patient:  Yousuf Dwyer    Unit/Bed:4B-06/006-A  YOB: 1960  MRN: [id number]   Acct: [de-identified]     PCP: Andry Upton MD  Date of Admission: 2/2/2020    Assessment/Plan:     1. Alcohol Abuse: With withdrawal--on phenobarbital protocol> received 1 dose last night--symptoms seems to be controlled currently and not quite receive anymore> , continue thiamine and folic acid and multivitamins. Aspiration and seizure precautions. --Did not receive any phenobarbital on>2/6 and 02/7  2. Sepsis (POA): 3/4 SIRS. 2/2 Blood cultures growing Pasteurella Multocida. Will await sensitives. Pt reports having had been scratched by a cat. BP has been low, pt had received 3L of fluid boluses. ID consulted. Cont IV Rocephin. 3. Pasteurella Multocida Bacteremia: likely from cat scratch. Cont IV Rocephin. ID consulted. Appreciate ID recommendations . Change to oral antibiotic? 4. Acute hypoxic respiratory failure-etiology? Currently at 80 percentage on 1 L wean oxygen as able     5. Hypocalcemaia: 2/2 s/p parathyroidectomy. Ical 1.08, 1.35. Pt receives weekly calcium supplements. Monitor Ca+ levels daily. 2/5--calcium levels are actually high iCal is 1.6 holding  calcium supplements today-we will do an endocrinology consult to aid in the dosing    2/6--> seen by endocrinology reduce the dose of the calcium supplements to 2 tablet 4 times daily also on calcitriol check iCal daily    2/7--> ionized calcium levels are better and it is normal--endocrinology managing>   Hypomagnesemia-secondary to poor oral intake as well as being an alcoholic getting replaced per protocol    6. S/P Parathyroidectomy: Removed due to hyperparathyroidism with Hunger Bone Syndrome. 7. History of Gastric Bypass: Aware. 2018. 8. History of TIA: Aware. 9. Hypothyroidism: Continue Synthroid.   10.  Deconditioning-added PT OT ambulate, add TCU consult      Chief Complaint: fever     Hospital Course:  famotidine  20 mg Oral BID    tiZANidine  4 mg Oral Nightly    vitamin A  20,000 Units Oral Daily    vitamin B-1  100 mg Oral Daily    vitamin B-12  500 mcg Oral Daily    vitamin B-6  100 mg Oral Daily    NONFORMULARY 50 mcg  50 mcg Injection Daily     PRN Meds: PHENobarbital IVPB **OR** PHENobarbital IVPB **OR** PHENobarbital IVPB, sodium chloride flush, cyclobenzaprine, acetaminophen, ondansetron, oxyCODONE-acetaminophen      Intake/Output Summary (Last 24 hours) at 2/7/2020 1752  Last data filed at 2/7/2020 5227  Gross per 24 hour   Intake 2406.75 ml   Output 350 ml   Net 2056.75 ml       Diet:  DIET GENERAL;  Dietary Nutrition Supplements: Clear Liquid Oral Supplement    Exam:  /86   Pulse 106   Temp 98.6 °F (37 °C) (Oral)   Resp 16   Ht 4' 10\" (1.473 m)   Wt 130 lb 15.3 oz (59.4 kg)   SpO2 95%   BMI 27.37 kg/m²     General appearance: Chronically ill appearing white female, alert awake oriented x3  HEENT: Pupils equal, round, and reactive to light. Conjunctivae/corneas clear. Neck: Supple, with full range of motion. No jugular venous distention. Trachea midline. Respiratory:  Normal respiratory effort on RA. Clear to auscultation, bilaterally without Rales/Wheezes/Rhonchi. Cardiovascular: Regular rate and rhythm with normal S1/S2 without murmurs, rubs or gallops. Abdomen: Soft, non-tender, non-distended with normal bowel sounds. Musculoskeletal: passive and active ROM x 4 extremities. Skin: Skin color, texture, turgor normal.  No rashes or lesions. Mediport, right chest   Neurologic:  Neurovascularly intact without any focal sensory/motor deficits.  Cranial nerves: II-XII intact, grossly non-focal.    Capillary Refill: Brisk,< 3 seconds   Peripheral Pulses: +2 palpable, equal bilaterally       Labs:   Recent Labs     02/07/20 0421   WBC 3.9*   HGB 8.7*   HCT 27.0*   *     Recent Labs     02/07/20 0421   *   K 3.2*      CO2 19*   BUN 4*   CREATININE 0.3*   CALCIUM

## 2020-02-07 NOTE — PROGRESS NOTES
Rupal Kim 58  INPATIENT OCCUPATIONAL THERAPY  [facility] CVICU 4B  EVALUATION    Time:    Time In: 430  Time Out: 09  Timed Code Treatment Minutes: 18 Minutes  Minutes: 33          Date: 2020  Patient Name: Cherri Meyer,   Gender: female      MRN: [id number]  : 1960  (62 y.o.)  Referring Practitioner: Dr. Sania Camacho  Diagnosis: sepsis  Additional Pertinent Hx: Per ER note on 2/3/2020: 62 y.o. female who presents to the Emergency Department for the evaluation of myalgias. The patient states that she developed bilateral leg cramps yesterday afternoon. The patient states that she developed bilateral lower back spasms last night. She rates her pain at a 6/10 in severity. The patient reports associated mild shortness of breath that worsened yesterday. The patient denies any chest pain or coughing. The patient reports chills and sweating but denies known fevers. She reports tinnitus of the right ear for which she has followed with ENT. She reports possible swelling of the RLE greater than baseline. She denies chest pain, congestion, ear pain or drainage, runny nose, sinus pain or pressure, abdominal pain, nausea, vomiting, diarrhea, constipation, weakness, numbness, or tingling. The patient states that she has been using Salonpas patches on her back without pain relief. The patient states that she was nauseated yesterday, but that has resolved today. The patient has a past medical history of alcoholism, closed fracture of the sixth and seventh cervical vertebra, closed fracture of thoracic vertebra, DVT, fibromyalgia, GERD, hiatal hernia, ITP, hungry bone syndrome, hyperparathyroidism, hypocalcemia, hypothyroidism, insomnia, irritable bowel, osteoarthritis, menopausal syndrome, osteopenia, ovarian cyst, oxaluria, tachycardia, TIA, and tubular adenoma of colon.      Restrictions/Precautions:  Restrictions/Precautions: Fall Risk    Subjective  Chart Reviewed: Yes, Orders, Progress Notes, History and Physical  Patient assessed for rehabilitation services?: Yes  Family / Caregiver Present: No    Subjective: required encouragement, quick to anger    Pain:  Pain Assessment  Patient Currently in Pain: Yes(no number given c/o back pain)    Social/Functional History:  Lives With: Spouse  Type of Home: House  Home Layout: One level  Home Access: Stairs to enter with rails(5)  Home Equipment: Rolling walker, Cane   Bathroom Shower/Tub: Tub/Shower unit  Bathroom Toilet: Standard  Bathroom Equipment: Grab bars in shower, Shower chair       ADL Assistance: Independent  Homemaking Assistance: Needs assistance(reports  does the cooking)  Ambulation Assistance: Independent  Transfer Assistance: Independent          Additional Comments: Pt reports fully indep PLOF without AD. No family present to verify. Cognition/Orientation:     Overall Cognitive Status: Exceptions(decreased problem solving, safety awareness, & insight)    ADL's:  Grooming: Setup(seated brushing teeth)  LE Dressing: Dependent/Total(for adjusting slipper socks)  Toileting: Dependent/Total(for wiping, on bedpain upon arrival of therapist)       Functional Mobility:  Bed mobility  Supine to Sit: Maximum assistance  Scooting: Maximal assistance    Functional Mobility  Functional - Mobility Device: No device  Activity: Other(2ft to recliner)  Assist Level: Minimal assistance(x 2)  Functional Mobility Comments: posterior lean noted     Balance:   standing balance: mod A x 2 initially then min A x 2    Transfers:  Sit to stand:  Moderate assistance(x 2)  Stand to sit: Minimal assistance(x 2 to recliner)       Upper Extremity Assessment:   LUE AROM : WFL  RUE AROM : WFL    LUE Strength  Gross LUE Strength: WFL  RUE Strength  Gross RUE Strength: WFL    Sensation  Overall Sensation Status: Impaired(reports some numbness in BUE)       Activity Tolerance: Patient limited by fatigue       Assessment:  Assessment: Pt demo decreased ADL & routine  Short term goal 4: Complete BADL routine with min A & min vcs for safety & sequencing  Long term goals  Time Frame for Long term goals : No LTG set d/t short ELOS         Following session, patient left in safe position with all fall risk precautions in place.

## 2020-02-07 NOTE — CONSULTS
fat, Moderate muscle loss    Objective Information:  · Nutrition-Focused Physical Findings: thin; edematous; no appetite with poor intake over the past 6 months; pt denies N/V- last BM x1 on 2/6; pt denies difficulty chewing/swallowing food; pt dislikes and cannot tolerate milk ONS; pt states she grazes on food all day and doesn't consume actual meals; pt amenable to Ensure Clear TID; hx gastric bypass  · Wound Type: None  · Current Nutrition Therapies:  · Oral Diet Orders: General   · Oral Diet intake: 1-25%, 0%  · Oral Nutrition Supplement (ONS) Orders: Clear Liquid Oral Supplement(Ensure Clear TID)  · ONS intake: (Initiated today)  · Anthropometric Measures:  · Ht: 4' 10\" (147.3 cm)   · Current Body Wt: 130 lb 15.3 oz (59.4 kg)(2/7; +2-3 pitting edema in extremities/generalized)  · Admission Body Wt: 118 lb 9.7 oz (53.8 kg)(2/3)  · Usual Body Wt: 160 lb (72.6 kg)(per pt report. Per EMR: 91 lb 3.2 ozon 1/23/20; 111 lb 6.4 oz on 10/17/19; 117 lb 12.8 oz on 5/7/19; 141 lb 9.6 oz on 3/14/19)  · % Weight Change: difficult to assess d/t pt with pitting edema noted/fluid shifts  · Ideal Body Wt: 95 lb (43.1 kg)   · BMI Classification: BMI 25.0 - 29.9 Overweight(27.4)    Nutrition Interventions:   Continue current diet, Start ONS, Vitamin Supplement(Recommend a Multivitamin w/minerals daily d/t severe mal and hx of ETOH abuse. Continue other vitamin supplementation as ordered.  Started Ensure Clear TID)  Continued Inpatient Monitoring, Education Initiated, Coordination of Care    Nutrition Evaluation:   · Evaluation: Goals set   · Goals: Pt will consume 50% or more of meals during LOS    · Monitoring: Nutrition Progression, Meal Intake, Supplement Intake, Diet Tolerance, Skin Integrity, Weight, Pertinent Labs, Monitor Bowel Function     Electronically signed by Idris Gomez RD, LD on 2/7/20 at 3:10 PM    Contact Number: [phone]

## 2020-02-08 NOTE — FLOWSHEET NOTE
02/08/20 0800   Provider Notification   Reason for Communication Review case   Provider Name Dr. Gilford Babe   Provider Notification Physician   Method of Communication Secure Message   Response See orders   Notification Time 0800   This RN notified provider that pt has low Phos 2.2. Asked if we can get replacement protocol. Also notified that Nightshift held IV metoprolol (ordered every 6 hrs) as pt had BP in the 90s overnight. HR 90s-100s - sinus tachy. Asked provider if she would like parameters. Provider ordered phosphorus replacement protocol and dc IV metoprolol    1258- This RN messaged provider CXR Results: Interval development of pulmonary vascular congestion and a small left-sided pleural effusion. Notified that she is voiding well after Bumex. Provider responded. No new orders at this time.

## 2020-02-08 NOTE — PROGRESS NOTES
Hospitalist Progress Note      Patient:  Fabián Malave    Unit/Bed:4B-06/006-A  YOB: 1960  MRN: [id number]   Acct: [de-identified]     PCP: Lilliam Triplett MD  Date of Admission: 2/2/2020    Assessment/Plan:     1. Acute hypoxic respiratory failure secondary to acute congestive heart failure systolic-has history of cardiomyopathy likely alcohol-related--add IV Bumex 1 5 mg twice daily> Monitor daily weights, strict  Intake and Out put, monitor K, Ical , Mag, BMP daily, replace lytes accordingly  2. Alcohol Abuse: With withdrawal--on phenobarbital protocol> received 1 dose last night--symptoms seems to be controlled currently and not quite receive anymore> , continue thiamine and folic acid and multivitamins. Aspiration and seizure precautions. --Did not receive any phenobarbital on 2/6 and 02/7/ 2/8  3. Sepsis (POA): Resolved-3/4 SIRS. 2/2 Blood cultures growing Pasteurella Multocida. Will await sensitives. Pt reports having had been scratched by a cat. BP has been low, pt had received 3L of fluid boluses. ID consulted. Cont IV Rocephin. 4. Pasteurella Multocida Bacteremia: likely from cat scratch. Cont IV Rocephin. ID consulted. Appreciate ID recommendations . Change to oral antibiotic? 5. Acute hypoxic respiratory failure-etiology? Currently at 80 percentage on 1 L wean oxygen as able     Hypocalcemaia: 2/2 s/p parathyroidectomy.  Ical 1.08, 1.35. patient is on calcitriol, calcium citrate-vitamin D tablets twice daily 2/5--calcium levels are actually high iCal is 1.6 holding  calcium supplements today-we will do an endocrinology consult to aid in the dosing    2/6--> seen by endocrinology reduce the dose of the calcium supplements to 2 tablet 4 times daily also on calcitriol check iCal daily    2/7--> ionized calcium levels are better and it is normal--endocrinology managing>   Hypomagnesemia-secondary to poor oral intake as well as being an alcoholic getting replaced per protocol    2/8--> ionized levels are normal    6. S/P Parathyroidectomy: Removed due to hyperparathyroidism with Hunger Bone Syndrome. 7. History of Gastric Bypass: Aware. 2018. 8. History of TIA: Aware. 9. Hypothyroidism: Continue Synthroid.   10. Deconditioning-added PT OT ambulate, add TCU consulted      Chief Complaint: fever     Hospital Course: 62 y.o. female who presented to [address] [place] [address] with muscle aches; she states yesterday she developed muscle cramps that started in her legs and then came up to her back; she still has severe cramps to her left lower back with any type of movement; she also relates to fever and chills; she relates to lightheadedness and dizziness, states she developed a cough today that is nonproductive, denies chest pain, relates to dyspnea on exertion, relates to nausea, states she is lost 85 pounds in the last year and feels it secondary to parathyroidism; in the ER she was given Rocephin and Zithromax for right-sided pneumonia; she had a MediPort placed in December 2019 secondary to her calcium infusions; she is fully awake and alert;  states she looks a little pale but otherwise at her baseline; she is being admitted to the hospital service for further care and evaluation.     Subjective:   Short of breath with minimal exertion  Back on 2 L of oxygen  Audible crackles  Denies any chest pain nausea vomiting    Medications:  Reviewed    Infusion Medications     Scheduled Medications    phosphorus replacement protocol   Other RX Placeholder    metoprolol tartrate  25 mg Oral BID    sodium phosphate IVPB  10 mmol Intravenous Once    calcitRIOL  0.25 mcg Oral BID    ipratropium-albuterol  1 ampule Inhalation Q4H WA    bumetanide  0.5 mg Intravenous BID    Calcium Citrate-Vitamin D  2 tablet Oral BID    albumin human  50 g Intravenous Daily    magnesium replacement protocol   Other RX Placeholder    levothyroxine  250 mcg Oral Daily    cefTRIAXone No rashes or lesions. Mediport, right chest   Neurologic:  Neurovascularly intact without any focal sensory/motor deficits. Cranial nerves: II-XII intact, grossly non-focal.    Capillary Refill: Brisk,< 3 seconds   Peripheral Pulses: +2 palpable, equal bilaterally       Labs:   Recent Labs     02/08/20  0410   WBC 4.8   HGB 7.1*   HCT 22.5*        Recent Labs     02/08/20  0410      K 3.3*      CO2 21*   BUN 3*   CREATININE 0.3*   CALCIUM 7.7*   PHOS 2.2*     Recent Labs     02/07/20  1525 02/08/20  0410   AST 31 22   ALT 20 14   BILIDIR <0.2  --    BILITOT 0.4 0.4   ALKPHOS 102 86     No results for input(s): INR in the last 72 hours. No results for input(s): Marty Prayer in the last 72 hours. Urinalysis:      Lab Results   Component Value Date    NITRU NEGATIVE 02/02/2020    WBCUA 0-2 12/06/2019    BACTERIA FEW 12/06/2019    RBCUA 0-2 12/06/2019    BLOODU NEGATIVE 02/02/2020    SPECGRAV 1.022 04/18/2019    GLUCOSEU NEGATIVE 02/02/2020       Radiology:   All imaging reviewed     Diet: DIET GENERAL;  Dietary Nutrition Supplements: Clear Liquid Oral Supplement      Code Status: Full Code            Electronically signed by Kavitha Ragsdale MD on 2/8/2020 at 5:38 PM

## 2020-02-08 NOTE — PLAN OF CARE
Problem: Sleep Pattern Disturbance:  Goal: Appears well-rested  Description  Appears well-rested  Outcome: Met This Shift     Problem: Mental Status - Impaired:  Goal: Mental status will be restored to baseline  Description  Mental status will be restored to baseline  Outcome: Met This Shift  Note:   Patient alert and oriented x4. Incomprehensible speech at times. Will continue to monitor mental status. Problem: Pain Control  Goal: Maintain pain level at or below patient's acceptable level (or 5 if patient is unable to determine acceptable level)  Outcome: Met This Shift  Flowsheets (Taken 2/8/2020 0236)  Patient's Stated Pain Goal: No pain  Note:   Pain goal: no pain. Denies pain so far this shift. Will continue to monitor. Problem: Cardiovascular  Goal: No DVT, peripheral vascular complications  Outcome: Met This Shift  Note:   No s/s of DVT noted. Lovenox SQ per MAR. Problem: Cardiovascular  Goal: Hemodynamic stability  Outcome: Met This Shift  Note:   VSS. Will continue to monitor vitals Q4H and PRN. Continuous telemetry. Problem: Respiratory  Goal: No pulmonary complications  Outcome: Met This Shift  Note:   Patient O2 saturation above 92% on room air. Respirations even and unlabored. Lung sounds clear. Will continue to monitor respiratory status. Problem: Respiratory  Goal: O2 Sat > 90%  Outcome: Met This Shift  Note:   Patient O2 saturation above 92% on room air. Respirations even and unlabored. Lung sounds clear. Will continue to monitor respiratory status. Problem:   Goal: Adequate urinary output  Outcome: Met This Shift  Note:   Adequate PO intake. IV fluids. Adequate urine output. Problem: Nutrition  Goal: Optimal nutrition therapy  2/8/2020 0236 by Kong Lawson RN  Outcome: Met This Shift  Note:   Encouraging PO intake. IV fluids infusing. Will continue to monitor intake. Problem: Musculor/Skeletal Functional Status  Goal: Absence of falls  Outcome:  Met

## 2020-02-08 NOTE — PLAN OF CARE
Problem: Impaired respiratory status  Goal: Clear lung sounds  Outcome: Ongoing  Note:   Duoneb to improve breath sounds.

## 2020-02-09 NOTE — PROGRESS NOTES
to 2 tablet 4 times daily also on calcitriol check iCal daily    2/7--> ionized calcium levels are better and it is normal--endocrinology managing>   Hypomagnesemia-secondary to poor oral intake as well as being an alcoholic getting replaced per protocol    2/8--> ionized levels are normal  2/9-> levels are normal    8. S/P Parathyroidectomy: Removed due to hyperparathyroidism with Hunger Bone Syndrome. 9. History of Gastric Bypass: Aware. 2018. 10. History of TIA: Aware. 11. Hypothyroidism: Continue Synthroid.   12. Deconditioning-added PT OT ambulate, add TCU consulted      Chief Complaint: fever     Hospital Course: 62 y.o. female who presented to [address] [place] [address] with muscle aches; she states yesterday she developed muscle cramps that started in her legs and then came up to her back; she still has severe cramps to her left lower back with any type of movement; she also relates to fever and chills; she relates to lightheadedness and dizziness, states she developed a cough today that is nonproductive, denies chest pain, relates to dyspnea on exertion, relates to nausea, states she is lost 85 pounds in the last year and feels it secondary to parathyroidism; in the ER she was given Rocephin and Zithromax for right-sided pneumonia; she had a MediPort placed in December 2019 secondary to her calcium infusions; she is fully awake and alert;  states she looks a little pale but otherwise at her baseline; she is being admitted to the hospital service for further care and evaluation.     Subjective:   Short of breath better  Diuresing fair  Still on 2 L OF OXYGEN  Good BMs  Denies any chest pain nausea vomiting    Medications:  Reviewed    Infusion Medications     Scheduled Medications    calcitRIOL  0.25 mcg Oral Daily    Calcium Citrate-Vitamin D  2 tablet Oral Daily    Vitamin D  2,000 Units Oral Daily    sodium chloride  20 mL Intravenous Once    potassium chloride  40 mEq Oral Once    magnesium sulfate  2 g Intravenous Once    phosphorus replacement protocol   Other RX Placeholder    metoprolol tartrate  25 mg Oral BID    ipratropium-albuterol  1 ampule Inhalation Q4H WA    bumetanide  0.5 mg Intravenous BID    albumin human  50 g Intravenous Daily    magnesium replacement protocol   Other RX Placeholder    levothyroxine  250 mcg Oral Daily    cefTRIAXone (ROCEPHIN) IV  2 g Intravenous Q24H    sodium chloride flush  10 mL Intravenous 2 times per day    enoxaparin  30 mg Subcutaneous Daily    Lipoflavonoid  5 tablet Oral Daily    potassium replacement protocol   Other RX Placeholder    acyclovir  400 mg Oral BID    DULoxetine  30 mg Oral Daily    DULoxetine  60 mg Oral Nightly    folic acid  6,774 mcg Oral Daily    gabapentin  100 mg Oral Daily    gabapentin  300 mg Oral Nightly    magnesium chloride  1 tablet Oral BID    famotidine  20 mg Oral BID    tiZANidine  4 mg Oral Nightly    vitamin A  20,000 Units Oral Daily    vitamin B-1  100 mg Oral Daily    vitamin B-6  100 mg Oral Daily    NONFORMULARY 50 mcg  50 mcg Injection Daily     PRN Meds: PHENobarbital IVPB **OR** PHENobarbital IVPB **OR** PHENobarbital IVPB, sodium chloride flush, cyclobenzaprine, acetaminophen, ondansetron, oxyCODONE-acetaminophen      Intake/Output Summary (Last 24 hours) at 2/9/2020 0950  Last data filed at 2/9/2020 0840  Gross per 24 hour   Intake 1380.92 ml   Output 2220 ml   Net -839.08 ml       Diet:  DIET GENERAL;  Dietary Nutrition Supplements: Clear Liquid Oral Supplement    Exam:  /76   Pulse 90   Temp 98.4 °F (36.9 °C) (Oral)   Resp 20   Ht 4' 10\" (1.473 m)   Wt 136 lb 0.4 oz (61.7 kg)   SpO2 95%   BMI 28.43 kg/m²     General appearance: Chronically ill appearing white female, alert awake oriented x3, in mild distress secondary to shortness of breath  HEENT: Pupils equal, round, and reactive to light. Conjunctivae/corneas clear. Neck: Supple, with full range of motion.  No jugular

## 2020-02-09 NOTE — PROGRESS NOTES
Spoke with patient and her  about TCU and rehab. They are agreeable to either unit. Await physiatry eval. Spoke with Dr. Isiah Lee and she feels patient may be ready in 2 days as patients diuretic was increased today. Will follow and initiate precert once physiatry sees patient. Erma GRIGGS made aware.

## 2020-02-09 NOTE — PROGRESS NOTES
Home medications sent with patient to [facility]. Patient stated that she would let  know that she was moving rooms.

## 2020-02-09 NOTE — PROGRESS NOTES
Department of Internal Medicine  Section of Endocrinology   [facility]    [address] , [address], [phone]  Office Phone [address]  ([address])  ([address])  [address] Jimena Zheng MD, Fellow of [employer] of Endocrinology   Progress Note    Admit Date: 2/2/2020  Reviewed the chart of the last 24 hours:   SUBJECTIVE:     Chief Complaint   Patient presents with    Muscle Pain     Sepsis ([facility] [address] [A41.9]   Patient Active Problem List   Diagnosis Code    TIA (transient ischemic attack) G45.9    Hypothyroidism E03.9    DVT (deep venous thrombosis) ([facility]) I82.409    Depression F32.9    Anxiety F41.9    Fibromyalgia M79.7    Fatty liver K76.0    Tachycardia R00.0    GERD (gastroesophageal reflux disease) K21.9    Recurrent cold sores B00.1    Irritable bowel K58.9    Gastric bypass status for obesity Z98.84    Insomnia G47.00    Ovarian cyst N83.209    Muscle weakness (generalized) M62.81    Alcoholism ([facility]) F10.20    Osteoarthritis M19.90    Hypocalcemia E83.51    Hypomagnesemia E83.42    Moderate malnutrition (HCC) E44.0    Other hypoparathyroidism (HCC) E20.8    Pancytopenia (HCC) D61.818    Palpitations R00.2    Asymptomatic bacteriuria R82.71    Hyperphosphatemia E83.39    Prolonged Q-T interval on ECG R94.31    Hypoalbuminemia E88.09    Tinnitus of both ears H93.13    Hx of parathyroidectomy Z90.09    Normocytic anemia D64.9    History of fibromyalgia Z87.39    Sepsis ([facility]) A41.9     <principal problem not specified>  2/2/2020  6:01 AM  Admission weight: 94 lb 3.2 oz (42.7 kg)  [id number]  [id number]  4B-06/006-A    Patient none. .   Medication side effects: none  Patient is eating 50%    Review of Systems  Review of Systems - General ROS: negative   Psychological ROS: negative   Hematological and Lymphatic ROS: No history of blood clots gastrointestinal endoscopy (Left, 5/7/2019). reports that she has never smoked. She has never used smokeless tobacco. She reports current alcohol use of about 9.0 standard drinks of alcohol per week. She reports that she does not use drugs. family history includes Asthma in her maternal grandfather; Breast Cancer in her mother and sister; Cancer in her father; Depression in her father and mother; Heart Attack in her maternal grandmother; High Blood Pressure in her father and mother; Mental Illness in her maternal grandmother; Other in her father, maternal grandfather, maternal grandmother, and mother.   Allergies   Allergen Reactions    Estrogens     Penicillins     Sulfa Antibiotics     Sulfur     Bactrim [Sulfamethoxazole-Trimethoprim] Rash     Current Facility-Administered Medications   Medication Dose Route Frequency Provider Last Rate Last Dose    potassium chloride (KLOR-CON M) extended release tablet 40 mEq  40 mEq Oral Once [employer], [address]        calcitRIOL (ROCALTROL) capsule 0.25 mcg  0.25 mcg Oral Daily Derek Heard MD        Calcium Citrate-Vitamin D 500-500 MG-UNIT CHEW 2 tablet  2 tablet Oral Daily Collin Martini MD        Vitamin D (CHOLECALCIFEROL) tablet 2,000 Units  2,000 Units Oral Daily Collin Martini MD        phosphorus replacement protocol   Other RX Placeholder Flor Bustillos MD        metoprolol tartrate (LOPRESSOR) tablet 25 mg  25 mg Oral BID Flor Vail MD   25 mg at 02/08/20 2130    ipratropium-albuterol (DUONEB) nebulizer solution 1 ampule  1 ampule Inhalation Q4H WA Flor Vail MD   1 ampule at 02/08/20 2044    bumetanide (BUMEX) injection 0.5 mg  0.5 mg Intravenous BID Flor Vail MD   0.5 mg at 02/08/20 2128    albumin human 25 % IV solution 50 g  50 g Intravenous Daily Collin Martini MD   50 g at 02/08/20 0851    magnesium replacement protocol   Other MD Maryam        levothyroxine (SYNTHROID) tablet 250 mcg  250 mcg Oral Daily Tiki Noble MD   250 mcg at 02/09/20 0532    PHENobarbital (LUMINAL) 130 mg in sodium chloride 0.9 % 100 mL IVPB  130 mg Intravenous Q1H PRN Aneudyl [address], MD        Or    PHENobarbital (LUMINAL) 260 mg in sodium chloride 0.9 % 100 mL IVPB  260 mg Intravenous Q1H PRN Aneudyl Navdeep Vail MD        Or    PHENobarbital (LUMINAL) 538.2 mg in sodium chloride 0.9 % 100 mL IVPB  10 mg/kg Intravenous Daily  Melania Cortés MD        cefTRIAXone (ROCEPHIN) 2 g IVPB in D5W 50ml minibag  2 g Intravenous Q24H Rossy Savage MD   Stopped at 02/08/20 1149    sodium chloride flush 0.9 % injection 10 mL  10 mL Intravenous 2 times per day Rossy Savage MD   10 mL at 02/08/20 2135    sodium chloride flush 0.9 % injection 10 mL  10 mL Intravenous PRN Rossy Savage MD        enoxaparin (LOVENOX) injection 30 mg  30 mg Subcutaneous Daily JEEVAN Angeles   30 mg at 02/08/20 0850    cyclobenzaprine (FLEXERIL) tablet 10 mg  10 mg Oral TID PRN JEVEAN Angeles   10 mg at 02/07/20 0911    Lipoflavonoid TABS 5 tablet  5 tablet Oral Daily Justyna Angeles   5 tablet at 02/08/20 0852    potassium replacement protocol   Other RX Placeholder Justyna Ortiz        acetaminophen (TYLENOL) tablet 650 mg  650 mg Oral Q4H PRN ANITA Ross - CNP   650 mg at 02/07/20 0911    ondansetron (ZOFRAN) injection 4 mg  4 mg Intravenous Q6H PRN ANITA Ross - CNP        oxyCODONE-acetaminophen (PERCOCET) 5-325 MG per tablet 1 tablet  1 tablet Oral Q4H PRN ANITA Ross - CNP   1 tablet at 02/08/20 1527    acyclovir (ZOVIRAX) tablet 400 mg  400 mg Oral BID ANITA Ross CNP   400 mg at 02/08/20 2128    DULoxetine (CYMBALTA) extended Nightly    folic acid  6,332 mcg Oral Daily    gabapentin  100 mg Oral Daily    gabapentin  300 mg Oral Nightly    magnesium chloride  1 tablet Oral BID    famotidine  20 mg Oral BID    tiZANidine  4 mg Oral Nightly    vitamin A  20,000 Units Oral Daily    vitamin B-1  100 mg Oral Daily    vitamin B-6  100 mg Oral Daily    NONFORMULARY 50 mcg  50 mcg Injection Daily     Continuous Infusions:  PRN Meds: PHENobarbital IVPB **OR** PHENobarbital IVPB **OR** PHENobarbital IVPB, sodium chloride flush, cyclobenzaprine, acetaminophen, ondansetron, oxyCODONE-acetaminophen    OBJECTIVE        Physical    VITALS:  /67   Pulse 88   Temp 99.1 °F (37.3 °C) (Oral)   Resp 24   Ht 4' 10\" (1.473 m)   Wt 136 lb 0.4 oz (61.7 kg)   SpO2 94%   BMI 28.43 kg/m²   24HR INTAKE/OUTPUT:      Intake/Output Summary (Last 24 hours) at 2/9/2020 0818  Last data filed at 2/9/2020 6184  Gross per 24 hour   Intake 1390.92 ml   Output 2420 ml   Net -1029.08 ml     CONSTITUTIONAL:  awake, alert, cooperative, no apparent distress, and appears stated age  LUNGS:  No increased work of breathing, good air exchange, clear to auscultation bilaterally, no crackles or wheezing  CARDIOVASCULAR:  Normal apical impulse, regular rate and rhythm, normal S1 and S2, no S3 or S4, and no murmur noted  ABDOMEN:  No scars, normal bowel sounds, soft, non-distended, non-tender, no masses palpated, no hepatosplenomegally    DATA  TSH:    Lab Results   Component Value Date    TSH 0.006 02/02/2020   No components found for: FREE T4No components found for: FREET3  RADIOLOGYREPORTS:    Lab Review  @[email or web address]@  [unfilled]  Lab Results   Component Value Date    TSH 0.006 (L) 02/02/2020    FT3 3.44 11/14/2019    T4FREE 1.80 (H) 02/02/2020     No results found for: FREET4  No results found for: TESTOSTERONE  CBC:  Lab Results   Component Value Date    WBC 9.1 02/09/2020    RBC 2.21 02/09/2020    HGB 6.9 02/09/2020    HCT 21.2 02/09/2020    MCV 95.9

## 2020-02-10 NOTE — PLAN OF CARE
a 0/10. Patient is receiving rest, positional changes, and percocet for pain at this time. Will continue to assess with hourly rounding. Problem: Cardiovascular  Goal: No DVT, peripheral vascular complications  Outcome: Ongoing  Note:   Patient has no signs/ symptoms of DVT at this time. Patient receiving lovenox at this time. Will continue to monitor. Problem: Respiratory  Goal: O2 Sat > 90%  Outcome: Ongoing  Note:   Patient is on 1 L nasal cannula at this time. Patients oxygen saturation maintains above 92% at this time. Pt has displayed no signs or symptoms of hypoxia throughout shift. Will continue to monitor. Problem:   Goal: Adequate urinary output  Outcome: Ongoing  Note:   Bumex gtt on board, diuresing well. Problem: Nutrition  Goal: Optimal nutrition therapy  Outcome: Ongoing  Note:   Patient eating three meals a day. Appetite intact. Patient tolerating PO fluids. Care plan reviewed with patient and . Patient and  verbalize understanding of the plan of care and contribute to goal setting.

## 2020-02-10 NOTE — PROGRESS NOTES
1920-Bedside report given by [facility]. SPO2 noted to be 83 on 4L. Increased oxygen ending at 6L at 89% Respatory entered to give breathing treatment and started patient on venturi. 1201 [facility] given update and made aware of trending numbers.  Orders placed for meds at bedside  2030- Dr Leonardo Bojorquez at bedside placed orders  2103-transfer order placed to [facility] 966 RN given report  21  Patient transferred to  by Nely Doe RN, and Vita Stewart RN

## 2020-02-10 NOTE — PROGRESS NOTES
[facility] [place] [facility] [facility] STEPDOWN TELEMETRY 4K  Occupational Therapy  Daily Note  Time:   Time In: 1347  Time Out: 1411  Timed Code Treatment Minutes: 24 Minutes  Minutes: 24          Date: 2/10/2020  Patient Name: Kan Sanders,   Gender: female      Room: -14/014-A  MRN: [id number]  : 1960  (62 y.o.)  Referring Practitioner: Dr. Sania Camacho  Diagnosis: sepsis  Additional Pertinent Hx: Per ER note on 2/3/2020: 62 y.o. female who presents to the Emergency Department for the evaluation of myalgias. The patient states that she developed bilateral leg cramps yesterday afternoon. The patient states that she developed bilateral lower back spasms last night. She rates her pain at a 6/10 in severity. The patient reports associated mild shortness of breath that worsened yesterday. The patient denies any chest pain or coughing. The patient reports chills and sweating but denies known fevers. She reports tinnitus of the right ear for which she has followed with ENT. She reports possible swelling of the RLE greater than baseline. She denies chest pain, congestion, ear pain or drainage, runny nose, sinus pain or pressure, abdominal pain, nausea, vomiting, diarrhea, constipation, weakness, numbness, or tingling. The patient states that she has been using Salonpas patches on her back without pain relief. The patient states that she was nauseated yesterday, but that has resolved today. The patient has a past medical history of alcoholism, closed fracture of the sixth and seventh cervical vertebra, closed fracture of thoracic vertebra, DVT, fibromyalgia, GERD, hiatal hernia, ITP, hungry bone syndrome, hyperparathyroidism, hypocalcemia, hypothyroidism, insomnia, irritable bowel, osteoarthritis, menopausal syndrome, osteopenia, ovarian cyst, oxaluria, tachycardia, TIA, and tubular adenoma of colon.      Restrictions/Precautions:  Restrictions/Precautions: Fall Risk, General Precautions    SUBJECTIVE: Pt seated on BSC with  present upon arrival, agreeable to OT session. RN okayed therapy session. PAIN: Denies. COGNITION: WFL    ADL:   Grooming: Contact Guard Assistance. Using wet wipe to clean hands with 2 UE release from RW. Toileting: Contact Guard Assistance. For hygeine after BM standing with 1 UE release from RW. Toilet Transfer: Contact Guard Assistance. BSC. BALANCE:  Sitting Balance:  Stand By Assistance. Standing Balance: Contact Guard Assistance. x1 minute within ADLs, in prep for mobility. BED MOBILITY:  Supine to Sit: Stand By Assistance   Sit to Supine: Stand By Assistance   Scooting: Stand By Assistance EOB. TRANSFERS:  Sit to Stand:  Contact Guard Assistance. Stand to Sit: Contact Guard Assistance. FUNCTIONAL MOBILITY:  Assistive Device: Rolling Walker  Assist Level:  Contact Guard Assistance. Distance: Completed functional mobility x1 lap within pt room and from bedside chair to EOB at slow pace, no LOB noted. Pt requires mod cues for walker safety and line managment- lenghty seated rest break after each trial of mobility, mod-min fatigue noted- O2 sats fluctuated frequently between % on room monitor, sats reading 86-92% on portable finger monitor throughout session. ASSESSMENT:     Activity Tolerance:  Patient tolerance of  treatment: fair. Pt limited by fatigue. Discharge Recommendations: Continue to assess pending progress  Equipment Recommendations: Other: Monitor pending progress  Plan: Times per week: 5x  Current Treatment Recommendations: Balance Training, Self-Care / ADL, Functional Mobility Training, Endurance Training, Safety Education & Training, Cognitive Reorientation    Patient Education  Patient Education: ADL's, Energy Conservation, Assistive Device Safety and Safety with transfers and mobility.     Goals  Short term goals  Time Frame for Short term goals: 2 weeks  Short term goal 1: Complete sit-stand

## 2020-02-10 NOTE — PROGRESS NOTES
20:15    Notified by PA that patient was becoming increasingly hypoxic (83% on 4L NC) / increased to 6L NC with SpO2 88-89%. ABG obtained: 7.31 / 28 / 86 (following increase to 6L) / 14 -- indicating primary metabolic acidosis with respiratory compensation     LA 8.8 (AG 22) -- likely secondary to hypoxia    BNP 19K    Hemoglobin stable    STAT portable CXR obtained / noted cardiomegaly with worsening vascular congestion, most likely the source of the patient's respiratory hypoxic respiratory failure / last dose of bumex 0.5 mg IV at 0830 yesterday AM.    Patient assessed at bedside / noted to have increased work of breathing and audible crackles. SpO2 85% on 6L. Orders were placed as follows:     - 2 mg IV bumex > gtt initiation  - 2 amps bicarb  - Initiation of CPAP (14 mm H2O, FiO2 50%)    Patient was transferred to stepdown. ---    06:00    Patient reassessed / breathing comfortably on CPAP with SpO2 100%. Per RN, net -700 UOP since arrival to . BP normal/stable. Bumex gtt up-titrated. Repeat labs resulted: LA normalized / AG closed. Electrolytes wnl. Renal function wnl. RN to notify me re: any additional clinical changes or concerns. Critical Care time 30 minutes.       Ramiro Marley MD  02/10/20

## 2020-02-10 NOTE — PROGRESS NOTES
and dizziness, states she developed a cough today that is nonproductive, denies chest pain, relates to dyspnea on exertion, relates to nausea, states she is lost 85 pounds in the last year and feels it secondary to parathyroidism; in the ER she was given Rocephin and Zithromax for right-sided pneumonia; she had a MediPort placed in December 2019 secondary to her calcium infusions; she is fully awake and alert;  states she looks a little pale but otherwise at her baseline; she is being admitted to the hospital service for further care and evaluation.     Subjective:   Patient was transferred back to the stepdown unit secondary to increased oxygen requirement that needed BiPAP  Currently on Bumex drip  Good diuresis-vitals are stable  Patient is on 1 L of oxygen currently  Denies any shortness of breath, chest pain nausea vomiting diarrhea    Medications:  Reviewed    Infusion Medications    bumetanide 0.1 mg/mL infusion 1 mg/hr (02/10/20 7137)     Scheduled Medications    [START ON 2/11/2020] cefUROXime  500 mg Oral 2 times per day    calcitRIOL  0.25 mcg Oral Daily    Calcium Citrate-Vitamin D  2 tablet Oral Daily    Vitamin D  2,000 Units Oral Daily    phosphorus replacement protocol   Other RX Placeholder    metoprolol tartrate  25 mg Oral BID    ipratropium-albuterol  1 ampule Inhalation Q4H WA    albumin human  50 g Intravenous Daily    magnesium replacement protocol   Other RX Placeholder    levothyroxine  250 mcg Oral Daily    sodium chloride flush  10 mL Intravenous 2 times per day    enoxaparin  30 mg Subcutaneous Daily    Lipoflavonoid  5 tablet Oral Daily    potassium replacement protocol   Other RX Placeholder    acyclovir  400 mg Oral BID    DULoxetine  30 mg Oral Daily    DULoxetine  60 mg Oral Nightly    folic acid  9,439 mcg Oral Daily    gabapentin  100 mg Oral Daily    gabapentin  300 mg Oral Nightly    magnesium chloride  1 tablet Oral BID    famotidine  20 mg Oral BID

## 2020-02-10 NOTE — PROGRESS NOTES
Layout: One level  Home Access: Stairs to enter with rails(5)  Home Equipment: Rolling walker, Cane   Bathroom Shower/Tub: Tub/Shower unit  Bathroom Toilet: Standard  Bathroom Equipment: Grab bars in shower, Shower chair    ADL Assistance: Independent  Homemaking Assistance: Needs assistance(reports  does the cooking)  Ambulation Assistance: Independent  Transfer Assistance: Independent  Additional Comments: Pt reports fully indep PLOF without AD. No family present to verify.      Restrictions/Precautions:  Restrictions/Precautions: Fall Risk, General Precautions    SUBJECTIVE: nursing ok'd therapy, resp therapy in room on arrival pt was on cont cpap and RT was getting blood gases and stated that this would determine if she could come off she reported that the gasses were good but kept her on the cpap, pt was on bedpan on arrival and needed assist for clean up then needed assist of change of gown and sheets, pt cooperative and motivated she was on cpap the entire session so walking distance was limited     PAIN no number given pt reported that she has been having mid and low back pain     OBJECTIVE:  Bed Mobility:  Supine to Sit: Minimal Assistance, w./ rail HOB flat   Sit to Supine: Minimal Assistance, then once in bed she needed min assist to reposition self pt completed bridges    Scooting: Stand By Assistance    Transfers:  Sit to Stand: [employer], from bed with cues for hand placement   Stand to [place] [id number] and SBA to scoot hips back in bed     Ambulation:  Contact Guard Assistance  Distance: 5 feet forward and back x 2 trials in a row then rest break and then another 5 feet x 2 forward and back   Surface: Level Tile  Device:Rolling Walker  Gait Deviations:  Good step length and heel strike noted, pt limited due to lines she did state min fatigue and required rest break between walks     Balance:  pt sitting edge of bed with SBA she did initially c/o of dizziness which Evaluation, Education, & procurement, Patient/Caregiver Education & Training, Neuromuscular Re-education    Patient Education  Patient Education: Plan of Care, cont w/ ex in bed throughout the day and did encourage her to use commode vs bedpan     Goals:  Patient goals : go home  Short term goals  Time Frame for Short term goals: at discharge  Short term goal 1: Pt to be CGA for supine <> sit to get in/out of bed  Short term goal 2: Pt to be CGA x 1 for sit <> stand to get up to ambulate  Short term goal 3: Pt to ambulate > 20 ft with RW with CGA x 1 for household distances  Long term goals  Time Frame for Long term goals : not set due to short ELOS    Following session, patient left in safe position with all fall risk precautions in place.

## 2020-02-10 NOTE — PLAN OF CARE
Problem: Discharge Planning:  Goal: Discharged to appropriate level of care  Description  Discharged to appropriate level of care  2/10/2020 0022 by Winnie Green RN  Outcome: Ongoing     Problem: Fluid Volume - Deficit:  Goal: Absence of fluid volume deficit signs and symptoms  Description  Absence of fluid volume deficit signs and symptoms  2/10/2020 0022 by Winnie Green RN  Outcome: Ongoing     Problem: Sleep Pattern Disturbance:  Goal: Appears well-rested  Description  Appears well-rested  2/10/2020 0022 by Winnie Green RN  Outcome: Not Met This Shift     Problem: Anxiety/Stress:  Goal: Level of anxiety will decrease  Description  Level of anxiety will decrease  2/10/2020 0022 by Winnie Green RN  Outcome: Ongoing     Problem: Mental Status - Impaired:  Goal: Mental status will be restored to baseline  Description  Mental status will be restored to baseline  2/10/2020 0022 by Winnie Green RN  Outcome: Ongoing     Problem: Risk for Impaired Skin Integrity  Goal: Tissue integrity - skin and mucous membranes  Description  Structural intactness and normal physiological function of skin and  mucous membranes. 2/10/2020 0022 by Winnie Green RN  Outcome: Ongoing     Problem: Pain Control  Goal: Maintain pain level at or below patient's acceptable level (or 5 if patient is unable to determine acceptable level)  2/10/2020 0022 by Winnie Green RN  Outcome: Ongoing  Note:   Pain Assessment: 0-10  Pain Level: 4   Patient's Stated Pain Goal: No pain   Is pain goal met at this time?   No     Non-Pharmaceutical Pain Intervention(s): Repositioned, Rest       Problem: Cardiovascular  Goal: No DVT, peripheral vascular complications  5/38/3180 0022 by Winnie Green RN  Outcome: Met This Shift     Problem: Cardiovascular  Goal: Hemodynamic stability  2/10/2020 0022 by Winnie Green RN  Outcome: Ongoing     Problem: Respiratory  Goal: No pulmonary complications  7/97/9026 0022 by Winnie Green RN  Outcome: Ongoing Problem: Respiratory  Goal: O2 Sat > 90%  2/10/2020 0022 by Fam Stubbs RN  Outcome: Ongoing  Note:   Sats maintained on CPAP. Problem: GI  Goal: No bowel complications  4/24/2495 0022 by Fam Stubbs RN  Outcome: Ongoing     Problem:   Goal: Adequate urinary output  2/10/2020 0022 by Fam Stubbs RN  Outcome: Ongoing     Problem: Nutrition  Goal: Optimal nutrition therapy  2/10/2020 0022 by Fam Stubbs RN  Outcome: Ongoing     Problem: Musculor/Skeletal Functional Status  Goal: Absence of falls  2/10/2020 0022 by Fam Stubbs RN  Outcome: Met This [address] reviewed with patient. Patient verbalized understanding of the plan of care and contribute to goal setting.

## 2020-02-10 NOTE — PROGRESS NOTES
Department of Internal Medicine  Section of Endocrinology   [facility]    [address] , [address], [address]  Office Phone [address]  ([phone])  ([address])  [address] Jimena Costa MD, Fellow of [employer] of Endocrinology   Progress Note    Admit Date: 2/2/2020    Reviewed the chart of the last 24 hours:   SUBJECTIVE:     Chief Complaint   Patient presents with    Muscle Pain     Sepsis ([facility] [address] [A41.9]   Patient Active Problem List   Diagnosis Code    TIA (transient ischemic attack) G45.9    Hypothyroidism E03.9    DVT (deep venous thrombosis) ([facility]) I82.409    Depression F32.9    Anxiety F41.9    Fibromyalgia M79.7    Fatty liver K76.0    Tachycardia R00.0    GERD (gastroesophageal reflux disease) K21.9    Recurrent cold sores B00.1    Irritable bowel K58.9    Gastric bypass status for obesity Z98.84    Insomnia G47.00    Ovarian cyst N83.209    Muscle weakness (generalized) M62.81    Alcoholism ([facility]) F10.20    Osteoarthritis M19.90    Hypocalcemia E83.51    Hypomagnesemia E83.42    Moderate malnutrition (HCC) E44.0    Other hypoparathyroidism (HCC) E20.8    Pancytopenia (HCC) D61.818    Palpitations R00.2    Asymptomatic bacteriuria R82.71    Hyperphosphatemia E83.39    Prolonged Q-T interval on ECG R94.31    Hypoalbuminemia E88.09    Tinnitus of both ears H93.13    Hx of parathyroidectomy Z90.09    Normocytic anemia D64.9    History of fibromyalgia Z87.39    Sepsis ([facility]) A41.9     <principal problem not specified>  2/2/2020  6:01 AM  Admission weight: 94 lb 3.2 oz (42.7 kg)  [id number]  [id number]  4K-14/014-A  He has been referred by  for evaluation of  Hypocalcemia  Patient has no complaints.    Medication side effects: none  Patient is eating 75%    Review of Systems  Review of Systems - General ROS: negative   Psychological surgery; Dilatation, esophagus; Colonoscopy (Left, 5/7/2019); and Upper gastrointestinal endoscopy (Left, 5/7/2019). reports that she has never smoked. She has never used smokeless tobacco. She reports current alcohol use of about 9.0 standard drinks of alcohol per week. She reports that she does not use drugs. family history includes Asthma in her maternal grandfather; Breast Cancer in her mother and sister; Cancer in her father; Depression in her father and mother; Heart Attack in her maternal grandmother; High Blood Pressure in her father and mother; Mental Illness in her maternal grandmother; Other in her father, maternal grandfather, maternal grandmother, and mother.   Allergies   Allergen Reactions    Estrogens     Penicillins     Sulfa Antibiotics     Sulfur     Bactrim [Sulfamethoxazole-Trimethoprim] Rash     Current Facility-Administered Medications   Medication Dose Route Frequency Provider Last Rate Last Dose    [START ON 2/11/2020] cefUROXime (CEFTIN) tablet 500 mg  500 mg Oral 2 times per day Ruben Harp MD        calcitRIOL (ROCALTROL) capsule 0.25 mcg  0.25 mcg Oral Daily Isra Martini MD   0.25 mcg at 02/10/20 0900    Calcium Citrate-Vitamin D 500-500 MG-UNIT CHEW 2 tablet  2 tablet Oral Daily Raul Duarte MD   2 tablet at 02/10/20 7729    Vitamin D (CHOLECALCIFEROL) tablet 2,000 Units  2,000 Units Oral Daily Isra Martini MD   2,000 Units at 02/10/20 0900    ipratropium (ATROVENT) 0.02 % nebulizer solution 0.5 mg  0.5 mg Nebulization Q4H PRN [facility]        bumetanide (BUMEX) 12.5 mg in sodium chloride 0.9 % 125 mL infusion  1 mg/hr Intravenous Continuous Ghazala Crow MD 10 mL/hr at 02/10/20 0614 1 mg/hr at 02/10/20 9720    phosphorus replacement protocol   Other RX [id number] MD        metoprolol tartrate (LOPRESSOR) tablet 25 mg  25 mg Oral BID Flor Mcarthur MD   25 CNP   1 tablet at 02/10/20 1307    acyclovir (ZOVIRAX) tablet 400 mg  400 mg Oral BID Claudene Fam, APRN - CNP   400 mg at 02/10/20 0901    DULoxetine (CYMBALTA) extended release capsule 30 mg  30 mg Oral Daily Claudene Fam, APRN - CNP   30 mg at 02/10/20 0901    DULoxetine (CYMBALTA) extended release capsule 60 mg  60 mg Oral Nightly Claudene Fam, APRN - CNP   60 mg at 89/21/40 7624    folic acid (FOLVITE) tablet 1,500 mcg  1,500 mcg Oral Daily Claudene Fam, APRN - CNP   1,500 mcg at 02/10/20 0901    gabapentin (NEURONTIN) capsule 100 mg  100 mg Oral Daily Claudene Fam, APRN - CNP   100 mg at 02/10/20 0901    gabapentin (NEURONTIN) capsule 300 mg  300 mg Oral Nightly Claudene Fam, APRN - CNP   300 mg at 02/09/20 2108    magnesium chloride (MAG DELAY) extended release tablet 64 mg  1 tablet Oral BID Claudene Fam, APRN - CNP   64 mg at 02/10/20 0915    famotidine (PEPCID) tablet 20 mg  20 mg Oral BID Claudene Fam, APRN - CNP   20 mg at 02/10/20 0900    tiZANidine (ZANAFLEX) tablet 4 mg  4 mg Oral Nightly Claudene Fam, APRN - CNP   4 mg at 02/09/20 2111    vitamin A capsule 20,000 Units  20,000 Units Oral Daily Claudene Fam, APRN - CNP   20,000 Units at 02/10/20 0900    vitamin B-1 (THIAMINE) tablet 100 mg  100 mg Oral Daily Claudene Fam, APRN - CNP   100 mg at 02/10/20 0900    vitamin B-6 (PYRIDOXINE) tablet 100 mg  100 mg Oral Daily ANITA Shipley - CNP   100 mg at 02/10/20 0900    NONFORMULARY 50 mcg  50 mcg Injection Daily Eulalia Forbes APRN - CNP   50 mcg at 02/10/20 1043     Home Meds:   Prior to Admission medications    Medication Sig Start Date End Date Taking?  Authorizing Provider   magnesium cl-calcium carbonate (SLOW-MAG) 71.5-119 MG TBEC tablet Take 1 tablet by mouth 2 times daily   Yes Historical Provider, MD   Vitamins-Lipotropics (LIPO-FLAVONOID PLUS PO) Take 5 tablets by mouth daily   Yes Historical Provider, MD DULoxetine (CYMBALTA) 30 MG extended release capsule TAKE 1 CAPSULE DAILY 1/20/20  Yes Cathleen Dong MD   acyclovir (ZOVIRAX) 400 MG tablet TAKE 1 TABLET TWICE A DAY 1/20/20  Yes Cathleen Dong MD   UNABLE TO FIND Inject 0.5 mcg into the skin daily Natpara injection   Yes Historical Provider, MD   calcitRIOL (ROCALTROL) 0.5 MCG capsule Take 0.5 mcg by mouth 4 times daily Two tablets 4 times daily   Yes Historical Provider, MD   Calcium Citrate-Vitamin D (CALCIUM CITRATE CHEWY BITE) 500-500 MG-UNIT CHEW Take 2 tablets by mouth 4 times daily    Yes Historical Provider, MD   vitamin B-6 (PYRIDOXINE) 100 MG tablet Take 100 mg by mouth daily   Yes Historical Provider, MD   aMILoride (MIDAMOR) 5 MG tablet Take 5 mg by mouth daily  10/23/19  Yes Historical Provider, MD   DULoxetine (CYMBALTA) 60 MG extended release capsule Take 1 capsule by mouth nightly 11/14/19  Yes Devi Pedroza MD   gabapentin (NEURONTIN) 100 MG capsule Take 1 capsule by mouth daily for 90 days. 11/14/19 2/12/20 Yes Devi Pedroza MD   gabapentin (NEURONTIN) 300 MG capsule Take 1 capsule by mouth nightly for 180 days.  11/14/19 5/12/20 Yes Devi Pedroza MD   Estradiol (VAGIFEM) 10 MCG TABS vaginal tablet AT THE START OF THERAPY, INSERT 1 TABLET VAGINALLY DAILY FOR 2 WEEKS, THEN USE TWICE WEEKLY THEREAFTER  Patient taking differently: Place 10 mcg vaginally Twice a Week On Saturday and Wednesday 11/14/19  Yes Devi Pedroza MD   metoprolol tartrate (LOPRESSOR) 25 MG tablet Take 0.5 tablets by mouth 2 times daily 11/14/19  Yes Devi Pedroza MD   tiZANidine (ZANAFLEX) 4 MG tablet Take 1 tablet by mouth nightly At bed time 11/14/19  Yes Devi Pedroza MD   zinc gluconate 50 MG tablet TAKE ONE AND ONE-HALF TABLETS (75 MG) DAILY  Patient taking differently: Take 50 mg by mouth daily 2 tablets daily 8/15/19  Yes Cathleen Dong MD   vitamin B-12 (CYANOCOBALAMIN) 500 MCG tablet TAKE 1 TABLET DAILY 8/15/19  Yes Cathleen Dong MD   vitamin B-1 (THIAMINE) 100 MG tablet Take 100 mg by mouth daily   Yes Historical Provider, MD   Vitamin K, Phytonadione, 100 MCG TABS Take 3 tablets by mouth daily  Patient taking differently: Take 1 tablet by mouth daily  4/23/18  Yes Bee Jaramillo MD   FOLIC ACID PO Take 3,586 mg by mouth daily   Yes Historical Provider, MD   Copper 5 MG CAPS Take 5 mg by mouth daily 2/26/18  Yes Bee Jaramillo MD   SYNTHROID 75 MCG tablet Take 75 mg by mouth daily 2/21/18  Yes Historical Provider, MD   SYNTHROID 200 MCG tablet Take 200 mcg by mouth daily 2/16/18  Yes Historical Provider, MD   Cholecalciferol (VITAMIN D3) 5000 units CAPS Take 2 capsules by mouth daily  Patient taking differently: Take 10,000 Units by mouth daily 5 tablets  daily 2/22/18  Yes Bee Jaramillo MD   esomeprazole ([address]) 20 MG delayed release capsule Take 20 mg by mouth 2 times daily   Yes Historical Provider, MD   ranitidine (ZANTAC) 150 MG tablet Take 150 mg by mouth 2 times daily   Yes Historical Provider, MD   vitamin A 35055 units capsule Take 25,000 Units by mouth daily   Yes Historical Provider, MD   NONFORMULARY Whey Protein Powder    Historical Provider, MD   ondansetron (ZOFRAN ODT) 4 MG disintegrating tablet Take 1 tablet by mouth every 8 hours as needed for Nausea 3/14/19   Dain Madden DO     Scheduled Meds:  Tamara Humphrey ON 2/11/2020] cefUROXime  500 mg Oral 2 times per day    calcitRIOL  0.25 mcg Oral Daily    Calcium Citrate-Vitamin D  2 tablet Oral Daily    Vitamin D  2,000 Units Oral Daily    phosphorus replacement protocol   Other RX Placeholder    metoprolol tartrate  25 mg Oral BID    ipratropium-albuterol  1 ampule Inhalation Q4H WA    albumin human  50 g Intravenous Daily    magnesium replacement protocol   Other RX Placeholder    levothyroxine  250 mcg Oral Daily    sodium chloride flush  10 mL Intravenous 2 times per day    enoxaparin  30 mg Subcutaneous Daily    Lipoflavonoid  5 tablet Oral Daily    potassium replacement protocol Other RX Placeholder    acyclovir  400 mg Oral BID    DULoxetine  30 mg Oral Daily    DULoxetine  60 mg Oral Nightly    folic acid  2,768 mcg Oral Daily    gabapentin  100 mg Oral Daily    gabapentin  300 mg Oral Nightly    magnesium chloride  1 tablet Oral BID    famotidine  20 mg Oral BID    tiZANidine  4 mg Oral Nightly    vitamin A  20,000 Units Oral Daily    vitamin B-1  100 mg Oral Daily    vitamin B-6  100 mg Oral Daily    NONFORMULARY 50 mcg  50 mcg Injection Daily     Continuous Infusions:   bumetanide 0.1 mg/mL infusion 1 mg/hr (02/10/20 0614)     PRN Meds:ipratropium, PHENobarbital IVPB **OR** PHENobarbital IVPB **OR** PHENobarbital IVPB, sodium chloride flush, cyclobenzaprine, acetaminophen, ondansetron, oxyCODONE-acetaminophen    OBJECTIVE        Physical    VITALS:  /67   Pulse 102   Temp 98.6 °F (37 °C) (Oral)   Resp 18   Ht 4' 10\" (1.473 m)   Wt 123 lb 10.9 oz (56.1 kg)   SpO2 96%   BMI 25.85 kg/m²   24HR INTAKE/OUTPUT:    Intake/Output Summary (Last 24 hours) at 2/10/2020 1832  Last data filed at 2/10/2020 1340  Gross per 24 hour   Intake 720 ml   Output 2650 ml   Net -1930 ml     CONSTITUTIONAL:  awake, alert, cooperative, no apparent distress, and appears stated age  LUNGS:  No increased work of breathing, good air exchange, clear to auscultation bilaterally, no crackles or wheezing  CARDIOVASCULAR:  Normal apical impulse, regular rate and rhythm, normal S1 and S2, no S3 or S4, and no murmur noted  ABDOMEN:  No scars, normal bowel sounds, soft, non-distended, non-tender, no masses palpated, no hepatosplenomegally    DATA  TSH:    Lab Results   Component Value Date    TSH 0.006 02/02/2020   No components found for: FREE T4No components found for: FREET3  RADIOLOGYREPORTS:    Lab Review  @[email or web address]@  [unfilled]  Lab Results   Component Value Date    TSH 0.006 (L) 02/02/2020    FT3 3.44 11/14/2019    T4FREE 1.80 (H) 02/02/2020     No results found for: FREET4  No

## 2020-02-10 NOTE — PROGRESS NOTES
Progress note: Infectious diseases    Patient - Agueda Lara,  Age - 62 y.o.    - 1960      Room Number - 4K-14/014-A   MRN -  [id number]   Acct # - [de-identified]  Date of Admission -  2020  6:01 AM    SUBJECTIVE:   No new issues. She is still weak and on oxygen. OBJECTIVE   VITALS    height is 4' 10\" (1.473 m) and weight is 123 lb 10.9 oz (56.1 kg). Her axillary temperature is 97.3 °F (36.3 °C). Her blood pressure is 134/80 and her pulse is 85. Her respiration is 18 and oxygen saturation is 100%.        Wt Readings from Last 3 Encounters:   02/10/20 123 lb 10.9 oz (56.1 kg)   20 94 lb 3.2 oz (42.7 kg)   20 91 lb 3.2 oz (41.4 kg)       I/O (24 Hours)    Intake/Output Summary (Last 24 hours) at 2/10/2020 1111  Last data filed at 2/10/2020 0419  Gross per 24 hour   Intake 606.08 ml   Output 1050 ml   Net -443.92 ml       General Appearance  Chronically sick looking, oriented, on nasal oxygen  HEENT - normocephalic, atraumatic, pale conjunctiva,  anicteric sclera  Neck - Supple, no mass  Lungs -  Bilateral   air entry, no rhonchi, no wheeze  Cardiovascular - Heart sounds are normal.     Abdomen - soft, not distended, nontender,   Neurologic -oriented  Skin - No bruising or bleeding  Extremities - + edema,     MEDICATIONS:      calcitRIOL  0.25 mcg Oral Daily    Calcium Citrate-Vitamin D  2 tablet Oral Daily    Vitamin D  2,000 Units Oral Daily    phosphorus replacement protocol   Other RX Placeholder    metoprolol tartrate  25 mg Oral BID    ipratropium-albuterol  1 ampule Inhalation Q4H WA    albumin human  50 g Intravenous Daily    magnesium replacement protocol   Other RX Placeholder    levothyroxine  250 mcg Oral Daily    cefTRIAXone (ROCEPHIN) IV  2 g Intravenous Q24H    sodium chloride flush  10 mL Intravenous 2 times per day    enoxaparin  30 mg Subcutaneous Daily    Lipoflavonoid  5 tablet Oral Daily    potassium replacement protocol   Other RX Placeholder    acyclovir  400 mg Oral BID    DULoxetine  30 mg Oral Daily    DULoxetine  60 mg Oral Nightly    folic acid  1,794 mcg Oral Daily    gabapentin  100 mg Oral Daily    gabapentin  300 mg Oral Nightly    magnesium chloride  1 tablet Oral BID    famotidine  20 mg Oral BID    tiZANidine  4 mg Oral Nightly    vitamin A  20,000 Units Oral Daily    vitamin B-1  100 mg Oral Daily    vitamin B-6  100 mg Oral Daily    NONFORMULARY 50 mcg  50 mcg Injection Daily      bumetanide 0.1 mg/mL infusion 1 mg/hr (02/10/20 0614)     ipratropium, PHENobarbital IVPB **OR** PHENobarbital IVPB **OR** PHENobarbital IVPB, sodium chloride flush, cyclobenzaprine, acetaminophen, ondansetron, oxyCODONE-acetaminophen      LABS:     CBC:   Recent Labs     02/09/20  0530 02/09/20  2200 02/10/20  0440   WBC 9.1 13.2* 12.3*   HGB 6.9* 8.8* 8.8*    231 191     BMP:    Recent Labs     02/09/20  0530 02/09/20  2050 02/10/20  0440    133* 137   K 3.2*  3.2* 4.7 3.5    98 100   CO2 22* 13* 23   BUN <2* <2* <2*   CREATININE 0.4 0.6 0.4   GLUCOSE 107 202* 123*     Calcium:  Recent Labs     02/10/20  0440   CALCIUM 8.0*     Ionized Calcium:No results for input(s): IONCA in the last 72 hours.   Magnesium:  Recent Labs     02/10/20  0440   MG 1.6     Phosphorus:  Recent Labs     02/10/20  0440   PHOS 3.5      Recent Labs     02/07/20  1525 02/08/20  0410 02/09/20  0530 02/10/20  0440   ALKPHOS 102 86 69 62   ALT 20 14 13 14   AST 31 22 25 59*   PROT 3.9* 4.5* 4.5* 5.5*   BILITOT 0.4 0.4 0.5 0.9   BILIDIR <0.2  --   --   --    LABALBU 1.8* 2.7* 3.1* 3.8        Problem list of patient:     Patient Active Problem List   Diagnosis Code    TIA (transient ischemic attack) G45.9    Hypothyroidism E03.9    DVT (deep venous thrombosis) (HCC) I82.409    Depression F32.9    Anxiety F41.9    Fibromyalgia M79.7    Fatty liver K76.0    Tachycardia R00.0    GERD (gastroesophageal reflux disease) K21.9    Recurrent cold sores B00.1    Irritable bowel K58.9    Gastric bypass status for obesity Z98.84    Insomnia G47.00    Ovarian cyst N83.209    Muscle weakness (generalized) M62.81    Alcoholism ([facility]) F10.20    Osteoarthritis M19.90    Hypocalcemia E83.51    Hypomagnesemia E83.42    Moderate malnutrition (HCC) E44.0    Other hypoparathyroidism ([facility]) E20.8    Pancytopenia ([facility]) D61.818    Palpitations R00.2    Asymptomatic bacteriuria R82.71    Hyperphosphatemia E83.39    Prolonged Q-T interval on ECG R94.31    Hypoalbuminemia E88.09    Tinnitus of both ears H93.13    Hx of parathyroidectomy Z90.09    Normocytic anemia D64.9    History of fibromyalgia Z87.39    Sepsis ([facility]) A41.9         ASSESSMENT/PLAN   Sepsis due to Pasteruella related to cat bite: improving , will transition to oral  Hx of alcohol abuse:    Deconditioning: discussed on Rehabilitaiton. She doesn't want to to go [facility].            Mireya Boyle MD, FACP 2/10/2020 11:11 AM

## 2020-02-11 NOTE — PROGRESS NOTES
Department of Internal Medicine  Section of Endocrinology   [facility] ENDOCRINOLOGY AND DIABETES CLINIC    [address] , [address], [address]  Office Phone Shania [address]  ([address])  ([address])  [address]ena Jay MD, Fellow of [employer] of Endocrinology   Progress Note    Admit Date: 2/2/2020    Reviewed the chart of the last 24 hours:   SUBJECTIVE:     Chief Complaint   Patient presents with    Muscle Pain     Sepsis ([facility] [address] [A41.9]   Patient Active Problem List   Diagnosis Code    TIA (transient ischemic attack) G45.9    Hypothyroidism E03.9    DVT (deep venous thrombosis) ([facility]) I82.409    Depression F32.9    Anxiety F41.9    Fibromyalgia M79.7    Fatty liver K76.0    Tachycardia R00.0    GERD (gastroesophageal reflux disease) K21.9    Recurrent cold sores B00.1    Irritable bowel K58.9    Gastric bypass status for obesity Z98.84    Insomnia G47.00    Ovarian cyst N83.209    Muscle weakness (generalized) M62.81    Alcoholism ([facility]) F10.20    Osteoarthritis M19.90    Hypocalcemia E83.51    Hypomagnesemia E83.42    Moderate malnutrition (HCC) E44.0    Other hypoparathyroidism (HCC) E20.8    Pancytopenia (HCC) D61.818    Palpitations R00.2    Asymptomatic bacteriuria R82.71    Hyperphosphatemia E83.39    Prolonged Q-T interval on ECG R94.31    Hypoalbuminemia E88.09    Tinnitus of both ears H93.13    Hx of parathyroidectomy Z90.09    Normocytic anemia D64.9    History of fibromyalgia Z87.39    Sepsis ([facility]) A41.9     <principal problem not specified>  2/2/2020  6:01 AM  Admission weight: 94 lb 3.2 oz (42.7 kg)  [id number]  [id number]  4K-14/014-A  He has been referred by Dr Janelle Rodriguez for evaluation of  Hypocalcemia  Patient has no complaints.    Medication side effects: none  Patient is eating 100%    Review of Systems  Review of Systems - General ROS: negative   Psychological ROS: negative   Hematological and Lymphatic ROS: No history of blood clots or bleeding disorder. Respiratory ROS: no cough, shortness of breath, or wheezing   Cardiovascular ROS: no chest pain or dyspnea on exertion   Gastrointestinal ROS: no abdominal pain, change in bowel habits, or black or bloody stools   Genito-Urinary ROS: no dysuria, trouble voiding, or hematuria   Musculoskeletal ROS: negative   Neurological ROS: no TIA or stroke symptoms   Dermatological ROS: negative    Past Medical, Surgical, Family, Social and Allergy Histories:  I have reviewed the patient's medical history in detail and updated the computerized patient record. has a past medical history of Alcoholism ([facility] [address] Anxiety, Atrophy of vulva, Atyp squam cell of undet signfc cyto smr crvx (ASC-US), Closed fracture of seventh cervical vertebra without spinal cord injury [facility]), Closed fracture of sixth cervical vertebra ([facility]), Closed fracture of thoracic vertebra ([facility] [address] Depression, Dry eye syndrome, DVT (deep venous thrombosis) ([facility] [address] Dyspareunia in female, External hemorrhoids without complication, Fibromyalgia, Gastric bypass status for obesity, GERD (gastroesophageal reflux disease), Hiatal hernia, History of ITP, Hungry bone syndrome, Hx of blood clots, Hyperparathyroidism ([address] Hypocalcemia, Hypothyroidism, Insomnia, Irritable bowel, Lactose intolerance, Menopausal syndrome, Osteoarthritis, Osteopenia, Osteopenia, Ovarian cyst, Oxaluria ([address] Recurrent cold sores, Tachycardia, TIA (transient ischemic attack), and Tubular adenoma of colon. has a past surgical history that includes Wrist fracture surgery (Right); Leg Surgery (Right); Tonsillectomy and adenoidectomy; Gastric bypass surgery (1996); Cholecystectomy (1996); Appendectomy (1996); parathyroidectomy (08/2017); Colonoscopy (approx 2013); Upper gastrointestinal endoscopy (approx 2013); laparoscopy; Foot surgery; Tubal ligation (1996);  Endoscopy, colon, diagnostic; fracture surgery; Dilatation, esophagus; Colonoscopy (Left, 5/7/2019); and Upper gastrointestinal endoscopy (Left, 5/7/2019). reports that she has never smoked. She has never used smokeless tobacco. She reports current alcohol use of about 9.0 standard drinks of alcohol per week. She reports that she does not use drugs. family history includes Asthma in her maternal grandfather; Breast Cancer in her mother and sister; Cancer in her father; Depression in her father and mother; Heart Attack in her maternal grandmother; High Blood Pressure in her father and mother; Mental Illness in her maternal grandmother; Other in her father, maternal grandfather, maternal grandmother, and mother.   Allergies   Allergen Reactions    Estrogens     Penicillins     Sulfa Antibiotics     Sulfur     Bactrim [Sulfamethoxazole-Trimethoprim] Rash     Current Facility-Administered Medications   Medication Dose Route Frequency Provider Last Rate Last Dose    cyclobenzaprine (FLEXERIL) tablet 5 mg  5 mg Oral TID PRN Flor Vail MD        oxyCODONE-acetaminophen (PERCOCET) 5-325 MG per tablet 1 tablet  1 tablet Oral Q6H PRN Flor [address], MD   1 tablet at 02/11/20 1215    ipratropium-albuterol (DUONEB) nebulizer solution 1 ampule  1 ampule Inhalation Q4H PRN Flor Vail MD        cefUROXime (CEFTIN) tablet 500 mg  500 mg Oral 2 times per day Amelie Gómez MD   500 mg at 02/11/20 4179    calcitRIOL (ROCALTROL) capsule 0.25 mcg  0.25 mcg Oral Daily David Martini MD   0.25 mcg at 02/11/20 0807    Calcium Citrate-Vitamin D 500-500 MG-UNIT CHEW 2 tablet  2 tablet Oral Daily Erin Keller MD   2 tablet at 02/11/20 8491    Vitamin D (CHOLECALCIFEROL) tablet 2,000 Units  2,000 Units Oral Daily David Martini MD   2,000 Units at 02/11/20 0805    ipratropium (ATROVENT) 0.02 % nebulizer solution 0.5 mg  0.5 mg Nebulization Q4H PRN [facility] [place]        phosphorus replacement protocol   Other RX Placeholder [address] Cortés MD        metoprolol tartrate (LOPRESSOR) tablet 25 mg  25 mg Oral BID Flor Vail MD   25 mg at 02/11/20 0805    albumin human 25 % IV solution 50 g  50 g Intravenous Daily Qian Eduardo MD   50 g at 02/11/20 7247    magnesium replacement protocol   Other RX Placeholder Flor [address] Cortés MD        levothyroxine (SYNTHROID) tablet 250 mcg  250 mcg Oral Daily Qian Eduardo MD   250 mcg at 02/11/20 0400    sodium chloride flush 0.9 % injection 10 mL  10 mL Intravenous 2 times per day Sky Hall MD   10 mL at 02/10/20 1905    sodium chloride flush 0.9 % injection 10 mL  10 mL Intravenous PRN Sky Hall MD        enoxaparin (LOVENOX) injection 30 mg  30 mg Subcutaneous Daily JEEVAN Angeles   30 mg at 02/11/20 0807    Lipoflavonoid TABS 5 tablet  5 tablet Oral Daily Justyna Angeles   5 tablet at 02/11/20 0809    potassium replacement protocol   Other RX Placeholder Justyna Ortiz        acetaminophen (TYLENOL) tablet 650 mg  650 mg Oral Q4H PRN Divya Mail, APRN - CNP   650 mg at 02/11/20 0805    ondansetron (ZOFRAN) injection 4 mg  4 mg Intravenous Q6H PRN Divya Mail, APRN - CNP        acyclovir (ZOVIRAX) tablet 400 mg  400 mg Oral BID Divya Mail, APRN - CNP   400 mg at 02/11/20 6032    DULoxetine (CYMBALTA) extended release capsule 30 mg  30 mg Oral Daily Divya Mail, APRN - CNP   30 mg at 02/11/20 9288    DULoxetine (CYMBALTA) extended release capsule 60 mg  60 mg Oral Nightly Divya Mail, APRN - CNP   60 mg at 91/72/62 5672    folic acid (FOLVITE) tablet 1,500 mcg  1,500 mcg Oral Daily Divya Mail, APRN - CNP   1,500 mcg at 02/11/20 8056    gabapentin (NEURONTIN) capsule 100 mg  100 mg Oral Daily Divya Frederick, APRN - CNP mouth daily  Patient taking differently: Take 10,000 Units by mouth daily 5 tablets  daily 2/22/18  Yes Bee Jaramillo MD   esomeprazole ([address]) 20 MG delayed release capsule Take 20 mg by mouth 2 times daily   Yes Historical Provider, MD   ranitidine (ZANTAC) 150 MG tablet Take 150 mg by mouth 2 times daily   Yes Historical Provider, MD   vitamin A 29756 units capsule Take 25,000 Units by mouth daily   Yes Historical Provider, MD   NONFORMULARY Whey Protein Powder    Historical Provider, MD   ondansetron (ZOFRAN ODT) 4 MG disintegrating tablet Take 1 tablet by mouth every 8 hours as needed for Nausea 3/14/19   Dain Madden DO     Scheduled Meds:   cefUROXime  500 mg Oral 2 times per day    calcitRIOL  0.25 mcg Oral Daily    Calcium Citrate-Vitamin D  2 tablet Oral Daily    Vitamin D  2,000 Units Oral Daily    phosphorus replacement protocol   Other RX Placeholder    metoprolol tartrate  25 mg Oral BID    albumin human  50 g Intravenous Daily    magnesium replacement protocol   Other RX Placeholder    levothyroxine  250 mcg Oral Daily    sodium chloride flush  10 mL Intravenous 2 times per day    enoxaparin  30 mg Subcutaneous Daily    Lipoflavonoid  5 tablet Oral Daily    potassium replacement protocol   Other RX Placeholder    acyclovir  400 mg Oral BID    DULoxetine  30 mg Oral Daily    DULoxetine  60 mg Oral Nightly    folic acid  3,487 mcg Oral Daily    gabapentin  100 mg Oral Daily    gabapentin  300 mg Oral Nightly    magnesium chloride  1 tablet Oral BID    famotidine  20 mg Oral BID    tiZANidine  4 mg Oral Nightly    vitamin A  20,000 Units Oral Daily    vitamin B-1  100 mg Oral Daily    vitamin B-6  100 mg Oral Daily    NONFORMULARY 50 mcg  50 mcg Injection Daily     Continuous Infusions:  PRN Meds:cyclobenzaprine, oxyCODONE-acetaminophen, ipratropium-albuterol, ipratropium, sodium chloride flush, acetaminophen, ondansetron    OBJECTIVE        Physical    VITALS:  /65 Pulse 88   Temp 98.6 °F (37 °C) (Oral)   Resp 16   Ht 4' 10\" (1.473 m)   Wt 110 lb (49.9 kg)   SpO2 93%   BMI 22.99 kg/m²   24HR INTAKE/OUTPUT:    Intake/Output Summary (Last 24 hours) at 2/12/2020 1701  Last data filed at 2/12/2020 1455  Gross per 24 hour   Intake 1265.46 ml   Output 600 ml   Net 665.46 ml     CONSTITUTIONAL:  awake, alert, cooperative, no apparent distress, and appears stated age  LUNGS:  No increased work of breathing, good air exchange, clear to auscultation bilaterally, no crackles or wheezing  CARDIOVASCULAR:  Normal apical impulse, regular rate and rhythm, normal S1 and S2, no S3 or S4, and no murmur noted  ABDOMEN:  No scars, normal bowel sounds, soft, non-distended, non-tender, no masses palpated, no hepatosplenomegally    DATA  TSH:    Lab Results   Component Value Date    TSH 0.006 02/02/2020   No components found for: FREE T4No components found for: FREET3  RADIOLOGYREPORTS:    Lab Review  @[email or web address]@  [unfilled]  Lab Results   Component Value Date    TSH 0.006 (L) 02/02/2020    FT3 3.44 11/14/2019    T4FREE 1.80 (H) 02/02/2020     No results found for: FREET4  No results found for: TESTOSTERONE  CBC:  Lab Results   Component Value Date    WBC 11.6 02/11/2020    RBC 2.77 02/11/2020    HGB 8.7 02/11/2020    HCT 25.6 02/11/2020    MCV 92.4 02/11/2020    MCH 31.4 02/11/2020    MCHC 34.0 02/11/2020    RDW 12.0 02/12/2018     02/11/2020    MPV 11.3 02/11/2020     CMP:    Lab Results   Component Value Date     02/11/2020    K 3.3 02/11/2020    K 2.9 02/11/2020    CL 95 02/11/2020    CO2 30 02/11/2020    BUN <2 02/11/2020    CREATININE 0.6 02/11/2020    LABGLOM >90 02/11/2020    GLUCOSE 93 02/11/2020    PROT 5.4 02/11/2020    LABALBU 3.8 02/11/2020    CALCIUM 8.0 02/11/2020    BILITOT 0.9 02/11/2020    ALKPHOS 66 02/11/2020    AST 32 02/11/2020    ALT 12 02/11/2020     Hepatic Function Panel:    Lab Results   Component Value Date    ALKPHOS 66 02/11/2020    ALT 12 02/11/2020    AST 32 02/11/2020    PROT 5.4 02/11/2020    BILITOT 0.9 02/11/2020    BILIDIR <0.2 02/07/2020    LABALBU 3.8 02/11/2020     Magnesium:    Lab Results   Component Value Date    MG 1.6 02/11/2020     PT/INR:    Lab Results   Component Value Date    PROTIME 10.8 07/13/2017    INR 1.25 02/02/2020     HgBA1c:    Lab Results   Component Value Date    LABA1C 4.1 02/08/2018      No results for input(s): CKTOTAL, CKMB, CKMBINDEX, TROPONINI in the last 72 hours. FLP:    Lab Results   Component Value Date    HDL 82 02/12/2018    LDLCALC 94 02/12/2018     DIET: DIET GENERAL;  Dietary Nutrition Supplements: Clear Liquid Oral Supplement  Impression:    Active Problems:    Sepsis ([address]  Resolved Problems:    * No resolved hospital problems.  *        ASSESSMENT AND PLAN

## 2020-02-11 NOTE — PROGRESS NOTES
[address] [address]  INPATIENT PHYSICAL THERAPY  DAILY NOTE  [facility] ICU STEPDOWN TELEMETRY 4K - 4K-14/014-A      Time In: 0840  Time Out: 7044  Timed Code Treatment Minutes: 39 Minutes  Minutes: 41          Date: 2020  Patient Name: Contreras Quezada,  Gender:  female        MRN: [id number]  : 1960  (62 y.o.)     Referring Practitioner: Maria A Melton MD  Diagnosis: sepsis  Additional Pertinent Hx: Per ER note on 2/3/2020: 62 y.o. female who presents to the Emergency Department for the evaluation of myalgias. The patient states that she developed bilateral leg cramps yesterday afternoon. The patient states that she developed bilateral lower back spasms last night. She rates her pain at a 6/10 in severity. The patient reports associated mild shortness of breath that worsened yesterday. The patient denies any chest pain or coughing. The patient reports chills and sweating but denies known fevers. She reports tinnitus of the right ear for which she has followed with ENT. She reports possible swelling of the RLE greater than baseline. She denies chest pain, congestion, ear pain or drainage, runny nose, sinus pain or pressure, abdominal pain, nausea, vomiting, diarrhea, constipation, weakness, numbness, or tingling. The patient states that she has been using Salonpas patches on her back without pain relief. The patient states that she was nauseated yesterday, but that has resolved today. The patient has a past medical history of alcoholism, closed fracture of the sixth and seventh cervical vertebra, closed fracture of thoracic vertebra, DVT, fibromyalgia, GERD, hiatal hernia, ITP, hungry bone syndrome, hyperparathyroidism, hypocalcemia, hypothyroidism, insomnia, irritable bowel, osteoarthritis, menopausal syndrome, osteopenia, ovarian cyst, oxaluria, tachycardia, TIA, and tubular adenoma of colon.      Prior Level of Function:  Lives With: Spouse  Type of Home: [place] Layout: One level  Home Access: Stairs to enter with rails(5)  Home Equipment: Rolling walker, Cane   Bathroom Shower/Tub: Tub/Shower unit  Bathroom Toilet: Standard  Bathroom Equipment: Grab bars in shower, Shower chair    ADL Assistance: Independent  Homemaking Assistance: Needs assistance(reports  does the cooking)  Ambulation Assistance: Independent  Transfer Assistance: Independent  Additional Comments: Pt reports fully indep PLOF without AD. No family present to verify.      Restrictions/Precautions:  Restrictions/Precautions: Fall Risk, General Precautions    SUBJECTIVE: nursing ok'd therapy, pt was on room air on arrival did spot check her O2 sats she did drop to 87% at one time but recovered within a min and rest breaks other times her sats were above 90%, pt did c/o of dizziness when she was finished in bathroom however this improved after a sitting rest break, pt impulsive and demonstrated decreased safety awareness erick with balance deficits     PAIN: no number given pt c/o of pain in carloz LEs     OBJECTIVE:  Transfers:  Sit to Stand: Contact Guard Assistance, slightly unsteady wtih initial standing balance   Stand to Sit:Contact Guard Assistance    Ambulation:  Min assist to [employer]  Distance: 10x2 w/o AD needing min assist, 65x1 w/ walker CGA   Surface: Level Tile  Device:first walk w/o AD, second with walk   Gait Deviations:  Noted w/o AD pt had loss of balance arms in semi guarded position and wide base of support she may benefit from AD for energy conservation so for second walk used walker noted improved step length and more steady on feet     Balance:  standing balance no UE at support pt reaching just slightly out of base of support with CGA to min assist she had a loss of balance post x 2 times  and on another occ she was reaching forward ~4in out of base and had a loss of balance forward     Exercise:  Patient was guided in 1 set(s) 10 reps of exercise to both lower extremities. Long arc quads and Seated hamstring curls, standing ex with UE at support and CGA for balance heel raises, toe raises, hip abd/add, mini squats, hamstring curls. Exercises were completed for increased independence with functional mobility. Functional Outcome Measures: Completed  AM-PAC Inpatient Mobility without Stair Climbing Raw Score : 15  AM-PAC Inpatient without Stair Climbing T-Scale Score : 43.03    ASSESSMENT:  Assessment: Patient progressing toward established goals. and pt tolerated increased activity this date she is impulsive and demonstrated decreased safety awareness erick of her balance deficits and cont to demonstrate generalized decreased strength and endurance and balance, she would greatly benefit from cont skilled therapy to work on strength, balance, endurance and increased independence iwth functional mobility prior to return home   Activity Tolerance:  Patient tolerance of  treatment: good. Equipment Recommendations: Other: monitor for needs  Discharge Recommendations:    Continue to assess pending progress(anticipate pt would benefit from an inpatient therapy stay prior to discharge home )    Plan: Times per week: 3-5x GM  Times per day: Daily  Specific instructions for Next Treatment: ther ex, ther act, gait trng  Current Treatment Recommendations: Strengthening, Transfer Training, Endurance Training, Balance Training, Gait Training, Functional Mobility Training, Safety Education & Training, Home Exercise Program, Equipment Evaluation, Education, & procurement, Patient/Caregiver Education & Training, Neuromuscular Re-education    Patient Education  Patient Education: Plan of Care, safety concerns not to get up on own and use of call lt, we also discussed ex to work on during the day and to ask nursing staff to take her for walks during the day     Goals:  Patient goals : go home  Short term goals  Time Frame for Short term goals: at discharge  Short term goal 1: Pt to

## 2020-02-11 NOTE — PROGRESS NOTES
Nutrition Assessment    Type and Reason for Visit: Reassess    Nutrition Recommendations:   *Recommend a Multivitamin w/minerals daily d/t severe malnutrition and hx of ETOH abuse. *Continue other vitamins as ordered. *Continue Ensure Clear, send BID per patient request.    Nutrition Assessment:   Pt improving from a nutritional standpoint AEB patient report of improving appetite and acceptance of Ensure Clear. Remains at risk for further nutritional compromise r/t admit with sepsis, severe malnutrition and underlying medical condition (osteopenia, alcoholism, hx gastric bypass). Nutrition recommendations/interventions as per above. Malnutrition Assessment:  · Malnutrition Status: Meets the criteria for severe malnutrition  · Context: Acute illness or injury  · Findings of the 6 clinical characteristics of malnutrition (Minimum of 2 out of 6 clinical characteristics is required to make the diagnosis of moderate or severe Protein Calorie Malnutrition based on AND/ASPEN Guidelines):  1. Energy Intake-Less than or equal to 50% of estimated energy requirement, Greater than or equal to 5 days    2. Weight Loss-Unable to assess(difficult to assess d/t pt with pitting edema noted/fluid shifts),    3. Fat Loss-Moderate subcutaneous fat loss, Orbital  4. Muscle Loss-Moderate muscle mass loss, Temples (temporalis muscle)  5. Fluid Accumulation-Moderate to severe fluid accumulation, Extremities  6.  Strength-Not measured    Nutrition Risk Level:  Moderate    Nutrient Needs:  · Estimated Daily Total Kcal: 8109-2665 kcal/day (22-25 kcal/kg - 59.4 kg on 2/7)  · Estimated Daily Protein (g): 55-65 g/day (1.3-1.5 g/kg - IBW 43 kg)    Nutrition Diagnosis:   · Problem: Severe malnutrition, In context of acute illness or injury  · Etiology: related to Insufficient energy/nutrient consumption     Signs and symptoms:  as evidenced by Diet history of poor intake, Moderate loss of subcutaneous fat, Moderate muscle

## 2020-02-11 NOTE — PROGRESS NOTES
[facility] ICU STEPDOWN TELEMETRY 4K  Occupational Therapy  Daily Note  Time:   Time In: 1350  Time Out: 6804  Timed Code Treatment Minutes: 24 Minutes  Minutes: 24          Date: 2020  Patient Name: Delvin Red,   Gender: female      Room: -14/014-A  MRN: [id number]  : 1960  (62 y.o.)  Referring Practitioner: Dr. Sania Camacho  Diagnosis: sepsis  Additional Pertinent Hx: Per ER note on 2/3/2020: 62 y.o. female who presents to the Emergency Department for the evaluation of myalgias. The patient states that she developed bilateral leg cramps yesterday afternoon. The patient states that she developed bilateral lower back spasms last night. She rates her pain at a 6/10 in severity. The patient reports associated mild shortness of breath that worsened yesterday. The patient denies any chest pain or coughing. The patient reports chills and sweating but denies known fevers. She reports tinnitus of the right ear for which she has followed with ENT. She reports possible swelling of the RLE greater than baseline. She denies chest pain, congestion, ear pain or drainage, runny nose, sinus pain or pressure, abdominal pain, nausea, vomiting, diarrhea, constipation, weakness, numbness, or tingling. The patient states that she has been using Salonpas patches on her back without pain relief. The patient states that she was nauseated yesterday, but that has resolved today. The patient has a past medical history of alcoholism, closed fracture of the sixth and seventh cervical vertebra, closed fracture of thoracic vertebra, DVT, fibromyalgia, GERD, hiatal hernia, ITP, hungry bone syndrome, hyperparathyroidism, hypocalcemia, hypothyroidism, insomnia, irritable bowel, osteoarthritis, menopausal syndrome, osteopenia, ovarian cyst, oxaluria, tachycardia, TIA, and tubular adenoma of colon.      Restrictions/Precautions:  Restrictions/Precautions: Fall Risk, General Precautions    SUBJECTIVE: Pt up with nursing ambulating to BR upon arrival, agreeable to OT session. PAIN: No c/o. COGNITION: Slow Processing, Decreased Problem Solving and Decreased Safety Awareness    ADL:   Toileting: Contact Guard Assistance. For hygeine and clothing management after void. Toilet Transfer: Contact Guard Assistance. STS. BALANCE:  Sitting Balance:  Stand By Assistance. Standing Balance: Contact Guard Assistance. x1 minute within ADLs, in prep for mobility. BED MOBILITY:  Sit to Supine: Stand By Assistance   Scooting: Stand By Assistance EOB. TRANSFERS:  Sit to Stand:  Contact Guard Assistance. Stand to Sit: Contact Guard Assistance. FUNCTIONAL MOBILITY:  Assistive Device: Rolling Walker  Assist Level:  Contact Guard Assistance. Distance: To and from bathroom and within unit hallway. Completed functional mobility to/from BR and within unit hallway at slow pace, no LOB noted. Pt requires min cues for walker safety- seated rest break after trial of mobility, min fatigue noted. Pt now on room air- O2 sats WNL throughout. ASSESSMENT:     Activity Tolerance:  Patient tolerance of  treatment: fair. Pt limited by fatigue. Discharge Recommendations: Continue to assess pending progress  Equipment Recommendations: Other: Monitor pending progress  Plan: Times per week: 5x  Current Treatment Recommendations: Balance Training, Self-Care / ADL, Functional Mobility Training, Endurance Training, Safety Education & Training, Cognitive Reorientation    Patient Education  Patient Education: ADL's, Importance of Increasing Activity, Assistive Device Safety and Safety with transfers and mobility.     Goals  Short term goals  Time Frame for Short term goals: 2 weeks  Short term goal 1: Complete sit-stand from various surfaces including toilet with 0>mod A x 1  Short term goal 2: Complete mobility to/from bathroom with RW & min A  Short term goal 3: Complete 2 min standing with 0>min A to increase ease of toileting routine  Short term goal 4: Complete BADL routine with min A & min vcs for safety & sequencing  Long term goals  Time Frame for Long term goals : No LTG set d/t short ELOS    Following session, patient left in safe position with all fall risk precautions in place.

## 2020-02-11 NOTE — PROGRESS NOTES
Progress note: Infectious diseases    Patient - Emil Ávlarez,  Age - 62 y.o.    - 1960      Room Number - 4K-14/014-A   MRN -  [id number]   Acct # - [de-identified]  Date of Admission -  2020  6:01 AM    SUBJECTIVE:   She feels better. She is off oxygen. OBJECTIVE   VITALS    height is 4' 10\" (1.473 m) and weight is 101 lb 13.6 oz (46.2 kg). Her oral temperature is 98.1 °F (36.7 °C). Her blood pressure is 113/72 and her pulse is 91. Her respiration is 16 and oxygen saturation is 93%. Wt Readings from Last 3 Encounters:   20 101 lb 13.6 oz (46.2 kg)   20 94 lb 3.2 oz (42.7 kg)   20 91 lb 3.2 oz (41.4 kg)       I/O (24 Hours)    Intake/Output Summary (Last 24 hours) at 2020 1824  Last data filed at 2020 1344  Gross per 24 hour   Intake 1393.01 ml   Output 3625 ml   Net -2231.99 ml       General Appearance  Chronically sick looking, oriented,    HEENT - normocephalic, atraumatic, pale conjunctiva,  anicteric sclera  Neck - Supple, no mass.   Lungs -  Bilateral   air entry, no rhonchi, no wheeze  Cardiovascular - Heart sounds are normal.     Abdomen - soft, not distended, nontender,   Neurologic -oriented  Skin - No bruising or bleeding  Extremities - + edema,     MEDICATIONS:      cefUROXime  500 mg Oral 2 times per day    calcitRIOL  0.25 mcg Oral Daily    Calcium Citrate-Vitamin D  2 tablet Oral Daily    Vitamin D  2,000 Units Oral Daily    phosphorus replacement protocol   Other RX Placeholder    metoprolol tartrate  25 mg Oral BID    albumin human  50 g Intravenous Daily    magnesium replacement protocol   Other RX Placeholder    levothyroxine  250 mcg Oral Daily    sodium chloride flush  10 mL Intravenous 2 times per day    enoxaparin  30 mg Subcutaneous Daily    Lipoflavonoid  5 tablet Oral Daily    potassium replacement protocol   Other RX Placeholder   

## 2020-02-11 NOTE — FLOWSHEET NOTE
7670 Patient having new confusion after being alert and oriented throughout the day. [facility] alcohol withdrawal phenobarb protocol is ordered, but patient has not received a dose of phenobarb for 5 days. Expressed these concerns to MANI Paz.      02/10/20 2569   Provider Notification   Reason for Communication Evaluate  (recurring symptoms of confusion/hallucinations)   Provider Name Campbell Das   Provider Notification Physician Assistant   Method of Communication Secure Message   Response See orders  (stat ABGs)   Notification Time  ABGs and blood glucose were drawn and normal. Adalgisa gave the ok to use the alcohol withdrawal phenobarb protocol as ordered.

## 2020-02-11 NOTE — PLAN OF CARE
Problem: Impaired respiratory status  Goal: Clear lung sounds  2/11/2020 0804 by Pedro Staples RCP  Outcome: Ongoing    Improve breath sounds, increase aeration and decrease WOB.

## 2020-02-11 NOTE — PLAN OF CARE
Problem: Impaired respiratory status  Goal: Clear lung sounds  2/11/2020 0804 by Nicki Irwin RCP  Outcome: Ongoing    Improve breath sounds, increase aeration and decrease WOB.

## 2020-02-11 NOTE — PROGRESS NOTES
Hospitalist Progress Note      Patient:  Lucia Neal    Unit/Bed:4K-14/014-A  YOB: 1960  MRN: [id number]   Acct: [de-identified]     PCP: Eliecer Martínez MD  Date of Admission: 2/2/2020    Assessment/Plan:     1. Acute hypoxic respiratory failure secondary to acute congestive heart failure systolic-has history of cardiomyopathy likely alcohol-related--add IV Bumex 1 5 mg twice daily> Monitor daily weights, strict  Intake and Out put, monitor K, Ical , Mag, BMP daily, replace lytes accordingly  2/9--> continue IV BUMEX, clinically improving was +10L,  Negative 1 L since yesterday--> no weight recorded    2/10--> patient was moved to a [facility] telemetry unit since patient was clinically better however last night had increasing shortness of breath that needed BiPAP and chest x-ray worsened pulmonary edema. Patient was on 1 mg Bumex IV twice daily, did not improve and hence was started on Bumex drip last night by the nighttime hospitalist patient was then transferred back to stepdown unit. -->  Currently back on 1 L of oxygen diuresed well lost couple of pounds--> wean oxygen as able    2/11--on room air, stop the IV Bumex drip, diuresed really well lost almost 30 pounds--keep off diuretics for today may start oral diuretics tomorrow. Replace electrolytes magnesium and potassium    2. Acute on chronic microcytic fe def anemia ---> occult blood pending--> received venofer xq dose 2/8/2020-->   hbg 6.9 on 2/9--> added one unit prbc  2/10--hemoglobin around 8.8--> no gross bleeds no transfusion requirement today   2/11-hemoglobin stable around 8.6- 8.8  3. Alcohol Abuse: With withdrawal--on phenobarbital protocol> received 1 dose last night--symptoms seems to be controlled currently and not quite receive anymore> , continue thiamine and folic acid and multivitamins. Aspiration and seizure precautions. --Did not receive any phenobarbital on 2/6 and 02/7/ 2/8 or 2/9    4.  Sepsis (POA): Resolved-3/4 magnesium chloride  1 tablet Oral BID    famotidine  20 mg Oral BID    tiZANidine  4 mg Oral Nightly    vitamin A  20,000 Units Oral Daily    vitamin B-1  100 mg Oral Daily    vitamin B-6  100 mg Oral Daily    NONFORMULARY 50 mcg  50 mcg Injection Daily     PRN Meds: cyclobenzaprine, oxyCODONE-acetaminophen, ipratropium-albuterol, ipratropium, sodium chloride flush, acetaminophen, ondansetron      Intake/Output Summary (Last 24 hours) at 2/11/2020 1646  Last data filed at 2/11/2020 1344  Gross per 24 hour   Intake 1393.01 ml   Output 3625 ml   Net -2231.99 ml       Diet:  DIET GENERAL;  Dietary Nutrition Supplements: Clear Liquid Oral Supplement    Exam:  /72   Pulse 91   Temp 98.1 °F (36.7 °C) (Oral)   Resp 16   Ht 4' 10\" (1.473 m)   Wt 101 lb 13.6 oz (46.2 kg)   SpO2 93%   BMI 21.29 kg/m²     General appearance: Chronically ill appearing white female, alert awake oriented x3, in mild distress secondary to shortness of breath  HEENT: Pupils equal, round, and reactive to light. Conjunctivae/corneas clear. Neck: Supple, with full range of motion. No jugular venous distention. Trachea midline. Respiratory:   Clear on auscultation  cardiovascular: Regular rate and rhythm with normal S1/S2 without murmurs, rubs or gallops. Abdomen: Soft, non-tender, non-distended with normal bowel sounds. Musculoskeletal: passive and active ROM x 4 extremities. Skin: Skin color, texture, turgor normal.  No rashes or lesions. Mediport, right chest   Neurologic:  Neurovascularly intact without any focal sensory/motor deficits.  Cranial nerves: II-XII intact, grossly non-focal.    Capillary Refill: Brisk,< 3 seconds   Peripheral Pulses: +2 palpable, equal bilaterally       Labs:   Recent Labs     02/11/20  0350   WBC 11.6*   HGB 8.7*   HCT 25.6*        Recent Labs     02/11/20  0350 02/11/20  1205     --    K 2.9* 3.3*   CL 95*  --    CO2 30  --    BUN <2*  --    CREATININE 0.6  --    CALCIUM 8.0*  -- PHOS 3.1  --      Recent Labs     02/11/20  0350   AST 32   ALT 12   BILITOT 0.9   ALKPHOS 66     No results for input(s): INR in the last 72 hours. No results for input(s): [place] in the last 72 hours. Urinalysis:      Lab Results   Component Value Date    NITRU NEGATIVE 02/02/2020    WBCUA 0-2 12/06/2019    BACTERIA FEW 12/06/2019    RBCUA 0-2 12/06/2019    BLOODU NEGATIVE 02/02/2020    SPECGRAV 1.022 04/18/2019    GLUCOSEU NEGATIVE 02/02/2020       Radiology:   All imaging reviewed     Diet: DIET GENERAL;  Dietary Nutrition Supplements: Clear Liquid Oral Supplement      Code Status: Full Code            Electronically signed by Constance Hinojosa MD on 2/11/2020 at 4:46 PM

## 2020-02-11 NOTE — CARE COORDINATION
2/11/20, 12:05 PM    DISCHARGE PLANNING EVALUATION      SW spoke to patient and her spouse, answered all questions regarding TCU and Rehab and insurance coverage

## 2020-02-12 NOTE — PROGRESS NOTES
Hospitalist Progress Note      Patient:  Betty Hightower    Unit/Bed:4K-14/014-A  YOB: 1960  MRN: [id number]   Acct: [de-identified]     PCP: Michael Issa MD  Date of Admission: 2/2/2020    Assessment/Plan:     1. Acute hypoxic respiratory failure secondary to acute congestive heart failure systolic-has history of cardiomyopathy likely alcohol-related--add IV Bumex 1 5 mg twice daily> Monitor daily weights, strict  Intake and Out put, monitor K, Ical , Mag, BMP daily, replace lytes accordingly  2/9--> continue IV BUMEX, clinically improving was +10L,  Negative 1 L since yesterday--> no weight recorded    2/10--> patient was moved to a [facility] telemetry unit since patient was clinically better however last night had increasing shortness of breath that needed BiPAP and chest x-ray worsened pulmonary edema. Patient was on 1 mg Bumex IV twice daily, did not improve and hence was started on Bumex drip last night by the nighttime hospitalist patient was then transferred back to stepdown unit. -->  Currently back on 1 L of oxygen diuresed well lost couple of pounds--> wean oxygen as able    2/11--on room air, stop the IV Bumex drip, diuresed really well lost almost 30 pounds--keep off diuretics for today may start oral diuretics tomorrow. Replace electrolytes magnesium and potassium    2/12-still on room air will add oral diuretics at discharge to TCU    2. Acute on chronic microcytic fe def anemia ---> occult blood pending--> received venofer xq dose 2/8/2020-->   hbg 6.9 on 2/9--> added one unit prbc  2/10--hemoglobin around 8.8--> no gross bleeds no transfusion requirement today   2/11-hemoglobin stable around 8.6- 8.8  2/12 hemoglobin around 7.9 however no gross bleeds we will add Venofer 1 more dose 200 mg IV today plan to check hemoglobin tomorrow if less than 7 consider transfusion  Follow-up with GI as outpatient for EGD and colonoscopy    3. Alcohol Abuse:  With withdrawal--on phenobarbital protocol> received 1 dose last night--symptoms seems to be controlled currently and not quite receive anymore> , continue thiamine and folic acid and multivitamins. Aspiration and seizure precautions. --Did not receive any phenobarbital on 2/6 and 02/7/ 2/8 or 2/9    4. Sepsis (POA): Resolved-3/4 SIRS. 2/2 Blood cultures growing Pasteurella Multocida. Will await sensitives. Pt reports having had been scratched by a cat. BP has been low, pt had received 3L of fluid boluses during the time of admission. ID consulted. Cont IV Rocephin.  -->  Changed to oral cefuroxime on 2/10 by ID    5. Pasteurella Multocida Bacteremia: likely from cat scratch. Cont IV Rocephin. ID consulted. Appreciate ID recommendations . Change to oral antibiotic? 2/10--change to oral cefuroxime by ID    6. Acute hypoxic respiratory failure-etiology? Currently at 80 percentage on 1 L wean oxygen as able     7. Hypocalcemaia: 2/2 s/p parathyroidectomy. Ical 1.08, 1.35. patient is on calcitriol, calcium citrate-vitamin D tablets twice daily 2/5--calcium levels are actually high iCal is 1.6 holding  calcium supplements today-we will do an endocrinology consult to aid in the dosing    2/6--> seen by endocrinology reduce the dose of the calcium supplements to 2 tablet 4 times daily also on calcitriol check iCal daily    2/7--> ionized calcium levels are better and it is normal--endocrinology managing>   Hypomagnesemia-secondary to poor oral intake as well as being an alcoholic getting replaced per protocol    2/8--> ionized levels are normal  2/9-> levels are normal  2/10--level is around 1.08--will need replacement will inform endocrinologist.  2/11--> informed endocrinology--> likely will need to increase ca supplemtns    8. S/P Parathyroidectomy: Removed due to hyperparathyroidism with Hunger Bone Syndrome. 9. History of Gastric Bypass: Aware. 2018. 10. Severe malnutrition--> dietary cons  11.  History of TIA: Aware. 12. Hypothyroidism: Continue Synthroid.   13. Deconditioning-added PT OT ambulate, add TCU consulted--> plan for to go to TCU once medically stable  14. Acute lactic acidosis-secondary to hypoxia? ??  Or lab error? ?  This was completely normal in the next set of labs. Stable for transfer to TCU today  CBC and BMP a.m. All ionized calcium should be called into the endocrinologist and instruction given in the discharge orders    Discharge time 35 minutes      Chief Complaint: fever     Hospital Course: 62 y.o. female who presented to [facility] with muscle aches; she states yesterday she developed muscle cramps that started in her legs and then came up to her back; she still has severe cramps to her left lower back with any type of movement; she also relates to fever and chills; she relates to lightheadedness and dizziness, states she developed a cough today that is nonproductive, denies chest pain, relates to dyspnea on exertion, relates to nausea, states she is lost 85 pounds in the last year and feels it secondary to parathyroidism; in the ER she was given Rocephin and Zithromax for right-sided pneumonia; she had a MediPort placed in December 2019 secondary to her calcium infusions; she is fully awake and alert;  states she looks a little pale but otherwise at her baseline; she is being admitted to the hospital service for further care and evaluation.     Subjective:   Doing much better  In room air  Eating better  Denies any shortness of breath chest pain nausea vomiting diarrhea  Looking well with PT    Medications:  Reviewed    Infusion Medications     Scheduled Medications    magnesium sulfate  2 g Intravenous Once    iron sucrose  200 mg Intravenous Once    cefUROXime  500 mg Oral 2 times per day    calcitRIOL  0.25 mcg Oral Daily    Calcium Citrate-Vitamin D  2 tablet Oral Daily    Vitamin D  2,000 Units Oral Daily    phosphorus replacement protocol   Other RX Placeholder    metoprolol tartrate  25 mg Oral BID    albumin human  50 g Intravenous Daily    magnesium replacement protocol   Other RX Placeholder    levothyroxine  250 mcg Oral Daily    sodium chloride flush  10 mL Intravenous 2 times per day    enoxaparin  30 mg Subcutaneous Daily    Lipoflavonoid  5 tablet Oral Daily    potassium replacement protocol   Other RX Placeholder    acyclovir  400 mg Oral BID    DULoxetine  30 mg Oral Daily    DULoxetine  60 mg Oral Nightly    folic acid  9,139 mcg Oral Daily    gabapentin  100 mg Oral Daily    gabapentin  300 mg Oral Nightly    magnesium chloride  1 tablet Oral BID    famotidine  20 mg Oral BID    tiZANidine  4 mg Oral Nightly    vitamin A  20,000 Units Oral Daily    vitamin B-1  100 mg Oral Daily    vitamin B-6  100 mg Oral Daily    NONFORMULARY 50 mcg  50 mcg Injection Daily     PRN Meds: heparin flush, cyclobenzaprine, oxyCODONE-acetaminophen, ipratropium-albuterol, ipratropium, sodium chloride flush, acetaminophen, ondansetron      Intake/Output Summary (Last 24 hours) at 2/12/2020 1436  Last data filed at 2/12/2020 0241  Gross per 24 hour   Intake 965.46 ml   Output 600 ml   Net 365.46 ml       Diet:  DIET GENERAL;  Dietary Nutrition Supplements: Clear Liquid Oral Supplement    Exam:  /67   Pulse 83   Temp 98.4 °F (36.9 °C) (Oral)   Resp 16   Ht 4' 10\" (1.473 m)   Wt 110 lb (49.9 kg)   SpO2 97%   BMI 22.99 kg/m²     General appearance: Chronically ill appearing white female, alert awake oriented x3, in mild distress secondary to shortness of breath  HEENT: Pupils equal, round, and reactive to light. Conjunctivae/corneas clear. Neck: Supple, with full range of motion. No jugular venous distention. Trachea midline. Respiratory:   Clear on auscultation  cardiovascular: Regular rate and rhythm with normal S1/S2 without murmurs, rubs or gallops. Abdomen: Soft, non-tender, non-distended with normal bowel sounds.   Musculoskeletal: passive and active ROM x 4 extremities. Skin: Skin color, texture, turgor normal.  No rashes or lesions. Mediport, right chest   Neurologic:  Neurovascularly intact without any focal sensory/motor deficits. Cranial nerves: II-XII intact, grossly non-focal.    Capillary Refill: Brisk,< 3 seconds   Peripheral Pulses: +2 palpable, equal bilaterally       Labs:   Recent Labs     02/12/20  0305   WBC 13.0*   HGB 7.9*   HCT 24.1*        Recent Labs     02/12/20 0305      K 3.8  3.8   CL 94*   CO2 30   BUN 3*   CREATININE 0.6   CALCIUM 8.0*   PHOS 3.5     Recent Labs     02/12/20  0305   AST 26   ALT 12   BILITOT 0.9   ALKPHOS 58     No results for input(s): INR in the last 72 hours. No results for input(s): Murleen [place] in the last 72 hours. Urinalysis:      Lab Results   Component Value Date    NITRU NEGATIVE 02/02/2020    WBCUA 0-2 12/06/2019    BACTERIA FEW 12/06/2019    RBCUA 0-2 12/06/2019    BLOODU NEGATIVE 02/02/2020    SPECGRAV 1.022 04/18/2019    GLUCOSEU NEGATIVE 02/02/2020       Radiology:   All imaging reviewed     Diet: DIET GENERAL;  Dietary Nutrition Supplements: Clear Liquid Oral Supplement      Code Status: Full Code            Electronically signed by Juluis Goldmann, MD on 2/12/2020 at 2:36 PM

## 2020-02-12 NOTE — PROGRESS NOTES
[address] [address]  Transitional Care Unit Preadmission Assessment    Patient Name: Renay Leung        MRN: [id number]    Rodri Fields: [de-identified]    : 1960  (60 y.o.)  Gender: female     Admitted From:  [x][facility] []Outside Admission - Location:  Date of Admission to the hospital:2020  Date patient eligible for admission:20  Primary Diagnosis Sepsis    Did patient have surgery?   [] Yes- Explain:   [x] No    Physicians:HospitalistLianet Marzella File, [address] [place] [address] for clinical complications/co-morbidities:   Past Medical History:   Diagnosis Date    Alcoholism ([address]     Anxiety     Atrophy of vulva     Atyp squam cell of undet signfc cyto smr crvx (ASC-US)     Closed fracture of seventh cervical vertebra without spinal cord injury ([address]     Closed fracture of sixth cervical vertebra (HCC)     Closed fracture of thoracic vertebra (HCC)     Depression     Dry eye syndrome     DVT (deep venous thrombosis) (HCC)     left arm; after contrast for MRI brain    Dyspareunia in female     External hemorrhoids without complication     Fibromyalgia     Gastric bypass status for obesity 2018    GERD (gastroesophageal reflux disease)     Hiatal hernia     History of ITP     as a senior in high school; no active issues    Hungry bone syndrome     Hx of blood clots     Hyperparathyroidism ([address]     Prior hospitalization with hungry bone syndrome on IV calcium in the past    Hypocalcemia     Hypothyroidism     Insomnia     Irritable bowel     Lactose intolerance     uses lactaid which helps    Menopausal syndrome     Osteoarthritis     Osteopenia     on reclast    Osteopenia     Ovarian cyst     repeat imaging in 2018 recommended    Oxaluria ([address]     Recurrent cold sores     on acyclovir    Tachycardia     due to hyperparathyroidism; on atenolol    TIA (transient ischemic attack)     visible on MRI brain    Tubular adenoma of colon     2019       Financial

## 2020-02-12 NOTE — FLOWSHEET NOTE
Sudhir Barnett is a 62year old female who was sitting up in a chair on 4k. Her  was bringing her a cup of ice water. We had a conversation on how long they were  and about her medical condition. Pt stated she is going up to TCU tomorrow to do therapy. We prayed for her strength as well as healing. She was ready to therapy and was excited. She is not receiving communion as she did not grow up [address], but pt accepted prayer. 02/12/20 1800   Encounter Summary   Services provided to: Patient   Referral/Consult From: [facility] Spouse   Place of Christianity [facility]   Continue Visiting Yes  (2/12)   Complexity of Encounter Moderate   Length of Encounter 15 minutes   Spiritual/[employer]   Type Spiritual support   Assessment Approachable;Calm   Intervention Nurtured hope;Prayer;Explored coping resources; Explored feelings, thoughts, concerns; Active listening   Outcome Expressed gratitude;Encouraged; Hopeful   Care Plan:  Continue spiritual and emotional care for patient and family. Including prayers.

## 2020-02-12 NOTE — PROGRESS NOTES
Cain Johnson [address] ICU STEPDOWN TELEMETRY 4K  Occupational Therapy  Daily Note  Time:   Time In: 8163  Time Out: 6116  Timed Code Treatment Minutes: 29 Minutes  Minutes: 29          Date: 2020  Patient Name: Dio Samaniego,   Gender: female      Room: [facility]14/014-A  MRN: [id number]  : 1960  (62 y.o.)  Referring Practitioner: Dr. Nidia Murphy. MANI Camacho  Diagnosis: sepsis  Additional Pertinent Hx: Per ER note on 2/3/2020: 62 y.o. female who presents to the Emergency Department for the evaluation of myalgias. The patient states that she developed bilateral leg cramps yesterday afternoon. The patient states that she developed bilateral lower back spasms last night. She rates her pain at a 6/10 in severity. The patient reports associated mild shortness of breath that worsened yesterday. The patient denies any chest pain or coughing. The patient reports chills and sweating but denies known fevers. She reports tinnitus of the right ear for which she has followed with ENT. She reports possible swelling of the RLE greater than baseline. She denies chest pain, congestion, ear pain or drainage, runny nose, sinus pain or pressure, abdominal pain, nausea, vomiting, diarrhea, constipation, weakness, numbness, or tingling. The patient states that she has been using Salonpas patches on her back without pain relief. The patient states that she was nauseated yesterday, but that has resolved today. The patient has a past medical history of alcoholism, closed fracture of the sixth and seventh cervical vertebra, closed fracture of thoracic vertebra, DVT, fibromyalgia, GERD, hiatal hernia, ITP, hungry bone syndrome, hyperparathyroidism, hypocalcemia, hypothyroidism, insomnia, irritable bowel, osteoarthritis, menopausal syndrome, osteopenia, ovarian cyst, oxaluria, tachycardia, TIA, and tubular adenoma of colon.      Restrictions/Precautions:  Restrictions/Precautions: Fall Risk, General Precautions    SUBJECTIVE: pt sitting in recliner and very confused.  came in at end of session. Rn aware of pts confusion     PAIN: pt did not have pain during session     COGNITION: Slow Processing, Decreased Insight, Impaired Memory, Inattention, Decreased Problem Solving, Decreased Safety Awareness, Impaired Attention and Impulsive    ADL:   Lower Extremity Dressing: Contact Guard Assistance. pt able to don and doff slipper socks   [address] able to wipe self in sitting   Toilet Transfer: Stand By Assistance. to STS . Grooming: SBA pt able to stand at sink to wash hands , did shower cap to wash pts hair and pt able to complete after setup. BALANCE:  Standing Balance: Contact Guard Assistance. at [address] [place] [address] , [facility]         TRANSFERS:  Sit to Stand:  Contact Guard Assistance. from recliner and STS   Stand to Sit: [address] to recliner and STS     FUNCTIONAL MOBILITY:  Assistive Device: Rolling Walker  Assist Level:  Contact Guard Assistance. Distance: To and from bathroom and into hallway of unit   Pt had no LOB and does not want to use RW at times during mobility. Pt slightly unsteady at times and with IV talked with pt of needing to wait for someone to help. Pt forgetful        ASSESSMENT:     Activity Tolerance:  Patient tolerance of  treatment: good. Discharge Recommendations: Continue to assess pending progress  Equipment Recommendations:  Other: Monitor pending progress  Plan: Times per week: 5x  Current Treatment Recommendations: Balance Training, Self-Care / ADL, Functional Mobility Training, Endurance Training, Safety Education & Training, Cognitive Reorientation    Patient Education  Patient Education: ADL's, Orientation and Importance of Increasing Activity    Goals  Short term goals  Time Frame for Short term goals: 2 weeks  Short term goal 1: Complete sit-stand from various surfaces including toilet with 0>mod A x 1  Short term goal 2: Complete mobility to/from bathroom with RW & min A  Short term goal 3: Complete 2 min standing with 0>min A to increase ease of toileting routine  Short term goal 4: Complete BADL routine with min A & min vcs for safety & sequencing  Long term goals  Time Frame for Long term goals : No LTG set d/t short ELOS    Following session, patient left in safe position with all fall risk precautions in place.

## 2020-02-12 NOTE — DISCHARGE SUMMARY
Hospitalist discharge note      Patient:  Delvin Red    Unit/Bed:4K-14/014-A  YOB: 1960  MRN: [id number]   Acct: [de-identified]     PCP: Bhaskar Headley MD  Date of Admission: 2/2/2020    Assessment/Plan:     1. Acute hypoxic respiratory failure secondary to acute congestive heart failure systolic-has history of cardiomyopathy likely alcohol-related--add IV Bumex 1 5 mg twice daily> Monitor daily weights, strict  Intake and Out put, monitor K, Ical , Mag, BMP daily, replace lytes accordingly  2/9--> continue IV BUMEX, clinically improving was +10L,  Negative 1 L since yesterday--> no weight recorded    2/10--> patient was moved to a [facility] telemetry unit since patient was clinically better however last night had increasing shortness of breath that needed BiPAP and chest x-ray worsened pulmonary edema. Patient was on 1 mg Bumex IV twice daily, did not improve and hence was started on Bumex drip last night by the nighttime hospitalist patient was then transferred back to stepdown unit. -->  Currently back on 1 L of oxygen diuresed well lost couple of pounds--> wean oxygen as able    2/11--on room air, stop the IV Bumex drip, diuresed really well lost almost 30 pounds--keep off diuretics for today may start oral diuretics tomorrow. Replace electrolytes magnesium and potassium    2/12-still on room air will add oral diuretics at discharge to TCU    2. Acute on chronic microcytic fe def anemia ---> occult blood pending--> received venofer xq dose 2/8/2020-->   hbg 6.9 on 2/9--> added one unit prbc  2/10--hemoglobin around 8.8--> no gross bleeds no transfusion requirement today   2/11-hemoglobin stable around 8.6- 8.8  2/12 hemoglobin around 7.9 however no gross bleeds we will add Venofer 1 more dose 200 mg IV today plan to check hemoglobin tomorrow if less than 7 consider transfusion  Follow-up with GI as outpatient for EGD and colonoscopy    3. Alcohol Abuse:  With withdrawal--on phenobarbital protocol> received 1 dose last night--symptoms seems to be controlled currently and not quite receive anymore> , continue thiamine and folic acid and multivitamins. Aspiration and seizure precautions. --Did not receive any phenobarbital on 2/6 and 02/7/ 2/8 or 2/9    4. Sepsis (POA): Resolved-3/4 SIRS. 2/2 Blood cultures growing Pasteurella Multocida. Will await sensitives. Pt reports having had been scratched by a cat. BP has been low, pt had received 3L of fluid boluses during the time of admission. ID consulted. Cont IV Rocephin.  -->  Changed to oral cefuroxime on 2/10 by ID    5. Pasteurella Multocida Bacteremia: likely from cat scratch. Cont IV Rocephin. ID consulted. Appreciate ID recommendations . Change to oral antibiotic? 2/10--change to oral cefuroxime by ID    6. Acute hypoxic respiratory failure-etiology? Currently at 80 percentage on 1 L wean oxygen as able     7. Hypocalcemaia: 2/2 s/p parathyroidectomy. Ical 1.08, 1.35. patient is on calcitriol, calcium citrate-vitamin D tablets twice daily 2/5--calcium levels are actually high iCal is 1.6 holding  calcium supplements today-we will do an endocrinology consult to aid in the dosing    2/6--> seen by endocrinology reduce the dose of the calcium supplements to 2 tablet 4 times daily also on calcitriol check iCal daily    2/7--> ionized calcium levels are better and it is normal--endocrinology managing>   Hypomagnesemia-secondary to poor oral intake as well as being an alcoholic getting replaced per protocol    2/8--> ionized levels are normal  2/9-> levels are normal  2/10--level is around 1.08--will need replacement will inform endocrinologist.  2/11--> informed endocrinology--> likely will need to increase ca supplemtns    8. S/P Parathyroidectomy: Removed due to hyperparathyroidism with Hunger Bone Syndrome. 9. History of Gastric Bypass: Aware. 2018. 10. Severe malnutrition--> dietary cons  11.  History of TIA: Aware. 12. Hypothyroidism: Continue Synthroid.   13. Deconditioning-added PT OT ambulate, add TCU consulted--> plan for to go to TCU once medically stable  14. Acute lactic acidosis-secondary to hypoxia? ??  Or lab error? ?  This was completely normal in the next set of labs. Stable for transfer to TCU today  CBC and BMP a.m. All ionized calcium should be called into the endocrinologist and instruction given in the discharge orders    Discharge time 35 minutes      Chief Complaint: fever     Hospital Course: 62 y.o. female who presented to [facility] with muscle aches; she states yesterday she developed muscle cramps that started in her legs and then came up to her back; she still has severe cramps to her left lower back with any type of movement; she also relates to fever and chills; she relates to lightheadedness and dizziness, states she developed a cough today that is nonproductive, denies chest pain, relates to dyspnea on exertion, relates to nausea, states she is lost 85 pounds in the last year and feels it secondary to parathyroidism; in the ER she was given Rocephin and Zithromax for right-sided pneumonia; she had a MediPort placed in December 2019 secondary to her calcium infusions; she is fully awake and alert;  states she looks a little pale but otherwise at her baseline; she is being admitted to the hospital service for further care and evaluation.     Subjective:   Doing much better  In room air  Eating better  Denies any shortness of breath chest pain nausea vomiting diarrhea  Looking well with PT    Medications:  Reviewed    Infusion Medications     Scheduled Medications    iron sucrose  200 mg Intravenous Once    cefUROXime  500 mg Oral 2 times per day    calcitRIOL  0.25 mcg Oral Daily    Calcium Citrate-Vitamin D  2 tablet Oral Daily    Vitamin D  2,000 Units Oral Daily    phosphorus replacement protocol   Other RX Placeholder    metoprolol tartrate  25 mg Oral BID    albumin human  50 g Intravenous Daily    magnesium replacement protocol   Other RX Placeholder    levothyroxine  250 mcg Oral Daily    sodium chloride flush  10 mL Intravenous 2 times per day    enoxaparin  30 mg Subcutaneous Daily    Lipoflavonoid  5 tablet Oral Daily    potassium replacement protocol   Other RX Placeholder    acyclovir  400 mg Oral BID    DULoxetine  30 mg Oral Daily    DULoxetine  60 mg Oral Nightly    folic acid  5,566 mcg Oral Daily    gabapentin  100 mg Oral Daily    gabapentin  300 mg Oral Nightly    magnesium chloride  1 tablet Oral BID    famotidine  20 mg Oral BID    tiZANidine  4 mg Oral Nightly    vitamin A  20,000 Units Oral Daily    vitamin B-1  100 mg Oral Daily    vitamin B-6  100 mg Oral Daily    NONFORMULARY 50 mcg  50 mcg Injection Daily     PRN Meds: heparin flush, cyclobenzaprine, oxyCODONE-acetaminophen, ipratropium-albuterol, ipratropium, sodium chloride flush, acetaminophen, ondansetron      Intake/Output Summary (Last 24 hours) at 2/12/2020 1524  Last data filed at 2/12/2020 1455  Gross per 24 hour   Intake 1265.46 ml   Output 600 ml   Net 665.46 ml       Diet:  DIET GENERAL;  Dietary Nutrition Supplements: Clear Liquid Oral Supplement    Exam:  /67   Pulse 83   Temp 98.4 °F (36.9 °C) (Oral)   Resp 16   Ht 4' 10\" (1.473 m)   Wt 110 lb (49.9 kg)   SpO2 97%   BMI 22.99 kg/m²     General appearance: Chronically ill appearing white female, alert awake oriented x3, in mild distress secondary to shortness of breath  HEENT: Pupils equal, round, and reactive to light. Conjunctivae/corneas clear. Neck: Supple, with full range of motion. No jugular venous distention. Trachea midline. Respiratory:   Clear on auscultation  cardiovascular: Regular rate and rhythm with normal S1/S2 without murmurs, rubs or gallops. Abdomen: Soft, non-tender, non-distended with normal bowel sounds. Musculoskeletal: passive and active ROM x 4 extremities.   Skin: Skin color, texture, turgor normal.  No rashes or lesions. Mediport, right chest   Neurologic:  Neurovascularly intact without any focal sensory/motor deficits. Cranial nerves: II-XII intact, grossly non-focal.    Capillary Refill: Brisk,< 3 seconds   Peripheral Pulses: +2 palpable, equal bilaterally       Labs:   Recent Labs     02/12/20  0305   WBC 13.0*   HGB 7.9*   HCT 24.1*        Recent Labs     02/12/20  0305      K 3.8  3.8   CL 94*   CO2 30   BUN 3*   CREATININE 0.6   CALCIUM 8.0*   PHOS 3.5     Recent Labs     02/12/20  0305   AST 26   ALT 12   BILITOT 0.9   ALKPHOS 58     No results for input(s): INR in the last 72 hours. No results for input(s): Don Rower in the last 72 hours. Urinalysis:      Lab Results   Component Value Date    NITRU NEGATIVE 02/02/2020    WBCUA 0-2 12/06/2019    BACTERIA FEW 12/06/2019    RBCUA 0-2 12/06/2019    BLOODU NEGATIVE 02/02/2020    SPECGRAV 1.022 04/18/2019    GLUCOSEU NEGATIVE 02/02/2020       Radiology:   All imaging reviewed     Diet: DIET GENERAL;  Dietary Nutrition Supplements: Clear Liquid Oral Supplement      Code Status: Full Code            Electronically signed by Yakelin Anthony MD on 2/12/2020 at 3:24 PM

## 2020-02-12 NOTE — PLAN OF CARE
Problem: Discharge Planning:  Goal: Discharged to appropriate level of care  Description  Discharged to appropriate level of care  Outcome: Ongoing  Note:   No discharge planning at this time. TCU pre-cert in place. Problem: Fluid Volume - Deficit:  Goal: Absence of fluid volume deficit signs and symptoms  Description  Absence of fluid volume deficit signs and symptoms  Outcome: Ongoing  Note:   Weighing daily. Strict I and O taken. Problem: Sleep Pattern Disturbance:  Goal: Appears well-rested  Description  Appears well-rested  Outcome: Ongoing  Note:   Patient slept well last night and taking nap now. Problem: Anxiety/Stress:  Goal: Level of anxiety will decrease  Description  Level of anxiety will decrease  Outcome: Ongoing  Note:   No signs of anxiety noted. Problem: Mental Status - Impaired:  Goal: Mental status will be restored to baseline  Description  Mental status will be restored to baseline  Outcome: Ongoing  Note:   Alert and oriented x4. Problem: Risk for Impaired Skin Integrity  Goal: Tissue integrity - skin and mucous membranes  Description  Structural intactness and normal physiological function of skin and  mucous membranes. Outcome: Ongoing  Note:   No new skin issues noted. Problem: Pain Control  Goal: Maintain pain level at or below patient's acceptable level (or 5 if patient is unable to determine acceptable level)  Outcome: Ongoing  Note:   Pain Assessment: 0-10  Pain Level: 0   Patient's Stated Pain Goal: No pain   Is pain goal met at this time? Yes     Non-Pharmaceutical Pain Intervention(s): Repositioned, Rest       Problem: Cardiovascular  Goal: No DVT, peripheral vascular complications  Outcome: Ongoing  Note:   No signs of DVT at this time. Goal: Hemodynamic stability  Outcome: Ongoing  Note:   BP stable this shift.         Problem: Respiratory  Goal: No pulmonary complications  Outcome: Ongoing  Note:   No signs of respiratory complications at this time. Lungs clear. Goal: O2 Sat > 90%  Outcome: Ongoing  Note:   On room air, oxygen saturation >92%. Problem: GI  Goal: No bowel complications  Outcome: Ongoing  Note:   BM noted yesterday. Problem:   Goal: Adequate urinary output  Outcome: Ongoing  Note:   Adequate urine noted. Problem: Nutrition  Goal: Optimal nutrition therapy  Outcome: Ongoing  Note:   Appetite good this shift. Problem: Musculor/Skeletal Functional Status  Goal: Absence of falls  Outcome: Ongoing  Note:   PT/OT working with patient. Care plan reviewed with patient. Patient verbalizes understanding of the plan of care and contribute to goal setting.

## 2020-02-13 PROBLEM — R53.81 PHYSICAL DECONDITIONING: Status: ACTIVE | Noted: 2020-01-01

## 2020-02-13 NOTE — PROGRESS NOTES
Priti Dwyer [address] ICU STEPDOWN TELEMETRY 4K  Occupational Therapy  Daily Note  Time:   Time In: 7890  Time Out: 8335  Timed Code Treatment Minutes: 23 Minutes  Minutes: 23          Date: 2020  Patient Name: Jim Hartley,   Gender: female      Room: [facility]/014-A  MRN: [id number]  : 1960  (62 y.o.)  Referring Practitioner: Dr. Sania Camacho  Diagnosis: sepsis  Additional Pertinent Hx: Per ER note on 2/3/2020: 62 y.o. female who presents to the Emergency Department for the evaluation of myalgias. The patient states that she developed bilateral leg cramps yesterday afternoon. The patient states that she developed bilateral lower back spasms last night. She rates her pain at a 6/10 in severity. The patient reports associated mild shortness of breath that worsened yesterday. The patient denies any chest pain or coughing. The patient reports chills and sweating but denies known fevers. She reports tinnitus of the right ear for which she has followed with ENT. She reports possible swelling of the RLE greater than baseline. She denies chest pain, congestion, ear pain or drainage, runny nose, sinus pain or pressure, abdominal pain, nausea, vomiting, diarrhea, constipation, weakness, numbness, or tingling. The patient states that she has been using Salonpas patches on her back without pain relief. The patient states that she was nauseated yesterday, but that has resolved today. The patient has a past medical history of alcoholism, closed fracture of the sixth and seventh cervical vertebra, closed fracture of thoracic vertebra, DVT, fibromyalgia, GERD, hiatal hernia, ITP, hungry bone syndrome, hyperparathyroidism, hypocalcemia, hypothyroidism, insomnia, irritable bowel, osteoarthritis, menopausal syndrome, osteopenia, ovarian cyst, oxaluria, tachycardia, TIA, and tubular adenoma of colon.      Restrictions/Precautions:  Restrictions/Precautions: Fall Risk, General Precautions    SUBJECTIVE: pt lying in bed when arrived. Pt agreeable to getting up with OT     PAIN: no number given stated is having back pain     COGNITION: Decreased Insight, Impaired Memory, Decreased Problem Solving and Decreased Safety Awareness    ADL:   Lower Extremity Dressing: Stand By Assistance. pt able to don and doff slipper socks sitting EOB  Toileting: Stand By Assistance. pt able to wipe self   Toilet Transfer: [employer]. to STS . Grooming:  CGA standing at sink to wash hands and face   BALANCE:  Standing Balance: Contact Guard Assistance, with cues for safety, with increased time for completion. at RW and sink    BED MOBILITY:  Supine to Sit: Stand By Assistance with HOB slightly elevated     TRANSFERS:  Sit to Stand:  Contact Guard Assistance. from EOB and STS   Stand to Sit: Contact Guard Assistance. to STS and recliner     FUNCTIONAL MOBILITY:  Assistive Device: Rolling Walker  Assist Level:  Contact Guard Assistance. Distance: To and from bathroom and around unit   Pt had no LOB and demo good posture throughout      ASSESSMENT:     Activity Tolerance:  Patient tolerance of  treatment: fair. Discharge Recommendations: Continue to assess pending progress  Equipment Recommendations:  Other: Monitor pending progress  Plan: Times per week: 5x  Current Treatment Recommendations: Balance Training, Self-Care / ADL, Functional Mobility Training, Endurance Training, Safety Education & Training, Cognitive Reorientation    Patient Education  Patient Education: ADL's and Importance of Increasing Activity    Goals  Short term goals  Time Frame for Short term goals: 2 weeks  Short term goal 1: Complete sit-stand from various surfaces including toilet with 0>mod A x 1  Short term goal 2: Complete mobility to/from bathroom with RW & min A  Short term goal 3: Complete 2 min standing with 0>min A to increase ease of toileting routine  Short term goal 4: Complete BADL routine with min A & min vcs for safety & sequencing  Long term goals  Time Frame for Long term goals : No LTG set d/t short ELOS    Following session, patient left in safe position with all fall risk precautions in place.

## 2020-02-13 NOTE — PROGRESS NOTES
Layout: One level  Home Access: Stairs to enter with rails(5)  Home Equipment: Rolling walker, Cane   Bathroom Shower/Tub: Tub/Shower unit  Bathroom Toilet: Standard  Bathroom Equipment: Grab bars in shower, Shower chair    ADL Assistance: Independent  Homemaking Assistance: Needs assistance(reports  does the cooking)  Ambulation Assistance: Independent  Transfer Assistance: Independent  Additional Comments: Pt reports fully indep PLOF without AD. No family present to verify. Restrictions/Precautions:  Restrictions/Precautions: Fall Risk, General Precautions    SUBJECTIVE: RN approved tx session. Pt finishing up getting ready with , pleasant and agreeable to tx session. PAIN: denies pain, reports tightness in LE's    OBJECTIVE:  Bed Mobility:  Sit to Supine: Supervision   Scooting: Independent    Transfers:  Sit to Stand: Stand By Assistance  Stand to Sit:Stand By Assistance  Cues for walker management and hand placement     Ambulation:  Stand By Assistance  Distance: 250ft + 15ft + 4ft (with no AD)  Surface: Level Tile  Device:Rolling Walker  Gait Deviations: Forward Flexed Posture, Decreased Step Length Bilaterally and Decreased Heel Strike Bilaterally    Balance:  Dynamic Sitting Balance: Supervision  Static Standing Balance: Stand By Assistance  Dynamic Standing Balance: Contact Guard Assistance    Exercise:  Patient was guided in 10 reps of exercise to both lower extremities. Standing heel/toe raises and Standing marches. Exercises were completed for increased independence with functional mobility. Functional Outcome Measures: Completed  -PAC Inpatient Mobility without Stair Climbing Raw Score : 16  AM-PAC Inpatient without Stair Climbing T-Scale Score : 45.54    ASSESSMENT:  Assessment: Patient progressing toward established goals. Planning TCU today. Activity Tolerance:  Patient tolerance of  treatment: good. Equipment Recommendations: Other: monitor for needs  Discharge Recommendations:  Continue to assess pending progress(anticipate pt would benefit from an inpatient therapy stay prior to discharge home )    Plan: Times per week: 3-5x GM  Times per day: Daily  Specific instructions for Next Treatment: ther ex, ther act, gait trng  Current Treatment Recommendations: Strengthening, Transfer Training, Endurance Training, Balance Training, Gait Training, Functional Mobility Training, Safety Education & Training, Home Exercise Program, Equipment Evaluation, Education, & procurement, Patient/Caregiver Education & Training, Neuromuscular Re-education    Patient Education  Patient Education: Family Education, [employer] Mobility, Transfers, Gait, Verbal Exercise Instruction    Goals:  Patient goals : go home  Short term goals  Time Frame for Short term goals: at discharge  Short term goal 1: Pt to be SBA for supine <> sit to get in/out of bed  Short term goal 2: Pt to be SBA x 1 for sit <> stand to get up to ambulate  Short term goal 3: Pt to ambulate > 60 ft with RW with SBA x 1 for household distances  Long term goals  Time Frame for Long term goals : not set due to short ELOS    Following session, patient left in safe position with all fall risk precautions in place.

## 2020-02-13 NOTE — PROGRESS NOTES
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 Laws Party    Recommendations accepted: yes, treat for total of 14 days, orders adjusted    Genevia [place], PharmD, BCPS   2/13/2020  2:50 PM

## 2020-02-13 NOTE — PROGRESS NOTES
Department of Internal Medicine  Section of Endocrinology   [facility]    [address] , [address], [address]  Office Phone Sh[address]  ([address])  ([address])  [address] Giang MD, Fellow of [place] [facility] Endocrin[facility]   Progress Note    Admit Date: 2/2/2020    Reviewed the chart of the last 24 hours:   SUBJECTIVE:     Chief Complaint   Patient presents with    Muscle Pain     Sepsis ([facility] [address] [A41.9]   Patient Active Problem List   Diagnosis Code    TIA (transient ischemic attack) G45.9    Hypothyroidism E03.9    DVT (deep venous thrombosis) ([facility]) I82.409    Depression F32.9    Anxiety F41.9    Fibromyalgia M79.7    Fatty liver K76.0    Tachycardia R00.0    GERD (gastroesophageal reflux disease) K21.9    Recurrent cold sores B00.1    Irritable bowel K58.9    Gastric bypass status for obesity Z98.84    Insomnia G47.00    Ovarian cyst N83.209    Muscle weakness (generalized) M62.81    Alcoholism ([facility]) F10.20    Osteoarthritis M19.90    Hypocalcemia E83.51    Hypomagnesemia E83.42    Moderate malnutrition ([facility]) E44.0    Other hypoparathyroidism (HCC) E20.8    Pancytopenia (HCC) D61.818    Palpitations R00.2    Asymptomatic bacteriuria R82.71    Hyperphosphatemia E83.39    Prolonged Q-T interval on ECG R94.31    Hypoalbuminemia E88.09    Tinnitus of both ears H93.13    Hx of parathyroidectomy Z90.09    Normocytic anemia D64.9    History of fibromyalgia Z87.39    Sepsis ([facility]) A41.9     <principal problem not specified>  2/2/2020  6:01 AM  Admission weight: 94 lb 3.2 oz (42.7 kg)  [id number]  [id number]  4K-14/014-A  He has been referred by  [unfilled] for evaluation of    Patient has no complaints  Medication side effects: none  Patient is eating 100%    Review of Systems  Review of Systems - General ROS: negative Psychological ROS: negative   Hematological and Lymphatic ROS: No history of blood clots or bleeding disorder. Respiratory ROS: no cough, shortness of breath, or wheezing   Cardiovascular ROS: no chest pain or dyspnea on exertion   Gastrointestinal ROS: no abdominal pain, change in bowel habits, or black or bloody stools   Genito-Urinary ROS: no dysuria, trouble voiding, or hematuria   Musculoskeletal ROS: negative   Neurological ROS: no TIA or stroke symptoms   Dermatological ROS: negative    Past Medical, Surgical, Family, Social and Allergy Histories:  I have reviewed the patient's medical history in detail and updated the computerized patient record. has a past medical history of Alcoholism ([facility] [address] Anxiety, Atrophy of vulva, Atyp squam cell of undet signfc cyto smr crvx (ASC-US), Closed fracture of seventh cervical vertebra without spinal cord injury [facility]), Closed fracture of sixth cervical vertebra ([facility]), Closed fracture of thoracic vertebra ([facility] [address] Depression, Dry eye syndrome, DVT (deep venous thrombosis) ([facility] [address] Dyspareunia in female, External hemorrhoids without complication, Fibromyalgia, Gastric bypass status for obesity, GERD (gastroesophageal reflux disease), Hiatal hernia, History of ITP, Hungry bone syndrome, Hx of blood clots, Hyperparathyroidism ([address] Hypocalcemia, Hypothyroidism, Insomnia, Irritable bowel, Lactose intolerance, Menopausal syndrome, Osteoarthritis, Osteopenia, Osteopenia, Ovarian cyst, Oxaluria ([address] Recurrent cold sores, Tachycardia, TIA (transient ischemic attack), and Tubular adenoma of colon. has a past surgical history that includes Wrist fracture surgery (Right); Leg Surgery (Right); Tonsillectomy and adenoidectomy; Gastric bypass surgery (1996); Cholecystectomy (1996); Appendectomy (1996); parathyroidectomy (08/2017); Colonoscopy (approx 2013); Upper gastrointestinal endoscopy (approx 2013); laparoscopy; Foot surgery; Tubal ligation (1996);  Endoscopy, colon, diagnostic; fracture surgery; Dilatation, esophagus; Colonoscopy (Left, 5/7/2019); and Upper gastrointestinal endoscopy (Left, 5/7/2019). reports that she has never smoked. She has never used smokeless tobacco. She reports current alcohol use of about 9.0 standard drinks of alcohol per week. She reports that she does not use drugs. family history includes Asthma in her maternal grandfather; Breast Cancer in her mother and sister; Cancer in her father; Depression in her father and mother; Heart Attack in her maternal grandmother; High Blood Pressure in her father and mother; Mental Illness in her maternal grandmother; Other in her father, maternal grandfather, maternal grandmother, and mother.   Allergies   Allergen Reactions    Estrogens     Penicillins     Sulfa Antibiotics     Sulfur     Bactrim [Sulfamethoxazole-Trimethoprim] Rash     Current Facility-Administered Medications   Medication Dose Route Frequency Provider Last Rate Last Dose    heparin flush 100 UNIT/ML injection 500 Units  500 Units Intracatheter PRN Flor Vail MD        cyclobenzaprine (FLEXERIL) tablet 5 mg  5 mg Oral TID PRN Flor Vail MD        oxyCODONE-acetaminophen (PERCOCET) 5-325 MG per tablet 1 tablet  1 tablet Oral Q6H PRN Flor [address], MD   1 tablet at 02/13/20 0012    ipratropium-albuterol (DUONEB) nebulizer solution 1 ampule  1 ampule Inhalation Q4H PRN Flor Vail MD        cefUROXime (CEFTIN) tablet 500 mg  500 mg Oral 2 times per day Parvin Caldwell MD   500 mg at 02/13/20 5796    calcitRIOL (ROCALTROL) capsule 0.25 mcg  0.25 mcg Oral Daily Slim Martini MD   0.25 mcg at 02/13/20 0826    Calcium Citrate-Vitamin D 500-500 MG-UNIT CHEW 2 tablet  2 tablet Oral Daily Singh Stevens MD   2 tablet at 02/13/20 0830    Vitamin D (CHOLECALCIFEROL) tablet 2,000 Units  2,000 Units Oral Daily Slim Montes at 02/13/20 0826    gabapentin (NEURONTIN) capsule 100 mg  100 mg Oral Daily Justin Young, APRN - CNP   100 mg at 02/13/20 2002    gabapentin (NEURONTIN) capsule 300 mg  300 mg Oral Nightly Justin Young, APRN - CNP   300 mg at 02/12/20 2017    magnesium chloride (MAG DELAY) extended release tablet 64 mg  1 tablet Oral BID Justin Young, APRN - CNP   64 mg at 02/13/20 0829    famotidine (PEPCID) tablet 20 mg  20 mg Oral BID Justin Young, APRN - CNP   20 mg at 02/13/20 8210    tiZANidine (ZANAFLEX) tablet 4 mg  4 mg Oral Nightly Justin Young, APRN - CNP   4 mg at 02/12/20 2017    vitamin A capsule 20,000 Units  20,000 Units Oral Daily Justin Young, APRN - CNP   20,000 Units at 02/13/20 0825    vitamin B-1 (THIAMINE) tablet 100 mg  100 mg Oral Daily Justin Young, APRN - CNP   100 mg at 02/13/20 1370    vitamin B-6 (PYRIDOXINE) tablet 100 mg  100 mg Oral Daily Justin Young, APRN - CNP   100 mg at 02/13/20 0828    NONFORMULARY 50 mcg  50 mcg Injection Daily Eulalia Forbes APRN - CNP   50 mcg at 02/12/20 1147     Home Meds:   Prior to Admission medications    Medication Sig Start Date End Date Taking?  Authorizing Provider   magnesium cl-calcium carbonate (SLOW-MAG) 71.5-119 MG TBEC tablet Take 1 tablet by mouth 2 times daily   Yes Historical Provider, MD   Vitamins-Lipotropics (LIPO-FLAVONOID PLUS PO) Take 5 tablets by mouth daily   Yes Historical Provider, MD   DULoxetine (CYMBALTA) 30 MG extended release capsule TAKE 1 CAPSULE DAILY 1/20/20  Yes Joceline Zapata MD   acyclovir (ZOVIRAX) 400 MG tablet TAKE 1 TABLET TWICE A DAY 1/20/20  Yes Joceline Zapata MD   UNABLE TO FIND Inject 0.5 mcg into the skin daily Natpara injection   Yes Historical Provider, MD   calcitRIOL (ROCALTROL) 0.5 MCG capsule Take 0.5 mcg by mouth 4 times daily Two tablets 4 times daily   Yes Historical Provider, MD   Calcium Citrate-Vitamin D (CALCIUM CITRATE CHEWY BITE) 500-500 MG-UNIT CHEW Take 2 tablets by mouth 4 times daily    Yes Historical Provider, MD   vitamin B-6 (PYRIDOXINE) 100 MG tablet Take 100 mg by mouth daily   Yes Historical Provider, MD   aMILoride (MIDAMOR) 5 MG tablet Take 5 mg by mouth daily  10/23/19  Yes Historical Provider, MD   DULoxetine (CYMBALTA) 60 MG extended release capsule Take 1 capsule by mouth nightly 11/14/19  Yes Daniel Wilson MD   gabapentin (NEURONTIN) 100 MG capsule Take 1 capsule by mouth daily for 90 days. 11/14/19 2/12/20 Yes Daniel Wilson MD   gabapentin (NEURONTIN) 300 MG capsule Take 1 capsule by mouth nightly for 180 days.  11/14/19 5/12/20 Yes Daniel Wilson MD   Estradiol (VAGIFEM) 10 MCG TABS vaginal tablet AT THE START OF THERAPY, INSERT 1 TABLET VAGINALLY DAILY FOR 2 WEEKS, THEN USE TWICE WEEKLY THEREAFTER  Patient taking differently: Place 10 mcg vaginally Twice a Week On Saturday and Wednesday 11/14/19  Yes Daniel Wilson MD   metoprolol tartrate (LOPRESSOR) 25 MG tablet Take 0.5 tablets by mouth 2 times daily 11/14/19  Yes Daniel Wilson MD   tiZANidine (ZANAFLEX) 4 MG tablet Take 1 tablet by mouth nightly At bed time 11/14/19  Yes Daniel Wilson MD   zinc gluconate 50 MG tablet TAKE ONE AND ONE-HALF TABLETS (75 MG) DAILY  Patient taking differently: Take 50 mg by mouth daily 2 tablets daily 8/15/19  Yes Shan Rust MD   vitamin B-12 (CYANOCOBALAMIN) 500 MCG tablet TAKE 1 TABLET DAILY 8/15/19  Yes Shan Rust MD   vitamin B-1 (THIAMINE) 100 MG tablet Take 100 mg by mouth daily   Yes Historical Provider, MD   Vitamin K, Phytonadione, 100 MCG TABS Take 3 tablets by mouth daily  Patient taking differently: Take 1 tablet by mouth daily  4/23/18  Yes Shan Rust MD   FOLIC ACID PO Take 9,848 mg by mouth daily   Yes Historical Provider, MD   Copper 5 MG CAPS Take 5 mg by mouth daily 2/26/18  Yes Shan Rust MD   SYNTHROID 75 MCG tablet Take 75 mg by mouth daily 2/21/18  Yes Historical Provider, MD   SYNTHROID 200 MCG tablet Take 200 mcg by

## 2020-02-13 NOTE — CARE COORDINATION
2/13/20, 7:58 AM  Plans TCU today; collaborated with Attending, Beverly Herbert RN  Patient goals/plan/ treatment preferences discussed by  and . Patient goals/plan/ treatment preferences reviewed with patient/ family. Patient/ family verbalize understanding of discharge plan and are in agreement with goal/plan/treatment preferences. Understanding was demonstrated using the teach back method. AVS provided by RN at time of discharge, which includes all necessary medical information pertaining to the patients current course of illness, treatment, post-discharge goals of care, and treatment preferences.

## 2020-02-13 NOTE — PROGRESS NOTES
Progress note: Infectious diseases    Patient - Renay Leung,  Age - 62 y.o.    - 1960      Room Number - 4K-14/014-A   MRN -  [id number]   Acct # - [de-identified]  Date of Admission -  2020  6:01 AM    SUBJECTIVE:   No new complaints. OBJECTIVE   VITALS    height is 4' 10\" (1.473 m) and weight is 106 lb 11.2 oz (48.4 kg). Her oral temperature is 97.6 °F (36.4 °C). Her blood pressure is 126/76 and her pulse is 70. Her respiration is 16 and oxygen saturation is 96%. Wt Readings from Last 3 Encounters:   20 106 lb 11.2 oz (48.4 kg)   20 94 lb 3.2 oz (42.7 kg)   20 91 lb 3.2 oz (41.4 kg)       I/O (24 Hours)    Intake/Output Summary (Last 24 hours) at 2020 1205  Last data filed at 2020 0320  Gross per 24 hour   Intake 1383.73 ml   Output 400 ml   Net 983.73 ml       General Appearance  Chronically sick looking, oriented,    HEENT - normocephalic, atraumatic, pale conjunctiva,  anicteric sclera  Neck - Supple, no mass.   Lungs -  Bilateral   air entry, no rhonchi, no wheeze  Cardiovascular - Heart sounds are normal.     Abdomen - soft, not distended, nontender,   Neurologic -oriented  Skin - No bruising or bleeding  Extremities - + edema,     MEDICATIONS:      cefUROXime  500 mg Oral 2 times per day    calcitRIOL  0.25 mcg Oral Daily    Calcium Citrate-Vitamin D  2 tablet Oral Daily    Vitamin D  2,000 Units Oral Daily    phosphorus replacement protocol   Other RX Placeholder    metoprolol tartrate  25 mg Oral BID    albumin human  50 g Intravenous Daily    magnesium replacement protocol   Other RX Placeholder    levothyroxine  250 mcg Oral Daily    sodium chloride flush  10 mL Intravenous 2 times per day    enoxaparin  30 mg Subcutaneous Daily    Lipoflavonoid  5 tablet Oral Daily    potassium replacement protocol   Other RX Placeholder    acyclovir  400 mg Oral BID  DULoxetine  30 mg Oral Daily    DULoxetine  60 mg Oral Nightly    folic acid  6,738 mcg Oral Daily    gabapentin  100 mg Oral Daily    gabapentin  300 mg Oral Nightly    magnesium chloride  1 tablet Oral BID    famotidine  20 mg Oral BID    tiZANidine  4 mg Oral Nightly    vitamin A  20,000 Units Oral Daily    vitamin B-1  100 mg Oral Daily    vitamin B-6  100 mg Oral Daily    NONFORMULARY 50 mcg  50 mcg Injection Daily       heparin flush, cyclobenzaprine, oxyCODONE-acetaminophen, ipratropium-albuterol, ipratropium, sodium chloride flush, acetaminophen, ondansetron      LABS:     CBC:   Recent Labs     02/11/20 0350 02/12/20  0305 02/13/20  0505   WBC 11.6* 13.0* 9.7   HGB 8.7* 7.9* 8.0*    252 246     BMP:    Recent Labs     02/11/20  0350 02/12/20  0305 02/13/20  0505     --  137 140   K 2.9*   < > 3.8  3.8 3.7  3.7   CL 95*  --  94* 98   CO2 30  --  30 29   BUN <2*  --  3* 4*   CREATININE 0.6  --  0.6 0.5   GLUCOSE 93  --  92 92    < > = values in this interval not displayed. Calcium:  Recent Labs     02/13/20  0505   CALCIUM 8.3*     Ionized Calcium:No results for input(s): IONCA in the last 72 hours.   Magnesium:  Recent Labs     02/13/20  0505   MG 2.2     Phosphorus:  Recent Labs     02/13/20  0505   PHOS 3.9      Recent Labs     02/11/20  0350 02/12/20  0305 02/13/20  0505   ALKPHOS 66 58 54   ALT 12 12 10*   AST 32 26 20   PROT 5.4* 5.3* 5.2*   BILITOT 0.9 0.9 0.9   LABALBU 3.8 4.0 3.9        Problem list of patient:     Patient Active Problem List   Diagnosis Code    TIA (transient ischemic attack) G45.9    Hypothyroidism E03.9    DVT (deep venous thrombosis) (HCC) I82.409    Depression F32.9    Anxiety F41.9    Fibromyalgia M79.7    Fatty liver K76.0    Tachycardia R00.0    GERD (gastroesophageal reflux disease) K21.9    Recurrent cold sores B00.1    Irritable bowel K58.9    Gastric bypass status for obesity Z98.84    Insomnia G47.00    Ovarian cyst N83.209    Muscle weakness (generalized) M62.81    Alcoholism (HCC) F10.20    Osteoarthritis M19.90    Hypocalcemia E83.51    Hypomagnesemia E83.42    Moderate malnutrition (HCC) E44.0    Other hypoparathyroidism (HCC) E20.8    Pancytopenia (HCC) D61.818    Palpitations R00.2    Asymptomatic bacteriuria R82.71    Hyperphosphatemia E83.39    Prolonged Q-T interval on ECG R94.31    Hypoalbuminemia E88.09    Tinnitus of both ears H93.13    Hx of parathyroidectomy Z90.09    Normocytic anemia D64.9    History of fibromyalgia Z87.39    Sepsis ([facility]) A41.9         ASSESSMENT/PLAN   Sepsis due to Pasteruella related to cat bite:better: continue oral antibiotic for two more days  Hx of alcohol abuse:    Deconditioning: will go to Rehabilitation. Discussed with pharmacy.         Mike Rendon MD, [address] 2/13/2020 12:05 PM

## 2020-02-13 NOTE — DISCHARGE SUMMARY
Hospitalist discharge note      Patient:  Geovani Chou    Unit/Bed:4K-14/014-A  YOB: 1960  MRN: [id number]   Acct: [de-identified]     PCP: Damon Castillo MD  Date of Admission: 2/2/2020    Assessment/Plan:     1. Acute hypoxic respiratory failure secondary to acute congestive heart failure systolic-has history of cardiomyopathy likely alcohol-related--add IV Bumex 1 5 mg twice daily> Monitor daily weights, strict  Intake and Out put, monitor K, Ical , Mag, BMP daily, replace lytes accordingly  2/9--> continue IV BUMEX, clinically improving was +10L,  Negative 1 L since yesterday--> no weight recorded    2/10--> patient was moved to a [facility] telemetry unit since patient was clinically better however last night had increasing shortness of breath that needed BiPAP and chest x-ray worsened pulmonary edema. Patient was on 1 mg Bumex IV twice daily, did not improve and hence was started on Bumex drip last night by the nighttime hospitalist patient was then transferred back to stepdown unit. -->  Currently back on 1 L of oxygen diuresed well lost couple of pounds--> wean oxygen as able    2/11--on room air, stop the IV Bumex drip, diuresed really well lost almost 30 pounds--keep off diuretics for today may start oral diuretics tomorrow. Replace electrolytes magnesium and potassium    2/12-still on room air will add oral diuretics at discharge to TCU    2. Acute on chronic microcytic fe def anemia ---> occult blood pending--> received venofer xq dose 2/8/2020-->   hbg 6.9 on 2/9--> added one unit prbc  2/10--hemoglobin around 8.8--> no gross bleeds no transfusion requirement today   2/11-hemoglobin stable around 8.6- 8.8  2/12 hemoglobin around 7.9 however no gross bleeds we will add Venofer 1 more dose 200 mg IV today plan to check hemoglobin tomorrow if less than 7 consider transfusion  Follow-up with GI as outpatient for EGD and colonoscopy    3. Alcohol Abuse:  With withdrawal--on phenobarbital protocol> received 1 dose last night--symptoms seems to be controlled currently and not quite receive anymore> , continue thiamine and folic acid and multivitamins. Aspiration and seizure precautions. --Did not receive any phenobarbital on 2/6 and 02/7/ 2/8 or 2/9    4. Sepsis (POA): Resolved-3/4 SIRS. 2/2 Blood cultures growing Pasteurella Multocida. Will await sensitives. Pt reports having had been scratched by a cat. BP has been low, pt had received 3L of fluid boluses during the time of admission. ID consulted. Cont IV Rocephin.  -->  Changed to oral cefuroxime on 2/10 by ID    5. Pasteurella Multocida Bacteremia: likely from cat scratch. Cont IV Rocephin. ID consulted. Appreciate ID recommendations . Change to oral antibiotic? 2/10--change to oral cefuroxime by ID    6. Acute hypoxic respiratory failure-etiology? Currently at 80 percentage on 1 L wean oxygen as able     7. Hypocalcemaia: 2/2 s/p parathyroidectomy. Ical 1.08, 1.35. patient is on calcitriol, calcium citrate-vitamin D tablets twice daily 2/5--calcium levels are actually high iCal is 1.6 holding  calcium supplements today-we will do an endocrinology consult to aid in the dosing    2/6--> seen by endocrinology reduce the dose of the calcium supplements to 2 tablet 4 times daily also on calcitriol check iCal daily    2/7--> ionized calcium levels are better and it is normal--endocrinology managing>   Hypomagnesemia-secondary to poor oral intake as well as being an alcoholic getting replaced per protocol    2/8--> ionized levels are normal  2/9-> levels are normal  2/10--level is around 1.08--will need replacement will inform endocrinologist.  2/11--> informed endocrinology--> likely will need to increase ca supplemtns    8. S/P Parathyroidectomy: Removed due to hyperparathyroidism with Hunger Bone Syndrome. 9. History of Gastric Bypass: Aware. 2018. 10. Severe malnutrition--> dietary cons  11.  History of TIA: Aware. 12. Hypothyroidism: Continue Synthroid.   13. Deconditioning-added PT OT ambulate, add TCU consulted--> plan for to go to TCU once medically stable  14. Acute lactic acidosis-secondary to hypoxia? ??  Or lab error? ?  This was completely normal in the next set of labs. Stable for transfer to TCU today  CBC and BMP a.m. All ionized calcium should be called into the endocrinologist and instruction given in the discharge orders    Discharge time 35 minutes    STAYED [address]  \  [address]      Chief Complaint: fever     Hospital Course: 62 y.o. female who presented to [facility] with muscle aches; she states yesterday she developed muscle cramps that started in her legs and then came up to her back; she still has severe cramps to her left lower back with any type of movement; she also relates to fever and chills; she relates to lightheadedness and dizziness, states she developed a cough today that is nonproductive, denies chest pain, relates to dyspnea on exertion, relates to nausea, states she is lost 85 pounds in the last year and feels it secondary to parathyroidism; in the ER she was given Rocephin and Zithromax for right-sided pneumonia; she had a MediPort placed in December 2019 secondary to her calcium infusions; she is fully awake and alert;  states she looks a little pale but otherwise at her baseline; she is being admitted to the hospital service for further care and evaluation.     Subjective:   Doing much better  In room air  Eating better  Denies any shortness of breath chest pain nausea vomiting diarrhea  Looking well with PT    Medications:  Reviewed    Infusion Medications     Scheduled Medications     PRN Meds:       Intake/Output Summary (Last 24 hours) at 2/13/2020 1748  Last data filed at 2/13/2020 1321  Gross per 24 hour   Intake 616.13 ml   Output --   Net 616.13 ml       Diet:  No diet orders on file    Exam:  /76   Pulse

## 2020-02-13 NOTE — PLAN OF CARE
Problem: Pain:  Goal: Pain level will decrease  Description  Pain level will decrease  Outcome: Ongoing  Note:   C/o chronic back pain. Was administered PRN percocet before admission on acute. Patient using heat pad. Problem: Falls - Risk of:  Goal: Will remain free from falls  Description  Will remain free from falls  Outcome: Ongoing  Note:   Patient educated on unit protocol for use of bed/chair alarms for safety. Alert to call light     Problem: Bleeding:  Goal: Will show no signs and symptoms of excessive bleeding  Description  Will show no signs and symptoms of excessive bleeding  Outcome: Ongoing  Note:   On lovenox for DVT prophylaxis. Problem: Mobility - Impaired:  Goal: Mobility will improve  Description  Mobility will improve  Outcome: Ongoing  Note:   1 assist CGA with walker and gait belt. Able to stand up without assist     Problem: Discharge Planning:  Goal: Patients continuum of care needs are met  Description  Patients continuum of care needs are met  Outcome: Ongoing  Note:   Patient educated on process for team conference. Patient lives with  Asim Aldana. ZOE is her son Royal Calixto who lives in [place]. Problem: Mood - Altered:  Goal: Mood stable  Description  Mood stable  Outcome: Ongoing  Note:   Pleasant and cooperative with staff. On cymbalta     Problem: Sensory:  Goal: General experience of comfort will improve  Description  General experience of comfort will improve  Outcome: Ongoing  Note:   Has orders for zanaflex and flexeril     Problem: Infection - Central Venous Catheter-Associated Bloodstream Infection:  Goal: Will show no infection signs and symptoms  Description  Will show no infection signs and symptoms  Outcome: Ongoing  Note:   Mediport to right chest clean and intact, heparinized on acute before admission.       Problem: Risk for Impaired Skin Integrity  Goal: Tissue integrity - skin and mucous membranes  Description  Structural intactness and normal physiological function of skin and  mucous membranes. Outcome: Ongoing  Note:   Buttocks red and blanches, scattered bruising.

## 2020-02-14 PROBLEM — E43 SEVERE MALNUTRITION (HCC): Status: ACTIVE | Noted: 2020-01-01

## 2020-02-14 NOTE — PROGRESS NOTES
Rupal Kim 58  INPATIENT OCCUPATIONAL THERAPY  [facility] SKILLED NURSING UNIT  EVALUATION    Time:   Time In: 732  Time Out: 0548  Timed Code Treatment Minutes: 39 Minutes  Minutes: 56          Date: 2020  Patient Name: Alicia Monroe,   Gender: female      MRN: [id number]  : 1960  (62 y.o.)  Referring Practitioner: Warren Powell MD; Attending: Dr. Az Dick  Diagnosis: Muscular Deconditioning  Additional Pertinent Hx: Per ER note on 2/3/2020: 62 y.o. female who presents to the Emergency Department for the evaluation of myalgias. The patient states that she developed bilateral leg cramps yesterday afternoon. The patient states that she developed bilateral lower back spasms last night. She rates her pain at a 6/10 in severity. The patient reports associated mild shortness of breath that worsened yesterday. The patient denies any chest pain or coughing. The patient reports chills and sweating but denies known fevers. She reports tinnitus of the right ear for which she has followed with ENT. She reports possible swelling of the RLE greater than baseline. She denies chest pain, congestion, ear pain or drainage, runny nose, sinus pain or pressure, abdominal pain, nausea, vomiting, diarrhea, constipation, weakness, numbness, or tingling. The patient states that she has been using Salonpas patches on her back without pain relief. The patient states that she was nauseated yesterday, but that has resolved today. The patient has a past medical history of alcoholism, closed fracture of the sixth and seventh cervical vertebra, closed fracture of thoracic vertebra, DVT, fibromyalgia, GERD, hiatal hernia, ITP, hungry bone syndrome, hyperparathyroidism, hypocalcemia, hypothyroidism, insomnia, irritable bowel, osteoarthritis, menopausal syndrome, osteopenia, ovarian cyst, oxaluria, tachycardia, TIA, and tubular adenoma of colon.  Transfer to [facility] 2/13/2020. Restrictions/Precautions:  Restrictions/Precautions: Fall Risk, General Precautions    Subjective  Chart Reviewed: Yes, Orders, Progress Notes, History and Physical, Previous Admission  Patient assessed for rehabilitation services?: Yes    Subjective: Cooperative    Pain:  Pain Assessment  Patient Currently in Pain: Yes  Pain Assessment: 0-10  Pain Level: 5  Pain Type: Chronic pain  Pain Location: Neck    Social/Functional History:  Lives With: Spouse  Type of Home: House  Home Layout: Two level, Able to Live on Main level with bedroom/bathroom  Home Access: Stairs to enter with rails(5)  Home Equipment: Rolling walker, Cane   Bathroom Shower/Tub: Tub/Shower unit  Bathroom Toilet: Standard  Bathroom Equipment: Grab bars in shower  IADL Comments: Pt reports spouse completes cooking and cleaning. ADL Assistance: Independent  Homemaking Assistance: Needs assistance  Ambulation Assistance: Independent  Transfer Assistance: Independent       Occupation: Retired  Leisure & Hobbies: word search, [employer]  IADL Comments: Pt reports spouse completes cooking and cleaning. Additional Comments: Pt reports fully indep with ADLs and no AD.     Cognition/Orientation:  Overall Orientation Status: Within Normal Limits  Overall Cognitive Status: WFL    ADL's:  Grooming: Independent  UE Bathing: Stand by assistance  LE Bathing: Stand by assistance  UE Dressing: Stand by assistance  LE Dressing: Stand by assistance       Functional Mobility:  Bed mobility  Supine to Sit: Supervision  Scooting: Supervision    Functional Mobility  Functional - Mobility Device: Rolling Walker  Activity: To/from bathroom  Assist Level: Stand by assistance     Balance:  Balance  Sitting Balance: Supervision  Standing Balance: Stand by assistance  Standing Balance  Time: x 1-2 min x 4 trials  Activity: ADLs    Transfers:  Sit to stand: Stand by assistance  Stand to sit: Stand by assistance       Upper Extremity Assessment:   LUE AROM :

## 2020-02-14 NOTE — PLAN OF CARE
Problem: Nutrition  Goal: Optimal nutrition therapy  Outcome: Ongoing   Nutrition Problem: Severe malnutrition, In context of acute illness or injury  Intervention: Food and/or Nutrient Delivery: Continue current diet, Vitamin Supplement, Modify current ONS  Nutritional Goals: pt. will consume 75% or more at meals during LOS.

## 2020-02-14 NOTE — PLAN OF CARE
[facility]  Physical Medicine Case Management Assessment    [] Inpatient Rehabilitation Unit  [x] Transitional Care Unit    Patient Name: Cathi Terry        MRN: [id number]    : 1960  (62 y.o.)  Gender: female   Date of Admission: 2020  2:24 PM    Family/Social/Home Environment: Social/Functional History: Patient is a 62year old  female who was admitted to [address] [place] [address] following a stay on acute for Sepsis/ infection. Patient  is very active, she manages her own care independently. Patient lives with her spouse in a private residence. Patient and spouse have been  39 years, they have one son, he lives in [place]. Patient has a strong support system in place with family and friends. 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 Additional Comments: Pt reports fully indep with ADLs and no AD. Contact/Guardian Information: Emergency Contacts  Healthcare Agent Appointed: Healthcare power of   Healthcare Agent's Name: ZOE Shah (spouse) [phone]    [facility] [employer] Utilized: None       Anticipated Needs/Discharge Plans: Patient plans to return home once she is discharged from [address] [place] [address].  Patient is high functioning, managed all care and all household responsibilities

## 2020-02-14 NOTE — PLAN OF CARE
Problem: Pain:  Goal: Pain level will decrease  Description  Pain level will decrease  Outcome: Ongoing    Pain Assessment: 0-10  Pain Level: 3   Patient's Stated Pain Goal: No pain   Is pain goal met at this time? Yes     Non-Pharmaceutical Pain Intervention(s): Rest, Repositioned       Problem: Falls - Risk of:  Goal: Will remain free from falls  Description  Will remain free from falls  Outcome: Ongoing    Fall precautions in place. Bed in low position. Call light and side table within reach. No falls noted this shift. Patient ambulates with use of walker and gait belt. Problem: Mobility - Impaired:  Goal: Mobility will improve  Description  Mobility will improve  Outcome: Ongoing    Patient ambulates with 1 assist this shift. Problem: Discharge Planning:  Goal: Patients continuum of care needs are met  Description  Patients continuum of care needs are met  2/14/2020 1732 by Fatimah Pearson RN  Outcome: Ongoing     Discharge planning ongoing. Patient plans for discharge to private residence. Problem: Risk for Impaired Skin Integrity  Goal: Tissue integrity - skin and mucous membranes  Description  Structural intactness and normal physiological function of skin and  mucous membranes. Outcome: Ongoing    No new skin issues noted this shift. Patient encouraged to reposition hourly. Care plan reviewed with patient. Patient verbalized understanding of the plan of care and contribute to goal setting.

## 2020-02-14 NOTE — PROGRESS NOTES
Speed, Wide Base of Support and Unsteady Gait  **Pt amb with side ASTER, decreased step length, slow and guarded    Stairs: Moderate Assistance  Number of Steps: 1  Height: 6\" step curb with No Device  **Pt stubs toe, near LOB, mod A for balance    Stairs:  Contact Guard Assistance  Number of Steps: 4  Height: 6\" step with Bilateral Handrails    Functional Outcome Measures: Completed     Timed Up and Go: 15.93 seconds (without AD)    Normative Reference Values  60-69 years:  8.1 seconds  70-79 years: 9.2 seconds  80-99 years: 11.3 seconds    < 10 seconds is normal, a score of > 14 seconds indicates high fall risk    Pt performs 5x sit to stand test from mat table at 19 inches in 24.31 seconds and min A to complete, pt unable to complete test without assistance    ASSESSMENT:  Activity Tolerance:  Patient tolerance of  treatment: good. Treatment Initiated: Treatment and education initiated within context of evaluation. Evaluation time included review of current medical information, gathering information related to past medical, social and functional history, completion of standardized testing, formal and informal observation of tasks, assessment of data and development of plan of care and goals. Treatment time included skilled education and facilitation of tasks to increase safety and independence with functional mobility for improved independence and quality of life. See additional transfers, gait, balance tasks, education on POC. Assessment: Body structures, Functions, Activity limitations: Decreased functional mobility , Decreased balance, Decreased endurance, Decreased strength, Increased pain  Assessment: Pt tolerates session well, limited by generalized weakness, deconditioning. Pt fatigues quickly, demonstrates significant functional weakness in LE's. PT to continue to progress strength and functional mobility to ensure safe DC home.   Prognosis: Good       Discharge Recommendations:  Discharge Recommendations: Continue to assess pending progress    Patient Education:  PT Education: Goals, PT Role, Plan of Care    Equipment Recommendations:  Equipment Needed: No    Plan:  Times per week: 6x  Current Treatment Recommendations: Strengthening, Functional Mobility Training, Neuromuscular Re-education, Home Exercise Program, Transfer Training, Gait Training, Safety Education & Training, Balance Training, Endurance Training, Stair training, Patient/Caregiver Education & Training    Goals:  Patient goals : to get stronger  Short term goals  Time Frame for Short term goals: 2 weeks  Short term goal 1: Pt to transfer supine <--> sit Independently to enable pt to get in/out of bed. Short term goal 2: Pt to transfer sit <--> stand mod I for increased functional mobility. Short term goal 3: Pt to ambulate 200 feet without AD with Supervision for household ambulation. Long term goals  Time Frame for Long term goals : 3 weeks  Long term goal 1: Pt to ambulate >250 feet without AD Independently for household ambulation. Long term goal 2: Pt to ascend/descend 5 steps with R HR SBA for home entry. Long term goal 3: Pt to perform car transfer Independently to enable pt to get in/out of the car. Long term goal 4: Pt to improve TUG test score to 10 seconds to indicate no fall risk. Long term goal 5: Pt to perform 5x sit to stand test from mat table Independently in 11 seconds to indicate no fall risk. Following session, patient left in safe position with all fall risk precautions in place.

## 2020-02-15 PROBLEM — R78.81 BACTEREMIA: Status: ACTIVE | Noted: 2020-01-01

## 2020-02-16 NOTE — H&P
TCU History and Physical      Chief Complaint and Reason for TCU Admission:   The need to continue the time with therapies following the acute hospital stay. History of Present Illness:  Shania Meza  is a 62 y.o. female admitted to the transitional care unit on 2/13/2020. She has had troubles with mineral balances for several months and has been admitted several times for that. She knows that walking up steps is impossible if she does not hold on to something. She came to the ED for weakness and was admitted. She has a history of alcoholism and does not feel any withdraw problem now but did receive some phenobarbital during the acute stay. She currently is too weak to return to her home.     Past Medical History:      Diagnosis Date    Alcoholism ([address]     Anxiety     Atrophy of vulva     Atyp squam cell of undet signfc cyto smr crvx (ASC-US)     Closed fracture of seventh cervical vertebra without spinal cord injury ([address]     Closed fracture of sixth cervical vertebra (HCC)     Closed fracture of thoracic vertebra (HCC)     Depression     Dry eye syndrome     D[facility]deep [facility] thrombosis) ([facility])     left arm; after contrast for MRI brain    Dyspareunia in female     External hemorrhoids without complication     Fibromyalgia     Gastric bypass status for obesity 2/26/2018    GERD (gastroesophageal reflux disease)     Hiatal hernia     History of ITP     as a senior in high school; no active issues    Hungry bone syndrome     Hx of blood clots     Hyperparathyroidism ([address]     Prior hospitalization with hungry bone syndrome on IV calcium in the past    Hypocalcemia     Hypothyroidism     Insomnia     Irritable bowel     Lactose intolerance     uses lactaid which helps    Menopausal syndrome     Osteoarthritis     Osteopenia     on reclast    Osteopenia     Ovarian cyst     repeat imaging in 9/2018 recommended    Oxaluria ([address]     Recurrent cold sores     on acyclovir    Tachycardia     due to hyperparathyroidism; on atenolol    TIA (transient ischemic attack)     visible on MRI brain    Tubular adenoma of colon     2019       Primary care provider: David Morales MD     Past Surgical History:      Procedure Laterality Date   [address]  approx 2013    normal per patient    COLONOSCOPY Left 5/7/2019    COLONOSCOPY POLYPECTOMY SNARE/COLD BIOPSY performed by Fly Betancourt MD at [address] ESOPHAGUS      ENDOSCOPY, COLON, DIAGNOSTIC      FOOT [address]    LAPAROSCOPY      LEG SURGERY Right     following fracture    PARATHYROIDECTOMY  08/2017    TONSILLECTOMY AND ADENOIDECTOMY      TUBAL LIGATION  1996    UPPER GASTROINTESTINAL ENDOSCOPY  approx 2013    normal per patient except mild erosion    UPPER GASTROINTESTINAL ENDOSCOPY Left 5/7/2019    EGD BIOPSY performed by Fly Betancourt MD at [address] Right     pinned       Allergies:    Estrogens;  Penicillins; Sulfa antibiotics; and Bactrim [sulfamethoxazole-trimethoprim]    Current Medications:    Current Facility-Administered Medications: esomeprazole (NEXIUM) delayed release capsule 20 mg, 20 mg, Oral, BID AC  Calcium Ctirate VItamin D 500-500 mg unit chew, 2 tablet, Oral, 4x Daily  polyethylene glycol (GLYCOLAX) packet 17 g, 17 g, Oral, Daily PRN  magnesium hydroxide (MILK OF MAGNESIA) 400 MG/5ML suspension 30 mL, 30 mL, Oral, Daily PRN  senna (SENOKOT) tablet 8.6 mg, 1 tablet, Oral, Nightly PRN  [START ON 2/16/2020] enoxaparin (LOVENOX) injection 40 mg, 40 mg, Subcutaneous, Daily  [START ON 2/16/2020] multivitamin 1 tablet, 1 tablet, Oral, Daily  heparin flush 100 UNIT/ML injection 500 Units, 500 Units, Intracatheter, Daily  magnesium chloride (MAG DELAY) extended release tablet 64 mg, 1 tablet, Oral, BID  heparin flush 100 UNIT/ML injection 500 Units, 500 Daily  Vitamin D (CHOLECALCIFEROL) tablet 2,000 Units, 2,000 Units, Oral, Daily  cefUROXime (CEFTIN) tablet 500 mg, 500 mg, Oral, 2 times per day     Resident is taking an antipsychotic medication? Yes, cymbalta     If yes, was Gradual Dose Reduction (GDR) attempted? No     No- GDR is clinically contraindicated due to the fact that tapering the medication  would not achieve the desired therapeutic effects and the current dose is  necessary to maintain or improve the resident's function, well-being, safety, and  quality of life. Social History:  Social History     Socioeconomic History    Marital status:      Spouse name: Not on file    Number of children: Not on file    Years of education: Not on file    Highest education level: Not on file   Occupational History    Not on file   Social Needs    Financial resource strain: Not on file    Food insecurity:     Worry: Not on file     Inability: Not on file    Transportation needs:     Medical: Not on file     Non-medical: Not on file   Tobacco Use    Smoking status: Never Smoker    Smokeless tobacco: Never Used   Substance and Sexual Activity    Alcohol use:  Yes     Alcohol/week: 9.0 standard drinks     Types: 7 Glasses of wine, 1 Cans of beer, 1 Shots of liquor per week    Drug use: No    Sexual activity: Not on file   Lifestyle    Physical activity:     Days per week: Not on file     Minutes per session: Not on file    Stress: Not on file   Relationships    Social connections:     Talks on phone: Not on file     Gets together: Not on file     Attends Druze service: Not on file     Active member of club or organization: Not on file     Attends meetings of clubs or organizations: Not on file     Relationship status: Not on file    Intimate partner violence:     Fear of current or ex partner: Not on file     Emotionally abused: Not on file     Physically abused: Not on file     Forced sexual activity: Not on file   Other Topics Concern    Not on file   Social History Narrative    Not on file           Family History:       Problem Relation Age of Onset   [facility] Breast Cancer Mother         twice age 48[de-identified] 79    Other Mother         anxiety, dementia    Depression Mother     High Blood Pressure Mother     Cancer Father         lung    Other Father         Myelodysplastic syndrome, cirrhosis of liver    Depression Father     High Blood Pressure Father     Breast Cancer Sister     Other Maternal Grandmother         Alzheimer's disease    Heart Attack Maternal Grandmother     Mental Illness Maternal Grandmother     Other Maternal Grandfather         asthma, anxiety    Asthma Maternal Grandfather        Review of Systems:  CONSTITUTIONAL:  positive for  fatigue  EYES:  positive for  glasses  HEENT:  negative for  nasal congestion  RESPIRATORY:  negative for  dyspnea  CARDIOVASCULAR:  negative for  chest pain  GASTROINTESTINAL:  negative for dysphagia  GENITOURINARY:  negative for dysuria  SKIN:  negative  HEMATOLOGIC/LYMPHATIC:  negative for petechiae  MUSCULOSKELETAL:  positive for  myalgias, arthralgias and muscle weakness  NEUROLOGICAL:  positive for gait problems and weakness  BEHAVIOR/PSYCH:  negative for increased agitation  10 point system review otherwise negative    Physical Exam:  /75   Pulse 80   Temp 97.9 °F (36.6 °C) (Oral)   Resp 16   Ht 4' 10\" (1.473 m)   Wt 112 lb 1.6 oz (50.8 kg)   SpO2 100% Comment: no o2 weaning at this time  BMI 23.43 kg/m²   Well developed well nourished white female who is awake alert and cooperative laying in bed  Skin atrophic  Membranes moist  Head normocephalic  Neck without mass  Chest symmetrical expansion  Heart S1S2 without murmur  Lungs CTA  Abd soft, non tender, normoactive BS and no mass  Ext without edema  Neuro weak  Psy pleasant        Diagnostics:  Recent Results (from the past 24 hour(s))   Ionized Calcium    Collection Time: 02/15/20  5:30 AM   Result Value Ref Range    Calcium,

## 2020-02-16 NOTE — PROGRESS NOTES
[address] [address]  [facility] SKILLED NURSING UNIT  Occupational Therapy  Daily Note  Time:   Time In: 0700  Time Out: 0730  Timed Code Treatment Minutes: 30 Minutes  Minutes: 30          Date: 2020  Patient Name: Geovani Chou,   Gender: female      Room: [facility]57/057-A  MRN: [id number]  : 1960  (62 y.o.)  Referring Practitioner: Loretta Sylvester MD; Attending: Dr. Taqueria Prater  Diagnosis: Muscular Deconditioning  Additional Pertinent Hx: Per ER note on 2/3/2020: 62 y.o. female who presents to the Emergency Department for the evaluation of myalgias. The patient states that she developed bilateral leg cramps yesterday afternoon. The patient states that she developed bilateral lower back spasms last night. She rates her pain at a 6/10 in severity. The patient reports associated mild shortness of breath that worsened yesterday. The patient denies any chest pain or coughing. The patient reports chills and sweating but denies known fevers. She reports tinnitus of the right ear for which she has followed with ENT. She reports possible swelling of the RLE greater than baseline. She denies chest pain, congestion, ear pain or drainage, runny nose, sinus pain or pressure, abdominal pain, nausea, vomiting, diarrhea, constipation, weakness, numbness, or tingling. The patient states that she has been using Salonpas patches on her back without pain relief. The patient states that she was nauseated yesterday, but that has resolved today. The patient has a past medical history of alcoholism, closed fracture of the sixth and seventh cervical vertebra, closed fracture of thoracic vertebra, DVT, fibromyalgia, GERD, hiatal hernia, ITP, hungry bone syndrome, hyperparathyroidism, hypocalcemia, hypothyroidism, insomnia, irritable bowel, osteoarthritis, menopausal syndrome, osteopenia, ovarian cyst, oxaluria, tachycardia, TIA, and tubular adenoma of colon.  [facility] to [facility] 2/13/2020. Restrictions/Precautions:  Restrictions/Precautions: Weight Bearing    SUBJECTIVE: Patient lying in bed upon arrival. Agreeable to OT session    PAIN: Denies    COGNITION: WFL    ADL:   Grooming: Stand By Assistance. Standing sinkside to wash hands after toileing tasks, complete oral and hair care  Bathing: Minimal Assistance. For B feet. Able to wash clarence area/bottom while standing with SBA. Washed remaining areas with supervision in seated position  Upper Extremity Dressing: with set-up. To doff gown and don long sleeve shirt  Lower Extremity Dressing: Minimal Assistance. Threading RLE into pants and donning/doffing socks. Able to thread LLE and pull up over hips  Toileting: Stand By Assistance. For hygiene  Toilet Transfer: Stand By Assistance. To/from STS with use of grab bar. BALANCE:  Sitting Balance:  Modified Independent. Standing Balance: Stand By Assistance. BED MOBILITY:  Supine to Sit: Supervision - with use of side rail    TRANSFERS:  Sit to Stand:  Stand By Assistance. From various surfaces with cueing provided for proper hand placement  Stand to Sit: Stand By Assistance. To various surfaces with cueing provided for safe technique    FUNCTIONAL MOBILITY:  Assistive Device: Rolling Walker  Assist Level:  Contact Guard Assistance. Distance: To and from bathroom  Fair pace, no LOB noted     ASSESSMENT:     Activity Tolerance:  Patient tolerance of  treatment: fair. Discharge Recommendations: Home with assist PRN  Equipment Recommendations:  Other: no equipment needs identified at this time  Plan: Times per week: 6x  Current Treatment Recommendations: Balance Training, Self-Care / ADL, Functional Mobility Training, Endurance Training, Safety Education & Training, Patient/Caregiver Education & Training    Patient Education  Patient Education: ADL's    Goals  Short term goals  Time Frame for Short term goals: 1 week  Short term goal 1: Pt will complete toileting routine

## 2020-02-16 NOTE — PROGRESS NOTES
: Yes  IADL Comments: Pt reports spouse completes cooking and cleaning. Additional Comments: Pt reports fully indep with ADLs and no AD. Restrictions/Precautions:  Restrictions/Precautions: Weight Bearing    SUBJECTIVE: Patient in bed side chair upon arrival and agreeable to therapy. Patient requested to return to bed side chair at conclusion of treatment session. Patient's  arrived at conclusion of treatment session. PAIN: 4/10    OBJECTIVE:  Transfers:  Sit to Stand: Stand By Assistance  Stand to Sit:Stand By Assistance    Ambulation:  Contact Guard Assistance  Distance: ~240'x1   Surface: Level Tile  Device:No Device  Gait Deviations:  Slow Cherise, Decreased Step Length Bilaterally, Decreased Weight Shift Bilaterally, Decreased Gait Speed, Decreased Heel Strike Bilaterally and Mild Path Deviations    Exercise:  Patient was guided in 1 set(s) 10-15 reps of exercise to both lower extremities. Ankle pumps, Quad sets, Heelslides, Short arc quads, Long arc quads, Hip abduction/adduction and Seated hip flexion. Exercises were completed for increased independence with functional mobility. ASSESSMENT:  Assessment: Patient progressing toward established goals. Activity Tolerance:  Patient tolerance of  treatment: good.       Equipment Recommendations:Equipment Needed: No  Discharge Recommendations:  Continue to assess pending progress    Plan: Times per week: 6x  Current Treatment Recommendations: Strengthening, Functional Mobility Training, Neuromuscular Re-education, Home Exercise Program, Transfer Training, Gait Training, Safety Education & Training, Balance Training, Endurance Training, Stair training, Patient/Caregiver Education & Training    Patient Education  Patient Education: Transfers, Gait, Use of Gait [place], Verbal Exercise Instruction,  - Patient Verbalized Understanding    Goals:  Patient goals : to get stronger  Short term goals  Time Frame for Short term goals: 2 weeks  Short term goal 1: Pt to transfer supine <--> sit Independently to enable pt to get in/out of bed. Short term goal 2: Pt to transfer sit <--> stand mod I for increased functional mobility. Short term goal 3: Pt to ambulate 200 feet without AD with Supervision for household ambulation. Long term goals  Time Frame for Long term goals : 3 weeks  Long term goal 1: Pt to ambulate >250 feet without AD Independently for household ambulation. Long term goal 2: Pt to ascend/descend 5 steps with R HR SBA for home entry. Long term goal 3: Pt to perform car transfer Independently to enable pt to get in/out of the car. Long term goal 4: Pt to improve TUG test score to 10 seconds to indicate no fall risk. Long term goal 5: Pt to perform 5x sit to stand test from mat table Independently in 11 seconds to indicate no fall risk. Following session, patient left in safe position with all fall risk precautions in place.

## 2020-02-17 NOTE — PROGRESS NOTES
Patient assisted to bathroom with walker and standby. Assisted back to recliner, denies needs. Call light within reach.   Dominguez PNS/RSC

## 2020-02-17 NOTE — PATIENT CARE CONFERENCE
[address] [address]  Transitional Care Unit  Team Conference Note    Patient Name: Cathi Terry   MRN: [id number]    : 1960  (62 y.o.)  Gender: female   Referring Practitioner: OrderinDeanne Cortés MD; Attending: Sathya Bailey MD  Diagnosis: Muscular deconditioning    Team Conference Date: 2020   Admission Date:   2078  1:78 PM  Re-Certification Date:     :  Tentative Discharge Plan and current psychosocial issues:Patient is a 62year old  female who was admitted to [address] [place] [address] following a stay on acute for Sepsis/ infection. Patient  is very active, she manages her own care independently. Patient lives with her spouse in a private residence. Patient and spouse have been  39 years, they have one son, he lives in [place]. Patient has a strong support system in place with family and friends. Patient plans to return home once she is discharged from [address] [place] [address]. Patient is high functioning, managed all care and all household responsibilities independently prior to hospital stay, patient is an active  and is able to transport herself to all medical appointments. Sharona Escobar is requesting to be discharged on  with [facility] PT,OT,RN    Nursing:     Weight:  Weight: has been stable     Wounds/Incisions/Ulcers:  No skin/wound issues identified  Bowel: continent  Bladder:continent     Pain: Patient's pain currently uncontrolled and adjustments will be made as follows: tylenol, neurontin, percocet    Education Provided:  Diabetic:  Yes   Peg Tube:No    Parson Care:  Education:No  Removal Necessary:  NA  Supplies Needed: NA     Anticoagulant Use:  Patient on lovenox for anticoagulation, which is being managed by Primary Care Physician    Antibiotic Use:  Patient not on antibiotics    Psychotropic Medication Use:  Patient on cymbalta     Oxygen Use: No    Home Medications Reconciled: N/A    Physical Therapy:   Mrs. Basilia Duran is Risk of Unplanned Readmission:        32         High (20-31)     Moderate (10-19)     Low (0-9)    Team Members Present at Conference:  Physician: Dr. Sade Lord MD  Nurse: Melani Harrington RN, Leatha RN  :  MORA Beatty  Occupational Therapist:  BOBBY Flores  Physical Therapist:   Yesenia Cuellar PT  Speech Therapist: Speech Therapist: N/A. All team members have reviewed and updated as necessary and appropriate the established interdisciplinary plan of care within the medical record of Edelmira Christian. Pt's team conference attended (see above). Pt seen on rounds with LSW, to review the results with patient and family. Discussed length of stay as above. Pt's team conference attended (see above.)  Pt seen on rounds with LSW, to review the results with patient and family. Discussed length of stay as above.     Physical Exam:  General:  Alert and oriented  Vitals:   Vitals:    02/17/20 2000   BP: 129/76   Pulse: 76   Resp: 18   Temp: 99.2 °F (37.3 °C)   SpO2: 98%     Heart:  Regular rate and rhythm  Lungs:  Clear to auscultation  Abdomen:  soft with positive bowel sounds     Assessment:  Progressing in full rehabilitation program (see above)    Plan:  Continue Rehab            Continue current medications            Spent 37 minutes today in patient management and care    Electronically signed by Yesica Purvis MD on 2/18/2020 at 8:43 AM

## 2020-02-17 NOTE — PROGRESS NOTES
2/13/2020. Restrictions/Precautions:  Restrictions/Precautions: Weight Bearing    SUBJECTIVE: Patient lying in bed upon arrival. Agreeable to OT session    PAIN: Denies    COGNITION: WFL    ADL:   Grooming: Supervision. Standing sinkside to wash hands after toileting tasks, complete oral/hair care  Bathing: Supervision. Standing in shower to wash clarence area/bottom holding onto grab bar as needed. Able to wash remaining areas seated on tub bench  Upper Extremity Dressing: Supervision. For retrieval of clothing from closet. Able to doff gown and don tshirt in seated position  Lower Extremity Dressing: Supervision. For retrieval of clothing. Able to thread BLE into underwear/pants. Pulled up over hips with BUE release from walker with supevision  Toileting: Supervision. For hygiene seated on STS  Toilet Transfer: Supervision. To/from STS with use of grab bar  Shower Transfer: Stand By Assistance. To/from shower chair with use of grab bar. BALANCE:  Sitting Balance:  Modified Independent. Standing Balance: Supervision. BED MOBILITY:  Supine to Sit: Modified Independent - with use of side rail    TRANSFERS:  Sit to Stand:  Supervision. From various surfaces  Stand to Sit: Supervision. To various surfaces    FUNCTIONAL MOBILITY:  Assistive Device: Rolling Walker  Assist Level:  Supervision. Distance: To and from bathroom and To and from shower room     ADDITIONAL ACTIVITIES:  Pt completed BUE strengthening exercises x10 reps x1 set this date with a minimal resistance band in all joints and all planes in order to increase strength and improve activity tolerance required for functional toilet & shower transfers and ADL routine. Pt tolerated fair, requiring brief rest breaks throughout task. Education provided on [place], handout provided to patient. *Briefly discussed plan of care with patient regarding progression towards therapy goals and discharge planning.  Discussed receiving home health indep to increase indep with IADLs. Long term goal 2: Pt will complete ADL routine including shower t/f with indep to increase indep with self care routine. Following session, patient left in safe position with all fall risk precautions in place.

## 2020-02-17 NOTE — PROGRESS NOTES
Nutrition Assessment    Type and Reason for Visit: Reassess    Nutrition Recommendations:   Consider sodium restriction ( CHF pt, on Bumex)  Continue current vitamin regimen. Send magic cup twice/day. Nutrition Assessment:    Pt. with no improvement from a nutritional standpoint AEB intake %. Remains at risk for further nutritional compromise r/t Bacteremia, Alcoholism, Muscular Deconditioning , Sepsis, Hx: GERD, gastric bypass, fatty liver, CHF . Nutrition recommendations/interventions as per above. Malnutrition Assessment:  · Malnutrition Status: Meets the criteria for severe malnutrition  · Context: Acute illness or injury  · Findings of the 6 clinical characteristics of malnutrition (Minimum of 2 out of 6 clinical characteristics is required to make the diagnosis of moderate or severe Protein Calorie Malnutrition based on AND/ASPEN Guidelines):  1. Energy Intake-(varied %),      2. Weight Loss-Unable to assess,    3. Fat Loss-Moderate subcutaneous fat loss, Orbital  4. Muscle Loss-Moderate muscle mass loss, Temples (temporalis muscle), Clavicles (pectoralis and deltoids)  5. Fluid Accumulation-Moderate to severe fluid accumulation, Extremities, Generalized  6.  Strength-Not measured    Nutrition Risk Level: High    Nutrient Needs:  · Estimated Daily Total Kcal: 4788-4382 kcals (25-28kcals/kgm wt. of 50.8kgm 2/13/20)   · Estimated Daily Protein (g): 51-61 grams (1-1.2 grams protein/kgm wt. of 50.8kgm 2/13/20)    Nutrition Diagnosis:   · Problem: Severe malnutrition, In context of acute illness or injury  · Etiology: related to (Alcoholism)     Signs and symptoms:  as evidenced by Diet history of poor intake, Patient report of, Moderate muscle loss, Moderate loss of subcutaneous fat    Objective Information:  · Nutrition-Focused Physical Findings: Pt. admitted with Muscular Deconditioning. pt. seen, not fond of hospital food.  Complains of dry mouth, states has tried Biotene at home & not

## 2020-02-17 NOTE — PROGRESS NOTES
Patient sitting in recliner watching TV,  at bedside. Denies any further assistance at this time. Call light within reach.   Dominguez PNS/RSC

## 2020-02-17 NOTE — PROGRESS NOTES
Standing hip abduction/adduction, Standing hip extension, Standing hamstring curls, Mini squats and  with 1# wt to b le's. Exercises were completed for increased independence with functional mobility. Functional Outcome Measures: Not completed       ASSESSMENT:  Assessment: Patient progressing toward established goals. Activity Tolerance:  Patient tolerance of  treatment: good. Equipment Recommendations:Equipment Needed: No  Discharge Recommendations:  Continue to assess pending progress    Plan: Times per week: 6x  Current Treatment Recommendations: Strengthening, Functional Mobility Training, Neuromuscular Re-education, Home Exercise Program, Transfer Training, Gait Training, Safety Education & Training, Balance Training, Endurance Training, Stair training, Patient/Caregiver Education & Training    Patient Education  Patient Education: Home Exercise Program, Family Education, Equipment Education, Transfers, Reviewed Prior Education, Gait, Stairs, Car Transfers, Verbal Exercise Instruction, - Patient Requires Continued Education    Goals:  Patient goals : to get stronger  Short term goals  Time Frame for Short term goals: 2 weeks  Short term goal 1: Pt to transfer supine <--> sit Independently to enable pt to get in/out of bed. Short term goal 2: Pt to transfer sit <--> stand mod I for increased functional mobility. Short term goal 3: Pt to ambulate 200 feet without AD with Supervision for household ambulation. Long term goals  Time Frame for Long term goals : 3 weeks  Long term goal 1: Pt to ambulate >250 feet without AD Independently for household ambulation. Long term goal 2: Pt to ascend/descend 5 steps with R HR SBA for home entry. Long term goal 3: Pt to perform car transfer Independently to enable pt to get in/out of the car. Long term goal 4: Pt to improve TUG test score to 10 seconds to indicate no fall risk.   Long term goal 5: Pt to perform 5x sit to stand test from mat table Independently

## 2020-02-18 NOTE — PROGRESS NOTES
[address] [place] [address]  INPATIENT PHYSICAL THERAPY  DAILY NOTE  [address] - [id number]    Time In: 0731  Time Out: 09  Timed Code Treatment Minutes: 40 Minutes  Minutes: 40          Date: 2020  Patient Name: Delvin Red,  Gender:  female        MRN: [id number]  : 1960  (62 y.o.)     Referring Practitioner: Ordering: Susie Bautista MD; Attending: Estevan Lewis MD  Diagnosis: Muscular deconditioning  Additional Pertinent Hx: 62 y.o. female who presented to [address] [place] [address] with muscle aches; she states yesterday she developed muscle cramps that started in her legs and then came up to her back; she still has severe cramps to her left lower back with any type of movement; she also relates to fever and chills; she relates to lightheadedness and dizziness, states she developed a cough today that is nonproductive, denies chest pain, relates to dyspnea on exertion, relates to nausea, states she is lost 85 pounds in the last year and feels it secondary to parathyroidism; in the ER she was given Rocephin and Zithromax for right-sided pneumonia; she had a MediPort placed in 2019 secondary to her calcium infusions; she is fully awake and alert;  states she looks a little pale but otherwise at her baseline; she is being admitted to the hospital service for further care and evaluation. To TCU .      Prior Level of Function:  Lives With: Spouse  Type of Home: House  Home Layout: One level  Home Access: Stairs to enter with rails(5)  Entrance Stairs - Rails: Right  Home Equipment: Rolling walker, Cane   Bathroom Shower/Tub: Tub/Shower unit  Bathroom Toilet: Standard  Bathroom Equipment: Grab bars in shower    Receives Help From: Family  ADL Assistance: [facility] [address]: Independent  Homemaking Responsibilities: Yes  Ambulation Assistance: Independent  Transfer Assistance: Independent  Active : Yes  IADL Measures: Not completed       ASSESSMENT:  Assessment: Patient progressing toward established goals. Activity Tolerance:  Patient tolerance of  treatment: good. Good motivation     Equipment Recommendations:Equipment Needed: No  Discharge Recommendations:  Continue to assess pending progress    Plan: Times per week: 6x  Current Treatment Recommendations: Strengthening, Functional Mobility Training, Neuromuscular Re-education, Home Exercise Program, Transfer Training, Gait Training, Safety Education & Training, Balance Training, Endurance Training, Stair training, Patient/Caregiver Education & Training    Patient Education  Patient Education: Home Exercise Program, Family Education, [employer] Mobility, Equipment Education, Transfers, Reviewed Prior Education, Gait, Stairs, Car Transfers, Verbal Exercise Instruction,  [employer] ISSUED    Goals:  Patient goals : to get stronger  Short term goals  Time Frame for Short term goals: 2 weeks  Short term goal 1: Pt to transfer supine <--> sit Independently to enable pt to get in/out of bed. Short term goal 2: Pt to transfer sit <--> stand mod I for increased functional mobility. Short term goal 3: Pt to ambulate 200 feet without AD with Supervision for household ambulation. Long term goals  Time Frame for Long term goals : 3 weeks  Long term goal 1: Pt to ambulate >250 feet without AD Independently for household ambulation. Long term goal 2: Pt to ascend/descend 5 steps with R HR SBA for home entry. Long term goal 3: Pt to perform car transfer Independently to enable pt to get in/out of the car. Long term goal 4: Pt to improve TUG test score to 10 seconds to indicate no fall risk. Long term goal 5: Pt to perform 5x sit to stand test from mat table Independently in 11 seconds to indicate no fall risk. Following session, patient left in safe position with all fall risk precautions in place.

## 2020-02-18 NOTE — DISCHARGE INSTR - OTHER ORDERS
Results for Nakia Martin (MRN [id number]) as of 2/18/2020 12:37   Ref.  Range 2/18/2020 12:00   Calcium Latest Ref Range: 8.5 - 10.5 mg/dL 9.4     calcium gluconate 1 g in dextrose 5 % 100 mL IVPB  administered 02/18/2020 @ 12PM

## 2020-02-18 NOTE — PROGRESS NOTES
Justin Damon        MRN: [id number]    : 1960  (60 y.o.)  Gender: female   Principal Problem: Muscle weakness (generalized)    Section J    Health Conditions    Should Pain Assessment Interview be Conducted? Attempt to conduct interview with all residents. If resident is comatose, skip to , Shortness of Breath (dyspnea)   Enter Code  1 0 - No à (resident is rarely/never understood) à Skip to [address] of Breath  1 - Yes à Continue to , Pain Presence   Pain Assessment Interview   Pain Presence   Enter Code  1 Ask resident: Renita Ch you had pain or hurting at any time in the last 5 days?   0 - No à Skip to , Shortness of Breath  1 - Yes  à Continue to , Pain Frequency  9 - Unable to answer à Skip to , Shortness of Breath    Pain Frequency   Enter Code          3 Ask resident: Thomas Shave much of the time have you experienced pain or hurting over the last 5 days?   1 - Almost constantly  2 - Frequently  3 - Occasionally  4 - Rarely  9 - Unable to answer    Pain Effect on Function   Enter Code      0 Ask resident: Nuno Nissen the last 5 days, has pain made it hard for you to sleep at night?   0 - No   1 - Yes   9 - Unable to answer    Enter Code      0 Ask resident: Nuno Nissen the last 5 days, have you limited your day-to-day activities because of pain?   0 - No   1 - Yes   9 - Unable to answer     Pain Intensity   Enter Code      06 A. Numeric Rating Scale (00-10)  Ask resident: Elton Nemo rate your worst pain over the last 5 days on a zero to ten scale, with zero being no pain and ten as the worst pain you can imagine.  (Show resident 00-10 pain scale)  Enter two-digit response. Enter 99 if unable to answer.

## 2020-02-18 NOTE — PROGRESS NOTES
Discharge instructions explained. Spoke with patient about the need to call Dr. Bren Miguel in [place] and have him fax in order if supplemental Calcium orders via OP nursing still wanted. Provided patient with fax number to give the physician and also made her aware that she would need to call central scheduling and set up new appointment times as well. Patient states understanding. Mediport de accessed as she will not be requiring IV Calcium at this time via IV team.  Patient discharged with all belongings that spouse packed for her.

## 2020-02-18 NOTE — PROGRESS NOTES
independence up to 150' with a RW and ascend/descend steps with 2 hand rails with SBA. All goals not assessed due to unexpected discharge this date per patient request and PT unable to assess goals. Pt returning to home this date with spouse. Equipment Recommendations:  Equipment Needed: No    Plan:  Discharge physical therapy services, pt requesting to return to home this date. Recommend home health PT. Goals:  Short term goals  Time Frame for Short term goals: 2 weeks  Short term goal 1: Pt to transfer supine <--> sit Independently to enable pt to get in/out of bed. GOAL MET  Short term goal 2: Pt to transfer sit <--> stand mod I for increased functional mobility. GOAL MET  Short term goal 3: Pt to ambulate 200 feet without AD with Supervision for household ambulation. GOAL NOT MET     Long term goals  Time Frame for Long term goals : 3 weeks  Long term goal 1: Pt to ambulate >250 feet without AD Independently for household ambulation. GOAL NOT MET  Long term goal 2: Pt to ascend/descend 5 steps with R HR SBA for home entry. GOAL NOT MET  Long term goal 3: Pt to perform car transfer Independently to enable pt to get in/out of the car. GOAL NOT MET  Long term goal 4: Pt to improve TUG test score to 10 seconds to indicate no fall risk. GOAL NOT MET/ASSESSED DUE TO UNEXPECTED DISCHARGE  Long term goal 5: Pt to perform 5x sit to stand test from mat table Independently in 11 seconds to indicate no fall risk.  GOAL NOT MET/ASSESSED DUE TO UNEXPECTED DISCHARGE

## 2020-02-18 NOTE — DISCHARGE SUMMARY
Anion Gap Latest Ref Range: 8.0 - 16.0 meq/L 13.0 10.0      Calcium, Ion Latest Ref Range: 1.12 - 1.32 mmol/L 1.15 1.14      Est, Glom Filt Rate Latest Units: ml/min/1.73m2 >90 >90      Magnesium Latest Ref Range: 1.6 - 2.4 mg/dL 1.7 1.5 (L) 1.5 (L) 1.6 1.5 (L)   Glucose Latest Ref Range: 70 - 108 mg/dL 93 96      Calcium Latest Ref Range: 8.5 - 10.5 mg/dL 9.3 8.8      Phosphorus Latest Ref Range: 2.4 - 4.7 mg/dL 3.9 4.1      Total Protein Latest Ref Range: 6.1 - 8.0 g/dL 5.4 (L) 5.1 (L)      Albumin Latest Ref Range: 3.5 - 5.1 g/dL 4.0 3.5      Alk Phos Latest Ref Range: 38 - 126 U/L 51 52      ALT Latest Ref Range: 11 - 66 U/L 10 (L) 10 (L)      AST Latest Ref Range: 5 - 40 U/L 18 19      Bilirubin Latest Ref Range: 0.3 - 1.2 mg/dL 0.9 0.9      WBC Latest Ref Range: 4.8 - 10.8 thou/mm3 9.1 9.1      RBC Latest Ref Range: 4.20 - 5.40 mill/mm3 2.54 (L) 2.48 (L)      Hemoglobin Quant Latest Ref Range: 12.0 - 16.0 gm/dl 7.9 (L) 7.9 (L)      Hematocrit Latest Ref Range: 37.0 - 47.0 % 24.4 (L) 24.2 (L)      MCV Latest Ref Range: 81.0 - 99.0 fL 96.1 97.6      MCH Latest Ref Range: 26.0 - 33.0 pg 31.1 31.9      MCHC Latest Ref Range: 32.2 - 35.5 gm/dl 32.4 32.6      MPV Latest Ref Range: 9.4 - 12.4 fL 12.0 11.7      RDW-CV Latest Ref Range: 11.5 - 14.5 % 14.9 (H) 14.9 (H)      RDW-SD Latest Ref Range: 35.0 - 45.0 fL 48.6 (H) 49.8 (H)      Platelet Count Latest Ref Range: 130 - 400 thou/mm3 264 266      Lymphocytes Absolute Latest Ref Range: 1.0 - 4.8 thou/mm3 4.7 4.0      Monocytes Absolute Latest Ref Range: 0.4 - 1.3 thou/mm3 0.5 0.7      Eosinophils Absolute Latest Ref Range: 0.0 - 0.4 thou/mm3 0.0 0.0      Basophils Absolute Latest Ref Range: 0.0 - 0.1 thou/mm3 0.0 0.0      Seg Neutrophils Latest Units: % 41.4 47.1      Segs Absolute Latest Ref Range: 1.8 - 7.7 thou/mm3 3.8 4.3      Lymphocytes Latest Units: % 52.1 44.4      Monocytes Latest Units: % 5.8 7.8      Eosinophils Latest Units: % 0.4 0.4      Basophils Latest Units: % 0.2 0.2      Immature Grans (Abs) Latest Ref Range: 0.00 - 0.07 thou/mm3 0.01 0.01      Immature Granulocytes Latest Units: % 0.1 0.1      Nucleated Red Blood Cells Latest Units: /100 wbc 0 0          No results for input(s): POCGLU in the last 72 hours. Patient Instructions: Follow-up visits: See after visit summary from hospitalization    Discharge Medications:   Thom Reeves   [place] Medication Instructions BUM:[id number]    Printed on:02/18/20 3585   Medication Information                      acyclovir (ZOVIRAX) 400 MG tablet  TAKE 1 TABLET TWICE A DAY             bumetanide (BUMEX) 1 MG tablet  Take 1 tablet by mouth daily Additional RF per her PCP             calcitRIOL (ROCALTROL) 0.5 MCG capsule  Take 0.5 mcg by mouth 4 times daily Two tablets 4 times daily             Calcium Citrate-Vitamin D (CALCIUM CITRATE CHEWY BITE) 500-500 MG-UNIT CHEW  Take 2 tablets by mouth 4 times daily              Cholecalciferol (VITAMIN D3) 50 MCG (2000 UT) CAPS  Take 1 capsule by mouth daily (with breakfast)             Copper 5 MG CAPS  Take 5 mg by mouth daily             DULoxetine (CYMBALTA) 30 MG extended release capsule  TAKE 1 CAPSULE DAILY             DULoxetine (CYMBALTA) 60 MG extended release capsule  Take 1 capsule by mouth nightly             esomeprazole (NEXIUM) 20 MG delayed release capsule  Take 20 mg by mouth 2 times daily             Estradiol (VAGIFEM) 10 MCG TABS vaginal tablet  AT THE START OF THERAPY, INSERT 1 TABLET VAGINALLY DAILY FOR 2 WEEKS, THEN USE TWICE WEEKLY THEREAFTER             FOLIC ACID PO  Take 0,899 mg by mouth daily             gabapentin (NEURONTIN) 100 MG capsule  Take 1 capsule by mouth daily for 90 days. gabapentin (NEURONTIN) 300 MG capsule  Take 1 capsule by mouth nightly for 180 days.              loperamide (IMODIUM) 2 MG capsule  Take 2 capsules by mouth 4 times daily as needed for Diarrhea             magnesium cl-calcium carbonate (SLOW-MAG) 71.5-119 MG TBEC tablet  Take 1 tablet by mouth 2 times daily             metoprolol tartrate (LOPRESSOR) 25 MG tablet  Take 0.5 tablets by mouth 2 times daily             NONFORMULARY  Whey Protein Powder             ondansetron (ZOFRAN ODT) 4 MG disintegrating tablet  Take 1 tablet by mouth every 8 hours as needed for Nausea             oxyCODONE-acetaminophen (PERCOCET) 5-325 MG per tablet  Take 1 tablet by mouth every 8 hours as needed for Pain for up to 7 days.  Additional RF per her PCP             SYNTHROID 200 MCG tablet  Take 200 mcg by mouth daily             SYNTHROID 50 MCG tablet  Take 1 tablet by mouth Daily Additional RF per her endocrinoloist             tiZANidine (ZANAFLEX) 4 MG tablet  Take 1 tablet by mouth nightly At bed time             UNABLE TO FIND  Inject 0.5 mcg into the skin daily Natpara injection             vitamin A 70942 units capsule  Take 25,000 Units by mouth daily             vitamin B-1 (THIAMINE) 100 MG tablet  Take 100 mg by mouth daily             vitamin B-12 (CYANOCOBALAMIN) 500 MCG tablet  TAKE 1 TABLET DAILY             vitamin B-6 (PYRIDOXINE) 100 MG tablet  Take 100 mg by mouth daily             Vitamin D 400 UNIT TABS tablet  Take 5 tablets by mouth daily             Vitamin K, Phytonadione, 100 MCG TABS  Take 3 tablets by mouth daily             Vitamins-Lipotropics (LIPO-FLAVONOID PLUS PO)  Take 5 tablets by mouth daily             zinc gluconate 50 MG tablet  TAKE ONE AND ONE-HALF TABLETS (75 MG) DAILY                 35 minutes spent preparing the patient for discharge    Becky Smith MD

## 2020-02-18 NOTE — PROGRESS NOTES
after task with a standing endurance of 9 minutes. ASSESSMENT:     Activity Tolerance:  Patient tolerance of  treatment: good. Discharge Recommendations: Home with home health OT   Equipment Recommendations: Other: no equipment needs identified at this time  Plan: Times per week: 6x  Current Treatment Recommendations: Balance Training, Self-Care / ADL, Functional Mobility Training, Endurance Training, Safety Education & Training, Patient/Caregiver Education & Training    Patient Education  Patient Education: Plan of Care and Home Exercise Program    Goals  Short term goals  Time Frame for Short term goals: 1 week  Short term goal 1: Pt will complete toileting routine with S to increase indep with toileting. Short term goal 2: Pt will complete safe functional mobility without AD and S to increase indep with accessing environment during ADLs. Short term goal 3: Pt will tolerate dynamic standing task x 5 min with B hand release and S to increase balance required for grooming. Short term goal 4: Pt will complete LB ADL with S to increase indep with self care. Long term goals  Time Frame for Long term goals : 2 weeks  Long term goal 1: Pt will complete light homemaking task such as retrieving glass of water with indep to increase indep with IADLs. Long term goal 2: Pt will complete ADL routine including shower t/f with indep to increase indep with self care routine. Following session, patient left in safe position with all fall risk precautions in place.

## 2020-02-19 NOTE — PROGRESS NOTES
1025 pt arrives ambulatory for calcium infusion. Infusion explained and questions answered. PT RIGHTS AND RESPONSIBILITIES OFFERED TO PT. Pt provided with crackers and pepsi. 1115 pt tolerating infusion well. Denies needs at this time. 1205 infusion complete. Pt tolerated it well with no complaints. Pt discharged ambulatory with instructions with no complaints.                        __m__ Safety:       (Environmental)   [place] to environment   Ensure ID band is correct and in place/ allergy band as needed   Assess for fall risk   Initiate fall precautions as applicable (fall band, side rails, etc.)   Call light within reach   Bed in low position/ wheels locked    __m__ Pain:        Assess pain level and characteristics   Administer analgesics as ordered   Assess effectiveness of pain management and report to MD as needed    _m___ Knowledge Deficit:   Assess baseline knowledge   Provide teaching at level of understanding   Provide teaching via preferred learning method   Evaluate teaching effectiveness    __m__ Hemodynamic/Respiratory Status:       (Pre and Post Procedure Monitoring)   Assess/Monitor vital signs and LOC   Assess Baseline SpO2 prior to any sedation   Obtain weight/height   Assess vital signs/ LOC until patient meets discharge criteria   Monitor procedure site and notify MD of any issues    __m__ Infection-Risk of Central Venous Catheter:   Monitor for infection signs and symptoms (catheter site redness, temperature elevation, etc)   Assess for infection risks   Educate regarding infection prevention   Manage central venous catheter (flushes/ dressing changes per protocol)

## 2020-02-20 NOTE — PROGRESS NOTES
1046 Patient arrived to [facility] ambulatory for calcium infusion  PT RIGHTS AND RESPONSIBILITIES OFFERED TO PT.  1200 Patient denies any needs at this time  1247 Discharge instructions given to patient verbalized understanding.  Patient discharged to home in stable condition    __m__ Safety:       (Environmental)  Katie [place] to environment   Ensure ID band is correct and in place/ allergy band as needed   Assess for fall risk   Initiate fall precautions as applicable (fall band, side rails, etc.)   Call light within reach   Bed in low position/ wheels locked    _m___ Pain:        Assess pain level and characteristics   Administer analgesics as ordered   Assess effectiveness of pain management and report to MD as needed    m____ Knowledge Deficit:   Assess baseline knowledge   Provide teaching at level of understanding   Provide teaching via preferred learning method   Evaluate teaching effectiveness    _m___ Hemodynamic/Respiratory Status:       (Pre and Post Procedure Monitoring)   Assess/Monitor vital signs and LOC   Assess Baseline SpO2 prior to any sedation   Obtain weight/height   Assess vital signs/ LOC until patient meets discharge criteria   Monitor procedure site and notify MD of any issues    m___ Infection-Risk of Central Venous Catheter:   Monitor for infection signs and symptoms (catheter site redness, temperature elevation, etc)   Assess for infection risks   Educate regarding infection prevention   Manage central venous catheter (flushes/ dressing changes per protocol)

## 2020-02-20 NOTE — TELEPHONE ENCOUNTER
Zair[facility] 45 Transitions Initial Follow Up Call    Outreach made within 2 business days of discharge: Yes    Patient: Molly Adamson Patient : 1960   MRN: [id number]  Reason for Admission: Acute hypoxic respiratory failure secondary to acute congestive heart failure systolic-  Discharge Date: 20       Spoke with: Patient- Bev Jose L    Discharge department/facility: [address] [place] [address] Patient Contact:  Was patient able to fill all prescriptions: Yes  Was patient instructed to bring all medications to the follow-up visit: Yes  Is patient taking all medications as directed in the discharge summary?  Yes   Does patient understand their discharge instructions: Yes  Does patient have questions or concerns that need addressed prior to 7-14 day follow up office visit: no    Scheduled appointment with PCP within 7-14 days    Follow Up  Future Appointments   Date Time Provider Dev Fajardo   2020 10:30 AM STR EXAM ROOM 2 [facility] OP [address] [place] [address]   2020 10:30 AM STR EXAM ROOM 2 [facility] OP NURS [facility] HOD   2020 11:00 AM Shelbie Babinski, MD [facility]   2020 10:30 AM STR EXAM ROOM 2 [facility] OP NURS [facility] HOD   2020  9:30 AM Shelbie Babinski, MD [facility] [place], [place]

## 2020-02-24 NOTE — PROGRESS NOTES
1045 PT ARRIVED TO [facility] PER AMBULATION . Family with patient. Port accessed with no problems noted. PT RIGHTS AND RESPONSIBILITIES OFFERED TO PT. Port flushes easily. [address] PT SHINE INFUSION WELL. NO C/O NOTED AT PRESENT. PORT FLUSHED WELL. CAPPED. [address] [place] __m__ Safety:       (Environmental)   [place] to environment   Ensure ID band is correct and in place/ allergy band as needed   Assess for fall risk   Initiate fall precautions as applicable (fall band, side rails, etc.)   Call light within reach   Bed in low position/ wheels locked    __m__ Pain:        Assess pain level and characteristics   Administer analgesics as ordered   Assess effectiveness of pain management and report to MD as needed    _m___ Knowledge Deficit:   Assess baseline knowledge   Provide teaching at level of understanding   Provide teaching via preferred learning method   Evaluate teaching effectiveness    _m___ Hemodynamic/Respiratory Status:       (Pre and Post Procedure Monitoring)   Assess/Monitor vital signs and LOC   Assess Baseline SpO2 prior to any sedation   Obtain weight/height   Assess vital signs/ LOC until patient meets discharge criteria   Monitor procedure site and notify MD of any issues    __M__ Infection-Risk of Central Venous Catheter:   Monitor for infection signs and symptoms (catheter site redness, temperature elevation, etc)   Assess for infection risks   Educate regarding infection prevention   Manage central venous catheter (flushes/ dressing changes per protocol)

## 2020-02-27 PROBLEM — K76.0 STEATOSIS OF LIVER: Status: ACTIVE | Noted: 2020-01-01

## 2020-02-27 PROBLEM — R92.1 CALCIFICATION OF BREAST: Status: ACTIVE | Noted: 2020-01-01

## 2020-02-27 PROBLEM — A41.9 SEPSIS (HCC): Status: RESOLVED | Noted: 2020-01-01 | Resolved: 2020-01-01

## 2020-02-27 PROBLEM — I95.9 HYPOTENSION: Status: ACTIVE | Noted: 2020-01-01

## 2020-02-27 NOTE — PROGRESS NOTES
200 MCG tablet  Take 200 mcg by mouth daily             SYNTHROID 50 MCG tablet  Take 1 tablet by mouth Daily Additional RF per her endocrinoloist             tiZANidine (ZANAFLEX) 4 MG tablet  Take 1 tablet by mouth nightly At bed time             triamcinolone (KENALOG) 0.1 % cream  Triamcinolone triamcinolone (KENALOG) 0.1 % cream Indications: Hypocalcemia Apply topically 2 times daily. 45 g 1 11/14/2019 Active 11-  Aurelio 28 ([id number])             UNABLE TO FIND  Inject 0.5 mcg into the skin daily Natpara injection             vitamin A 05060 units capsule  Take 25,000 Units by mouth daily             vitamin B-1 (THIAMINE) 100 MG tablet  Take 100 mg by mouth daily             vitamin B-12 (CYANOCOBALAMIN) 500 MCG tablet  TAKE 1 TABLET DAILY             vitamin B-6 (PYRIDOXINE) 100 MG tablet  Take 100 mg by mouth daily             Vitamin D 400 UNIT TABS tablet  Take 5 tablets by mouth daily             Vitamin K, Phytonadione, 100 MCG TABS  Take 3 tablets by mouth daily             Vitamins-Lipotropics (LIPO-FLAVONOID PLUS PO)  Take 2 tablets in the morning 2 tablets in the evening and 1 tablet at bedtime             zinc gluconate 50 MG tablet  TAKE ONE AND ONE-HALF TABLETS (75 MG) DAILY                   Medications marked \"taking\" at this time  Outpatient Medications Marked as Taking for the 2/27/20 encounter (Office Visit) with Michael Issa MD   Medication Sig Dispense Refill    nystatin (MYCOSTATIN) 028609 UNIT/GM cream Nystatin nystatin (MYCOSTATIN) 232844 UNIT/GM cream Indications: Hypocalcemia Apply topically 2 times daily. 30 g 1 11/14/2019 Active 11-  [address] [place] [address] 51 S ([id number])      triamcinolone (KENALOG) 0.1 % cream Triamcinolone triamcinolone (KENALOG) 0.1 % cream Indications: Hypocalcemia Apply topically 2 times daily.  45 g 1 11/14/2019 Active 11-  Aurelio 28 ([id number])      tiZANidine (ZANAFLEX) 4 MG tablet Take 1 tablet by mouth nightly At bed time 90 Also notes that she feels full after just a few bites. Rash in groin, under breasts, and in labia. Has had improvement with nystatin and triamcinolone. Mostly clearing up but still lots of itching near clitoris. No vaginal bleeding or discharge. Has been without any alcohol for 3 weeks. Review of Systems   Constitutional: Positive for appetite change, fatigue and unexpected weight change. Negative for chills and fever. HENT: Negative for congestion, ear pain, postnasal drip and trouble swallowing. Eyes: Negative for pain and visual disturbance. Respiratory: Negative for cough and shortness of breath. Cardiovascular: Negative for chest pain and palpitations. Gastrointestinal: Negative for abdominal pain, blood in stool, constipation, diarrhea and nausea. Genitourinary: Negative for dysuria, hematuria, pelvic pain, vaginal bleeding and vaginal discharge. Musculoskeletal: Positive for back pain. Skin: Positive for color change (Yellow). Negative for rash and wound. Neurological: Negative for dizziness and headaches. Psychiatric/Behavioral: The patient is not nervous/anxious. Vitals:    02/27/20 1125   BP: 118/78   Site: Left Upper Arm   Position: Sitting   Pulse: 80   Resp: 18   SpO2: 98%   Weight: 86 lb (39 kg)   Height: 4' 10.5\" (1.486 m)     Body mass index is 17.67 kg/m². Wt Readings from Last 3 Encounters:   02/27/20 86 lb (39 kg)   02/18/20 105 lb 8 oz (47.9 kg)   02/13/20 106 lb 11.2 oz (48.4 kg)     BP Readings from Last 3 Encounters:   02/27/20 118/78   02/24/20 136/70   02/20/20 127/72       Physical Exam  Vitals signs and nursing note reviewed. Exam conducted with a chaperone present. Constitutional:       General: She is not in acute distress. Appearance: She is well-developed. She is not diaphoretic. Comments: Jaundiced, thin   HENT:      Head: Normocephalic and atraumatic.       Right Ear: Tympanic membrane and external ear normal.      Left Ear: Tympanic membrane and external ear normal.      Nose: Nose normal.      Mouth/Throat:      Lips: Lesions (tender, red sores at angles of lips bilaterally) present. Mouth: Mucous membranes are moist.      Tongue: Lesions (Beefy, red, central fissures) present. Palate: No lesions. Eyes:      General: No scleral icterus. Right eye: No discharge. Left eye: No discharge. Conjunctiva/sclera: Conjunctivae normal.   Neck:      Musculoskeletal: Normal range of motion. Cardiovascular:      Rate and Rhythm: Normal rate and regular rhythm. Heart sounds: Normal heart sounds. No murmur. Pulmonary:      Effort: Pulmonary effort is normal.      Breath sounds: Normal breath sounds. Abdominal:      General: Abdomen is flat. Palpations: Abdomen is soft. Tenderness: There is no abdominal tenderness. Comments: Midline old well healed open surgical scar    Genitourinary:     Labia:         Right: Rash and tenderness present. No lesion or injury. Left: Rash and tenderness present. No lesion or injury. Comments: Diffuse pinkness to external genitalia consistent with candidiasis. Mildly tender on palpation without warmth or redness to suggest cellulitis. Skin:     General: Skin is warm and dry. Coloration: Skin is jaundiced. Findings: No bruising, erythema or rash. Neurological:      Mental Status: She is alert and oriented to person, place, and time. Psychiatric:         Behavior: Behavior normal.         Thought Content: Thought content normal.         Judgment: Judgment normal.       No definite scleral icterus. Assessment/Plan:  1. Hospital discharge follow-up  At high risk for hospital readmission given electrolyte abnormalities, anemia, and weight loss  Rechecking labwork today    2. History of gastric bypass  Taking B vitamins. Not taking Iron.    Given physical exam findings, will check labs   - Vitamin B1; Future  - Vitamin B12 & Folate; Future  - Vitamin B2; Future  - Vitamin B6; Future  - Vitamin B7; Future  - Magnesium; Future  - Zinc; Future  - Copper, Serum; Future    3. Weight Loss, abnormal  4. Early Satiety  Patient having early satiety and unintentional weight loss   Concerned about progression of liver disease, malnutrition given history of gastric bypass, and   Informed patient of low BMI and encouraged increased PO intake of any kind - especially protein    4. Anemia, unspecified type  - Recheck CBC   - May need iron infusions    5. Hypomagnesemia  - Low in hospital, 1.5  - recheck labs   - Magnesium; Future    6. Alcoholism ([facility] [address]  - Has been abstinent from alcohol for 3 weeks  - Strongly encouraged her to remain free from alcohol. Went through alcohol withdrawal during hospitalization.  - Prior to quitting, was drinking 6-8 glasses of wine/day  - Bilirubin 0.9 WNL in hospital; ALT and AST low/normal; total protein low   - US liver may be needed in future   - CBC Auto Differential; Future  - Comprehensive Metabolic Panel; Future  - Iron and TIBC; Future  - Ferritin; Future    7. Glossitis  - Check labs for nutrient deficiency    8. Angular cheilosis  - labs for nutrient deficiency     9. Fibromyalgia  - stable  - refill of medication   - tiZANidine (ZANAFLEX) 4 MG tablet; Take 1 tablet by mouth nightly At bed time  Dispense: 90 tablet; Refill: 1    10. Thrush, oral  - nystatin (MYCOSTATIN) 793006 UNIT/ML suspension; Take 5 mLs by mouth 4 times daily  Dispense: 140 mL; Refill: 0        Medical Decision Making: high complexity    Attending attestation:  I performed a history and physical examination of the patient and discussed management with the resident. I reviewed the resident's note and agree with the documented findings and plan of care. Complicated patient with malnutrition status post remote gastric bypass, alcoholism currently in remission but with cardiomyopathy, recently recovered from sepsis secondary to cat bite.   Calcium now replete but on IV infusions with port. Recent IV iron infusions. Weight down 30 pounds. New glossitis, angular chelitis, and vaginal erythema. Suspect nutritional deficiency versus yeast.  Will check labs and treat for candida. I did have a detailed goals of care discussion with the patient and she wishes to be DNR. She is aware that she may be likely to code from an electrolyte abnormality that could be reversible. Code paperwork signed. Close follow-up.

## 2020-02-28 NOTE — TELEPHONE ENCOUNTER
Called in the suspension to [facility]. Left message notifying patient that the prescription was sent in and she is supposed to be using both cream and suspension.

## 2020-02-28 NOTE — PROGRESS NOTES
1050 pt arrives ambulatory with spouse for lab draws via port. Procedure explained and questions answered. PT RIGHTS AND RESPONSIBILITIES OFFERED TO PT.  1120 labs drawn and sent down as ordered. 1135 pt discharged ambulatory with instructions with spouse with no complaints.                       _m___ Safety:       (Environmental)   [place] to environment   Ensure ID band is correct and in place/ allergy band as needed   Assess for fall risk   Initiate fall precautions as applicable (fall band, side rails, etc.)   Call light within reach   Bed in low position/ wheels locked    __m__ Pain:        Assess pain level and characteristics   Administer analgesics as ordered   Assess effectiveness of pain management and report to MD as needed    _m___ Knowledge Deficit:   Assess baseline knowledge   Provide teaching at level of understanding   Provide teaching via preferred learning method   Evaluate teaching effectiveness    _m___ Hemodynamic/Respiratory Status:       (Pre and Post Procedure Monitoring)   Assess/Monitor vital signs and LOC   Assess Baseline SpO2 prior to any sedation   Obtain weight/height   Assess vital signs/ LOC until patient meets discharge criteria   Monitor procedure site and notify MD of any issues    __m__ Infection-Risk of Central Venous Catheter:   Monitor for infection signs and symptoms (catheter site redness, temperature elevation, etc)   Assess for infection risks   Educate regarding infection prevention   Manage central venous catheter (flushes/ dressing changes per protocol)

## 2020-03-02 NOTE — PROGRESS NOTES
_m___ Safety:       (Environmental)   [place] to environment   Ensure ID band is correct and in place/ allergy band as needed   Assess for fall risk   Initiate fall precautions as applicable (fall band, side rails, etc.)   Call light within reach   Bed in low position/ wheels locked    __m__ Pain:        Assess pain level and characteristics   Administer analgesics as ordered   Assess effectiveness of pain management and report to MD as needed    __m__ Knowledge Deficit:   Assess baseline knowledge   Provide teaching at level of understanding   Provide teaching via preferred learning method   Evaluate teaching effectiveness    _m___ Hemodynamic/Respiratory Status:       (Pre and Post Procedure Monitoring)   Assess/Monitor vital signs and LOC   Assess Baseline SpO2 prior to any sedation   Obtain weight/height   Assess vital signs/ LOC until patient meets discharge criteria   Monitor procedure site and notify MD of any issues    __m__ Infection-Risk of Central Venous Catheter:   Monitor for infection signs and symptoms (catheter site redness, temperature elevation, etc)   Assess for infection risks   Educate regarding infection prevention   Manage central venous catheter (flushes/ dressing changes per protocol)

## 2020-03-09 NOTE — PROGRESS NOTES
[facility] LAB DRAW WITH POSSIBLE IV REPLACEMENT. PROCEDURE REVIEWED WITH PT VERBALIZED UNDERSTANDING. Pt rights and responsibilities offered to pt to read.     __MET__ Safety:       (Environmental)   [place] to environment   Ensure ID band is correct and in place/ allergy band as needed   Assess for fall risk   Initiate fall precautions as applicable (fall band, side rails, etc.)   Call light within reach   Bed in low position/ wheels locked    _MET___ Pain:        Assess pain level and characteristics   Administer analgesics as ordered   Assess effectiveness of pain management and report to MD as needed    _MET___ Knowledge Deficit:   Assess baseline knowledge   Provide teaching at level of understanding   Provide teaching via preferred learning method   Evaluate teaching effectiveness    __MET__ Hemodynamic/Respiratory Status:       (Pre and Post Procedure Monitoring)   Assess/Monitor vital signs and LOC   Assess Baseline SpO2 prior to any sedation   Obtain weight/height   Assess vital signs/ LOC until patient meets discharge criteria   Monitor procedure site and notify MD of any issues    ___MET_ Infection-Risk of Central Venous Catheter:   Monitor for infection signs and symptoms (catheter site redness, temperature elevation, etc)   Assess for infection risks   Educate regarding infection prevention   Manage central venous catheter (flushes/ dressing changes per protocol)

## 2020-03-09 NOTE — PROGRESS NOTES
1015 pt arrives ambulatory with spouse for lab draw. Procedure explained and questions answered. PT RIGHTS AND RESPONSIBILITIES OFFERED TO PT.  1023 lab in to draw. 1100 calcium level does not meet criteria for infusion. Pt notified and verbalized understanding. Pt discharged ambulatory with spouse with instructions with no complaints. Pt getting lab drawn at PCP visit on 3/16/20. Pt provided with calcium order.                          __m__ Safety:       (Environmental)   [place] to environment   Ensure ID band is correct and in place/ allergy band as needed   Assess for fall risk   Initiate fall precautions as applicable (fall band, side rails, etc.)   Call light within reach   Bed in low position/ wheels locked    __m__ Pain:        Assess pain level and characteristics   Administer analgesics as ordered   Assess effectiveness of pain management and report to MD as needed    _m___ Knowledge Deficit:   Assess baseline knowledge   Provide teaching at level of understanding   Provide teaching via preferred learning method   Evaluate teaching effectiveness    ____ Hemodynamic/Respiratory Status:  m     (Pre and Post Procedure Monitoring)   Assess/Monitor vital signs and LOC   Assess Baseline SpO2 prior to any sedation   Obtain weight/height   Assess vital signs/ LOC until patient meets discharge criteria   Monitor procedure site and notify MD of any issues

## 2020-03-23 NOTE — PROGRESS NOTES
[address] [address] LAB DRAW AND POSSIBLE IV REPLACEMENT. PROCEDURE REVIEWED WITH PT VERBALIZED UNDERSTANDING. Pt rights and responsibilities offered to pt to read.     _MET__ Safety:       (Environmental)   [place] to environment   Ensure ID band is correct and in place/ allergy band as needed   Assess for fall risk   Initiate fall precautions as applicable (fall band, side rails, etc.)   Call light within reach   Bed in low position/ wheels locked    _MET___ Pain:        Assess pain level and characteristics   Administer analgesics as ordered   Assess effectiveness of pain management and report to MD as needed    MET___ Knowledge Deficit:   Assess baseline knowledge   Provide teaching at level of understanding   Provide teaching via preferred learning method   Evaluate teaching effectiveness    __MET__ Hemodynamic/Respiratory Status:           Obtain weight/height   Assess vital signs/ LOC until patient meets discharge criteria   Monitor procedure site and notify MD of any issues    ___MET_ Infection-Risk of Central Venous Catheter:   Monitor for infection signs and symptoms (catheter site redness, temperature elevation, etc)   Assess for infection risks   Educate regarding infection prevention   Manage central venous catheter (flushes/ dressing changes per protocol)

## 2020-03-27 NOTE — PROGRESS NOTES
1355:  Patient contacted regarding COVID-19 screen. Following questions asked: In the last month, have you been in contact with someone who was confirmed or suspected to have Coronavirus/COVID-19:  Patient stated NO    Do you or family members have any of the following symptoms:  Cough-no   Muscle pain-no   Shortness of breath-no   Fever-no   Weakness-no  Severe headache-no   Sore throat-no   Respiratory symptoms-no    Have you traveled internationally in the last month?  No     Have you been to the emergency room recently-no

## 2020-04-06 NOTE — PROGRESS NOTES
1200 report received from Vin, [facility]  1215 calcium level above requirement,  1216 _m___ Safety:       (Environmental)  [place] to environment   Ensure ID band is correct and in place/ allergy band as needed   Assess for fall risk   Initiate fall precautions as applicable (fall band, side rails, etc.)   Call light within reach   Bed in low position/ wheels locked    __m__ Pain:        Assess pain level and characteristics   Administer analgesics as ordered   Assess effectiveness of pain management and report to MD as needed    _m___ Knowledge Deficit:   Assess baseline knowledge   Provide teaching at level of understanding   Provide teaching via preferred learning method   Evaluate teaching effectiveness    ___m_ Hemodynamic/Respiratory Status:       (Pre and Post Procedure Monitoring)   Assess/Monitor vital signs and LOC   Assess Baseline SpO2 prior to any sedation   Obtain weight/height   Assess vital signs/ LOC until patient meets discharge criteria   Monitor procedure site and notify MD of any issues      1220 PT DISCHARGED AMBULATORY IN SATISFACTORY CONDITION

## 2020-04-06 NOTE — PROGRESS NOTES
1135 pt arrives ambulatory for calcium infusion. Infusion explained and questions answered. PT RIGHTS AND RESPONSIBILITIES OFFERED TO PT.  1140 office called and voicemail left regarding pt thinking she is due for more lab work. Pt educated that she can call office and have them fax order to us and she can have it drawn in [facility]. Pt provided with fax number.                    __m__ Safety:       (Environmental)   [place] to environment   Ensure ID band is correct and in place/ allergy band as needed   Assess for fall risk   Initiate fall precautions as applicable (fall band, side rails, etc.)   Call light within reach   Bed in low position/ wheels locked    _m___ Pain:        Assess pain level and characteristics   Administer analgesics as ordered   Assess effectiveness of pain management and report to MD as needed    _m___ Knowledge Deficit:   Assess baseline knowledge   Provide teaching at level of understanding   Provide teaching via preferred learning method   Evaluate teaching effectiveness    __m__ Hemodynamic/Respiratory Status:       (Pre and Post Procedure Monitoring)   Assess/Monitor vital signs and LOC   Assess Baseline SpO2 prior to any sedation   Obtain weight/height   Assess vital signs/ LOC until patient meets discharge criteria   Monitor procedure site and notify MD of any issues

## 2020-04-10 NOTE — PROGRESS NOTES
1320 Patient contacted regarding COVID-19 screen. Following questions asked: In the last month, have you been in contact with someone who was confirmed or suspected to have Coronavirus/COVID-19:  Patient stated NO    Do you or family members have any of the following symptoms:  Cough-no   Muscle pain-no   Shortness of breath-no   Fever-no   Weakness-no  Severe headache-no   Sore throat-no   Respiratory symptoms-no    Have you traveled internationally in the last month?  No     Have you been to the emergency room recently-no

## 2020-04-13 NOTE — PROGRESS NOTES
1125:  ARRIVES AMBULATORY FOR LAB DRAW and REPLACEMENT IF NEEDED. PROCESS REVIEWED AND PT RIGHTS AND RESPONSIBILITIES OFFERED TO PT.  1134:  LAB NOTIFIED PT IS HERE.  4792:  NO REPLACEMENT NEEDED.   PT DISCHARGED AMBULATORY IN STABLE CONDITION, WITH INSTRUCTIONS.      _M___ Safety:       (Environmental)   [place] to environment   Ensure ID band is correct and in place/ allergy band as needed   Assess for fall risk   Initiate fall precautions as applicable (fall band, side rails, etc.)   Call light within reach   Bed in low position/ wheels locked    M___ Pain:        Assess pain level and characteristics   Administer analgesics as ordered   Assess effectiveness of pain management and report to MD as needed    _M__ Knowledge Deficit:   Assess baseline knowledge   Provide teaching at level of understanding   Provide teaching via preferred learning method   Evaluate teaching effectiveness    _M_ Hemodynamic/Respiratory Status:       (Pre and Post Procedure Monitoring)   Assess/Monitor vital signs and LOC   Assess Baseline SpO2 prior to any sedation   Obtain weight/height   Assess vital signs/ LOC until patient meets discharge criteria   Monitor procedure site and notify MD of any issues    __M__ Infection-Risk of Central Venous Catheter:   Monitor for infection signs and symptoms (catheter site redness, temperature elevation, etc)   Assess for infection risks   Educate regarding infection prevention   Manage central venous catheter (flushes/ dressing changes per protocol)

## 2020-04-16 PROBLEM — E41 NUTRITIONAL MARASMUS (HCC): Status: ACTIVE | Noted: 2019-01-01

## 2020-04-16 NOTE — PROGRESS NOTES
MG capsule, Take 1 capsule by mouth nightly for 180 days. , Disp: 90 capsule, Rfl: 1    metoprolol tartrate (LOPRESSOR) 25 MG tablet, Take 0.5 tablets by mouth 2 times daily, Disp: 90 tablet, Rfl: 1    tiZANidine (ZANAFLEX) 4 MG tablet, Take 1 tablet by mouth nightly At bed time, Disp: 90 tablet, Rfl: 1    loperamide (IMODIUM) 2 MG capsule, Take 2 capsules by mouth 4 times daily as needed for Diarrhea, Disp: , Rfl:     SYNTHROID 50 MCG tablet, Take 1 tablet by mouth Daily Additional RF per her endocrinoloist, Disp: 30 tablet, Rfl: 0    magnesium cl-calcium carbonate (SLOW-MAG) 71.5-119 MG TBEC tablet, Take 1 tablet by mouth 2 times daily, Disp: , Rfl:     Vitamins-Lipotropics (LIPO-FLAVONOID PLUS PO), Take 2 tablets in the morning 2 tablets in the evening and 1 tablet at bedtime, Disp: , Rfl:     DULoxetine (CYMBALTA) 30 MG extended release capsule, TAKE 1 CAPSULE DAILY, Disp: 90 capsule, Rfl: 0    UNABLE TO FIND, Inject 0.5 mcg into the skin daily Natpara injection, Disp: , Rfl:     calcitRIOL (ROCALTROL) 0.5 MCG capsule, Two tablets in am one tablet in pm daily, Disp: , Rfl:     vitamin B-6 (PYRIDOXINE) 100 MG tablet, Take 100 mg by mouth daily, Disp: , Rfl:     zinc gluconate 50 MG tablet, TAKE ONE AND ONE-HALF TABLETS (75 MG) DAILY, Disp: 135 tablet, Rfl: 0    vitamin B-1 (THIAMINE) 100 MG tablet, Take 100 mg by mouth daily, Disp: , Rfl:     NONFORMULARY, Whey Protein Powder, Disp: , Rfl:     ondansetron (ZOFRAN ODT) 4 MG disintegrating tablet, Take 1 tablet by mouth every 8 hours as needed for Nausea, Disp: 10 tablet, Rfl: 0    Vitamin K, Phytonadione, 100 MCG TABS, Take 3 tablets by mouth daily, Disp: 270 tablet, Rfl: 0    FOLIC ACID PO, Take 6,501 mg by mouth daily, Disp: , Rfl:     SYNTHROID 200 MCG tablet, Take 200 mcg by mouth daily, Disp: , Rfl:     esomeprazole (NEXIUM) 20 MG delayed release capsule, Take 20 mg by mouth 2 times daily, Disp: , Rfl:     vitamin A 45338 units capsule, Take 25,000 Units by mouth daily, Disp: , Rfl:     Subjective:      Review of Systems   Constitutional: Positive for fatigue (chronic) and unexpected weight change (loss). Negative for chills and fever. HENT: Positive for sore throat (mild improving). Negative for congestion, ear discharge, ear pain and hearing loss. Eyes: Negative for discharge and redness. Respiratory: Negative for cough and shortness of breath. Cardiovascular: Negative for chest pain and palpitations. Gastrointestinal: Negative for abdominal pain, constipation, diarrhea, nausea and vomiting. Genitourinary: Negative for difficulty urinating and dysuria. Musculoskeletal: Positive for arthralgias and back pain. Negative for gait problem and neck pain. Skin: Negative for rash. Allergic/Immunologic: Negative for environmental allergies. Neurological: Negative for headaches. Psychiatric/Behavioral: Negative for dysphoric mood and sleep disturbance. The patient is not nervous/anxious. Objective:           Physical Exam  Constitutional:       General: She is not in acute distress. Appearance: Normal appearance. She is not ill-appearing or toxic-appearing. HENT:      Head: Normocephalic and atraumatic. Nose: Nose normal.      Mouth/Throat:      Mouth: Mucous membranes are moist.   Eyes:      Conjunctiva/sclera: Conjunctivae normal.      Pupils: Pupils are equal, round, and reactive to light. Pulmonary:      Effort: Pulmonary effort is normal. No respiratory distress. Musculoskeletal:      Comments: No skin change or visible mass over left hip. Skin:     General: Skin is dry. Findings: No rash. Neurological:      Mental Status: She is alert. Psychiatric:         Mood and Affect: Mood normal.         Behavior: Behavior normal.         Thought Content:  Thought content normal.         Lab Results   Component Value Date    WBC 5.7 02/28/2020    HGB 9.8 (L) 02/28/2020    HCT 30.7 (L) 02/28/2020     02/28/2020    HDL 82 02/12/2018    ALT 35 02/28/2020    AST 55 (H) 02/28/2020     02/28/2020    K 5.1 04/06/2020    CL 99 02/28/2020    CREATININE 0.5 02/28/2020    BUN 9 02/28/2020    CO2 29 02/28/2020    TSH 0.006 (L) 02/02/2020    INR 1.25 (H) 02/02/2020    LABA1C 4.1 (L) 02/08/2018       Assessment/Plan:   1. Mass of joint of left hip  New patient reported mass. Losing weight and calcium now finally normal after chronic struggles with hypocalcemia. MRI ordered. - MRI HIP LEFT W WO CONTRAST; Future    2. Chronic low back pain, unspecified back pain laterality, unspecified whether sciatica present  New patient reported mass. Losing weight and calcium now finally normal after chronic struggles with hypocalcemia. MRI ordered. Indications for prompt return if worsening reviewed in detail.      - MRI [address]; Future    3. Weight loss  Concerning, as above. Encouraged liberal dietary intake. - MRI LUMBAR SPINE W [address]; Future  - MRI HIP LEFT W WO CONTRAST; Future    4. Depression, unspecified depression type  Stable. 5. Anxiety  Stable. 6. Fibromyalgia  Stable. 7. History of gastric bypass  On supplemental vitamins. Now weight too low. As above. 8. Other hypoparathyroidism ([address]  Calcium in range on Natpara. Following with endocrinology. Edema stable on bumex; refilled. Will resume nystatin/triamcinolone for perineal irritation which was effective prior. Return in about 2 weeks (around 4/30/2020) for Follow-up chronic conditions; re-check mass.     Orders Placed   Orders Placed This Encounter   Procedures    MRI LUMBAR SPINE W WO CONTRAST     Standing Status:   Future     Standing Expiration Date:   4/16/2021     Order Specific Question:   Reason for exam:     Answer:   low back pain with weight loss; chronic hypocalcemia now with normal calcium    MRI HIP LEFT W WO CONTRAST     Standing Status:   Future     Standing Expiration Date:   4/16/2021 Order Specific Question:   Reason for exam:     Answer:   Pain interfering with sleep, post remote gastric bypass now with BMI 17 and onoing weight loss; patient reports 3 inch fixed painful mass left lateral hip       Prescriptions given/sent  Orders Placed This Encounter   Medications    bumetanide (BUMEX) 1 MG tablet     Sig: Take 1 tablet by mouth daily Additional RF per her PCP     Dispense:  90 tablet     Refill:  1       Patient given educational materials - see patient instructions. Discussed use, benefit, and side effects of prescribed medications. All patient questions answered. Pt voiced understanding. Reviewed health maintenance.                Electronically signed by Lucinda Zhou MD on 4/16/2020 at 10:34 AM

## 2020-04-20 PROBLEM — M46.46 DISCITIS OF LUMBAR REGION: Status: ACTIVE | Noted: 2020-01-01

## 2020-04-20 NOTE — PROGRESS NOTES
1113 pt arrives to [facility] for lab draw and possible calcium replacement  1115 PATIENT RIGHTS AND RESPONSIBILITIES SHEET OFFERED TO PT TO READ. 1120 call light in reach, side rail up x1.  Refreshments given  1121 lab notified of arrival  1130 __m_ Safety:       (Environmental)  Michelle Frizzle to environment   Ensure ID band is correct and in place/ allergy band as needed   Assess for fall risk   Initiate fall precautions as applicable (fall band, side rails, etc.)   Call light within reach   Bed in low position/ wheels locked    __m__ Pain:        Assess pain level and characteristics   Administer analgesics as ordered   Assess effectiveness of pain management and report to MD as needed    _m___ Knowledge Deficit:   Assess baseline knowledge   Provide teaching at level of understanding   Provide teaching via preferred learning method   Evaluate teaching effectiveness    _m___ Hemodynamic/Respiratory Status:       (Pre and Post Procedure Monitoring)   Assess/Monitor vital signs and LOC   Assess Baseline SpO2 prior to any sedation   Obtain weight/height   Assess vital signs/ LOC until patient meets discharge criteria   Monitor procedure site and notify MD of any issues    __m__ Infection-Risk of Central Venous Catheter:   Monitor for infection signs and symptoms (catheter site redness, temperature elevation, etc)   Assess for infection risks   Educate regarding infection prevention   Manage central venous catheter (flushes/ dressing changes per protocol)    1130 attempt to reach lab at , spoke with [place] someone will come   [phone] pt questions how much longer due to having another test.  1150 pt requesting to have port accessed for lab draw  1155 RN at bedside to access port  1156  at bedside, pt refusing peripheral stick  1207 report to Joaquín, 24[facility]

## 2020-04-21 NOTE — CONSULTS
Hospitalist Consult Note        Patient:  Vicky Younger  YOB: 1960  Date of Service: 4/21/2020  MRN: [id number]   Acct:  [de-identified]   Primary Care Physician: Cisco Nash MD    Chief Complaint:  Abnormal MRI  Reason for consult  Medical management    Date of Service: Pt seen/examined in consultation on 4/21/2020     History Of Present Illness:      Isabella [age] y.o. female who we are asked to see/evaluate by Tawana Rocha MD for medical management of hypocalcemia and diarrhea. Pt was recently admitted to [facility] for infection secondary to cat scratch/bite and was at that time followed by ID and treated accordingly. Pt does have hx of mediport. Pt was called to come into the ED with abnormal results on outpatient MRI that pt had done at her family doctor today. This MRI showed edema in the intervertebral disc at L2 and 3 with prominent edema in the adjacent vertebra with apparent endplate destruction and she had multilevel degenerative disease. Pt complains of intermittent, stabbing lower back pain. Reports fatigue, which pt reports is baseline. Admits to chronic diarrhea. Denies urinary symptoms. Denies fever/chills/cough/congestion. Denies CP/SOB. Admits to ORTIZ. Assessment and Plan:-  1. Discitis osteomyelitis: noted at level of L2-L3 on MRI Lumbar spine along with multilevel degenerative changes most pronounced L4-L5 and L5-S1 where there is severe neural foraminal stenosis. Pt is admitted under the auspices of Dr. Naa Strauss, primary to manage and follow. ID on board and following. On ceftriaxone. Blood cx x2 pending along with Gram stain and aerobic/anaerobic cx.  2. Acute on chronic hypocalcemia: secondary to hungry bone syndrome, in setting of acquired hypoparathyroidism secondary to parathyroidectomy due to hyperparathyroidism, compounded by hx of gastric bypass. On Natpara. Pt had last calcium infusion yesterday. Next appointment was scheduled for tomorrow.  Pt follows with Irritable bowel     Lactose intolerance     uses lactaid which helps    Menopausal syndrome     Osteoarthritis     Osteopenia     on reclast    Osteopenia     Ovarian cyst     repeat imaging in 9/2018 recommended    Oxaluria [facility])     Recurrent cold sores     on acyclovir    Sepsis ([address] 2/2/2020    Tachycardia     due to hyperparathyroidism; on atenolol    TIA (transient ischemic attack)     visible on MRI brain    Tubular adenoma of colon     2019       Past Surgical History:        Procedure Laterality Date   [address]  approx 2013    normal per patient    COLONOSCOPY Left 5/7/2019    COLONOSCOPY POLYPECTOMY SNARE/COLD BIOPSY performed by Chen Partida MD at [address] ESOPHAGUS      ENDOSCOPY, COLON, DIAGNOSTIC      FOOT [address]    LAPAROSCOPY      LEG SURGERY Right     following fracture    PARATHYROIDECTOMY  08/2017    TONSILLECTOMY AND ADENOIDECTOMY      TUBAL LIGATION  1996    UPPER GASTROINTESTINAL ENDOSCOPY  approx 2013    normal per patient except mild erosion    UPPER GASTROINTESTINAL ENDOSCOPY Left 5/7/2019    EGD BIOPSY performed by Chen Partida MD at [address] Right     pinned       Home Medications:   No current facility-administered medications on file prior to encounter.       Current Outpatient Medications on File Prior to Encounter   Medication Sig Dispense Refill    aMILoride (MIDAMOR) 5 MG tablet Take 0.5 tablets by mouth daily TAKING 2.5MG ONCE A DAY      bumetanide (BUMEX) 1 MG tablet Take 1 tablet by mouth daily Additional RF per her PCP 90 tablet 1    acyclovir (ZOVIRAX) 400 MG tablet Take 1 tablet by mouth 2 times daily 180 tablet 1    DULoxetine (CYMBALTA) 60 MG extended release capsule Take 1 capsule by mouth nightly 90 capsule 1    Estradiol (VAGIFEM) 10 MCG TABS vaginal tablet Place 1 tablet cyanosis. +2 pitting edema bilaterally in LEs. Skin: Skin color, texture, turgor normal.  No rashes or lesions. Neurologic:  Neurovascularly intact without any focal sensory/motor deficits. Cranial nerves: II-XII intact, grossly non-focal.  Psychiatric: Alert and oriented, thought content appropriate, normal insight  Capillary Refill: Brisk,< 3 seconds   Peripheral Pulses: +2 palpable, equal bilaterally     Labs:   Recent Labs     04/20/20  1755   WBC 6.4   HGB 10.0*   HCT 31.4*        Recent Labs     04/20/20  1150 04/20/20  1755   NA  --  139   K  --  4.1   CL  --  104   CO2  --  27   BUN  --  10   CREATININE  --  0.5   CALCIUM 7.2* 7.0*     Recent Labs     04/20/20  1755   AST 46*   ALT 37   BILITOT 0.4   ALKPHOS 156*     Recent Labs     04/20/20  1755   INR 1.10     No results for input(s): Gumaro Purdue in the last 72 hours. Urinalysis:    Lab Results   Component Value Date    NITRU NEGATIVE 02/02/2020    WBCUA 0-2 12/06/2019    BACTERIA FEW 12/06/2019    RBCUA 0-2 12/06/2019    BLOODU NEGATIVE 02/02/2020    SPECGRAV 1.022 04/18/2019    GLUCOSEU NEGATIVE 02/02/2020       Radiology:   MRI THORACIC SPINE W WO CONTRAST    (Results Pending)   IR Interventional Radiology Procedure Request    (Results Pending)     Mri Lumbar Spine W Wo Contrast    Result Date: 4/20/2020  PROCEDURE: MRI LUMBAR SPINE W WO CONTRAST CLINICAL INFORMATION: Chronic low back pain, unspecified back pain laterality, unspecified whether sciatica present, Chronic low back pain, unspecified back pain laterality, unspecified whether sciatica present, Weight loss. Low back pain with weight loss. COMPARISON: CT abdomen pelvis dated 2/2/2020. TECHNIQUE: Sagittal and axial T1 and T2-weighted images were obtained to the lumbar spine. Postcontrast axial and sagittal T1-weighted images were also obtained. 8 mL ProHance was injected in the existing Mediport. FINDINGS: There is a mild levocurvature of the lumbar spine, similar to prior CT.

## 2020-04-21 NOTE — H&P
calcium through a Mediport. Also of note, she has lost  over 80 pounds over the past year. She is currently resting in bed on  5K with no significant complaints at this time. She is concerned that  things have moved quickly, and she is unsure of what all was going on. Both myself and Dr. Juan Starkey discussed the findings of the MRI scan with  her and noted that at this time, she does have relatively low lab counts  for an infection with a CRP of 0.6. White blood cell count of 6.4, seg  neutrophils at 48, and sed rate at 2. This may be indication that we  are finding this infection very early on, which may build well for her  long term. She denies any right sided lower extremity symptoms. Her  symptoms of left lower extremity pain consists mostly of anterior thigh  pain without much radiation beyond the knees. She denies any weakness  or loss of sensation. ASSESSMENT:  L2 L3 diskitis, osteomyelitis. PLAN:  Both Dr. Juan Starkey and myself have seen the patient and discussed  with her the findings. At this point, we are hopeful that she will be  able to be treated through intravenous antibiotics, and Infectious  Disease has been consulted for this. Dr. Karla Kemp also helped and  followed along with her sepsis earlier in 02/2020. At this time, there  is no immediate plan for surgical intervention. Dr. Juan Starkey will discuss  further with Dr. Karla Kemp his assessment and clinical recommendation as  well. We may be planning a 7 to 14 day antibiotic treatment with repeat  MRI scan of the lumbar spine to evaluate for advancement of any endplate  erosion or soft tissue expansion, which may require further surgery. If  she worsens in terms of her clinical appearance or with extremity or  back pain, then it may also be an indication for surgical management as  well. DRUG ALLERGIES:  Include ESTROGEN, PENICILLIN, SULFA ANTIBIOTICS.     CURRENT HOME MEDICATIONS:  Include amiloride, bumetanide, acyclovir,  duloxetine, also ordering an MRI scan of the thoracic spine with  and without contrast as she has complaints of thoracic pain, and we want  to rule out any change or possible diskitis in the thoracic region as  well. This will be done today. We will continue following and adjust  the treatment plan as accordingly.         Noble Cassidy    D: 04/21/2020 7:46:57       T: 04/21/2020 10:30:10     [email or web address]  Job#: [id number]     Doc#: [id number]    CC:  Janessa Live MD

## 2020-04-21 NOTE — CARE COORDINATION
ovarian cyst       Medications:  Scheduled Meds:   Parathyroid Hormone (Recomb)  50 mcg Subcutaneous Nightly    acyclovir  400 mg Oral BID    spironolactone  12.5 mg Oral Daily    bumetanide  1 mg Oral Daily    calcitRIOL  0.5 mcg Oral BID    DULoxetine  30 mg Oral Daily    DULoxetine  60 mg Oral Nightly    pantoprazole  40 mg Oral QAM AC    gabapentin  100 mg Oral Daily    gabapentin  300 mg Oral Nightly    metoprolol tartrate  12.5 mg Oral BID    levothyroxine  200 mcg Oral Daily    levothyroxine  50 mcg Oral Daily    tiZANidine  4 mg Oral Nightly    sodium chloride flush  10 mL Intravenous 2 times per day    cefTRIAXone (ROCEPHIN) IV  2 g Intravenous Q24H     Continuous Infusions:   Pertinent Info/Orders/Treatment Plan:  Lactic acid 2.5, WBC 6.4, Calcium 7.0, Albumin 2.4, blood cultures pending. I D consult to Dr. Michelle Ceron, Hellen, prn Tylenol, Glycolax, Phenergan and Zofran. Diet: DIET GENERAL;  Dietary Nutrition Supplements: Other Oral Supplement (see comment)   Smoking status:  reports that she has never smoked. She has never used smokeless tobacco.   PCP: Matthew Freeman MD  Readmission 30 days or less: No  Readmission Risk Score: 26%    Discharge Planning Evaluation  Current Residence:  Private Residence  Living Arrangements:  Spouse/Significant Other   Support Systems:  Spouse/Significant Other, Family Members, Children  Current Services PTA:     Potential Assistance Needed:  N/A  Potential Assistance Purchasing Medications:  No  Does patient want to participate in local refill/ meds to beds program?  Yes  Type of Home Care Services:  None  Patient expects to be discharged to:  home with   Expected Discharge date:  04/23/20  Follow Up Appointment: Best Day/ Time: Wednesday PM    Patient Goals/Plan/Treatment Preferences: Met with Wyatt Aviles. She is from home with her .  Wyatt Aviles verifies she has insurance, a PCP, can afford her medications, will have transportation to home at discharge, and her ADL's and nutritional needs are met. Roman Ludwig states she comes to inpatient nursing for her calcium infusions three times weekly when her levels are off. If she needs daily antibiotic therapy she would like to see if her insurance policy provides coverage for home infusion. Transportation/Food Security/Housekeeping Addressed:  No issues identified.  Evaluation: no, not at this time.

## 2020-04-21 NOTE — PROGRESS NOTES
1530 Patient received in IR for lumbar disc biopsy. 1535 This procedure has been fully reviewed with the patient and written informed consent has been obtained. 1601 Procedure started with Dr. Clau Holbrook. 1610 Procedure completed; patient tolerated well. Band aid to lower back; no bleeding noted. [phone] Patient on bed; comfort ensured. 1636 Patient taken to 5K via bed.

## 2020-04-21 NOTE — PROGRESS NOTES
Pt admitted to  5K6 via in a wheelchair from ED. Complaints: discitis of lumbar region. IV normal saline infusing into the port in right jugular at a rate of 0 mls/ hour with about 900 mls in the bag still. IV site free of s/s of infection or infiltration. Vital signs obtained. Assessment and data collection initiated. Two nurse skin assessment performed by [facility] CELSO GRIGGS and Greg Ponce. Oriented to room. Policies and procedures for  explained. All questions answered with no further questions at this time. Fall prevention and safety brochure discussed with patient. Bed alarm on. Call light in reach. Oriented to room. Daniela Yepez RN 4/20/2020 8:06 PM     Explained patients right to have family, representative or physician notified of their admission. Patient has Requested for physician to be notified. Patient has Declined for family/representative to be notified.

## 2020-04-21 NOTE — CONSULTS
 Gastric bypass status for obesity Z98.84    Insomnia G47.00    Ovarian cyst N83.209    Muscle weakness (generalized) M62.81    Alcoholism (HCC) F10.20    Osteoarthritis M19.90    Hypocalcemia E83.51    Hypomagnesemia E83.42    Moderate malnutrition (HCC) E44.0    Other hypoparathyroidism (HCC) E20.8    Pancytopenia (HCC) D61.818    Palpitations R00.2    Asymptomatic bacteriuria R82.71    Hyperphosphatemia E83.39    Prolonged Q-T interval on ECG R94.31    Hypoalbuminemia E88.09    Tinnitus of both ears H93.13    Hx of parathyroidectomy Z90.09    Normocytic anemia D64.9    History of fibromyalgia Z87.39    Physical deconditioning R53.81    Severe malnutrition (HCC) E43    Bacteremia R78.81    Fever R50.9    Calcification of breast R92.1    Hypotension I95.9    Steatosis of liver K76.0    Nutritional marasmus (HCC) E41    Discitis of lumbar region M46.46           Impression and Recommendation:   Discitis of lumbar spine with erosion of spinous process: We will arrange biopsy of the area by interventional radiology. Patient had previous history of injury with multiple fractures of the vertebrae. 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 There is concern for seeding of the space with the organism. Will do blood culture. She has also metabolic bone disease which may contribute to the changes. [Her C-reactive protein is 0.06] which is unusual with osteomyelitis/discitis  We will continue IV ceftriaxone due to her history of allergy to penicillin  Will make further recommendation based on biopsy report    Thank you Elliot Kaiser MD for allowing me to participate in this patient's care.     Izabela Leach MD,FACP 4/21/2020 8:28 AM

## 2020-04-22 NOTE — PROGRESS NOTES
tiZANidine  4 mg Oral Nightly    sodium chloride flush  10 mL Intravenous 2 times per day    cefTRIAXone (ROCEPHIN) IV  2 g Intravenous Q24H       loperamide **OR** loperamide, acetaminophen, sodium chloride flush, polyethylene glycol, promethazine **OR** ondansetron      LABS:     CBC:   Recent Labs     04/20/20 1755 04/22/20  1210   WBC 6.4 5.0   HGB 10.0* 8.7*    161     BMP:    Recent Labs     04/20/20  1755      K 4.1      CO2 27   BUN 10   CREATININE 0.5   GLUCOSE 128*     Calcium:  Recent Labs     04/20/20  1755   CALCIUM 7.0*     Ionized Calcium:No results for input(s): IONCA in the last 72 hours. Magnesium:  Recent Labs     04/21/20  1500   MG 1.2*     Phosphorus:  Recent Labs     04/21/20  1500   PHOS 5.6*     BNP:No results for input(s): BNP in the last 72 hours. Glucose:No results for input(s): POCGLU in the last 72 hours. HgbA1C: No results for input(s): LABA1C in the last 72 hours. INR:   Recent Labs     04/20/20  1755   INR 1.10     Hepatic:   Recent Labs     04/20/20  1755   ALKPHOS 156*   ALT 37   AST 46*   PROT 4.8*   BILITOT 0.4   LABALBU 2.4*     Amylase and Lipase:  Recent Labs     04/20/20  1755   LACTA 2.5*     Lactic Acid:   Recent Labs     04/20/20  1755   LACTA 2.5*     Troponin: No results for input(s): CKTOTAL, CKMB, TROPONINI in the last 72 hours. BNP: No results for input(s): BNP in the last 72 hours. CULTURES:   UA: No results for input(s): SPECGRAV, PHUR, COLORU, CLARITYU, MUCUS, PROTEINU, BLOODU, RBCUA, WBCUA, BACTERIA, NITRU, GLUCOSEU, BILIRUBINUR, UROBILINOGEN, KETUA, LABCAST, LABCASTTY, AMORPHOS in the last 72 hours.     Invalid input(s): CRYSTALS  Micro:   Lab Results   Component Value Date    BC No growth-preliminary  04/20/2020         IMAGING:         Problem list of patient:     Patient Active Problem List   Diagnosis Code    TIA (transient ischemic attack) G45.9    Hypothyroidism E03.9    DVT (deep venous thrombosis) ([facility] I82.409    Depression F32.9    Anxiety F41.9    Fibromyalgia M79.7    Fatty liver K76.0    Tachycardia R00.0    GERD (gastroesophageal reflux disease) K21.9    Recurrent cold sores B00.1    Irritable bowel K58.9    Gastric bypass status for obesity Z98.84    Insomnia G47.00    Ovarian cyst N83.209    Muscle weakness (generalized) M62.81    Alcoholism (HCC) F10.20    Osteoarthritis M19.90    Hypocalcemia E83.51    Hypomagnesemia E83.42    Moderate malnutrition (HCC) E44.0    Other hypoparathyroidism (HCC) E20.8    Pancytopenia (HCC) D61.818    Palpitations R00.2    Asymptomatic bacteriuria R82.71    Hyperphosphatemia E83.39    Prolonged Q-T interval on ECG R94.31    Hypoalbuminemia E88.09    Tinnitus of both ears H93.13    Hx of parathyroidectomy Z90.09    Normocytic anemia D64.9    History of fibromyalgia Z87.39    Physical deconditioning R53.81    Severe malnutrition (HCC) E43    Bacteremia R78.81    Fever R50.9    Calcification of breast R92.1    Hypotension I95.9    Steatosis of liver K76.0    Nutritional marasmus (HCC) E41    Discitis of lumbar region M46.46         ASSESSMENT/PLAN   discites of the lumbar spine  Continue current iv antibiotic.  Will arrange out patient iv  Has Alejandra Murray MD, [facility] 4/22/2020 4:27 PM

## 2020-04-22 NOTE — PROGRESS NOTES
Rupal Kim 58  PHYSICAL THERAPY MISSED TREATMENT NOTE  ACUTE CARE    Date: 2020  Patient Name: Jose Guadalupe Walters        MRN: [id number]   : 1960  (60 y.o.)  Gender: female                REASON FOR MISSED TREATMENT:  Missed Treat. Per OT, pt amb around unit without difficulty, at baseline function. PT will sign off.

## 2020-04-22 NOTE — CARE COORDINATION
4/22/20, 2:59 PM EDT    DISCHARGE ON [address] [place] [address] day: 2  Location: -06/006-A Reason for admit: Discitis of lumbar region [M46.46]  Discitis of lumbar region [M46.46]   Procedure: 4/21/2020 Biopsy inferior aspect left side L2  Treatment Plan of Care: Ionized calcium 0.95. ID following-awaiting biopsy report for antibiotic plan at discharge, PT/OT evaluations completed-sign off, consult to Dietician for severe protein calorie malnutrition, Rocephin, prn Tylenol, Imodium, Glycolax, Phenergan and Zofran, Calcium replacement protocol, daily  CBC, general diet, activity as tolerated, SCD's, up with assistance. Barriers to Discharge: Medical stability. PCP: Triston Kemp MD  Readmission Risk Score: 30%  Patient Goals/Plan/Treatment Preferences: Home with her . Monitoring for antibiotic therapy. [facility] contacted to verify benefits for home IV antibiotic infusions. Spoke to [employer].

## 2020-04-22 NOTE — PROGRESS NOTES
Telephone Encounter by Juliet Garcia at 05/18/17 10:49 AM     Author:  Juliet Garcia Service:  (none) Author Type:  Ordering User     Filed:  05/18/17 10:51 AM Encounter Date:  5/18/2017 Status:  Signed     :  Juliet Garcia (Ordering User)            Remote monitoring reports an increase in median respiratory rate + 5 over the last 2 days.  Patient does not weigh himself with the provided scale.  Today's /100 , activity level very low.[JT1.1M]        Revision History        User Key Date/Time User Provider Type Action    > JT1.1 05/18/17 10:51 AM Juliet Garcia Ordering User Sign    M - Manual             Yes  Ambulation Assistance: Independent  Transfer Assistance: Independent    Active : Yes     Additional Comments: Independent in all ADL/IADL tasks and functional mobility    VISION:Corrected. Wears glasses    HEARING:  WFL    COGNITION: WNL    RANGE OF MOTION:  Bilateral Upper Extremity:  WNL    STRENGTH:  Bilateral Upper Extremity:  WNL    ADL:   Grooming: Independent.  wsh/dy face and hair, oral hygiene, ~10 minutes standing sink side, 2 UE release, retrievd all necessary items from room  Upper Extremity Dressing: Independent. Pt doffed dirty gown and donned clean gown with buttons already snapped . BALANCE:  Sitting Balance:  Independent. In recliner  Standing Balance: Independent. BED MOBILITY:  Not Tested    TRANSFERS:  Sit to Stand:  Independent. From recliner   Stand to Sit: Independent. to recliner    FUNCTIONAL MOBILITY:  Assistive Device: None  Assist Level: Independent. Distance: In unit hallway, to/from bathroom  Good pace, steady, no LOB, good endurance         Activity Tolerance:  Patient tolerance of  treatment: good. Pt exhibited good endurance and was indep in all functional mobility and ADLs completed during session. Assessment:  Assessment: This 63 y/o female presents to [facility] with discitis of lumbar region. Pt is indep with all self cares, functinal mobility, IADLS, and is not at risk of falls. Recommending this pt be d/c from [address] UP: No  No Skilled OT: Independent with functional mobility, Independent with ADL's    Treatment Initiated: Treatment and education initiated within context of evaluation. Evaluation time included review of current medical information, gathering information related to past medical, social and functional history, completion of standardized testing, formal and informal observation of tasks, assessment of data and development of plan of care and goals.   Treatment time included skilled education and facilitation of tasks to increase safety and independence with ADL's for improved functional independence and quality of life. Discharge Recommendations:  Home with assist PRN    Patient Education:  OT Education: OT Role, Plan of Care, Precautions    Equipment Recommendations:    Plan:   .  See long-term goal time frame for expected duration of plan of care. If no long-term goals established, a short length of stay is anticipated. Goals:     Short term goals  Time Frame for Short term goals: N/A d/t pt being d/c from OT         Following session, patient left in safe position with all fall risk precautions in place.

## 2020-04-22 NOTE — FLOWSHEET NOTE
04/22/20 0547   Provider Notification   Reason for Communication New orders   Provider Name [facility]   Provider Notification Physician Assistant   Method of Communication Secure Message   Response See orders   Notification Time 3857     Patient had Manual BP of 73/41 (MAP 53) and pulse 75. Asymptomatic. Patient experienced loose watery diarrhea multiple times last night. 500 mL 0.9 bolus ordered. Imodium 2-4 mg PRN 3x daily also added d/t CDIFF not being detected in patient's stool. Will continue to monitor BP.

## 2020-04-22 NOTE — PLAN OF CARE
Problem: Falls - Risk of:  Goal: Will remain free from falls  Description: Will remain free from falls  Outcome: Ongoing  Note: Patient has remained free of injury, call light within reach, bed in low position, side rails x2,      Problem: Pain:  Goal: Pain level will decrease  Description: Pain level will decrease  Outcome: Ongoing  Note: Patient has complained of a headache today, 4/10  It has decreased to a 2 through out the day with the use of Tylenol     Problem: Daily Care:  Goal: Daily care needs are met  Description: Daily care needs are met  Outcome: Ongoing  Note: Daily cares have been met, patient has been up to the bathroom and it able to complete cares per self   Care plan reviewed with patient and verbalize understanding of the plan of care and contribute to goal setting.

## 2020-04-23 NOTE — PROGRESS NOTES
History of gastric bypass 1996 went down to 123#, gained back up to 187# then had parathyroid removed and weight just kept coming off. Reports she has an appetite but early satiety, states her  states she only eats a few bites. She does try to do small frequent meals, includes good protein sources, uses Ensure Clear at home. She declined Ensure Clear while here as she is hopeful she will be going home tommorrow. Diarrhea she reports from antibiotics, discussed probiotic use, yogurt/kefir. Weight hard to assess given LE edema. Note alcohol abuse history as well. On vitamin A, vitamin B-6, vitamin b-1, bumex, imodium, calcium replacement, parathyroid hormone, rocephin, aldactone. Glucose 128, calcium 7. Reports she often has to get IV calcium infusions. 10 stools in the last 24 hours. · Wound Type: None  · Current Nutrition Therapies:  · Oral Diet Orders: General   · Oral Diet intake: 26-50%, %  · Oral Nutrition Supplement (ONS) Orders: None(Offered but declined need at this time, uses Ensure Clear at home.)  · ONS intake: Unable to assess  · Anthropometric Measures:  · Ht: 4' 10\" (147.3 cm)   · Current Body Wt: 105 lb 12.8 oz (48 kg)(4/23/20 +2 LE edema)  · Admission Body Wt: 86 lb (39 kg)(4/20/20 stated)  · Usual Body Wt: (187# for years. Per EMR: 5/7/19: 117#12.8oz, 1/23/20: 91#3. 2oz, 2/18/20: 105#8oz (+1 LE edema))  · % Weight Change:  ,  10.3% loss in the last 11 months however note pt has LE edema so hard to assess true amount verses some fluid/edema shifts as well  · Ideal Body Wt: 95 lb (43.1 kg),   · BMI Classification: BMI 18.5 - 24.9 Normal Weight    Nutrition Interventions:   Continue current diet(Offered ONS, pt declined need in anticipation of being discharged tommorrow. )  Continued Inpatient Monitoring, Education Initiated, Coordination of Care    Nutrition Evaluation:   · Evaluation: Goals set   · Goals: Patient will consume 75% or more of meals and ONS during LOS.

## 2020-04-23 NOTE — PLAN OF CARE
Problem: Falls - Risk of:  Goal: Will remain free from falls  Description: Will remain free from falls  4/23/2020 0604 by Court Choudhury RN  Outcome: Met This Shift  4/22/2020 1625 by Juan A Bliss RN  Outcome: Ongoing  Note: Patient has remained free of injury, call light within reach, bed in low position, side rails x2,   Goal: Absence of physical injury  Description: Absence of physical injury  4/23/2020 0604 by Court Choudhury RN  Outcome: Met This Shift  4/22/2020 1625 by Juan A Bliss RN  Outcome: Ongoing     Problem: Pain:  Goal: Pain level will decrease  Description: Pain level will decrease  4/23/2020 0604 by Court Choudhury RN  Outcome: Met This Shift  4/22/2020 1625 by Juan A Bliss RN  Outcome: Ongoing  Note: Patient has complained of a headache today, 4/10  It has decreased to a 2 through out the day with the use of Tylenol  Goal: Control of acute pain  Description: Control of acute pain  4/23/2020 0604 by Court Choudhury RN  Outcome: Met This Shift  4/22/2020 1625 by Juan A Bliss RN  Outcome: Ongoing  Goal: Control of chronic pain  Description: Control of chronic pain  4/23/2020 0604 by Court Choudhury RN  Outcome: Met This Shift  4/22/2020 1625 by Juan A Bliss RN  Outcome: Ongoing     Problem: Safety:  Goal: Free from accidental physical injury  Description: Free from accidental physical injury  4/23/2020 0604 by Court Choudhury RN  Outcome: Met This Shift  4/22/2020 1625 by Juan A Bliss RN  Outcome: Ongoing  Goal: Free from intentional harm  Description: Free from intentional harm  4/23/2020 0604 by Court Choudhury RN  Outcome: Met This Shift  4/22/2020 1625 by Juan A Bliss RN  Outcome: Ongoing     Problem: Daily Care:  Goal: Daily care needs are met  Description: Daily care needs are met  4/23/2020 0604 by Court Choudhury RN  Outcome: Met This Shift  4/22/2020 1625 by Juan A Bliss RN  Outcome: Ongoing  Note: Daily cares have been met, patient has been up to the bathroom and it able

## 2020-04-23 NOTE — PROGRESS NOTES
Department of Orthopedic Surgery  Spine Service  Uvaldo Salcedo PA-C Progress Note        Subjective:  Peola Mcardle is resting in bed feeling well. Had an L2-3 biopsy yesterday. No culture results yet. Currently on rocephin, appreciate Dr. Lashell Sorenson help. 4/23/20  Peola Mcardle is still doing well. She has had no fevers, increased pain, or difficulty. Awaiting culture results. The MRI T spine showed no evidence of infection. Vitals  VITALS:  BP (!) 91/50   Pulse 87   Temp 98.7 °F (37.1 °C) (Oral)   Resp 16   Ht 4' 10\" (1.473 m)   Wt 105 lb 12.8 oz (48 kg)   SpO2 97%   BMI 22.11 kg/m²   24HR INTAKE/OUTPUT:      Intake/Output Summary (Last 24 hours) at 4/23/2020 2711  Last data filed at 4/23/2020 0645  Gross per 24 hour   Intake 3204.92 ml   Output 1 ml   Net 3203.92 ml     URINARY CATHETER OUTPUT ([place]):     DRAIN/TUBE OUTPUT:         PHYSICAL EXAM:    Orientation:  alert and oriented to person, place and time    Lower Extremity Motor :  quadriceps, extensor hallucis longus, dorsiflexion, plantarflexion 5/5 bilaterally  Lower Extremity Sensory:  Intact L1-S1    Flatus:  positive    ABNORMAL EXAM FINDINGS:  none    LABS:    HgB:    Lab Results   Component Value Date    HGB 7.3 04/23/2020         ASSESSMENT AND PLAN:    L2-3 discitis     1:  Awaiting culture results and antibiotic plan from ID ok to discharge once set. 2:  Will trend WBC  3:  Planning at this time long term abx, no surgical plans currently.

## 2020-04-23 NOTE — PROGRESS NOTES
Assessment and Plan:        1. Discitis- bx report pending  2. Pancytopenia- trend; need to be mindful may be med related  3. Edema- may be better treated with wraps      CC:  Back pain  HPI:  Pt with back pain, s/p gastric bypass, s/p parathyroidectomy, recent Pasturella sepsis had developed back pain and outpt scan suggested discitis. Hospitalist was asked to see re low calcium and diarrhea. The diarrhea has been chronic since bypass and she has had investigation by Dr Ilir Lacey. ROS (12 point review of systems completed. Pertinent positives noted.  Otherwise ROS is negative) :   PMH:  Per HPI  SHX:  , no tobacco, one to two etoh per day; used to be heavy  FHX: hbp, cancer  Allergies: See above    Medications:       [START ON 4/24/2020] bumetanide  0.5 mg Oral Daily    magnesium replacement protocol   Other RX Placeholder    Parathyroid Hormone (Recomb)  50 mcg Subcutaneous Nightly    calcium replacement protocol   Other RX Placeholder    acyclovir  400 mg Oral BID    spironolactone  12.5 mg Oral Daily    calcitRIOL  0.5 mcg Oral BID    DULoxetine  30 mg Oral Daily    DULoxetine  60 mg Oral Nightly    pantoprazole  40 mg Oral QAM AC    gabapentin  100 mg Oral Daily    gabapentin  300 mg Oral Nightly    metoprolol tartrate  12.5 mg Oral BID    levothyroxine  200 mcg Oral Daily    levothyroxine  50 mcg Oral Daily    tiZANidine  4 mg Oral Nightly    sodium chloride flush  10 mL Intravenous 2 times per day    cefTRIAXone (ROCEPHIN) IV  2 g Intravenous Q24H       Vital Signs:   BP (!) 91/50   Pulse 87   Temp 98.7 °F (37.1 °C) (Oral)   Resp 16   Ht 4' 10\" (1.473 m)   Wt 105 lb 12.8 oz (48 kg)   SpO2 97%   BMI 22.11 kg/m²      Intake/Output Summary (Last 24 hours) at 4/23/2020 1217  Last data filed at 4/23/2020 0730  Gross per 24 hour   Intake 1544.92 ml   Output 1 ml   Net 1543.92 ml        Physical Examination: General appearance - chronically ill appearing  Mental status - alert, oriented to person, place, and time  Neck - supple, no significant adenopathy, no JVD, or carotid bruits  Chest - not examined  Heart - not examined  Abdomen - not examined  Neurological - alert, oriented, normal speech, no focal findings or movement disorder noted  Musculoskeletal - no muscular tenderness noted  Extremities - not examined  Skin - normal coloration and turgor, no rashes, no suspicious skin lesions noted    Data: (All radiographs, tracings, PFTs, and imaging are personally viewed and interpreted unless otherwise noted).     Reviewed labs      Electronically signed by Zabrina Oh MD on 4/23/2020 at 12:17 PM

## 2020-04-23 NOTE — CARE COORDINATION
Rober Boxer, [facility], states they can accept [place]. Start of care planned for Saturday. Discharge anticipated for tomorrow.  Electronically signed by Bernice Aguayo RN on 4/23/20 at 1:17 PM EDT

## 2020-04-23 NOTE — CARE COORDINATION
4/23/20, 12:42 PM EDT    DISCHARGE ON [address] [place] [address] day: 3  Location: -06/006-A Reason for admit: Discitis of lumbar region [M46.46]  Discitis of lumbar region [M46.46]   Procedure: 4/21/2020 Biopsy inferior aspect left side L2  Treatment Plan of Care: WBC 3.5, H/H 7.3/23.0, Ionized calcium 1.08. ID following, Rocephin, Calcium and Magnesium replacement protocol, prn Tylenol, Imodium, Glycolax, Phenergan, and Zofran, daily CBC and Magnesium, BMP and Ionized Calcium in a.m., general diet, SCD's, PT/OT eval's complete, they have signed off, up with assistance. Barriers to Discharge: Medical stability. PCP: Edith Harley MD  Readmission Risk Score: 30%  Patient Goals/Plan/Treatment Preferences: Antonio Espana is from home with her . She agrees to [facility] and [facility]. Referral for [facility] called to dago Lu. She is reviewing chart to verify they can accommodate her needs (she was going to outpatient nursing on Mondays for her Ionized calcium draws and infusions). Antonio Espana would like to have her calcium levels and infusions at home with her antibiotic therapy infusions if possible. Antonio Espana has concerns with her medi-port access device (how long is the same device safe to be used). Planning for discharge tomorrow.

## 2020-04-23 NOTE — PROGRESS NOTES
Progress note: Infectious diseases    Patient - Bert Chavarria,  Age - 62 y.o.    - 1960      Room Number - 5K-06/006-A   MRN -  [id number]   Acct # - [de-identified]  Date of Admission -  2020  5:39 PM    SUBJECTIVE:   No new issues. OBJECTIVE   VITALS    height is 4' 10\" (1.473 m) and weight is 105 lb 12.8 oz (48 kg). Her oral temperature is 98.7 °F (37.1 °C). Her blood pressure is 91/50 (abnormal) and her pulse is 87. Her respiration is 16 and oxygen saturation is 97%.        Wt Readings from Last 3 Encounters:   20 105 lb 12.8 oz (48 kg)   20 86 lb (39 kg)   20 86 lb (39 kg)       I/O (24 Hours)    Intake/Output Summary (Last 24 hours) at 2020 0930  Last data filed at 2020 0645  Gross per 24 hour   Intake 1304.92 ml   Output 1 ml   Net 1303.92 ml       General Appearance  Awake, alert, oriented,  not  In acute distress  HEENT - normocephalic, atraumatic, pale conjunctiva,  anicteric sclera  Neck - Supple, no mass  Lungs -  Bilateral  air entry,    Cardiovascular - Heart sounds are normal.    Abdomen - soft, not distended, nontender,   Neurologic -oriented  Skin - No bruising or bleeding  Extremities - No edema, no cyanosis, clubbing     MEDICATIONS:      Parathyroid Hormone (Recomb)  50 mcg Subcutaneous Nightly    calcium replacement protocol   Other RX Placeholder    acyclovir  400 mg Oral BID    spironolactone  12.5 mg Oral Daily    bumetanide  1 mg Oral Daily    calcitRIOL  0.5 mcg Oral BID    DULoxetine  30 mg Oral Daily    DULoxetine  60 mg Oral Nightly    pantoprazole  40 mg Oral QAM AC    gabapentin  100 mg Oral Daily    gabapentin  300 mg Oral Nightly    metoprolol tartrate  12.5 mg Oral BID    levothyroxine  200 mcg Oral Daily    levothyroxine  50 mcg Oral Daily    tiZANidine  4 mg Oral Nightly    sodium chloride flush  10 mL Intravenous 2 times per day    K76.0    Tachycardia R00.0    GERD (gastroesophageal reflux disease) K21.9    Recurrent cold sores B00.1    Irritable bowel K58.9    Gastric bypass status for obesity Z98.84    Insomnia G47.00    Ovarian cyst N83.209    Muscle weakness (generalized) M62.81    Alcoholism (HCC) F10.20    Osteoarthritis M19.90    Hypocalcemia E83.51    Hypomagnesemia E83.42    Moderate malnutrition (HCC) E44.0    Other hypoparathyroidism (HCC) E20.8    Pancytopenia (HCC) D61.818    Palpitations R00.2    Asymptomatic bacteriuria R82.71    Hyperphosphatemia E83.39    Prolonged Q-T interval on ECG R94.31    Hypoalbuminemia E88.09    Tinnitus of both ears H93.13    Hx of parathyroidectomy Z90.09    Normocytic anemia D64.9    History of fibromyalgia Z87.39    Physical deconditioning R53.81    Severe malnutrition (HCC) E43    Bacteremia R78.81    Fever R50.9    Calcification of breast R92.1    Hypotension I95.9    Steatosis of liver K76.0    Nutritional marasmus (HCC) E41    Discitis of lumbar region M46.46         ASSESSMENT/PLAN   discites of the lumbar spine  Continue current iv antibiotic.  Will arrange out patient iv   social service to arrange out patient antibiotic       Aida Chavira MD, FACP 4/23/2020 9:30 AM

## 2020-04-24 PROBLEM — E87.6 HYPOKALEMIA: Status: ACTIVE | Noted: 2020-01-01

## 2020-04-24 PROBLEM — M46.40 DISCITIS: Status: ACTIVE | Noted: 2020-01-01

## 2020-04-24 NOTE — PLAN OF CARE
Problem: Falls - Risk of:  Goal: Will remain free from falls  Description: Will remain free from falls  Outcome: Met This Shift     Problem: Pain:  Goal: Pain level will decrease  Description: Pain level will decrease  Outcome: Ongoing  Note: Tylenol x1 for throat pain, relief noted     Problem: Daily Care:  Goal: Daily care needs are met  Description: Daily care needs are met  Outcome: Ongoing     Problem: GI  Goal: No bowel complications  Outcome: Ongoing  Note: Multiple loose bowel movements overnight, imodium x1 given. Problem:   Goal: Adequate urinary output  Outcome: Ongoing     Problem: Nutrition  Goal: Optimal nutrition therapy  4/24/2020 0447 by Kevon Milton RN  Outcome: Ongoing  4/23/2020 1543 by Heber Dukes RD, LD  Outcome: Ongoing   Care plan reviewed with patient. Patient verbalize understanding of the plan of care and contribute to goal setting.

## 2020-04-24 NOTE — CARE COORDINATION
Phone call placed to Dr. Jonel Montes office ([facility]in[facility]), requesting orders for Calcium lab draws and replacement orders be faxed to forward onto [facility] so that [facility] can handle this for her since they are managing her IV antibiotic infusions.  Electronically signed by Oanh Huynh RN on 4/24/20 at 11:26 AM EDT

## 2020-04-24 NOTE — DISCHARGE SUMMARY
Vitals:  Vitals:    04/23/20 1550 04/23/20 1955 04/24/20 0235 04/24/20 0445   BP: (!) 129/56 (!) 142/76 (!) 77/47 (!) 83/45   Pulse: 97 124 91    Resp: 16 16 16    Temp: 98 °F (36.7 °C) 99.1 °F (37.3 °C) 98.2 °F (36.8 °C)    TempSrc: Oral Oral Oral    SpO2: 100% 100% 96%    Weight:       Height:         Weight: Weight: 105 lb 12.8 oz (48 kg)     24 hour intake/output:    Intake/Output Summary (Last 24 hours) at 4/24/2020 1042  Last data filed at 4/24/2020 0355  Gross per 24 hour   Intake 951.02 ml   Output --   Net 951.02 ml         General appearance:  Chronically ill appearing    HEENT:  Normal cephalic, atraumatic without obvious deformity. Pupils equal, round, and reactive to light. Extra ocular muscles intact. Conjunctivae/corneas clear. Neck: Supple, with full range of motion. No jugular venous distention. Trachea midline. Respiratory:  Normal respiratory effort. Clear to auscultation, bilaterally without Rales/Wheezes/Rhonchi. Cardiovascular:  Regular rate and rhythm with normal S1/S2 without murmurs, rubs or gallops. Abdomen: Soft, non-tender, non-distended with normal bowel sounds. Musculoskeletal:  No clubbing, cyanosis or edema bilaterally. Full range of motion without deformity. Skin: Skin color, texture, turgor normal.  No rashes or lesions. Neurologic:  Neurovascularly intact without any focal sensory/motor deficits. Cranial nerves: II-XII intact, grossly non-focal.  Psychiatric:  Alert and oriented, thought content appropriate, normal insight  Capillary Refill: No change since prior exam    Peripheral Pulses: No change since prior exam        Labs:  For convenience and continuity at follow-up the following most recent labs are provided:      CBC:    Lab Results   Component Value Date    WBC 5.7 04/24/2020    HGB 7.8 04/24/2020    HCT 25.1 04/24/2020     04/24/2020       Renal:    Lab Results   Component Value Date     04/24/2020    K 3.3 04/24/2020    K 4.1 04/20/2020    CL

## 2020-04-24 NOTE — PROGRESS NOTES
Date    BC No growth-preliminary  04/20/2020         IMAGING:         Problem list of patient:     Patient Active Problem List   Diagnosis Code    TIA (transient ischemic attack) G45.9    Hypothyroidism E03.9    DVT (deep venous thrombosis) ([facility]) I82.409    Depression F32.9    Anxiety F41.9    Fibromyalgia M79.7    Fatty liver K76.0    Tachycardia R00.0    GERD (gastroesophageal reflux disease) K21.9    Recurrent cold sores B00.1    Irritable bowel K58.9    Gastric bypass status for obesity Z98.84    Insomnia G47.00    Ovarian cyst N83.209    Muscle weakness (generalized) M62.81    Alcoholism (HCC) F10.20    Osteoarthritis M19.90    Hypocalcemia E83.51    Hypomagnesemia E83.42    Moderate malnutrition (HCC) E44.0    Other hypoparathyroidism (HCC) E20.8    Pancytopenia (HCC) D61.818    Palpitations R00.2    Asymptomatic bacteriuria R82.71    Hyperphosphatemia E83.39    Prolonged Q-T interval on ECG R94.31    Hypoalbuminemia E88.09    Tinnitus of both ears H93.13    Hx of parathyroidectomy Z90.09    Normocytic anemia D64.9    History of fibromyalgia Z87.39    Physical deconditioning R53.81    Severe malnutrition (HCC) E43    Bacteremia R78.81    Fever R50.9    Calcification of breast R92.1    Hypotension I95.9    Steatosis of liver K76.0    Nutritional marasmus (HCC) E41    Discitis of lumbar region M46.46    Discitis M46.40    Hypokalemia E87.6         ASSESSMENT/PLAN   discites of the lumbar spine: pathology shows fibrosis of the tissue, no inflmammatory cells noted. It may all be caused by her underlying bone disease  Will discharge home today. Culture not available.     Izabela Leach MD, [address] 4/24/2020 10:25 AM

## 2020-04-24 NOTE — PROGRESS NOTES
Department of Orthopedic Surgery  Spine Service  Emilie Meza PA-C Progress Note        Subjective:  Mari Payne is resting in bed feeling well. Awaiting biopsy results. Dr. Mcbride Cancel on for abx plan. .     4/23/20  Mari Payne is still doing well. She has had no fevers, increased pain, or difficulty. Awaiting culture results. The MRI T spine showed no evidence of infection. Vitals  VITALS:  BP (!) 83/45   Pulse 91   Temp 98.2 °F (36.8 °C) (Oral)   Resp 16   Ht 4' 10\" (1.473 m)   Wt 105 lb 12.8 oz (48 kg)   SpO2 96%   BMI 22.11 kg/m²   24HR INTAKE/OUTPUT:      Intake/Output Summary (Last 24 hours) at 4/24/2020 0930  Last data filed at 4/24/2020 0355  Gross per 24 hour   Intake 951.02 ml   Output --   Net 951.02 ml     URINARY CATHETER OUTPUT ([place]):     DRAIN/TUBE OUTPUT:         PHYSICAL EXAM:    Orientation:  alert and oriented to person, place and time    Lower Extremity Motor :  quadriceps, extensor hallucis longus, dorsiflexion, plantarflexion 5/5 bilaterally  Lower Extremity Sensory:  Intact L1-S1    Flatus:  positive    ABNORMAL EXAM FINDINGS:  none    LABS:    HgB:    Lab Results   Component Value Date    HGB 7.8 04/24/2020         ASSESSMENT AND PLAN:    L2-3 discitis     1:  Awaiting culture results and antibiotic plan from ID ok to discharge once set. 2:  Will trend WBC  3:  Planning at this time long term abx, no surgical plans currently.

## 2020-04-24 NOTE — PROGRESS NOTES
Discharge instructions explained to patient with verbalized understanding. Patient returning home with spouse and HHC. All personal belongings and medications sent with patient.

## 2020-04-24 NOTE — CARE COORDINATION
Met with Papo Doherty to review her discharge plans. She is in agreement for her discharge today.  Electronically signed by Adithya Burgos RN on 4/24/20 at 1:18 PM EDT

## 2020-04-30 PROBLEM — M46.46 LUMBAR DISCITIS: Status: ACTIVE | Noted: 2020-01-01

## 2020-04-30 PROBLEM — M25.852 MASS OF JOINT OF LEFT HIP: Status: ACTIVE | Noted: 2020-01-01

## 2020-04-30 PROBLEM — R63.4 WEIGHT LOSS: Status: ACTIVE | Noted: 2020-01-01

## 2020-04-30 NOTE — PROGRESS NOTES
seventh cervical vertebra without spinal cord injury [facility]), Closed fracture of sixth cervical vertebra (HCC), Closed fracture of thoracic vertebra ([facility] [address] Depression, Dry eye syndrome, DVT (deep venous thrombosis) ([address] Dyspareunia in female, External hemorrhoids without complication, Fibromyalgia, Gastric bypass status for obesity, GERD (gastroesophageal reflux disease), Hiatal hernia, History of ITP, Hungry bone syndrome, Hx of blood clots, Hyperparathyroidism ([address] Hypocalcemia, Hypothyroidism, Insomnia, Irritable bowel, Lactose intolerance, Menopausal syndrome, Osteoarthritis, Osteopenia, Osteopenia, Ovarian cyst, Oxaluria ([address] Recurrent cold sores, Sepsis ([address] Tachycardia, TIA (transient ischemic attack), and Tubular adenoma of colon. Past SurgicalHistory  The patient  has a past surgical history that includes Wrist fracture surgery (Right); Leg Surgery (Right); Tonsillectomy and adenoidectomy; Gastric bypass surgery (1996); Cholecystectomy (1996); Appendectomy (1996); parathyroidectomy (08/2017); Colonoscopy (approx 2013); Upper gastrointestinal endoscopy (approx 2013); laparoscopy; Foot surgery; Tubal ligation (1996); Endoscopy, colon, diagnostic; fracture surgery; Dilatation, esophagus; Colonoscopy (Left, 5/7/2019); and Upper gastrointestinal endoscopy (Left, 5/7/2019). Family History  This patient's family history includes Asthma in her maternal grandfather; Breast Cancer in her mother and sister; Cancer in her father; Depression in her father and mother; Heart Attack in her maternal grandmother; High Blood Pressure in her father and mother; Mental Illness in her maternal grandmother; Other in her father, maternal grandfather, maternal grandmother, and mother. Social History  Sky Dodge  reports that she has never smoked. She has never used smokeless tobacco. She reports current alcohol use of about 9.0 standard drinks of alcohol per week.  She reports that she does not use drugs.    Medications    Current Outpatient Medications:     CALCIUM REPLACEMENT PROTOCOL, by Other route, Disp: , Rfl:     nystatin (MYCOSTATIN) 300029 UNIT/GM cream, Take 30 g by mouth, Disp: , Rfl:     Parathyroid Hormone, Recomb, 50 MCG CART, Inject 50 mcg into the skin, Disp: , Rfl:     vitamin B-12 (CYANOCOBALAMIN) 500 MCG tablet, , Disp: , Rfl:     aMILoride (MIDAMOR) 5 MG tablet, TAKING 2.5MG THREE TIMES WEEKLY, Disp: 30 tablet, Rfl: 3    bumetanide (BUMEX) 1 MG tablet, TAKE ONE HALF TAB  THREE TIMES WEEKLY, Disp: 90 tablet, Rfl: 1    calcitRIOL (ROCALTROL) 0.5 MCG capsule, Two tablets twice daily, Disp: 30 capsule, Rfl: 3    magnesium cl-calcium carbonate (SLOW-MAG) 71.5-119 MG TBEC tablet, Take 1 tablet by mouth 4 times daily, Disp: 3 tablet, Rfl: 0    potassium chloride (KLOR-CON M) 20 MEQ extended release tablet, Take 1 tablet by mouth 2 times daily, Disp: 60 tablet, Rfl: 1    psyllium (METAMUCIL) 58.6 % powder, Take by mouth 2 times daily. , Disp: , Rfl:     cefTRIAXone (ROCEPHIN) 500 MG injection, Infuse 2,000 mg intravenously every 24 hours for 23 days, Disp: 68300 mg, Rfl: 0    acyclovir (ZOVIRAX) 400 MG tablet, Take 1 tablet by mouth 2 times daily, Disp: 180 tablet, Rfl: 1    DULoxetine (CYMBALTA) 60 MG extended release capsule, Take 1 capsule by mouth nightly, Disp: 90 capsule, Rfl: 1    Estradiol (VAGIFEM) 10 MCG TABS vaginal tablet, Place 1 tablet vaginally Twice a Week On Saturday and Wednesday, Disp: 26 tablet, Rfl: 1    gabapentin (NEURONTIN) 100 MG capsule, Take 1 capsule by mouth daily for 90 days. , Disp: 180 capsule, Rfl: 1    gabapentin (NEURONTIN) 300 MG capsule, Take 1 capsule by mouth nightly for 180 days. , Disp: 90 capsule, Rfl: 1    metoprolol tartrate (LOPRESSOR) 25 MG tablet, Take 0.5 tablets by mouth 2 times daily, Disp: 90 tablet, Rfl: 1    tiZANidine (ZANAFLEX) 4 MG tablet, Take 1 tablet by mouth nightly At bed time, Disp: 90 tablet, Rfl: 1    loperamide (IMODIUM) 2 MG capsule, Take 2 capsules by mouth 4 times daily as needed for Diarrhea, Disp: , Rfl:     SYNTHROID 50 MCG tablet, Take 1 tablet by mouth Daily Additional RF per her endocrinoloist, Disp: 30 tablet, Rfl: 0    Vitamins-Lipotropics (LIPO-FLAVONOID PLUS PO), Take 2 tablets in the morning 2 tablets in the evening and 1 tablet at bedtime, Disp: , Rfl:     DULoxetine (CYMBALTA) 30 MG extended release capsule, TAKE 1 CAPSULE DAILY, Disp: 90 capsule, Rfl: 0    UNABLE TO FIND, Inject 0.5 mcg into the skin daily Natpara injection, Disp: , Rfl:     vitamin B-6 (PYRIDOXINE) 100 MG tablet, Take 100 mg by mouth daily, Disp: , Rfl:     zinc gluconate 50 MG tablet, TAKE ONE AND ONE-HALF TABLETS (75 MG) DAILY, Disp: 135 tablet, Rfl: 0    vitamin B-1 (THIAMINE) 100 MG tablet, Take 100 mg by mouth daily, Disp: , Rfl:     NONFORMULARY, Whey Protein Powder, Disp: , Rfl:     ondansetron (ZOFRAN ODT) 4 MG disintegrating tablet, Take 1 tablet by mouth every 8 hours as needed for Nausea, Disp: 10 tablet, Rfl: 0    Vitamin K, Phytonadione, 100 MCG TABS, Take 3 tablets by mouth daily, Disp: 270 tablet, Rfl: 0    FOLIC ACID PO, Take 8,678 mg by mouth daily, Disp: , Rfl:     SYNTHROID 200 MCG tablet, Take 200 mcg by mouth daily, Disp: , Rfl:     esomeprazole (NEXIUM) 20 MG delayed release capsule, Take 20 mg by mouth 2 times daily, Disp: , Rfl:     vitamin A 77168 units capsule, Take 25,000 Units by mouth daily, Disp: , Rfl:     Subjective:      Review of Systems   Constitutional: Negative for chills, fatigue, fever and unexpected weight change. HENT: Positive for sore throat (mild). Negative for congestion, ear discharge, ear pain and hearing loss. Eyes: Negative for discharge and redness. Respiratory: Negative for cough and shortness of breath. Cardiovascular: Positive for leg swelling (mild pitting to groin; mainly below knee). Negative for chest pain and palpitations.    Gastrointestinal: Negative for variation likely fluid related. Close follow-up. Encouraged liberal diet. 3. Depression, unspecified depression type  Stable. 4. Anxiety  Stable. 5. Alcoholism ([facility] [address]  Drinking again. Discussed risks in detail. Prefer cessation but small quantify daily okay if can actually limit. 6. Other hypoparathyroidism ([facility] [address]  Follows with endocrine. On hormone; calcium infusions. 7. Hypomagnesemia  On supplement. 8. Anemia, unspecified type  Uncertain cause; suspect multifactorial/chronci disease. Specialists following labs closely. 9. Port-A-Cath in place  Stable. Labs upcoming. Using for infusions. Return in about 6 weeks (around 6/11/2020) for Follow-up chronic conditions. Orders Placed   No orders of the defined types were placed in this encounter. Prescriptions given/sent  No orders of the defined types were placed in this encounter. Educational materials provided. Discussed use, benefit, and side effects of prescribed medications. All patient questions answered. Pt voiced understanding.           --Stephanie Aleman MD on 4/30/2020 at 10:34 AM    An electronic signature was used to authenticate this note.

## 2020-05-01 NOTE — TELEPHONE ENCOUNTER
Patient called and stated that she will be due for a 6 month mammogram to follow up on a mass that was found. She will be due after 5/22/20. She is requesting that the order be sent to [facility].

## 2020-05-05 NOTE — TELEPHONE ENCOUNTER
Pt is set up to go to the [facility] center on 5-22-20 at 10am arrival at 9:45am.      Visit Type: STR TELMA DIGITAL DIAGNOSTIC W OR WO CAD BILATERAL     STR TELMA DIGITAL DIAGNOSTIC W OR WO CAD BILATERAL  PREPS: * Arrive 15 minutes prior * No powder, lotion, or deodorant under the arms or around the breast area * No perfumes or scented products  Please report to [address] [facility], [address], [facility], [address] [facility] [facility] [address]   Dept directions for [facility]:  [address] [facility]  [address] [facility]. [address]  [facility], [address]    Enter the main entrance of this medical building.  Take the elevators to the 2nd floor, exit to the left and turn left down hallway.  [facility], [address] is the first door on the right.

## 2020-05-06 NOTE — PROGRESS NOTES
Jose Guadalupe Walters is a 62 y.o. female being evaluated by a Virtual Visit (video visit) encounter to address concerns as mentioned above. A caregiver was present when appropriate. Due to this being a TeleHealth encounter (During JFLHN-19 public health emergency), evaluation of the following organ systems was limited: Vitals/Constitutional/EENT/Resp/CV/GI//MS/Neuro/Skin/Heme-Lymph-Imm. Pursuant to the emergency declaration under the [address] [place] [address] and the [employer] Act, this Virtual Visit was conducted with patient's (and/or legal guardian's) consent, to reduce the patient's risk of exposure to COVID-19 and provide necessary medical care. The patient (and/or legal guardian) has also been advised to contact this office for worsening conditions or problems, and seek emergency medical treatment and/or call 911 if deemed necessary. Patient identification was verified at the start of the visit: Yes    Total time spent for this encounter: 30 minutes    Services were provided through a video synchronous discussion virtually to substitute for in-person clinic visit. --Hua Jacinto RN on 5/6/2020 at 1:19 PM    An electronic signature was used to authenticate this note.
MD   vitamin A 78820 units capsule Take 25,000 Units by mouth daily  Historical Provider, MD       Social History     Tobacco Use    Smoking status: Never Smoker    Smokeless tobacco: Never Used   Substance Use Topics    Alcohol use: Yes     Alcohol/week: 9.0 standard drinks     Types: 7 Glasses of wine, 1 Cans of beer, 1 Shots of liquor per week    Drug use: No            PHYSICAL EXAMINATION:  [ INSTRUCTIONS:  \"[x]\" Indicates a positive item  \"[]\" Indicates a negative item  -- DELETE ALL ITEMS NOT EXAMINED]  Vital Signs: (As obtained by patient/caregiver or practitioner observation)    Blood pressure-  Heart rate-    Respiratory rate-    Temperature-  Pulse oximetry-     Constitutional: [x] Appears well-developed and well-nourished [x] No apparent distress      [] Abnormal-   Mental status  [x] Alert and awake  [] Oriented to person/place/time []Able to follow commands      Eyes:  EOM    []  Normal  [] Abnormal-  Sclera  []  Normal  [] Abnormal -         Discharge []  None visible  [] Abnormal -    HENT:   [] Normocephalic, atraumatic.   [] Abnormal   [] Mouth/Throat: Mucous membranes are moist.     External Ears [] Normal  [] Abnormal-     Neck: [] No visualized mass     Pulmonary/Chest: [] Respiratory effort normal.  [] No visualized signs of difficulty breathing or respiratory distress        [] Abnormal-      Musculoskeletal:   [] Normal gait with no signs of ataxia         [] Normal range of motion of neck        [] Abnormal-       Neurological:        [] No Facial Asymmetry (Cranial nerve 7 motor function) (limited exam to video visit)          [] No gaze palsy        [] Abnormal-         Skin:        [] No significant exanthematous lesions or discoloration noted on facial skin         [] Abnormal-            Psychiatric:       [] Normal Affect [] No Hallucinations        [] Abnormal-     Other pertinent observable physical exam findings-     ASSESSMENT/PLAN:  Osteomyelitis discitis of lumbar spine: She

## 2020-05-19 NOTE — PROGRESS NOTES
_m___ Safety:       (Environmental)   [place] to environment   Ensure ID band is correct and in place/ allergy band as needed   Assess for fall risk   Initiate fall precautions as applicable (fall band, side rails, etc.)   Call light within reach   Bed in low position/ wheels locked    __m__ Pain:        Assess pain level and characteristics   Administer analgesics as ordered   Assess effectiveness of pain management and report to MD as needed    __m__ Knowledge Deficit:   Assess baseline knowledge   Provide teaching at level of understanding   Provide teaching via preferred learning method   Evaluate teaching effectiveness    __m__ Hemodynamic/Respiratory Status:       (Pre and Post Procedure Monitoring)   Assess/Monitor vital signs and LOC   Assess Baseline SpO2 prior to any sedation   Obtain weight/height   Assess vital signs/ LOC until patient meets discharge criteria   Monitor procedure site and notify MD of any issues    __m__ Infection-Risk of Central Venous Catheter:   Monitor for infection signs and symptoms (catheter site redness, temperature elevation, etc)   Assess for infection risks   Educate regarding infection prevention   Manage central venous catheter (flushes/ dressing changes per protocol)

## 2020-05-26 NOTE — PROGRESS NOTES
0730 ALERT FEMALE ADMITTED FOR DISC BIOPSY PROCEDURE REVIEWED WITH PT VERBALIZED UNDERSTANDING. Pt rights and responsibilities offered to pt to read. TAKES NO BLOOD THINNERS.    __MET__ Safety:       (Environmental)   [place] to environment   Ensure ID band is correct and in place/ allergy band as needed   Assess for fall risk   Initiate fall precautions as applicable (fall band, side rails, etc.)   Call light within reach   Bed in low position/ wheels locked    __MET__ Pain:        Assess pain level and characteristics   Administer analgesics as ordered   Assess effectiveness of pain management and report to MD as needed    __MET__ Knowledge Deficit:   Assess baseline knowledge   Provide teaching at level of understanding   Provide teaching via preferred learning method   Evaluate teaching effectiveness    __MET__ Hemodynamic/Respiratory Status:       (Pre and Post Procedure Monitoring)   Assess/Monitor vital signs and LOC   Assess Baseline SpO2 prior to any sedation   Obtain weight/height   Assess vital signs/ LOC until patient meets discharge criteria   Monitor procedure site and notify MD of any issues    _MET___ Infection-Risk of Central Venous Catheter:   Monitor for infection signs and symptoms (catheter site redness, temperature elevation, etc)   Assess for infection risks   Educate regarding infection prevention   Manage central venous catheter (flushes/ dressing changes per protocol)

## 2020-05-26 NOTE — H&P
calcium gluconate 1 g in dextrose 5 % 100 mL IVPB, 1 g, Intravenous, Once, Regla Cuellar MD, Last Rate: 100 mL/hr at 05/26/20 1034, 1 g at 05/26/20 1034  Prior to Admission medications    Medication Sig Start Date End Date Taking? Authorizing Provider   Estradiol (VAGIFEM) 10 MCG TABS vaginal tablet AT THE START OF THERAPY INSERT 1 TABLET VAGINALLY DAILY FOR 2 WEEKS , THEN TWICE WEEKLY THEREAFTER 5/4/20   Loni Burns MD   CALCIUM REPLACEMENT PROTOCOL by Other route 4/21/20   Historical Provider, MD   vitamin B-12 (CYANOCOBALAMIN) 500 MCG tablet  4/19/20   Historical Provider, MD   nystatin (MYCOSTATIN) 792199 UNIT/GM cream Take 30 g by mouth 11/14/19   Historical Provider, MD   Parathyroid Hormone, Recomb, 50 MCG CART Inject 50 mcg into the skin 4/21/20   Historical Provider, MD   aMILoride (MIDAMOR) 5 MG tablet TAKING 2.5MG THREE TIMES WEEKLY 4/24/20   Ava Brower MD   bumetanide (BUMEX) 1 MG tablet TAKE ONE HALF TAB  THREE TIMES WEEKLY 4/24/20   Ava Brower MD   calcitRIOL (ROCALTROL) 0.5 MCG capsule Two tablets twice daily 4/24/20   Ava Brower MD   magnesium cl-calcium carbonate (SLOW-MAG) 71.5-119 MG TBEC tablet Take 1 tablet by mouth 4 times daily 4/24/20   Ava Brower MD   potassium chloride (KLOR-CON M) 20 MEQ extended release tablet Take 1 tablet by mouth 2 times daily 4/24/20   Ava Brower MD   psyllium (METAMUCIL) 58.6 % powder Take by mouth 2 times daily. 4/24/20   Ava Brower MD   acyclovir (ZOVIRAX) 400 MG tablet Take 1 tablet by mouth 2 times daily 3/14/20   Loni Burns MD   DULoxetine (CYMBALTA) 60 MG extended release capsule Take 1 capsule by mouth nightly 3/14/20   Loni Burns MD   gabapentin (NEURONTIN) 100 MG capsule Take 1 capsule by mouth daily for 90 days. 3/14/20 6/12/20  Loni Burns MD   gabapentin (NEURONTIN) 300 MG capsule Take 1 capsule by mouth nightly for 180 days.  3/14/20 9/10/20  Loni Burns MD   metoprolol tartrate (LOPRESSOR) 25 MG tablet Take 0.5 tablets by mouth 2 times daily 3/14/20   Meeta Tao MD   tiZANidine (ZANAFLEX) 4 MG tablet Take 1 tablet by mouth nightly At bed time 3/14/20   Meeta Tao MD   loperamide (IMODIUM) 2 MG capsule Take 2 capsules by mouth 4 times daily as needed for Diarrhea 2/18/20   Sabas Aj MD   SYNTHROID 50 MCG tablet Take 1 tablet by mouth Daily Additional RF per her endocrinoloist 2/18/20   Sabas Aj MD   Vitamins-Lipotropics (LIPO-FLAVONOID PLUS PO) Take 2 tablets in the morning 2 tablets in the evening and 1 tablet at bedtime    Historical Provider, MD   DULoxetine (CYMBALTA) 30 MG extended release capsule TAKE 1 CAPSULE DAILY 1/20/20   Meeta Tao MD   UNABLE TO FIND Inject 0.5 mcg into the skin daily Natpara injection    Historical Provider, MD   vitamin B-6 (PYRIDOXINE) 100 MG tablet Take 100 mg by mouth daily    Historical Provider, MD   zinc gluconate 50 MG tablet TAKE ONE AND ONE-HALF TABLETS (75 MG) DAILY 8/15/19   Meeta Tao MD   vitamin B-1 (THIAMINE) 100 MG tablet Take 100 mg by mouth daily    Historical Provider, MD   NONFORMULARY Whey Protein Powder    Historical Provider, MD   ondansetron (ZOFRAN ODT) 4 MG disintegrating tablet Take 1 tablet by mouth every 8 hours as needed for Nausea 3/14/19   Lemuel Diaz,    Vitamin K, Phytonadione, 100 MCG TABS Take 3 tablets by mouth daily 4/23/18   Meeta Tao MD   FOLIC ACID PO Take 9,291 mg by mouth daily    Historical Provider, MD   SYNTHROID 200 MCG tablet Take 200 mcg by mouth daily 2/16/18   Historical Provider, MD   esomeprazole (NEXIUM) 20 MG delayed release capsule Take 20 mg by mouth 2 times daily    Historical Provider, MD   vitamin A 17975 units capsule Take 25,000 Units by mouth daily    Historical Provider, MD     Additional information:       VITAL SIGNS   Vitals:    05/26/20 1034   BP: (!) 98/57   Pulse: 74   Resp: 16   Temp:    SpO2: 96%       PHYSICAL:   Heart:  [x]Regular rate and rhythm

## 2020-05-26 NOTE — OP NOTE
Department of Radiology  Post Procedure Progress Note      Pre-Procedure Diagnosis:  Possible diskitis    Procedure Performed:  Fluoro guided disk aspiration, L2-3    Anesthesia: local / versed and fentanyl    Findings: successful. Approximately 2ml of dark red fluid aspirated. Immediate Complications:  None    Estimated Blood Loss: minimal    SEE DICTATED PROCEDURE NOTE FOR COMPLETE DETAILS.     Electronically signed by Nilsa Haro MD on 5/26/2020 at 10:51 AM

## 2020-05-27 NOTE — PROGRESS NOTES
5/27/2020 1233 Call placed. Spoke with patient directly. Patient states she is just experiencing normal back pain. Patient denies bleeding, bruising, or edema at puncture site. Patient asks how much fluid was removed from abscess. Informed patient that based on physician note that 2ml was removed. Patient denies having any further post procedure questions.

## 2020-06-11 NOTE — PROGRESS NOTES
[address] [address]  Dept: [phone]  Dept Fax: [phone]  Loc: Zev Bull is a 62 y.o. female being evaluated by a Virtual Visit (video visit) encounter to address concerns as mentioned below. A caregiver was present when appropriate. Due to this being a TeleHealth encounter (During [facility]-22 public health emergency), evaluation of the following organ systems was limited: Vitals/Constitutional/EENT/Resp/CV/GI//MS/Neuro/Skin/Heme-Lymph-Imm. Pursuant to the emergency declaration under the [address] [place] [address], [address] [place] [address] authority and the [employer] Act, this Virtual Visit was conducted with patient's (and/or legal guardian's) consent, to reduce the patient's risk of exposure to COVID-19 and provide necessary medical care. The patient (and/or legal guardian) has also been advised to contact this office for worsening conditions or problems, and seek emergency medical treatment and/or call 911 if deemed necessary. Patient identification was verified at the start of the visit: Yes    Total time spent for this encounter: Not billed by time. Services were provided through a video synchronous discussion virtually to substitute for in-person clinic visit. Patient and provider were located at their individual homes.    :     HPI       Here for video visit. Prefers to [employer]; aware of possible security risks. Patient is at her home. Calcium was 9.2. Had 3 infusions and increased Natpera. Trying to keep level up so she can swim. Had another biopsy and culture for spine a few weeks ago with Dr. Xavier Terry. Had MRI also. He wanted to do surgery. Patient was really nervous about this option. She got a second opinion in [place] with Dr. Azeb Barlow; this physician did not recommend surgery. tartrate (LOPRESSOR) 25 MG tablet, Take 0.5 tablets by mouth 2 times daily, Disp: 90 tablet, Rfl: 1    tiZANidine (ZANAFLEX) 4 MG tablet, Take 1 tablet by mouth nightly At bed time, Disp: 90 tablet, Rfl: 1    loperamide (IMODIUM) 2 MG capsule, Take 2 capsules by mouth 4 times daily as needed for Diarrhea, Disp: , Rfl:     SYNTHROID 50 MCG tablet, Take 1 tablet by mouth Daily Additional RF per her endocrinoloist, Disp: 30 tablet, Rfl: 0    Vitamins-Lipotropics (LIPO-FLAVONOID PLUS PO), Take 2 tablets in the morning 2 tablets in the evening and 1 tablet at bedtime, Disp: , Rfl:     DULoxetine (CYMBALTA) 30 MG extended release capsule, TAKE 1 CAPSULE DAILY, Disp: 90 capsule, Rfl: 0    UNABLE TO FIND, Inject 0.5 mcg into the skin daily Natpara injection, Disp: , Rfl:     vitamin B-6 (PYRIDOXINE) 100 MG tablet, Take 100 mg by mouth daily, Disp: , Rfl:     zinc gluconate 50 MG tablet, TAKE ONE AND ONE-HALF TABLETS (75 MG) DAILY, Disp: 135 tablet, Rfl: 0    vitamin B-1 (THIAMINE) 100 MG tablet, Take 100 mg by mouth daily, Disp: , Rfl:     NONFORMULARY, Whey Protein Powder, Disp: , Rfl:     ondansetron (ZOFRAN ODT) 4 MG disintegrating tablet, Take 1 tablet by mouth every 8 hours as needed for Nausea, Disp: 10 tablet, Rfl: 0    Vitamin K, Phytonadione, 100 MCG TABS, Take 3 tablets by mouth daily, Disp: 270 tablet, Rfl: 0    FOLIC ACID PO, Take 0,075 mg by mouth daily, Disp: , Rfl:     SYNTHROID 200 MCG tablet, Take 200 mcg by mouth daily, Disp: , Rfl:     esomeprazole (NEXIUM) 20 MG delayed release capsule, Take 20 mg by mouth 2 times daily, Disp: , Rfl:     vitamin A 59448 units capsule, Take 25,000 Units by mouth daily, Disp: , Rfl:     Subjective:      Review of Systems   Constitutional: Negative for chills, fatigue, fever and unexpected weight change. HENT: Negative for congestion, ear discharge, ear pain, hearing loss and sore throat. Eyes: Negative for discharge and redness.    Respiratory: Negative

## 2020-06-11 NOTE — TELEPHONE ENCOUNTER
Spoke with office and they will submit the request for records to be faxed here to their records team.

## 2020-06-25 NOTE — TELEPHONE ENCOUNTER
Gerri RN from home health states that patient had fallen the night of the 22 nd. She states that the patient had told her that she had awoken to go to the bathroom- she made it to the bathroom however, then fell and hit her back. Per Barbee Homans-  Patient said that she was fine and didn't need checked out. However, due to have bumping into the doorway prior to previous visit Gerri feels like she should be evaluated by PT and OT. Gave verbal order to go ahead with the evaluations. Tyree Cuff

## 2020-06-29 NOTE — TELEPHONE ENCOUNTER
Aware. Agree with PT and OT evaluations. Will sign off on orders. Follow-up as planned or sooner if needed.

## 2020-07-02 NOTE — TELEPHONE ENCOUNTER
Patient calling in and needs a creatinine order for her CT scan that is on Monday. Her infusion nurse is coming out on Sunday to draw her calcium level ( she went from 11.4 down to 7.6 - need 3 units of calcium ) please place order     Left message for visiting nurses to return call- do I need to fax or can they pull from epic.   [phone].

## 2020-07-02 NOTE — TELEPHONE ENCOUNTER
Nursing at home health notified of order being placed. They will pull from epic. Yadi Lester  left message on voicemail letting patient know

## 2020-07-06 NOTE — PROGRESS NOTES
1037 Placed 500units of IV Heparin in pt's mediport and the needle stayed in. Dressing dry and intact.

## 2020-07-16 NOTE — PATIENT INSTRUCTIONS
Patient Education        Learning About High-Protein Foods  What foods are high in protein? The foods you eat contain nutrients, such as vitamins and minerals. Protein is a nutrient. Your body needs the right amount to stay healthy and work as it should. You can use the list below to help you make choices about which foods to eat. Here are some examples of foods that are high in protein. Dairy and dairy alternatives  · Cheese  · Milk  · Soy milk  · Yogurt (especially [place])  Meat  · Beef  · Chicken  · Ham  · Lamb  · Lunch meat  · Pork  · Sausage  · Greek [place] ([facility])  Other protein foods  · Beans (black, garbanzo, kidney, lima)  · Eggs  · Hummus  · Lentils  · Nuts  · Peanut butter and other nut butters  · Peas  · Soybeans  · Tofu  · Veggie or soy doni (Check the nutrition label for the amount of protein in each serving.)  Seafood  · Anchovies  · Cod  · Crab  · Halibut  · [place]  · Sardines  · Shrimp  · Tilapia  · Trout  · Tuna  Protein supplements  · Bars (Check the nutrition label for the amount of protein in each serving.)  · Drinks  · Powders  Work with your doctor to find out how much of this nutrient you need. Depending on your health, you may need more or less of it in your diet. Where can you learn more? Go to https://[employer] TechnologiespepicWurleb.[employer]. org and sign in to your [employer] account. Enter [id number] [id number] in the [employer] box to learn more about \"Learning About High-Protein Foods. \"     If you do not have an account, please click on the \"Sign Up Now\" link. Current as of: August 22, 2019               Content Version: 12.5  © 8032-2965 Healthwise, Incorporated. Care instructions adapted under license by [facility]. If you have questions about a medical condition or this instruction, always ask your healthcare professional. [address]  any warranty or liability for your use of this information.          Patient Education        High-Calorie and High-Protein Diet: Care Instructions  Your Care Instructions     A high-calorie, high-protein diet gives you more energy and extra nutrition to help your body heal. Your doctor and dietitian can help you design a diet based on your health and what you prefer to eat. Always talk with your doctor or dietitian before you make changes in your diet. Follow-up care is a key part of your treatment and safety. Be sure to make and go to all appointments, and call your doctor if you are having problems. It's also a good idea to know your test results and keep a list of the medicines you take. How can you care for yourself at home? · Eat three meals a day, plus snacks in between and at bedtime. · Include favorite foods in your meals. This will help make meals more pleasant. · Drink your beverage at the end of the meal, because drinking before or during the meal can fill you up. · Eat high-protein foods, such as:  ? Meat, fish, and poultry. ? Milk and milk products. Add powdered milk to other foods (such as pudding or soups) to boost the protein. ? Eggs. ? Cooked dried beans and peas. ? Peanut butter, nuts, and seeds. ? Tofu.  ? Cheeses. ? Protein bars. · Eat high-calorie foods, such as:  ? Butter, honey, and brown sugar, added to foods to make them taste better. ? Oils, sauces, and gravies. ? Peanut butter. ? Whole milk, yogurt, mayonnaise, and sour cream.  ? Granola cereal with fruit and granola bars. ? Muffins, pancakes, waffles, and other breads. ? Milkshakes, puddings, and custard. · Try a liquid meal replacement, such as Ensure, Boost, or instant breakfast drinks, if you have trouble eating solid foods. They will give you both calories and protein. Soups and smoothies also are good sources of nutrition. · Keep snacks around that are easy to eat, such as pudding, energy bars, ice cream, and flavored ice pops. · If you can, take a walk before you eat, to make you hungrier. · Drink plenty of fluids.  If you have kidney, heart, or liver disease and have to limit fluids, talk with your doctor before you increase the amount of fluids you drink. · Try to avoid eating foods that don't give you much nutrition, such as potato chips, candy bars, and soft drinks. Where can you learn more? Go to https://chpecameroneb.healthInTown. org and sign in to your [employer] account. Enter E699 in the [employer] box to learn more about \"High-Calorie and High-Protein Diet: Care Instructions. \"     If you do not have an account, please click on the \"Sign Up Now\" link. Current as of: August 22, 2019               Content Version: 12.5  © 4865-1869 Healthwise, Incorporated. Care instructions adapted under license by [facility] ([facility]). If you have questions about a medical condition or this instruction, always ask your healthcare professional. [address] any warranty or liability for your use of this information.

## 2020-08-06 NOTE — TELEPHONE ENCOUNTER
----- Message from Lianne Little MD sent at 8/6/2020  2:30 PM EDT -----  DEXA shows osteoporosis at high risk for fracture. Okay to schedule sooner visit to discuss or we can discuss on 10/8/2020 if preferred. Usually medication treatment in addition to weight bearing exercise and adequate calcium and vitamin D are recommended.

## 2020-08-13 NOTE — PROGRESS NOTES
[facility] MEDICINE  [address] [place] [id number]  Dept: [phone]  Dept Fax: [phone]  Loc: Zev Bull is a 62 y.o. female being evaluated by a Virtual Visit (video visit) encounter to address concerns as mentioned below. A caregiver was present when appropriate. Due to this being a TeleHealth encounter (During MAYAJ-64 public health emergency), evaluation of the following organ systems was limited: Vitals/Constitutional/EENT/Resp/CV/GI//MS/Neuro/Skin/Heme-Lymph-Imm. Pursuant to the emergency declaration under the [address] [place] [address] authority and the [employer] Act, this Virtual Visit was conducted with patient's (and/or legal guardian's) consent, to reduce the patient's risk of exposure to COVID-19 and provide necessary medical care. The patient (and/or legal guardian) has also been advised to contact this office for worsening conditions or problems, and seek emergency medical treatment and/or call 911 if deemed necessary. Patient identification was verified at the start of the visit: Yes    Total time spent for this encounter: Not billed by time. Services were provided through a video synchronous discussion virtually to substitute for in-person clinic visit. Patient and provider were located at their individual homes.    :     HPI       Here for video visit. Patient is at her home. DEXA with osteoporosis. Was on reclast but getting every 2 years. Last dose November of 2016. Wants to touch base with Dr. Reba Buckley about this also. Due for dose. Last calcium level was in range. Sees endocrine on the 27th. Will double check with them on rx for reclast.  No calcium for 2 weeks since levels in range. Did get repeat MRI of spine and had another biopsy without infection. No surgery planned. Is using more ibuprofen for pain. This helps. Taking 800 BID. Did start pudding with extra protein for low albumin. Weight was 88.4 pounds this morning. Drinking is not too bad; maybe 6 glasses a day. Most days around 2 glasses. Finished therapy. Doing better with stairs. No recent falls. Still with numbness of feet and skin sluffing intermittently with edema. Middle finger on left hand is a bit red at nail base. Intermittent pain and skin sluffing Using Epson salts. Mother fell again. They are having issues. Step Dad wrecked truck for the 3rd time. Retired. Patient Active Problem List   Diagnosis    TIA (transient ischemic attack)    Hypothyroidism    DVT (deep venous thrombosis) (HCC)    Depression    Anxiety    Fibromyalgia    Fatty liver    Tachycardia    GERD (gastroesophageal reflux disease)    Recurrent cold sores    Irritable bowel    Gastric bypass status for obesity    Insomnia    Ovarian cyst    Muscle weakness (generalized)    Alcoholism (HCC)    Osteoarthritis    Hypocalcemia    Hypomagnesemia    Moderate malnutrition (HCC)    Other hypoparathyroidism (HCC)    Pancytopenia (HCC)    Palpitations    Asymptomatic bacteriuria    Hyperphosphatemia    Prolonged Q-T interval on ECG    Hypoalbuminemia    Tinnitus of both ears    Hx of parathyroidectomy    Normocytic anemia    History of fibromyalgia    Physical deconditioning    Severe malnutrition (HCC)    Bacteremia    Fever    Calcification of breast    Hypotension    Steatosis of liver    Nutritional marasmus (HCC)    Discitis of lumbar region    Discitis    Hypokalemia    Mass of joint of left hip    Weight loss    Lumbar discitis       The patient is allergic to penicillins; sulfa antibiotics; and bactrim [sulfamethoxazole-trimethoprim].     Medical History  Mathew Warren has a past medical history of Alcoholism ([facility] [address] Anxiety, Atrophy of vulva, Atyp squam cell of undet signfc cyto smr crvx (ASC-US), Closed fracture of seventh cervical vertebra without spinal cord injury [facility]), Closed fracture of sixth cervical vertebra ([facility] [address] Closed fracture of thoracic vertebra ([address] Depression, Dry eye syndrome, DVT (deep venous thrombosis) ([address] Dyspareunia in female, External hemorrhoids without complication, Fibromyalgia, Gastric bypass status for obesity, GERD (gastroesophageal reflux disease), Hiatal hernia, History of ITP, Hungry bone syndrome, Hx of blood clots, Hyperparathyroidism ([address] Hypocalcemia, Hypothyroidism, Insomnia, Irritable bowel, Lactose intolerance, Menopausal syndrome, Osteoarthritis, Osteopenia, Osteopenia, Ovarian cyst, Oxaluria ([address] Recurrent cold sores, Sepsis ([address] Tachycardia, TIA (transient ischemic attack), and Tubular adenoma of colon. Past SurgicalHistory  The patient  has a past surgical history that includes Wrist fracture surgery (Right); Leg Surgery (Right); Tonsillectomy and adenoidectomy; Gastric bypass surgery (1996); Cholecystectomy (1996); Appendectomy (1996); parathyroidectomy (08/2017); Colonoscopy (approx 2013); Upper gastrointestinal endoscopy (approx 2013); laparoscopy; Foot surgery; Tubal ligation (1996); Endoscopy, colon, diagnostic; fracture surgery; Dilatation, esophagus; Colonoscopy (Left, 5/7/2019); and Upper gastrointestinal endoscopy (Left, 5/7/2019). Family History  This patient's family history includes Asthma in her maternal grandfather; Breast Cancer in her mother and sister; Cancer in her father; Depression in her father and mother; Heart Attack in her maternal grandmother; High Blood Pressure in her father and mother; Mental Illness in her maternal grandmother; Other in her father, maternal grandfather, maternal grandmother, and mother. Social History  Zhane Davila  reports that she has never smoked. She has never used smokeless tobacco. She reports current alcohol use of about 9.0 standard drinks of alcohol per week. She reports that she does not use drugs. Medications    Current Outpatient Medications:     potassium chloride (MICRO-K) 10 MEQ extended release capsule, Take 10 mEq by mouth daily , Disp: , Rfl:     Psyllium (METAMUCIL FIBER PO), Take by mouth 5 capsules once daily, Disp: , Rfl:     aMILoride (MIDAMOR) 5 MG tablet, TAKING 2.5MG THREE TIMES WEEKLY, Disp: 30 tablet, Rfl: 3    bumetanide (BUMEX) 1 MG tablet, TAKE ONE HALF TAB  THREE TIMES WEEKLY, Disp: 90 tablet, Rfl: 1    DULoxetine (CYMBALTA) 30 MG extended release capsule, TAKE 1 CAPSULE DAILY, Disp: 90 capsule, Rfl: 0    DULoxetine (CYMBALTA) 60 MG extended release capsule, Take 1 capsule by mouth nightly, Disp: 90 capsule, Rfl: 1    gabapentin (NEURONTIN) 100 MG capsule, Take 1 capsule by mouth daily for 90 days. , Disp: 180 capsule, Rfl: 1    gabapentin (NEURONTIN) 300 MG capsule, Take 1 capsule by mouth nightly for 180 days. , Disp: 90 capsule, Rfl: 1    metoprolol tartrate (LOPRESSOR) 25 MG tablet, Take 0.5 tablets by mouth 2 times daily, Disp: 90 tablet, Rfl: 1    tiZANidine (ZANAFLEX) 4 MG tablet, Take 1 tablet by mouth nightly At bed time, Disp: 90 tablet, Rfl: 1    neomycin-bacitracin-polymyxin (NEOSPORIN) 400-5-5000 ointment, Apply topically, Disp: , Rfl:     vitamin B-12 (CYANOCOBALAMIN) 500 MCG tablet, TAKE 1 TABLET DAILY, Disp: 30 tablet, Rfl: 11    acyclovir (ZOVIRAX) 400 MG tablet, TAKE 1 TABLET TWICE A DAY, Disp: 180 tablet, Rfl: 3    Estradiol (VAGIFEM) 10 MCG TABS vaginal tablet, AT THE START OF THERAPY INSERT 1 TABLET VAGINALLY DAILY FOR 2 WEEKS , THEN TWICE WEEKLY THEREAFTER, Disp: 40 tablet, Rfl: 3    CALCIUM REPLACEMENT PROTOCOL, 500 mg by Other route daily , Disp: , Rfl:     Parathyroid Hormone, Recomb, 50 MCG CART, Inject 0.1 mcg into the skin Natpara Injection, Disp: , Rfl:     calcitRIOL (ROCALTROL) 0.5 MCG capsule, Two tablets twice daily (Patient taking differently: Take 0.5 mcg by mouth daily Two tablets twice daily), Disp: 30 capsule, Rfl: 3    magnesium cl-calcium carbonate (SLOW-MAG) 71.5-119 MG TBEC tablet, Take 1 tablet by mouth 4 times daily (Patient taking differently: Take 1 tablet by mouth 2 times daily ), Disp: 3 tablet, Rfl: 0    loperamide (IMODIUM) 2 MG capsule, Take 2 capsules by mouth 4 times daily as needed for Diarrhea, Disp: , Rfl:     SYNTHROID 50 MCG tablet, Take 1 tablet by mouth Daily Additional RF per her endocrinoloist, Disp: 30 tablet, Rfl: 0    vitamin B-6 (PYRIDOXINE) 100 MG tablet, Take 100 mg by mouth daily, Disp: , Rfl:     zinc gluconate 50 MG tablet, TAKE ONE AND ONE-HALF TABLETS (75 MG) DAILY, Disp: 135 tablet, Rfl: 0    vitamin B-1 (THIAMINE) 100 MG tablet, Take 100 mg by mouth daily, Disp: , Rfl:     ondansetron (ZOFRAN ODT) 4 MG disintegrating tablet, Take 1 tablet by mouth every 8 hours as needed for Nausea, Disp: 10 tablet, Rfl: 0    Vitamin K, Phytonadione, 100 MCG TABS, Take 3 tablets by mouth daily, Disp: 270 tablet, Rfl: 0    FOLIC ACID PO, Take 2,745 mg by mouth daily, Disp: , Rfl:     SYNTHROID 200 MCG tablet, Take 200 mcg by mouth daily, Disp: , Rfl:     esomeprazole (NEXIUM) 20 MG delayed release capsule, Take 20 mg by mouth 2 times daily, Disp: , Rfl:     vitamin A 44878 units capsule, Take 25,000 Units by mouth daily, Disp: , Rfl:     Subjective:      Review of Systems   Constitutional: Negative for chills, fatigue, fever and unexpected weight change. HENT: Negative for congestion, ear discharge, ear pain, hearing loss and sore throat. Eyes: Negative for discharge and redness. Respiratory: Negative for cough and shortness of breath. Cardiovascular: Positive for leg swelling (trace from waist down). Negative for chest pain and palpitations. Gastrointestinal: Negative for abdominal pain, constipation, diarrhea, nausea and vomiting. Genitourinary: Negative for difficulty urinating and dysuria. Musculoskeletal: Positive for back pain (a bit worse).  Negative for arthralgias, gait problem and neck pain. Skin: Negative for rash. Allergic/Immunologic: Negative for environmental allergies. Neurological: Negative for dizziness and headaches. Psychiatric/Behavioral: Negative for dysphoric mood and sleep disturbance. The patient is not nervous/anxious. Objective:           Physical Exam  Vitals signs reviewed. Constitutional:       General: She is not in acute distress. Appearance: Normal appearance. She is well-developed. She is ill-appearing (quite slender). She is not toxic-appearing. HENT:      Head: Normocephalic and atraumatic. Nose: Nose normal.      Mouth/Throat:      Mouth: Mucous membranes are moist.   Eyes:      Conjunctiva/sclera: Conjunctivae normal.   Neck:      Thyroid: No thyromegaly. Pulmonary:      Effort: Pulmonary effort is normal. No respiratory distress. Abdominal:      Palpations: Abdomen is soft. Skin:     General: Skin is warm and dry. Findings: No rash. Comments: Mild erythema at base of third nail on left hand. No redness of finger pad. No streaking up finger. Only a 2-3 mm rim at he edge of the nail is involved. Neurological:      Mental Status: She is alert. Comments: No obvious focal deficit. Psychiatric:         Mood and Affect: Mood normal.         Behavior: Behavior normal.         Thought Content: Thought content normal.         Lab Results   Component Value Date    WBC 5.8 05/19/2020    HGB 10.5 (L) 05/19/2020    HCT 32.6 (L) 05/19/2020     05/19/2020    HDL 82 02/12/2018    ALT 31 07/20/2020    AST 46 (H) 07/20/2020     07/20/2020    K 3.8 07/20/2020     (H) 07/20/2020    CREATININE 0.7 07/20/2020    BUN 6 (L) 07/20/2020    CO2 27 07/20/2020    TSH 0.006 (L) 02/02/2020    INR 1.10 04/20/2020    LABA1C 4.1 (L) 02/08/2018       Maked[place]:   1.  Osteoporosis without current pathological fracture, unspecified osteoporosis type  Discussed resuming reclast. Patient to discuss with endocrinology. Will get as infusion. 2. Weight loss  Stabilized. 3. Alcoholism ([address]  Encouraged cessation. Still binging. Aware or risks. 4. Paronychia of left middle finger  Mild. Okay to trial supportive measures. If not resolving by Monday should come in for drainage. 5. Pleural effusion  Resolved. 6. Abnormal CT of the abdomen  Stable. 7. Hypoalbuminemia due to protein-calorie malnutrition (HCC)  Causing anasarca. Agreed with protein rich diet; needs to quit alcohol. Pre-contemplative. Return in about 2 months (around 10/13/2020) for Follow-up chronic conditions. Orders Placed   No orders of the defined types were placed in this encounter. Prescriptions given/sent  No orders of the defined types were placed in this encounter. Educational materials provided. Discussed use, benefit, and side effects of prescribed medications. All patient questions answered. Pt voiced understanding.           --Alicia Gutierrez MD on 8/13/2020 at 4:43 PM    An electronic signature was used to authenticate this note.

## 2020-08-27 NOTE — TELEPHONE ENCOUNTER
Patient calling in and states that she has gained 22 lbs in the past 7-10 days. Swelling in right breast, abdomen and then waist down. . tops of thighs are now starting to seep. . patient denies fever and cough- does mention that she is a \"little winded\". . placed patient on hold- spoke with Vashti Melo- per Vashti Melo she would like patient to go to ER to be evaluated.  Patient was notified and states that she will head over to [facility] to be seen

## 2020-08-27 NOTE — ED TRIAGE NOTES
Pt to ed concerned with weight gain and edema. Pt reports this has occurred in the past and reports having a paracentesis. Pt denies pain. Edema +2 noted from lower extremities into the abd. Pt also reports edema to the right breast. Pt reports weight gain noted over the last 7 days. Noted port access to the right chest for weekly calcium infusions at home. Pt waiting on provider to discuss the POC. Will continue to monitor.

## 2020-08-27 NOTE — ED NOTES
Pt resting in bed upon entering room. Pt informed of orders placed and port accessed and blood work obtained and ns bolus initiated. Pt reports no other concerns and or needs at this time. Call light in reach and sr up x2 will continue to monitor.       Lakesha Gann RN  08/27/20 [id number]

## 2020-08-27 NOTE — ED NOTES
Noted pt's BP was 85/64 and repeat 85/62 Dr. Dandre Galindo notified. Pt laying flat upon entering room and cuff changed reassessment of BP noted 93/64. Resident into assess pt will monitor for further orders. Call light in reach with sr up x2 and significant other at the bedside.       Joselyn Mccormack RN  08/27/20 [id number]

## 2020-08-27 NOTE — ED PROVIDER NOTES
EMERGENCY DEPARTMENT ENCOUNTER      CHIEF COMPLAINT    Chief Complaint   Patient presents with    Weight Gain    Leg Swelling       HPI    Gisel Heath is a 62 y.o. female with alcoholic cirrhosis, still drinking six glasses of wine daily who presents with generalized weakness since the onset with worsening LE edema. Per  Chapo Denis, she is not more confused than normal. She is requesting for paracentesis though she has never required it before, and had once seen Dr. Merlyn Hauser as an outpatient for EGD and colonoscopy. The duration has been constant since the onset. The generalized weakness might be associated with cirrhosis that is worsening. No aggravating or alleviating factors. REVIEW OF SYSTEMS    Cardiac: No chest pain or palpitations  Respiratory: No shortness of breath or new cough  General: No fevers   : No dysuria or hematuria  GI: No vomiting or diarrhea  Msk: +worsening LE edema  See HPI for further details. All other systems reviewed and are negative.     PAST MEDICAL OR SURGICAL HISTORY    Past Medical History:   Diagnosis Date    Alcoholism ([address]     Anxiety     Atrophy of vulva     Atyp squam cell of undet signfc cyto smr crvx (ASC-US)     Closed fracture of seventh cervical vertebra without spinal cord injury ([address]     Closed fracture of sixth cervical vertebra (HCC)     Closed fracture of thoracic vertebra (HCC)     Depression     Dry eye syndrome     DVT (deep venous thrombosis) (HCC)     left arm; after contrast for MRI brain    Dyspareunia in female     External hemorrhoids without complication     Fibromyalgia     Gastric bypass status for obesity 2/26/2018    GERD (gastroesophageal reflux disease)     Hiatal hernia     History of ITP     as a senior in high school; no active issues    Hungry bone syndrome     Hx of blood clots     Hyperparathyroidism ([address]     Prior hospitalization with hungry bone syndrome on IV calcium in the past    Hypocalcemia     Hypothyroidism     Insomnia     Irritable bowel     Lactose intolerance     uses lactaid which helps    Menopausal syndrome     Osteoarthritis     Osteopenia     on reclast    Osteopenia     Ovarian cyst     repeat imaging in 9/2018 recommended    Oxaluria [facility])     Recurrent cold sores     on acyclovir    Sepsis ([facility] 2/2/2020    Tachycardia     due to hyperparathyroidism; on atenolol    TIA (transient ischemic attack)     visible on MRI brain    Tubular adenoma of colon     2019     Past Surgical History:   Procedure Laterality Date   [address]  approx 2013    normal per patient    COLONOSCOPY Left 5/7/2019    COLONOSCOPY POLYPECTOMY SNARE/COLD BIOPSY performed by Vanita Babin MD at [address] ESOPHAGUS      ENDOSCOPY, COLON, DIAGNOSTIC      FOOT [address]    LAPAROSCOPY      LEG SURGERY Right     following fracture    PARATHYROIDECTOMY  08/2017    TONSILLECTOMY AND ADENOIDECTOMY      TUBAL LIGATION  1996    UPPER GASTROINTESTINAL ENDOSCOPY  approx 2013    normal per patient except mild erosion    UPPER GASTROINTESTINAL ENDOSCOPY Left 5/7/2019    EGD BIOPSY performed by Vanita Babin MD at [address] Right     pinned       CURRENT MEDICATIONS    Current Outpatient Rx   Medication Sig Dispense Refill    potassium chloride (MICRO-K) 10 MEQ extended release capsule Take 10 mEq by mouth daily       Psyllium (METAMUCIL FIBER PO) Take by mouth 5 capsules once daily      aMILoride (MIDAMOR) 5 MG tablet TAKING 2.5MG THREE TIMES WEEKLY 30 tablet 3    bumetanide (BUMEX) 1 MG tablet TAKE ONE HALF TAB  THREE TIMES WEEKLY 90 tablet 1    DULoxetine (CYMBALTA) 30 MG extended release capsule TAKE 1 CAPSULE DAILY 90 capsule 0    DULoxetine (CYMBALTA) 60 MG extended release capsule Take 1 capsule by mouth nightly 90 capsule 1    gabapentin (NEURONTIN) 100 MG capsule Take 1 capsule by mouth daily for 90 days. 180 capsule 1    gabapentin (NEURONTIN) 300 MG capsule Take 1 capsule by mouth nightly for 180 days.  90 capsule 1    metoprolol tartrate (LOPRESSOR) 25 MG tablet Take 0.5 tablets by mouth 2 times daily 90 tablet 1    tiZANidine (ZANAFLEX) 4 MG tablet Take 1 tablet by mouth nightly At bed time 90 tablet 1    neomycin-bacitracin-polymyxin (NEOSPORIN) 400-5-5000 ointment Apply topically      vitamin B-12 (CYANOCOBALAMIN) 500 MCG tablet TAKE 1 TABLET DAILY 30 tablet 11    acyclovir (ZOVIRAX) 400 MG tablet TAKE 1 TABLET TWICE A  tablet 3    Estradiol (VAGIFEM) 10 MCG TABS vaginal tablet AT THE START OF THERAPY INSERT 1 TABLET VAGINALLY DAILY FOR 2 WEEKS , THEN TWICE WEEKLY THEREAFTER 40 tablet 3    CALCIUM REPLACEMENT PROTOCOL 500 mg by Other route daily       Parathyroid Hormone, Recomb, 50 MCG CART Inject 0.1 mcg into the skin Natpara Injection      calcitRIOL (ROCALTROL) 0.5 MCG capsule Two tablets twice daily (Patient taking differently: Take 0.5 mcg by mouth daily Two tablets twice daily) 30 capsule 3    magnesium cl-calcium carbonate (SLOW-MAG) 71.5-119 MG TBEC tablet Take 1 tablet by mouth 4 times daily (Patient taking differently: Take 1 tablet by mouth 2 times daily ) 3 tablet 0    loperamide (IMODIUM) 2 MG capsule Take 2 capsules by mouth 4 times daily as needed for Diarrhea      SYNTHROID 50 MCG tablet Take 1 tablet by mouth Daily Additional RF per her endocrinoloist 30 tablet 0    vitamin B-6 (PYRIDOXINE) 100 MG tablet Take 100 mg by mouth daily      zinc gluconate 50 MG tablet TAKE ONE AND ONE-HALF TABLETS (75 MG) DAILY 135 tablet 0    vitamin B-1 (THIAMINE) 100 MG tablet Take 100 mg by mouth daily      ondansetron (ZOFRAN ODT) 4 MG disintegrating tablet Take 1 tablet by mouth every 8 hours as needed for Nausea 10 tablet 0    Vitamin K, Phytonadione, 100 MCG TABS Take 3 tablets by mouth daily 812 tablet 0    FOLIC ACID PO Take 1,600 mg by mouth daily      SYNTHROID 200 MCG tablet Take 200 mcg by mouth daily      esomeprazole (NEXIUM) 20 MG delayed release capsule Take 20 mg by mouth 2 times daily      vitamin A 85599 units capsule Take 25,000 Units by mouth daily         ALLERGIES    Allergies   Allergen Reactions    Penicillins      She has tolerated rocephin with out any issues    Sulfa Antibiotics     Bactrim [Sulfamethoxazole-Trimethoprim] Rash       FAMILY OR SOCIAL HISTORY    Family History   Problem Relation Age of Onset    Breast Cancer Mother         twice age 48[de-identified] 79    Other Mother         anxiety, dementia    Depression Mother     High Blood Pressure Mother     Cancer Father         lung    Other Father         Myelodysplastic syndrome, cirrhosis of liver    Depression Father     High Blood Pressure Father     Breast Cancer Sister     Other Maternal Grandmother         Alzheimer's disease    Heart Attack Maternal Grandmother     Mental Illness Maternal Grandmother     Other Maternal Grandfather         asthma, anxiety    Asthma Maternal Grandfather      Social History     Socioeconomic History    Marital status:      Spouse name: Not on file    Number of children: Not on file    Years of education: Not on file    Highest education level: Not on file   Occupational History    Not on file   Social Needs    Financial resource strain: Not on file    Food insecurity     Worry: Not on file     Inability: Not on file    Transportation needs     Medical: Not on file     Non-medical: Not on file   Tobacco Use    Smoking status: Never Smoker    Smokeless tobacco: Never Used   Substance and Sexual Activity    Alcohol use:  Yes     Alcohol/week: 9.0 standard drinks     Types: 7 Glasses of wine, 1 Cans of beer, 1 Shots of liquor per week    Drug use: No    Sexual activity: Not on file   Lifestyle    Physical activity     Days per week: Not on file     Minutes per session: Not on file    Stress: Not on file   Relationships    Social connections     Talks on phone: Not on file     Gets together: Not on file     Attends Islam service: Not on file     Active member of club or organization: Not on file     Attends meetings of clubs or organizations: Not on file     Relationship status: Not on file    Intimate partner violence     Fear of current or ex partner: Not on file     Emotionally abused: Not on file     Physically abused: Not on file     Forced sexual activity: Not on file   Other Topics Concern    Not on file   Social History Narrative    Not on file       PHYSICAL EXAM    VITAL SIGNS: /80   Pulse 100   Temp 98.5 °F (36.9 °C) (Oral)   Resp 18   Ht 4' 10\" (1.473 m)   Wt 104 lb (47.2 kg)   SpO2 100%   BMI 21.74 kg/m²   Constitutional:  Well developed, well nourished, no acute distress  Eyes:  Pupils equally round and reactive to light, sclera nonicteric  HENT:  atraumatic, moist mucous membranes  NECK: Normal range of motion, no JVD  Respiratory:  No respiratory distress, normal breath sounds, no wheezing   Cardiovascular:  Mild tachycardic rate, normal rhythm, no murmurs   GI:  Soft, nondistended, normal bowel sounds, nontender, no obvious fluid waves noted.   Musculoskeletal: 4+ tense vulva and LE edema, no acute deformities   Integument:  Skin is warm and dry, no obvious rash    Vascular: Radial and DP pulses 2+ equal bilaterally  Neurologic: awake, alert, oriented x3, handgrip is 5/5 bilaterally, normal finger to nose test bilaterally  Psychiatric:  normal affect, does not appear anxious    Labs Reviewed   CBC WITH AUTO DIFFERENTIAL - Abnormal; Notable for the following components:       Result Value    RBC 3.44 (*)     Hemoglobin 11.1 (*)     Hematocrit 34.2 (*)     MCV 99.4 (*)     RDW-SD 49.2 (*)     All other components within normal limits   COMPREHENSIVE METABOLIC PANEL W/ REFLEX TO MG FOR LOW K - Abnormal; Notable for the following components:    Calcium 7.7 (*) Alkaline Phosphatase 159 (*)     Total Protein 3.9 (*)     Alb 1.6 (*)     All other components within normal limits   LIPASE - Abnormal; Notable for the following components:    Lipase 4.3 (*)     All other components within normal limits   CULTURE, URINE   AMMONIA   TROPONIN   APTT   PROTIME-INR   BRAIN NATRIURETIC PEPTIDE   T4, FREE   ETHANOL   ANION GAP   OSMOLALITY   GLOMERULAR FILTRATION RATE, ESTIMATED   URINALYSIS   CREATININE, URINE, 24 HOUR   CALCIUM, URINE, 24 HOUR   INSULIN, FASTING       EKG  (Interpreted by me)  EKG Interpretation. EKG Interpretation    Interpreted by emergency department physician on 8/27/20 17:28    Rhythm: normal sinus   Rate: 92 bpm  Axis: normal  Ectopy: none  Conduction: normal  ST Segments: no acute change  T Waves: no acute change  Q Waves: none    Clinical Impression: non-specific EKG    RADIOLOGY/PROCEDURES    XR CHEST PORTABLE   Final Result   No acute findings               **This report has been created using voice recognition software. It may contain minor errors which are inherent in voice recognition technology. **      Final report electronically signed by Dr. Verlean Sicard on 8/27/2020 7:27 PM          ED COURSE & MEDICAL DECISION MAKING    Pertinent Labs & Imaging studies reviewed and interpreted. (See chart for details)  See chart for details of medications given during the ED stay. Vitals:    08/27/20 1723 08/27/20 1730 08/27/20 1856 08/27/20 2033   BP: 106/77  101/73 115/80   Pulse: 92  103 100   Resp: 20  18 18   Temp: 98.5 °F (36.9 °C)      TempSrc: Oral      SpO2: 100%  99% 100%   Weight:  104 lb (47.2 kg)     Height:  4' 10\" (1.473 m)         Differential diagnosis: Dehydration, potentially/metabolic problem, anemia, infection, hypotension,cirrhosis, SBP (less likely) other    FINAL IMPRESSION    1. Alcoholic cirrhosis of liver with ascites ([address]    2. Peripheral edema        PLAN  MDM - pt has obvious liver cirrhosis, of the alcoholic cirrhosis variety.  Pt has no obvious fever or confusion. Pt has a stripe of ascites noted in her RUQ per bedside US. Pt has no signs of SBP, but may need admission for her hypotension and LE edema (vulva edema, equivalent to scrotal edema in male). Pt's BP is typically in the low 392'B systolic. Will check UA, coags and ammonia levels. RE-assessment at 2200 pt doing better. Her labs are wnl. Pt is encouraged to follow up with Dr. Fredy Ro and to reduce her alcohol intake. Pt was dc in stable condition. All questions answered by me to the best of my abilities prior to her discharge.      Doug Reddy MD  08/27/20 1813

## 2020-08-28 NOTE — ED NOTES
Pt paged out requesting to use bathroom. Pt provided with stand by assistance to bathroom and returned to bed without incident. Clean catch urine collected and sent to lab. Vs reassessed and stable. Pt voices no concerns and or needs. Will continue to monitor. Call light in reach.      Maty Sharma RN  08/27/20 2037

## 2020-08-28 NOTE — ED NOTES
Lab paged department and reported to charge nurse Bertin Rapp RN that specimen exploded in route to the lab. Pt notified that another sample would be needed and Dr. Juliane Finley to be notified.        Eric Bass RN  08/27/20 2043

## 2020-08-28 NOTE — ED NOTES
Dr. Gregorio Guevara into update on the POC and pt requesting to go home.       Vale Delgado RN  08/27/20 [id number]

## 2020-09-05 NOTE — PROGRESS NOTES
Met with pt and her spouse Mik Link is ED room 15. Discussed ACP. Pt would like to remain full code. Pt is [employer]. She is a retired nurse practitioner.

## 2020-09-05 NOTE — FLOWSHEET NOTE
Consult from ACP activator. Patient states she does have POA. Thought it was on file. Will have someone bring it in for chart. Patient shared that she is Mormonism but not practicing, but always appreciates when she is here and someone comes to pray with her.   offered song and prayer for patient's healing

## 2020-09-05 NOTE — ACP (ADVANCE CARE PLANNING)
health and suddenly became very ill and were unable to breathe on your own, what would your preference be about the use of a ventilator (breathing machine) if it were available to you? \"      Would the patient desire the use of ventilator (breathing machine)?: yes    \"If your health worsens and it becomes clear that your chance of recovery is unlikely, what would your preference be about the use of a ventilator (breathing machine) if it were available to you? \"     Would the patient desire the use of ventilator (breathing machine)?: No      Resuscitation  \"CPR works best to restart the heart when there is a sudden event, like a heart attack, in someone who is otherwise healthy. Unfortunately, CPR does not typically restart the heart for people who have serious health conditions or who are very sick. \"    \"In the event your heart stopped as a result of an underlying serious health condition, would you want attempts to be made to restart your heart (answer \"yes\" for attempt to resuscitate) or would you prefer a natural death (answer \"no\" for do not attempt to resuscitate)? \" yes      NOTE: If the patient has a valid advance directive AND now provides care preference(s) that are inconsistent with that prior directive, advise the patient to consider either: creating a new advance directive that complies with state-specific requirements; or, if that is not possible, orally revoking that prior directive in accordance with state-specific requirements, which must be documented in the EHR. [] Yes   [x] No   Educated Patient / Shweta Geronimo regarding differences between Advance Directives and portable DNR orders.     Length of ACP Conversation in minutes:  20  Conversation Outcomes:  [x] ACP discussion completed  [] Existing advance directive reviewed with patient; no changes to patient's previously recorded wishes  [] New Advance Directive completed  [] Portable Do Not Rescitate prepared for Provider review and signature  [] POLST/POST/MOLST/MOST prepared for Provider review and signature      Follow-up plan:    [] Schedule follow-up conversation to continue planning  [x] Referred individual to Provider for additional questions/concerns   [] Advised patient/agent/surrogate to review completed ACP document and update if needed with changes in condition, patient preferences or care setting    [] This note routed to one or more involved healthcare providers      Met with pt and her spouse [facility] is ED room 15. Discussed ACP. Pt would like to remain full code. 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

## 2020-09-05 NOTE — ED NOTES
Patient resting on cart with complaint of lower back pain and shortness of breath. Patient updated on plan of care and droplet precautions placed. Respirations unlabored. Call light in reach. Side rails up times 2.      Danielle Ibrahim RN  09/05/20 [id number]

## 2020-09-05 NOTE — ED PROVIDER NOTES
[address] ENCOUNTER      PATIENT NAME: Chanelle Newsome  MRN: [id number]  : 1960  ORTIZ: 2020  PROVIDER: Holden Crews MD      CHIEF COMPLAINT       Chief Complaint   Patient presents with    Shortness of Breath       Nurses Notes reviewed and I agreeexcept as noted in the HPI. HISTORY OF PRESENT ILLNESS    Chanelle Newsome is a 62 y.o. female who presents to Emergency Department with Shortness of Breath      55-year-old female with longtime history of alcohol abuse presented to ED complaining of increasing diffuse swelling all over in the last 3 days duration, and since yesterday, left lower extremity began to weep. No chest pain, no adominal pain. No fever and no chills. Shortness of breath was worse on exertion. No cough. No nausea or vomiting. GI is Dr. Ainsley Mazariegos. No hematemesis. No melena. Last drinking was 5 days ago. Onset: Gradual  Location: At home  Severity: Moderate  Timing: During the last 3 days  Modifying factors: Worse on exertion  Quality: Shortness of breath and diffuse swelling  Radiation: No pain  Duration: Persistent  Context: Long history of alcohol abuse  Prior episodes: None  Therapy today: None  Associated Symptoms: Fatigue and shortness of breath  Additional history: None    This HPI was provided by the patient. REVIEW OF SYSTEMS     Review of Systems   Constitutional: Positive for activity change, appetite change and fatigue. Negative for chills, fever and unexpected weight change. HENT: Negative for congestion, ear discharge, ear pain, hearing loss, nosebleeds, rhinorrhea and sore throat. Eyes: Negative for photophobia, pain, discharge, redness and itching. Respiratory: Positive for shortness of breath. Negative for cough, chest tightness, wheezing and stridor. Cardiovascular: Positive for leg swelling. Negative for chest pain and palpitations.    Gastrointestinal: Negative for abdominal distention, (approx 2013); laparoscopy; Foot surgery; Tubal ligation (1996); Endoscopy, colon, diagnostic; fracture surgery; Dilatation, esophagus; Colonoscopy (Left, 5/7/2019); and Upper gastrointestinal endoscopy (Left, 5/7/2019). CURRENT MEDICATIONS       Discharge Medication List as of 9/5/2020  4:20 PM      CONTINUE these medications which have NOT CHANGED    Details   potassium chloride (MICRO-K) 10 MEQ extended release capsule Take 10 mEq by mouth daily Historical Med      Psyllium (METAMUCIL FIBER PO) Take by mouth 5 capsules once dailyHistorical Med      aMILoride (MIDAMOR) 5 MG tablet TAKING 2.5MG THREE TIMES WEEKLY, Disp-30 tablet,R-3Normal      bumetanide (BUMEX) 1 MG tablet TAKE ONE HALF TAB  THREE TIMES WEEKLY, Disp-90 tablet,R-1Normal      !! DULoxetine (CYMBALTA) 30 MG extended release capsule TAKE 1 CAPSULE DAILY, Disp-90 capsule,R-0Normal      !! DULoxetine (CYMBALTA) 60 MG extended release capsule Take 1 capsule by mouth nightly, Disp-90 capsule,R-1Normal      !! gabapentin (NEURONTIN) 100 MG capsule Take 1 capsule by mouth daily for 90 days. , Disp-180 capsule,R-1Normal      !! gabapentin (NEURONTIN) 300 MG capsule Take 1 capsule by mouth nightly for 180 days. , Disp-90 capsule,R-1Normal      metoprolol tartrate (LOPRESSOR) 25 MG tablet Take 0.5 tablets by mouth 2 times daily, Disp-90 tablet,R-1Normal      tiZANidine (ZANAFLEX) 4 MG tablet Take 1 tablet by mouth nightly At bed time, Disp-90 tablet,R-1Normal      neomycin-bacitracin-polymyxin (NEOSPORIN) 400-5-5000 ointment Apply topically, Topical, Starting Tue 5/26/2020, Historical Med      vitamin B-12 (CYANOCOBALAMIN) 500 MCG tablet TAKE 1 TABLET DAILY, Disp-30 tablet, R-11Normal      acyclovir (ZOVIRAX) 400 MG tablet TAKE 1 TABLET TWICE A DAY, Disp-180 tablet, R-3Normal      Estradiol (VAGIFEM) 10 MCG TABS vaginal tablet AT THE START OF THERAPY INSERT 1 TABLET VAGINALLY DAILY FOR 2 WEEKS , THEN TWICE WEEKLY THEREAFTER, Disp-40 tablet, R-3Normal CALCIUM REPLACEMENT PROTOCOL 500 mg by Other route daily Historical Med      Parathyroid Hormone, Recomb, 50 MCG CART Inject 0.1 mcg into the skin Natpara InjectionHistorical Med      calcitRIOL (ROCALTROL) 0.5 MCG capsule Two tablets twice daily, Disp-30 capsule, R-3NO PRINT      magnesium cl-calcium carbonate (SLOW-MAG) 71.5-119 MG TBEC tablet Take 1 tablet by mouth 4 times daily, Disp-3 tablet, R-0NO PRINT      loperamide (IMODIUM) 2 MG capsule Take 2 capsules by mouth 4 times daily as needed for DiarrheaOTC      !! SYNTHROID 50 MCG tablet Take 1 tablet by mouth Daily Additional RF per her endocrinoloist, Disp-30 tablet, R-0, DAWNormal      vitamin B-6 (PYRIDOXINE) 100 MG tablet Take 100 mg by mouth dailyHistorical Med      zinc gluconate 50 MG tablet TAKE ONE AND ONE-HALF TABLETS (75 MG) DAILY, Disp-135 tablet, R-0Normal      vitamin B-1 (THIAMINE) 100 MG tablet Take 100 mg by mouth dailyHistorical Med      ondansetron (ZOFRAN ODT) 4 MG disintegrating tablet Take 1 tablet by mouth every 8 hours as needed for Nausea, Disp-10 tablet, R-0Print      Vitamin K, Phytonadione, 100 MCG TABS Take 3 tablets by mouth daily, Disp-270 tablet, [id number] Sig      FOLIC ACID PO Take 0,434 mg by mouth dailyHistorical Med      !! SYNTHROID 200 MCG tablet Take 200 mcg by mouth daily, DAWHistorical Med      esomeprazole (NEXIUM) 20 MG delayed release capsule Take 20 mg by mouth 2 times dailyHistorical Med      vitamin A 03647 units capsule Take 25,000 Units by mouth dailyHistorical Med       !! - Potential duplicate medications found. Please discuss with provider. ALLERGIES     is allergic to penicillins; sulfa antibiotics; and bactrim [sulfamethoxazole-trimethoprim]. FAMILY HISTORY     She indicated that the status of her mother is unknown. She indicated that her father is . She indicated that the status of her sister is unknown. She indicated that her maternal grandmother is .  She indicated that her maternal grandfather is . She indicated that her paternal grandmother is . She indicated that her paternal grandfather is . family history includes Asthma in her maternal grandfather; Breast Cancer in her mother and sister; Cancer in her father; Depression in her father and mother; Heart Attack in her maternal grandmother; High Blood Pressure in her father and mother; Mental Illness in her maternal grandmother; Other in her father, maternal grandfather, maternal grandmother, and mother. SOCIAL HISTORY      reports that she has never smoked. She has never used smokeless tobacco. She reports current alcohol use of about 9.0 standard drinks of alcohol per week. She reports that she does not use drugs. PHYSICAL EXAM     INITIAL VITALS:  height is 4' 10\" (1.473 m) and weight is 117 lb (53.1 kg). Her oral temperature is 98.2 °F (36.8 °C). Her blood pressure is 104/88 and her pulse is 92. Her respiration is 18 and oxygen saturation is 97%. Physical Exam  Vitals signs and nursing note reviewed. Constitutional:       Appearance: She is well-developed. She is not diaphoretic. HENT:      Head: Normocephalic and atraumatic. Nose: Nose normal.   Eyes:      General: No scleral icterus. Right eye: No discharge. Left eye: No discharge. Conjunctiva/sclera: Conjunctivae normal.      Pupils: Pupils are equal, round, and reactive to light. Neck:      Musculoskeletal: Normal range of motion and neck supple. Vascular: No JVD. Trachea: No tracheal deviation. Cardiovascular:      Rate and Rhythm: Normal rate and regular rhythm. Heart sounds: Normal heart sounds. No murmur. No friction rub. No gallop. Pulmonary:      Effort: Pulmonary effort is normal. No respiratory distress. Breath sounds: Normal breath sounds. No stridor. No wheezing or rales. Chest:      Chest wall: No tenderness.    Abdominal:      General: Bowel sounds are normal. There is no distension. Palpations: Abdomen is soft. There is no mass. Tenderness: There is no abdominal tenderness. There is no guarding or rebound. Hernia: No hernia is present. Musculoskeletal:         General: No tenderness or deformity. Right lower leg: Edema present. Left lower leg: Edema present. Comments: Diffuse swelling consistent with anasarca   Lymphadenopathy:      Cervical: No cervical adenopathy. Skin:     General: Skin is warm and dry. Capillary Refill: Capillary refill takes less than 2 seconds. Coloration: Skin is not pale. Findings: No erythema or rash. Comments: Diffuse swelling consistent with anasarca   Neurological:      Mental Status: She is alert and oriented to person, place, and time. Cranial Nerves: No cranial nerve deficit. Sensory: No sensory deficit. Motor: No abnormal muscle tone. Coordination: Coordination normal.      Deep Tendon Reflexes: Reflexes normal.   Psychiatric:         Behavior: Behavior normal.         Thought Content: Thought content normal.         Judgment: Judgment normal.           DIFFERENTIAL DIAGNOSIS:   Anasarca, malnutrition, CHF, hypoalbuminemia, ILEANA, third space fluid retention    DIAGNOSTIC RESULTS     EKG: All EKG's are interpreted by the Emergency Department Physician who either signs or Co-signsthis chart in the absence of a cardiologist.  Interpreted by me  Normal sinus rhythm  Ventricular rate 92 bpm  MT interval 134 ms  QRS duration 56 ms   ms  Low voltage QRS  Possible inferior infarct, age undetermined  No ST elevation or acute T wave    RADIOLOGY: non-plain film images(s) such as CT, Ultrasound and MRI are read by the radiologist.    XR CHEST PORTABLE   Final Result   1. Normal heart size. Mediport right side, catheter tip at cavoatrial junction. 2. Tiny bilateral pleural effusions. Moderate bibasilar atelectasis/pneumonia.             **This report has been created using voice recognition software. It may contain minor errors which are inherent in voice recognition technology. **      Final report electronically signed by Dr. Anne Colbert on 9/5/2020 2:40 PM          []Visualized and interpreted by me   [] Radiologist's Wet Read Report Reviewed   [] Discussed with Radiologist.    Kem Ng:   Results for orders placed or performed during the hospital encounter of 09/05/20   CBC Auto Differential   Result Value Ref Range    WBC 6.5 4.8 - 10.8 thou/mm3    RBC 3.36 (L) 4.20 - 5.40 mill/mm3    Hemoglobin 11.0 (L) 12.0 - 16.0 gm/dl    Hematocrit 33.6 (L) 37.0 - 47.0 %    .0 (H) 81.0 - 99.0 fL    MCH 32.7 26.0 - 33.0 pg    MCHC 32.7 32.2 - 35.5 gm/dl    RDW-CV 14.3 11.5 - 14.5 %    RDW-SD 52.1 (H) 35.0 - 45.0 fL    Platelets 557 656 - 525 thou/mm3    MPV 10.7 9.4 - 12.4 fL    Seg Neutrophils 66.2 %    Lymphocytes 27.6 %    Monocytes 5.3 %    Eosinophils 0.5 %    Basophils 0.2 %    Immature Granulocytes 0.2 %    Segs Absolute 4.3 1.8 - 7.7 thou/mm3    Lymphocytes Absolute 1.8 1.0 - 4.8 thou/mm3    Monocytes Absolute 0.3 (L) 0.4 - 1.3 thou/mm3    Eosinophils Absolute 0.0 0.0 - 0.4 thou/mm3    Basophils Absolute 0.0 0.0 - 0.1 thou/mm3    Immature Grans (Abs) 0.01 0.00 - 0.07 thou/mm3    nRBC 0 /100 wbc   Comprehensive Metabolic Panel   Result Value Ref Range    Glucose 95 70 - 108 mg/dL    CREATININE 0.3 (L) 0.4 - 1.2 mg/dL    BUN 11 7 - 22 mg/dL    Sodium 137 135 - 145 meq/L    Potassium 4.0 3.5 - 5.2 meq/L    Chloride 112 (H) 98 - 111 meq/L    CO2 22 (L) 23 - 33 meq/L    Calcium 8.4 (L) 8.5 - 10.5 mg/dL    AST 26 5 - 40 U/L    Alkaline Phosphatase 145 (H) 38 - 126 U/L    Total Protein 3.5 (L) 6.1 - 8.0 g/dL    Alb 1.4 (L) 3.5 - 5.1 g/dL    Total Bilirubin 0.5 0.3 - 1.2 mg/dL    ALT 20 11 - 66 U/L   Lipase   Result Value Ref Range    Lipase 4.8 (L) 5.6 - 51.3 U/L   Troponin   Result Value Ref Range    Troponin T < 0.010 ng/ml   Brain Natriuretic Peptide   Result Value Ref Range    Pro-BNP 1448 09/05/20 1554 09/05/20 1705   BP: (!) 121/101 118/69 106/87 104/88   Pulse: 93 85 89 92   Resp: 18 16 18 18   Temp: 98.2 °F (36.8 °C)      TempSrc: Oral      SpO2: 99% 99% 96% 97%   Weight: 117 lb (53.1 kg)      Height: 4' 10\" (1.473 m)          2:13 PM: Patient is seen and evaluated in a timely fashion. ACTIONS:    Large bore IV  Tele monitor  EKG  CXR  Labs  Medications:   Medications   furosemide (LASIX) injection 20 mg (20 mg Intravenous Given 9/5/20 1640)   spironolactone (ALDACTONE) tablet 50 mg (50 mg Oral Given 9/5/20 1642)   heparin flush 100 UNIT/ML injection 300 Units (300 Units Intracatheter Given 9/5/20 1722)       MEDICAL DECISION MAKINGS:    ED work-ups reviewed her swelling is anasarca secondary to her malnutrition due to alcohol abuse and decompensated liver function. No lab evidence her swelling is caused by CHF or nephrotic syndrome    Patient needs diuresis, this was discussed and concurred by Dr. Ainsley Mazariegos. Patient also was suggested to increase protein intake such as taking protein shake and protein pudding. She should completely abstain from drinking. Patient was given first dose of Lasix and spironolactone. Discharged with prescriptions of Lasix and spironolactone at 50: 20 ratio. Followed by Tabitha Garcia in one week. CRITICAL CARE:   None    CONSULTS:  Dr. Peck Fossa:  None    FINAL IMPRESSION      1. Anasarca    2. Malnutrition, unspecified type ([address]    3. Alcoholic fatty liver    4.  Alcohol abuse          DISPOSITION/PLAN   Home    PATIENT REFERRED TO:  Letty Rodriguez MD  [address] [address]  [phone]    In 1 week  As scheduled      DISCHARGE MEDICATIONS:  Discharge Medication List as of 9/5/2020  4:20 PM      START taking these medications    Details   furosemide (LASIX) 20 MG tablet Take 1 tablet by mouth daily, Disp-30 tablet,R-0Print      spironolactone (ALDACTONE) 25 MG tablet Take 2 tablets by mouth daily, Disp-30 tablet,[facility]             (Please note that portions of this note were completed with a voice recognition program.  Efforts were made to edit the dictations but occasionally words aremis-transcribed.)    MD Lata Carlson MD  09/06/20 [id number]

## 2020-09-06 NOTE — CARE COORDINATION
ACM attempted outreach for ED follow up, anasarca, ETOH abuse, COVID 19 Protocol    No answer and unable to LVM, mailbox not available at this time.

## 2020-09-11 NOTE — ED NOTES
Patient to ED via EMS for fatigue, confusion, and hypotension. Patient lives at home with home health care. Patient has liver disease causing third spacing. Patient has generalized edema. Patient pale in color. Patient is alert and oriented to person, place, and time. Son at bedside. Mediport accessed prior to arrival. EKG obtained, patient placed on telemetry and in gown.  Call light within reach, side rails up x2     Murtaza Barker RN  09/11/20 [id number]

## 2020-09-11 NOTE — ED PROVIDER NOTES
EMERGENCY DEPARTMENT ENCOUNTER      CHIEF COMPLAINT    Chief Complaint   Patient presents with    Hypotension    Altered Mental Status       HPI    Tobias Carson is a 62 y.o. female with no prior history who presents with generalized weakness since the onset past week, with \"third spacing\", more confusion and hypotensive. She needed to take bumex . The duration has been constant since the onset. The generalized weakness is likely associated with her liver cirrhosis. No aggravating or alleviating factors. REVIEW OF SYSTEMS    Cardiac: No chest pain or palpitations  Respiratory: No shortness of breath or new cough  General: +generalized weakness; No fevers   MSK: +leg edema and swelling  : No dysuria or hematuria  GI: No vomiting or diarrhea  See HPI for further details. All other systems reviewed and are negative.     PAST MEDICAL OR SURGICAL HISTORY    Past Medical History:   Diagnosis Date    Alcoholism ([address]     Anxiety     Atrophy of vulva     Atyp squam cell of undet signfc cyto smr crvx (ASC-US)     Closed fracture of seventh cervical vertebra without spinal cord injury ([address]     Closed fracture of sixth cervical vertebra (HCC)     Closed fracture of thoracic vertebra (HCC)     Depression     Dry eye syndrome     D[facility]deep [facility] thrombosis) ([facility])     left arm; after contrast for MRI brain    Dyspareunia in female     External hemorrhoids without complication     Fibromyalgia     Gastric bypass status for obesity 2/26/2018    GERD (gastroesophageal reflux disease)     Hiatal hernia     History of ITP     as a senior in high school; no active issues    Hungry bone syndrome     Hx of blood clots     Hyperparathyroidism ([address]     Prior hospitalization with hungry bone syndrome on IV calcium in the past    Hypocalcemia     Hypothyroidism     Insomnia     Irritable bowel     Lactose intolerance     uses lactaid which helps    Menopausal syndrome     Osteoarthritis     Osteopenia     on reclast    Osteopenia     Ovarian cyst     repeat imaging in 9/2018 recommended    Oxaluria [facility])     Recurrent cold sores     on acyclovir    Sepsis ([address] 2/2/2020    Tachycardia     due to hyperparathyroidism; on atenolol    TIA (transient ischemic attack)     visible on MRI brain    Tubular adenoma of colon     2019     Past Surgical History:   Procedure Laterality Date   [address]  approx 2013    normal per patient    COLONOSCOPY Left 5/7/2019    COLONOSCOPY POLYPECTOMY SNARE/COLD BIOPSY performed by Debbie Simpson MD at [address] ESOPHAGUS      ENDOSCOPY, COLON, DIAGNOSTIC      FOOT [address]    LAPAROSCOPY      LEG SURGERY Right     following fracture    PARATHYROIDECTOMY  08/2017    TONSILLECTOMY AND ADENOIDECTOMY      TUBAL LIGATION  1996    UPPER GASTROINTESTINAL ENDOSCOPY  approx 2013    normal per patient except mild erosion    UPPER GASTROINTESTINAL ENDOSCOPY Left 5/7/2019    EGD BIOPSY performed by Debbie Simpson MD at [address] Right     pinned       CURRENT MEDICATIONS    Current Outpatient Rx   Medication Sig Dispense Refill    furosemide (LASIX) 20 MG tablet Take 1 tablet by mouth daily 30 tablet 0    spironolactone (ALDACTONE) 25 MG tablet Take 2 tablets by mouth daily 30 tablet 0    potassium chloride (MICRO-K) 10 MEQ extended release capsule Take 10 mEq by mouth daily       Psyllium (METAMUCIL FIBER PO) Take by mouth 5 capsules once daily      aMILoride (MIDAMOR) 5 MG tablet TAKING 2.5MG THREE TIMES WEEKLY 30 tablet 3    bumetanide (BUMEX) 1 MG tablet TAKE ONE HALF TAB  THREE TIMES WEEKLY 90 tablet 1    DULoxetine (CYMBALTA) 30 MG extended release capsule TAKE 1 CAPSULE DAILY 90 capsule 0    DULoxetine (CYMBALTA) 60 MG extended release capsule Take 1 capsule by mouth nightly 90 capsule 1    gabapentin (NEURONTIN) 100 MG capsule Take 1 capsule by mouth daily for 90 days. 180 capsule 1    gabapentin (NEURONTIN) 300 MG capsule Take 1 capsule by mouth nightly for 180 days.  90 capsule 1    metoprolol tartrate (LOPRESSOR) 25 MG tablet Take 0.5 tablets by mouth 2 times daily 90 tablet 1    tiZANidine (ZANAFLEX) 4 MG tablet Take 1 tablet by mouth nightly At bed time 90 tablet 1    neomycin-bacitracin-polymyxin (NEOSPORIN) 400-5-5000 ointment Apply topically      vitamin B-12 (CYANOCOBALAMIN) 500 MCG tablet TAKE 1 TABLET DAILY 30 tablet 11    acyclovir (ZOVIRAX) 400 MG tablet TAKE 1 TABLET TWICE A  tablet 3    Estradiol (VAGIFEM) 10 MCG TABS vaginal tablet AT THE START OF THERAPY INSERT 1 TABLET VAGINALLY DAILY FOR 2 WEEKS , THEN TWICE WEEKLY THEREAFTER 40 tablet 3    CALCIUM REPLACEMENT PROTOCOL 500 mg by Other route daily       Parathyroid Hormone, Recomb, 50 MCG CART Inject 0.1 mcg into the skin Natpara Injection      calcitRIOL (ROCALTROL) 0.5 MCG capsule Two tablets twice daily (Patient taking differently: Take 0.5 mcg by mouth daily Two tablets twice daily) 30 capsule 3    magnesium cl-calcium carbonate (SLOW-MAG) 71.5-119 MG TBEC tablet Take 1 tablet by mouth 4 times daily (Patient taking differently: Take 1 tablet by mouth 2 times daily ) 3 tablet 0    loperamide (IMODIUM) 2 MG capsule Take 2 capsules by mouth 4 times daily as needed for Diarrhea      SYNTHROID 50 MCG tablet Take 1 tablet by mouth Daily Additional RF per her endocrinoloist 30 tablet 0    vitamin B-6 (PYRIDOXINE) 100 MG tablet Take 100 mg by mouth daily      zinc gluconate 50 MG tablet TAKE ONE AND ONE-HALF TABLETS (75 MG) DAILY 135 tablet 0    vitamin B-1 (THIAMINE) 100 MG tablet Take 100 mg by mouth daily      ondansetron (ZOFRAN ODT) 4 MG disintegrating tablet Take 1 tablet by mouth every 8 hours as needed for Nausea 10 tablet 0    Vitamin K, Phytonadione, 100 MCG TABS Take 3 tablets by mouth daily 270 tablet on file    Stress: Not on file   Relationships    Social connections     Talks on phone: Not on file     Gets together: Not on file     Attends [employer] service: Not on file     Active member of club or organization: Not on file     Attends meetings of clubs or organizations: Not on file     Relationship status: Not on file    Intimate partner violence     Fear of current or ex partner: Not on file     Emotionally abused: Not on file     Physically abused: Not on file     Forced sexual activity: Not on file   Other Topics Concern    Not on file   Social History Narrative    Not on file       PHYSICAL EXAM    VITAL SIGNS: /70   Pulse 63   Temp 98.3 °F (36.8 °C)   Resp 16   Wt 121 lb (54.9 kg)   SpO2 96%   BMI 25.29 kg/m²   Constitutional:  Well developed, well nourished, weak in appearance, mild acute distress  Eyes:  Pupils equally round and reactive to light, sclera nonicteric  HENT:  atraumatic, drt mucous membranes  NECK: Normal range of motion, no JVD  Respiratory:  No respiratory distress, normal breath sounds, no wheezing   Cardiovascular:  normal rate, normal rhythm, no murmurs   GI:  Soft, mild distension, normal bowel sounds, nontender  Musculoskeletal:  4+ bilateral LE edema with anasarca to groin area, no acute deformities   : rectal exam showed no blood. Occult negative.   Integument:  Skin is warm and dry, no obvious rash    Vascular: Radial and DP pulses 2+ equal bilaterally  Neurologic: awake, alert, oriented x3, handgrip is 5/5 bilaterally, normal finger to nose test bilaterally  Psychiatric:  normal affect, does not appear anxious    Labs Reviewed   CBC WITH AUTO DIFFERENTIAL - Abnormal; Notable for the following components:       Result Value    WBC 4.2 (*)     RBC 2.36 (*)     Hemoglobin 7.9 (*)     Hematocrit 24.5 (*)     .8 (*)     MCH 33.5 (*)     RDW-CV 15.4 (*)     RDW-SD 58.1 (*)     Monocytes Absolute 0.1 (*)     All other components within normal limits COMPREHENSIVE METABOLIC PANEL W/ REFLEX TO MG FOR LOW K - Abnormal; Notable for the following components:    Glucose 57 (*)     CO2 20 (*)     Calcium 7.0 (*)     Total Protein 2.6 (*)     Alb 1.1 (*)     All other components within normal limits   PROTIME-INR - Abnormal; Notable for the following components:    INR 1.83 (*)     All other components within normal limits   APTT - Abnormal; Notable for the following components:    aPTT 54.1 (*)     All other components within normal limits   LACTIC ACID, PLASMA - Abnormal; Notable for the following components:    Lactic Acid 3.3 (*)     All other components within normal limits   AMMONIA - Abnormal; Notable for the following components:    Ammonia 86 (*)     All other components within normal limits   ANION GAP - Abnormal; Notable for the following components:    Anion Gap 7.0 (*)     All other components within normal limits   GLOMERULAR FILTRATION RATE, ESTIMATED - Abnormal; Notable for the following components:    Est, Glom Filt Rate 56 (*)     All other components within normal limits   OSMOLALITY - Abnormal; Notable for the following components:    Osmolality Calc 267.9 (*)     All other components within normal limits   CULTURE, URINE   CULTURE, BLOOD 1   CULTURE, BLOOD 2   CALCIUM, IONIZED   SCAN OF BLOOD SMEAR   URINALYSIS   BLOOD OCCULT STOOL SCREEN #1       EKG  (Interpreted by me)  EKG Interpretation. EKG Interpretation    Interpreted by emergency department physician on 9/11/20 11:51     Rhythm: normal sinus   Rate: 89 bpm  Axis: normal  Ectopy: none  Conduction: normal  ST Segments: no acute change  T Waves: no acute change  Q Waves: none low signal amplitudes    Clinical Impression: non-specific EKG, low voltage signals      RADIOLOGY/PROCEDURES    XR CHEST PORTABLE   Final Result   Bilateral pleural effusions and bibasilar atelectasis or infiltrate. Correlate for CHF. **This report has been created using voice recognition software.  It may contain minor errors which are inherent in voice recognition technology. **      Final report electronically signed by Dr. Garcia Been on 9/11/2020 12:54 PM          ED COURSE & MEDICAL DECISION MAKING    Pertinent Labs & Imaging studies reviewed and interpreted. (See chart for details)  See chart for details of medications given during the ED stay. Vitals:    09/11/20 1401 09/11/20 1410 09/11/20 1421 09/11/20 1435   BP: (!) 63/43 (!) 68/48 102/69 105/70   Pulse:  62 53 63   Resp: 14 14 14 16   Temp:       TempSrc:       SpO2: 97% 95% 95% 96%   Weight: 121 lb (54.9 kg)          Differential diagnosis: Dehydration, potentially/metabolic problem, anemia, worsening cirrhosis, infection, hypotension, Stroke, Structural CNS mass lesion, other    CRITICAL CARE NOTE:   There was a high probability of clinically significant life-threatening deterioration of the patient's condition requiring my urgent intervention. Total critical care time is 80 minutes. This includes vital sign monitoring, pulse oximetry monitoring, telemetry monitoring, clinical response to the IV medications, reviewing the nursing notes, consultation time, dictation/documetation time. (This time excludes time spent performing procedures). Pt was hypotension and required pressor therapy, as well as albumin. Critically ill. FINAL IMPRESSION    1. Altered mental status, unspecified altered mental status type    2. Hypotension, unspecified hypotension type    3. Alcoholic cirrhosis of liver with ascites (HCC)    4. Other iron deficiency anemia        PLAN  Admit to ICU    Pt does not worse than when I saw her a few weeks ago. She has increased weakness, hypotension and thought her Ca is low. Pt was seen last week for similar symptoms (third spacing) and somewhat responded to diuresis. She takes 0.5 tab of bumex daily. Pt has 4+ pitting edema in both LE without any obvious calf swelling or weakness.       Will also check Ca+ and ammonia as well. May need admission. Re-assessment at 1345 - pt is doing worse, with BP dropping to 12P systolic, so we will institute IV levophed via her port, and also attempt to establish a peripheral IV for her hypotension and albumin infusion. Pt will also receive a rectal exam.    Dr Melissa Hathaway of GI was consulted and actually recommended one unit of PRBC.  At this time, since pt is going to the ICU imminently, will defer that decision to the ICU team.     Atha Moritz, MD  09/11/20 [id number]

## 2020-09-11 NOTE — ED NOTES
Bed: 003A  Expected date:   Expected time:   Means of arrival: [facility]  Comments:     Ruthy Berumen RN  09/11/20 1140

## 2020-09-11 NOTE — ED NOTES
Patient resting in bed with easy and unlabored respirations. Patient alert and oriented.  Spouse at bedside      Shari Matt RN  09/11/20 0468

## 2020-09-11 NOTE — ED NOTES
Lowering BP notified to resident [facility], awaiting further orders     Piotr Barboza, INDU  09/11/20 5586

## 2020-09-11 NOTE — H&P
#9.  11. Essential Hypertension: Currently hypotensive. Hold beta-blocker. 12. History of Parathyroidectomy with Associated Hypocalcemia: Ionized calcium stable. Continue Calcitrol and Recomb. 13. History of Gastric Bypass: Continue vitamin K, vitamin A, vitamin B12, vitamin B6, vitamin B1, zinc, folic acid. 14. Acquired Hypothyroidism:  Continue synthroid. 15. Severe Protein Calorie Malnutrition/Hypoalbuminemia: Consult to Dietitian. History Of Present Illness:      78-year-old  female, non-smoker, past medical history alcoholism, TIA, hyperparathyroidism, osteoporosis, hypocalcemia, blood clots, ITP in high school, GERD, gastric bypass, fibromyalgia, tubular adenoma of colon, tachycardia, recurrent cold sores, irritable bowel syndrome who presented on 9/11/2020 to [facility] emergency department per EMS with reports of fatigue, confusion, hypotension. Per patient she woke up this morning and was feeling fatigued and when her home health nurse came to evaluate her her blood pressure was low. She does not know how low it was. EMS was called but patient does not remember anything until being in the emergency department. She states she has been taking medications as prescribed and drinking and eating appropriately. Patient has had increasing weakness but no falls. She has been using a walker for 3 days. Patient denies chest pain, nausea/vomiting, melena, fever/chills, recent sick contacts. Patient does have dyspnea with exertion. She has no PND. States that her last alcoholic drink was August 12. Denies cough, abdominal pain. She states that she has never had a thoracentesis or a paracentesis. Per notes:  Last EGD and colonoscopy completed May 2019 did not demonstrate esophageal varices but did demonstrate portal hypertensive gastropathy and gastric polyp. Patient had appointment with Dr. Tahira Couch on 9/9/2020 and was noted to be on on Aldactone, Lasix, thiamine.   Liver biopsy was reordered as had not been completed previously. After Biopsy patient, scheduled for 9/23/2020, patient was to start lactulose and repeat EGD to check for varices. Patient started on Lasix and Aldactone per emergency room physician on 9/5/2020. Noted in the emergency department that blood pressure dropped to 40 systolic. IV norepinephrine was initiated. Patient also received  1 L IV saline, 25 g IV albumin, 25 g IV dextrose for hypoglycemia. Patient was subsequently admitted to the Intensive Care unit for further management.       Past Medical History:        Diagnosis Date    Alcoholism ([address]     Anxiety     Atrophy of vulva     Atyp squam cell of undet signfc cyto smr crvx (ASC-US)     Closed fracture of seventh cervical vertebra without spinal cord injury ([address]     Closed fracture of sixth cervical vertebra (HCC)     Closed fracture of thoracic vertebra (HCC)     Depression     Dry eye syndrome     [facility]     left arm; after contrast for MRI brain    Dyspareunia in female     External hemorrhoids without complication     Fibromyalgia     Gastric bypass status for obesity 2/26/2018    GERD (gastroesophageal reflux disease)     Hiatal hernia     History of ITP     as a senior in high school; no active issues    Hungry bone syndrome     Hx of blood clots     Hyperparathyroidism ([address]     Prior hospitalization with hungry bone syndrome on IV calcium in the past    Hypocalcemia     Hypothyroidism     Insomnia     Irritable bowel     Lactose intolerance     uses lactaid which helps    Menopausal syndrome     Osteoarthritis     Osteopenia     on reclast    Osteopenia     Ovarian cyst     repeat imaging in 9/2018 recommended    Oxaluria ([address]     Recurrent cold sores     on acyclovir    Sepsis ([address] 2/2/2020    Tachycardia     due to hyperparathyroidism; on atenolol    TIA (transient ischemic attack)     visible on MRI brain    Tubular adenoma of colon 2019       Past Surgical History:        Procedure Laterality Date    APPENDE[address]OMY  1996   [address]    COLONOSCOPY  approx 2013    normal per patient    COLONOSCOPY Left 5/7/2019    COLONOSCOPY POLYPECTOMY SNARE/COLD BIOPSY performed by Britta Gonzalez MD at [address] ESOPHAGUS      ENDOSCOPY, COLON, DIAGNOSTIC      FOOT [address]      GASTRIC BYPASS SURGERY  1996    LAPAROSCOPY      LEG SURGERY Right     following fracture    PARATHYROIDECTOMY  08/2017    TONSILLECTOMY AND ADENOIDECTOMY      TUBAL LIGATION  1996    UPPER GASTROINTESTINAL ENDOSCOPY  approx 2013    normal per patient except mild erosion    UPPER GASTROINTESTINAL ENDOSCOPY Left 5/7/2019    EGD BIOPSY performed by Britta Gonzalez MD at [address] [facility]    WRIST FRACTURE SURGERY Right     pinned       Home Medications:   No current facility-administered medications on file prior to encounter. Current Outpatient Medications on File Prior to Encounter   Medication Sig Dispense Refill    furosemide (LASIX) 20 MG tablet Take 1 tablet by mouth daily 30 tablet 0    spironolactone (ALDACTONE) 25 MG tablet Take 2 tablets by mouth daily 30 tablet 0    potassium chloride (MICRO-K) 10 MEQ extended release capsule Take 10 mEq by mouth daily       Psyllium (METAMUCIL FIBER PO) Take by mouth 5 capsules once daily      aMILoride (MIDAMOR) 5 MG tablet TAKING 2.5MG THREE TIMES WEEKLY 30 tablet 3    bumetanide (BUMEX) 1 MG tablet TAKE ONE HALF TAB  THREE TIMES WEEKLY 90 tablet 1    DULoxetine (CYMBALTA) 30 MG extended release capsule TAKE 1 CAPSULE DAILY 90 capsule 0    DULoxetine (CYMBALTA) 60 MG extended release capsule Take 1 capsule by mouth nightly 90 capsule 1    gabapentin (NEURONTIN) 100 MG capsule Take 1 capsule by mouth daily for 90 days. 180 capsule 1    gabapentin (NEURONTIN) 300 MG capsule Take 1 capsule by mouth nightly for 180 days.  90 capsule 1    metoprolol tartrate (LOPRESSOR) 25 MG tablet Take 0.5 tablets by mouth 2 times daily 90 tablet 1    tiZANidine (ZANAFLEX) 4 MG tablet Take 1 tablet by mouth nightly At bed time 90 tablet 1    neomycin-bacitracin-polymyxin (NEOSPORIN) 400-5-5000 ointment Apply topically      vitamin B-12 (CYANOCOBALAMIN) 500 MCG tablet TAKE 1 TABLET DAILY 30 tablet 11    acyclovir (ZOVIRAX) 400 MG tablet TAKE 1 TABLET TWICE A  tablet 3    Estradiol (VAGIFEM) 10 MCG TABS vaginal tablet AT THE START OF THERAPY INSERT 1 TABLET VAGINALLY DAILY FOR 2 WEEKS , THEN TWICE WEEKLY THEREAFTER 40 tablet 3    CALCIUM REPLACEMENT PROTOCOL 500 mg by Other route daily       Parathyroid Hormone, Recomb, 50 MCG CART Inject 0.1 mcg into the skin Natpara Injection      calcitRIOL (ROCALTROL) 0.5 MCG capsule Two tablets twice daily (Patient taking differently: Take 0.5 mcg by mouth daily Two tablets twice daily) 30 capsule 3    magnesium cl-calcium carbonate (SLOW-MAG) 71.5-119 MG TBEC tablet Take 1 tablet by mouth 4 times daily (Patient taking differently: Take 1 tablet by mouth 2 times daily ) 3 tablet 0    loperamide (IMODIUM) 2 MG capsule Take 2 capsules by mouth 4 times daily as needed for Diarrhea      SYNTHROID 50 MCG tablet Take 1 tablet by mouth Daily Additional RF per her endocrinoloist 30 tablet 0    vitamin B-6 (PYRIDOXINE) 100 MG tablet Take 100 mg by mouth daily      zinc gluconate 50 MG tablet TAKE ONE AND ONE-HALF TABLETS (75 MG) DAILY 135 tablet 0    vitamin B-1 (THIAMINE) 100 MG tablet Take 100 mg by mouth daily      ondansetron (ZOFRAN ODT) 4 MG disintegrating tablet Take 1 tablet by mouth every 8 hours as needed for Nausea 10 tablet 0    Vitamin K, Phytonadione, 100 MCG TABS Take 3 tablets by mouth daily 718 tablet 0    FOLIC ACID PO Take 0,642 mg by mouth daily      SYNTHROID 200 MCG tablet Take 200 mcg by mouth daily      esomeprazole (NEXIUM) 20 MG delayed release capsule Take 20 mg by mouth 2 times daily      vitamin A 08785 units capsule Take 25,000 Units by mouth daily         Allergies:    Penicillins; Sulfa antibiotics; and Bactrim [sulfamethoxazole-trimethoprim]    Social History:    reports that she has never smoked. She has never used smokeless tobacco. She reports current alcohol use of about 9.0 standard drinks of alcohol per week. She reports that she does not use drugs. Family History:       Problem Relation Age of Onset    Breast Cancer Mother         twice age 48[de-identified] 79    Other Mother         anxiety, dementia    Depression Mother     High Blood Pressure Mother     Cancer Father         lung    Other Father         Myelodysplastic syndrome, cirrhosis of liver    Depression Father     High Blood Pressure Father     Breast Cancer Sister     Other Maternal Grandmother         Alzheimer's disease    Heart Attack Maternal Grandmother     Mental Illness Maternal Grandmother     Other Maternal Grandfather         asthma, anxiety    Asthma Maternal Grandfather        Diet:  No diet orders on file    Review of systems:   Pertinent positives as noted in the HPI. All other systems reviewed and negative. PHYSICAL EXAMINATION:  Vital signs: Temperature 98.3, respirations 16, pulse 61, blood pressure 78/59, pulse ox 99% on 4 L nasal cannula. No intake/output data recorded. Wt Readings from Last 3 Encounters:   09/11/20 121 lb (54.9 kg)   09/05/20 117 lb (53.1 kg)   08/27/20 104 lb (47.2 kg)      Body mass index is 25.29 kg/m². CAM-ICU:   Awake and alert. General:   Pale acute on chronically ill-appearing  female resting in bed. HEENT:  normocephalic and atraumatic. No scleral icterus. PERR. Neck: supple. No thyromegaly. Lungs: clear/diminished to auscultation laterally without wheezes/rales/rhonchi. No retractions or tachypnea. Cardiac: RRR. No JVD. Abdomen:  Distended semi-firm, left lower quadrant, hypoactive bowel sounds, nontender. Extremities:  No clubbing, cyanosis.   Petechial rash bilateral lower extremities. Neurolysed bilateral upper extremity edema 1-2+ and bilateral lower extremity edema 4+. Vasculature: capillary refill < 3 seconds. Palpable dorsalis pedis pulses, 1+. Skin:  Pale, warm, and dry. Psych:  Alert and oriented x3. Affect appropriate. Lymph:  No supraclavicular adenopathy. Neurologic:  No focal deficit. No seizures. Data: (All radiographs, tracings, PFTs, and imaging are personally viewed and interpreted unless otherwise noted).  Telemetry: Sinus tachycardia   Sodium 135, potassium 4.4, chloride 108, CO2 20, BUN 13, creatinine 1.0, anion gap 7.0, ionized calcium 1.14, estimated GFR 56, lactic acid 3.3, glucose 57, calcium 7.0, osmolality Calc 267.9, total protein 2.6.  Albumin 1.1, alk phos 108, ALT 13, ammonia 86, AST 19, bilirubin 0.9.   WBC 4.2, hemoglobin 7.9, hematocrit 24.5, .8, platelet count 080.  INR 1.83/APTT 54.1.   Urinalysis trace ketones, trace protein, positive nitrates, moderate leukocytes, WBC 5.9.   Occult stool negative.  EKG demonstrates normal sinus rhythm no acute ST changes   Chest x-ray demonstrates bilateral pleural effusions. ICU PROPHYLAXIS/THERAPY:   Stress ulcer:  [] PPI Agent  [x] H2RA [] Sucralfate [] Other:   VTE:     [] Enoxaparin    [] Warfarin [] NOAC [x] PCD Device:Bilat LE   [] Heparin: [] Subcut / [] IV       Electronically signed by ANITA Brown CNP on 9/11/2020 at 5:09 PM  CRITICAL CARE SPECIALIST.

## 2020-09-11 NOTE — CONSULTS
[address] CONSULT      Patient: Noemy Romero  : 1960  Acct#: [de-identified]  Consult seen for:   Noemy Romero is a 62 y.o. , female with ETOH cirrhosis and anemia. To ER with fatigue, confusion, hypotension and edema       ASSESSMENT:     1. Anemia; macrocytic H/H  7.9/24.5 with negative FOBT. Bone marrow suppression suspected from ETOH use  2. ETOH cirrhosis; saw DR Ligia Munson 20   3. Hepatic encephalopathy , see #4 also  4. Elevated ammonia 86, previously normal (normal LFT's)  5. Generalized edema with pitting edema bilateral lower legs  6. GERD  7. In ER going to ICU; on Levophed for hypotension         PLAN:   1. EGD and colonoscopy done May 2019 with DR Ligia Munson. No esophageal varices. Had portal hypertensive gastropathy and gastric polyp. Colonoscopy, she had 2 flat polyps that were tubular adenomatous and diverticulosis noted. Colonoscopy due In 5 years    2. At last appointment 2 days ago; she was on aldactone, lasix, thiamine and liver bx was reordeed (she did not get it done previously). Then, after biopsy, possibly start lactulose and repeat EGD to check for varices. 3. Follow labs  4. Albumin 25% 100 ml daily ordered  5. Lactulose 30 ml every 12 hours-ordered   6. Aspiration precautions -ordered  7. ETOH cessation  8. Ammonia level in am-ordered   9. PRBC prn ; defer to intensivist   10. Will follow -Please page DR Ligia Munson if any questions or concerns  Thanks         HISTORY OF PRESENT ILLNESS     patient lying in ER with family a her side. She denies any GI related issues but she had some \"gas \" this am. Has been taking water pill /duretic at home. States leg edema for the last few days; worse now. She is scheduled for liver bx on 20. She has not had to take lactulose in the past but will be started on it now due to elevated ammonia level. NO bleeding noted in stools, no hematemesis. Reviewed last EGd and colonoscopy were may 2019. PROBLEM LIST    does not have any pertinent problems on file. PAST MEDICAL HISTORY     has a past medical history of Alcoholism ([facility] [address] Anxiety, Atrophy of vulva, Atyp squam cell of undet signfc cyto smr crvx (ASC-US), Closed fracture of seventh cervical vertebra without spinal cord injury [facility]), Closed fracture of sixth cervical vertebra (HCC), Closed fracture of thoracic vertebra ([facility] [address] Depression, Dry eye syndrome, DVT (deep venous thrombosis) ([facility] [address] Dyspareunia in female, External hemorrhoids without complication, Fibromyalgia, Gastric bypass status for obesity, GERD (gastroesophageal reflux disease), Hiatal hernia, History of ITP, Hungry bone syndrome, Hx of blood clots, Hyperparathyroidism ([facility] [address] Hypocalcemia, Hypothyroidism, Insomnia, Irritable bowel, Lactose intolerance, Menopausal syndrome, Osteoarthritis, Osteopenia, Osteopenia, Ovarian cyst, Oxaluria ([facility] [address] Recurrent cold sores, Sepsis ([facility] [address] Tachycardia, TIA (transient ischemic attack), and Tubular adenoma of colon. PAST SURGICAL HISTORY      has a past surgical history that includes Wrist fracture surgery (Right); Leg Surgery (Right); Tonsillectomy and adenoidectomy; Gastric bypass surgery (1996); Cholecystectomy (1996); Appendectomy (1996); parathyroidectomy (08/2017); Colonoscopy (approx 2013); Upper gastrointestinal endoscopy (approx 2013); laparoscopy; Foot surgery; Tubal ligation (1996); Endoscopy, colon, diagnostic; fracture surgery; Dilatation, esophagus; Colonoscopy (Left, 5/7/2019); and Upper gastrointestinal endoscopy (Left, 5/7/2019).       LABS:    CBC:   Recent Labs     09/11/20  1210   WBC 4.2*   HGB 7.9*        BMP:    Recent Labs     09/11/20  1210      K 4.4      CO2 20*   BUN 13   CREATININE 1.0   GLUCOSE 57*     Hepatic:   Recent Labs     09/11/20  1210   ALKPHOS 108   ALT 13   AST 19   PROT 2.6*   BILITOT 0.9   LABALBU 1.1*     Amylase and Lipase:No results for input(s): LIPASE, AMYLASE in the last 72 hours. Lactic Acid:   Recent Labs     09/11/20  1210   LACTA 3.3*     Calcium:  Recent Labs     09/11/20  1210   CALCIUM 7.0*     Ionized Calcium:No results for input(s): IONCA in the last 72 hours. Magnesium:No results for input(s): MG in the last 72 hours. Phosphorus:No results for input(s): PHOS in the last 72 hours. Troponin: No results for input(s): CKTOTAL, CKMB, TROPONINI in the last 72 hours. PT/INR:     Recent Labs     09/11/20  1210   INR 1.83*         REVIEW OF SYSTEMS:    GENERAL:  No fever, chills or weight loss. EYES:  No  blurred vision, double vision  CARDIOVASCULAR:  No chest pain or palpitations. RESPIRATORY:  No dyspnea or cough. GI: gas this am, none now. + liver cirrhosis   MUSCULOSKELETAL:  No new painful or swollen joints or myalgias. :   No dysuria or hematuria. SKIN:  No rashes or jaundice. NEUROLOGIC:   No headaches or  seizures  PSYCH:  No anxiety or depression. ENDOCRINE:   No polyuria or polydipsia. BLOOD:  + anemia, bleeding disorder          PHYSICAL EXAMINATION:      Vitals:    09/11/20 1550   BP: 102/76   Pulse: 62   Resp: 16   Temp:    SpO2: 99%     GEN: Well nourished, well developed. Looks older than stated age  LUNGS:  Clear to auscultation bilaterally. Chest rises equally on inspiration. CARDIOVASCULAR:  Regular rate and rhythm without murmurs, rubs or gallops. ABDOMEN:  Soft, nontender and nondistended with normal bowel sounds. EYES: EDGARDO. ENT:  Ears symmetric, Neck supple, trachea midline, moist mucous membranes     EXTREMITIES:  No cyanosis, clubbing,+ pitting lower leg edema. DERM:  No rash or jaundice. + pale  NEURO:  Alert and oriented x4. Patient moves all extremities and has gross sensation in all extremities. PSYCHIATRIC: calm, pleasant    HOME MEDICATIONS      Prior to Admission medications    Medication Sig Start Date End Date Taking?  Authorizing Provider   furosemide (LASIX) 20 MG tablet Take 1 tablet by mouth daily 9/5/20 10/5/20  Nitza Gongora MD   spironolactone (ALDACTONE) 25 MG tablet Take 2 tablets by mouth daily 9/5/20   Nitza Gongroa MD   potassium chloride (MICRO-K) 10 MEQ extended release capsule Take 10 mEq by mouth daily  6/25/20   Historical Provider, MD   Psyllium (METAMUCIL FIBER PO) Take by mouth 5 capsules once daily    Historical Provider, MD   aMILoride (MIDAMOR) 5 MG tablet TAKING 2.5MG THREE TIMES WEEKLY 7/16/20   Kevyn Grant MD   bumetanide (BUMEX) 1 MG tablet TAKE ONE HALF TAB  THREE TIMES WEEKLY 7/16/20   Kevyn Grant MD   DULoxetine (CYMBALTA) 30 MG extended release capsule TAKE 1 CAPSULE DAILY 7/16/20   Kevyn Grant MD   DULoxetine (CYMBALTA) 60 MG extended release capsule Take 1 capsule by mouth nightly 7/16/20   Kevyn Grant MD   gabapentin (NEURONTIN) 100 MG capsule Take 1 capsule by mouth daily for 90 days. 7/16/20 10/14/20  Kevyn Grant MD   gabapentin (NEURONTIN) 300 MG capsule Take 1 capsule by mouth nightly for 180 days.  7/16/20 1/12/21  Kevyn Grant MD   metoprolol tartrate (LOPRESSOR) 25 MG tablet Take 0.5 tablets by mouth 2 times daily 7/16/20   Kevyn Grant MD   tiZANidine (ZANAFLEX) 4 MG tablet Take 1 tablet by mouth nightly At bed time 7/16/20   Kevyn Grant MD   neomycin-bacitracin-polymyxin (NEOSPORIN) 400-5-5000 ointment Apply topically 5/26/20   Historical Provider, MD   vitamin B-12 (CYANOCOBALAMIN) 500 MCG tablet TAKE 1 TABLET DAILY 6/2/20   ANITA Case CNP   acyclovir (ZOVIRAX) 400 MG tablet TAKE 1 TABLET TWICE A DAY 6/2/20   ANITA Case CNP   Estradiol (VAGIFEM) 10 MCG TABS vaginal tablet AT THE START OF THERAPY INSERT 1 TABLET VAGINALLY DAILY FOR 2 WEEKS , THEN TWICE WEEKLY THEREAFTER 5/4/20   Heather Hills MD   CALCIUM REPLACEMENT PROTOCOL 500 mg by Other route daily  4/21/20   Historical Provider, MD   Parathyroid Hormone, Recomb, 50 MCG CART Inject 0.1 mcg into the skin Natpara Injection 4/21/20   Historical Provider, MD   calcitRIOL (ROCALTROL) 0.5 MCG capsule Two tablets twice daily  Patient taking differently: Take 0.5 mcg by mouth daily Two tablets twice daily 4/24/20   Nori Fraire MD   magnesium cl-calcium carbonate (SLOW-MAG) 71.5-119 MG TBEC tablet Take 1 tablet by mouth 4 times daily  Patient taking differently: Take 1 tablet by mouth 2 times daily  4/24/20   Nori Friare MD   loperamide (IMODIUM) 2 MG capsule Take 2 capsules by mouth 4 times daily as needed for Diarrhea 2/18/20   Celestino Adames MD   SYNTHROID 50 MCG tablet Take 1 tablet by mouth Daily Additional RF per her endocrinoloist 2/18/20   Celestino Adames MD   vitamin B-6 (PYRIDOXINE) 100 MG tablet Take 100 mg by mouth daily    Historical Provider, MD   zinc gluconate 50 MG tablet TAKE ONE AND ONE-HALF TABLETS (75 MG) DAILY 8/15/19   Haroon Dawson MD   vitamin B-1 (THIAMINE) 100 MG tablet Take 100 mg by mouth daily    Historical Provider, MD   ondansetron (ZOFRAN ODT) 4 MG disintegrating tablet Take 1 tablet by mouth every 8 hours as needed for Nausea 3/14/19   Demar Barone DO   Vitamin K, Phytonadione, 100 MCG TABS Take 3 tablets by mouth daily 4/23/18   Haroon Dawson MD   FOLIC ACID PO Take 3,608 mg by mouth daily    Historical Provider, MD   SYNTHROID 200 MCG tablet Take 200 mcg by mouth daily 2/16/18   Historical Provider, MD   esomeprazole (NEXIUM) 20 MG delayed release capsule Take 20 mg by mouth 2 times daily    Historical Provider, MD   vitamin A 15293 units capsule Take 25,000 Units by mouth daily    Historical Provider, MD       MEDICATIONS    Scheduled Meds:  Continuous Infusions:   norepinephrine 6 mcg/min (09/11/20 1540)     PRN Meds:. ALLERGIES   is allergic to penicillins; sulfa antibiotics; and bactrim [sulfamethoxazole-trimethoprim].     SOCIAL HISTORY    Social History  Social History     Socioeconomic History    Marital status:      Spouse name: None    Number of children: None    Years of education: None    Highest education level: None   Occupational History    None   Social Needs    Financial resource strain: None    Food insecurity     Worry: None     Inability: None    Transportation needs     Medical: None     Non-medical: None   Tobacco Use    Smoking status: Never Smoker    Smokeless tobacco: Never Used   Substance and Sexual Activity    Alcohol use: Yes     Alcohol/week: 9.0 standard drinks     Types: 7 Glasses of wine, 1 Cans of beer, 1 Shots of liquor per week    Drug use: No    Sexual activity: None   Lifestyle    Physical activity     Days per week: None     Minutes per session: None    Stress: None   Relationships    Social connections     Talks on phone: None     Gets together: None     Attends Synagogue service: None     Active member of club or organization: None     Attends meetings of clubs or organizations: None     Relationship status: None    Intimate partner violence     Fear of current or ex partner: None     Emotionally abused: None     Physically abused: None     Forced sexual activity: None   Other Topics Concern    None   Social History Narrative    None       FAMILY HISTORY    family history includes Asthma in her maternal grandfather; Breast Cancer in her mother and sister; Cancer in her father; Depression in her father and mother; Heart Attack in her maternal grandmother; High Blood Pressure in her father and mother; Mental Illness in her maternal grandmother; Other in her father, maternal grandfather, maternal grandmother, and mother. REVIEW OF DIAGNOSTIC TESTING AND LABS:        Hospitalist/Attending provider notes, consulting physician notes, laboratory results and procedure notes reviewed prior to seeing the patient. Note done in collaboration with DR Carter Crystal.    Electronically signed by ANITA Quiroz CNP on 9/11/20 at 4:25 PM EDT

## 2020-09-12 NOTE — PROGRESS NOTES
Intensive Care Unit  Medical Intensive Care Unit  Attending Progress Note      Patient was seen and evaluated with Dr. Henry Giordano. Team members present on rounds:  Nursing and Patient    ICU guidelines/prophylaxis active for the following:    head above bed above 30 degrees, insulin guidelines, DVT prophylaxis, ulcer prophylaxis and analgesia/sedation guidelines    Patient Examined? yes    Additions/differences/highlights to the resident's/fellow's exam:  VITALS:  BP 88/71   Pulse 115   Temp 98.7 °F (37.1 °C) (Oral)   Resp 14   Wt 122 lb 5.7 oz (55.5 kg)   SpO2 96%   BMI 25.57 kg/m²   24HR INTAKE/OUTPUT:    Intake/Output Summary (Last 24 hours) at 9/12/2020 0928  Last data filed at 9/12/2020 5658  Gross per 24 hour   Intake 770 ml   Output 110 ml   Net 660 ml      CAM-ICU:   Awake and alert. CONSTITUTIONAL:   Pale acute on chronically ill-appearing  female resting in bed. HEENT:  normocephalic and atraumatic. No scleral icterus. PERR. Neck: supple. No thyromegaly. Ecchymosis/subcu hematoma left more than the right neck probably secondary to external jugular peripheral line insertion trials in ER yesterday. Lungs: clear/diminished to auscultation laterally without wheezes/rales/rhonchi. No retractions or tachypnea. Cardiac: RRR. No JVD. Abdomen:  Distended semi-firm, left lower quadrant, hypoactive bowel sounds, nontender. Extremities:  No clubbing, cyanosis. Petechial rash bilateral lower extremities. Neurolysed bilateral upper extremity edema 1-2+ and bilateral lower extremity edema 4+. Vasculature: capillary refill < 3 seconds. Palpable dorsalis pedis pulses, 1+. Skin:  Pale, warm, and dry. Psych:  Alert and oriented x3. Affect appropriate. Lymph:  No supraclavicular adenopathy. Neurologic:  No focal deficit. No seizures.   DATA:  CBC:   Lab Results   Component Value Date    WBC 6.2 09/12/2020    RBC 2.99 09/12/2020    HGB 9.7 09/12/2020    HCT 29.1 09/12/2020    MCV 97.3 09/12/2020    RDW 12.0 02/12/2018     09/12/2020     CMP:    Lab Results   Component Value Date     09/12/2020    K 4.5 09/12/2020    K 4.4 09/11/2020     09/12/2020    CO2 18 09/12/2020    BUN 14 09/12/2020    CREATININE 1.2 09/12/2020    LABGLOM 46 09/12/2020    GLUCOSE 79 09/12/2020    PROT 2.6 09/11/2020    CALCIUM 7.6 09/12/2020    BILITOT 0.9 09/11/2020    ALKPHOS 108 09/11/2020    AST 19 09/11/2020    ALT 13 09/11/2020       KEY ISSUES/FINDINGS/ASSESSMENT AND PLAN:    Yessy Peters 62 y old patient  presented on 9/11/2020 to [address] [place] [address] emergency department per EMS with reports of fatigue, confusion, hypotension. Per patient she woke up this morning and was feeling fatigued and when her home health nurse came to evaluate her her blood pressure was low. She does not know how low it was. EMS was called but patient does not remember anything until being in the emergency department. States that her last alcoholic drink was August 12. Last EGD and colonoscopy completed May 2019 did not demonstrate esophageal varices but did demonstrate portal hypertensive gastropathy and gastric polyp. 1. Acute Hypoxic Respiratory Failure: Secondary to hypoventilation secondary to altered mental status with bilateral pleural effusion, improved with nasal cannula 4 L/min, pulse ox increased from 88%(room air) to 95%. 2. Septic Shock: Secondary to gram-negative bacilli bacteremia( proteus), source could be from line(medport) or spontaneous bacterial peritonitis or pneumonia or UTI ; IV antibiotics started with IV fluids vasopressors and patient is hemodynamically stable on vasopressors now. Will consult ID for the possibility of Mediport infection and the need to remove the Mediport. Meropenem started and Zosyn discontinued for the possibility of ESBL Proteus. 3. Hepatic Encephalopathy: Mentation improved since admission. Aspiration precautions. Lactulose per #4. Continue albumin.   4. Acute versus chronic  Blood Loss Anemia on Macrocytic Anemia:  Hemoglobin 7.9/hematocrit 24.5, baseline range appears tween 9 and 11 since May 2020. Hemoglobin on 9/5/2020 was 11.0.   GI consult   5. alcoholic Cirrhosis of Liver: Follows with Dr. Sophia Valerio. 6. Non-Anion Gap Metabolic Acidosis: Secondary to #2. Trend BMP. 7. HFrEF: Concern for exacerbation related to pleural effusions per chest x-ray. Echocardiogram on 12/6/2019 demonstrated EF 45 to 50%. Repeat echocardiogram.  BNP. 8. Essential Hypertension: Currently hypotensive. Hold beta-blocker. 9. s/p Parathyroidectomy with Associated Hypocalcemia: Ionized calcium stable. Continue Calcitrol and Recomb. 10. History of Gastric Bypass: Continue vitamin K, vitamin A, vitamin B12, vitamin B6, vitamin B1, zinc, folic acid. 11. Acquired Hypothyroidism:  Continue synthroid. check tsh  12. Severe Protein Calorie Malnutrition/Hypoalbuminemia: Consult to Dietitian.     Critical condition with guarded prognosis  Cc time 50 min apart from procedures

## 2020-09-12 NOTE — CONSULTS
CONSULTATION NOTE :ID       Patient - Gianna Pérez,  Age - 62 y.o.    - 1960      Room Number - 4D-06/006-A   N -  [id number]   [facility]  Date of Admission -  2020 11:46 AM  Patient's PCP: Sandra Del Angel MD     Requesting Physician: Campbell Powell MD    REASON FOR CONSULTATION   Septic shock  CHIEF COMPLAINT     Change in mental state  HISTORY OF PRESENT ILLNESS       This is a very pleasant 62 y.o. female who was admitted to the hospital with a chief complaints of change in mental state. She was brought to hospital due to change in mental state. She has hx of alcohol abuse with liver disease, was noted to have fever , hypotension requiring pressors. Has positive blood cx for proteus. Drawn from line. Has mediport placed last December. Denies dysuria frequency and urgency. She is on treatment for encephalopathy. Started in meropenem. She has hx of hungry bone disease had hx of UTI and is colonized with MRSA.she has markedly declined since the last time I saw her, she has a new central line. mediport was noted on the right IJV. discussed with nursing staff and ICU attending    PAST MEDICAL  HISTORY       Past Medical History:   Diagnosis Date    Alcoholism ([address]     Anxiety     Atrophy of vulva     Atyp squam cell of undet signfc cyto smr crvx (ASC-US)     Closed fracture of seventh cervical vertebra without spinal cord injury ([address]     Closed fracture of sixth cervical vertebra (HCC)     Closed fracture of thoracic vertebra (HCC)     Depression     Dry eye syndrome     DVT (deep venous thrombosis) (HCC)     left arm; after contrast for MRI brain    Dyspareunia in female     External hemorrhoids without complication     Fibromyalgia     Gastric bypass status for obesity 2018    GERD (gastroesophageal reflux disease)     Hiatal hernia     History of ITP     as a senior in high school; no active issues    Hungry bone syndrome     mg Oral Daily    levothyroxine  200 mcg Oral Daily    levothyroxine  50 mcg Oral Daily    vitamin A  20,000 Units Oral Daily    vitamin B-1  100 mg Oral Daily    vitamin B-6  100 mg Oral Daily    vitamin B-12  500 mcg Oral Daily    Vitamin K (Phytonadione)  100 mcg Oral Daily    Parathyroid Hormone (Recomb)  50 mcg Subcutaneous Daily    sodium chloride  20 mL Intravenous Once    DULoxetine  30 mg Oral Daily    acyclovir  400 mg Oral BID    folic acid  1 mg Oral Daily     Continuous Infusions:   norepinephrine 10 mcg/min (09/12/20 0146)     PRN Meds:sodium chloride flush, acetaminophen **OR** acetaminophen, promethazine **OR** ondansetron, PHENobarbital **OR** PHENobarbital **OR** PHENobarbital IVPB  Allergies:   ALLERGIES:    Penicillins; Sulfa antibiotics; and Bactrim [sulfamethoxazole-trimethoprim]        SOCIAL HISTORY:     TOBACCO:   reports that she has never smoked. She has never used smokeless tobacco.     ETOH:   reports previous alcohol use. Patient currently lives with family        FAMILY HISTORY:         Problem Relation Age of Onset    Breast Cancer Mother         twice age 48[de-identified] 79    Other Mother         anxiety, dementia    Depression Mother     High Blood Pressure Mother     Cancer Father         lung    Other Father         Myelodysplastic syndrome, cirrhosis of liver    Depression Father     High Blood Pressure Father     Breast Cancer Sister     Other Maternal Grandmother         Alzheimer's disease    Heart Attack Maternal Grandmother     Mental Illness Maternal Grandmother     Other Maternal Grandfather         asthma, anxiety    Asthma Maternal Grandfather        REVIEW OF SYSTEMS:     Constitutional: + fever, no night sweats, + fatigue,+ weight loss. Head: no head ache , no head injury, no migranes. Eye: no eye discharge, blurring of vision, no double vision,no eye pain.   Ears: no hearing difficulty, no tinnitus  Mouth/throat: no ulceration, dental caries , dysphagia, no hoarseness and voice change  Respiratory:+ cough no chest pain,+shortness of breath,no wheezing  CVS: no palpitation, no chest pain,   GI: no abdominal pain, no nausea , no vomiting, no constipation,no diarrhea. JANETT: no dysuria, frequency and urgency, no hematuria, no kidney stones  Musculoskeletal: no joint pain, swelling , stiffness,  Endocrine: no polyuria, polydipsia, no cold or heat intolerance  Hematology: + anemia, + easy brusing or bleeding, no hx of clotting disorder  Dermatology: no skin rash, no skin lesions, no pruritis,  Neurological:no headaches,no dizziness, no seizure, no numbness. Psychiatry: hx of alcohol abuse    PHYSICAL EXAM:     BP (!) 125/101   Pulse 115   Temp 98.4 °F (36.9 °C) (Bladder)   Resp 17   Wt 122 lb 5.7 oz (55.5 kg)   SpO2 99%   BMI 25.57 kg/m²   General apperance:  Awake, alert,chronically sick looking  HEENT: pale conjunctiva, unicteric sclera, moist oral mucosa. Chest:  Diminished breath sound   Cardiovascular:  RRR ,S1S2, no murmur or gallop. Abdomen:  Soft, non tender to palpation. Extremities: +edema, extensive bruising of the skin over  Skin:  Oozing of fluid from the extremites  CNS:awake and orietned        LABS:     CBC:   Recent Labs     09/11/20  1210 09/12/20  0114 09/12/20  0600   WBC 4.2*  --  6.2   HGB 7.9* 10.6* 9.7*     --  198     BMP:    Recent Labs     09/11/20  1210 09/12/20  0600    138   K 4.4 4.5    108   CO2 20* 18*   BUN 13 14   CREATININE 1.0 1.2   GLUCOSE 57* 79     Calcium:  Recent Labs     09/12/20  0600   CALCIUM 7.6*     Ionized Calcium:No results for input(s): IONCA in the last 72 hours. Magnesium:  Recent Labs     09/12/20  0600   MG 1.3*     Phosphorus:  Recent Labs     09/12/20  0600   PHOS 3.2     BNP:No results for input(s): BNP in the last 72 hours. Glucose:  Recent Labs     09/11/20  1504   POCGLU 53*     HgbA1C: No results for input(s): LABA1C in the last 72 hours.   INR:   Recent Labs 09/12/20  1510   INR 1.76*     Hepatic:   Recent Labs     09/11/20  1210   ALKPHOS 108   ALT 13   AST 19   PROT 2.6*   BILITOT 0.9   LABALBU 1.1*     Amylase and Lipase:  Recent Labs     09/12/20  1315   LACTA 2.1     Lactic Acid:   Recent Labs     09/12/20  1315   LACTA 2.1      UA:   Recent Labs     09/11/20  1300   SPECGRAV 1.017   PHUR 6.0   COLORU DK YELLOW*   PROTEINU TRACE*   BLOODU NEGATIVE   RBCUA 0-2   WBCUA 5-9   BACTERIA MODERATE   NITRU POSITIVE*   BILIRUBINUR SMALL*   UROBILINOGEN 1.0   KETUA TRACE*   LABCAST 0-4 HYALINE  NONE SEEN         IMAGING:    Micro:   Lab Results   Component Value Date    BC No growth-preliminary No growth  04/20/2020       Problem list of patient      Patient Active Problem List   Diagnosis Code    TIA (transient ischemic attack) G45.9    Hypothyroidism E03.9    DVT (deep venous thrombosis) ([facility]) I82.409    Depression F32.9    Anxiety F41.9    Fibromyalgia M79.7    Fatty liver K76.0    Tachycardia R00.0    GERD (gastroesophageal reflux disease) K21.9    Recurrent cold sores 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    Irritable bowel K58.9    Gastric bypass status for obesity Z98.84    Insomnia G47.00    Ovarian cyst N83.209    Muscle weakness (generalized) M62.81    Alcoholism (HCC) F10.20    Osteoarthritis M19.90    Hypocalcemia E83.51    Hypomagnesemia E83.42    Moderate malnutrition (HCC) E44.0    Other hypoparathyroidism (HCC) E20.8    Pancytopenia (HCC) D61.818    Palpitations R00.2    Asymptomatic bacteriuria R82.71    Hyperphosphatemia E83.39    Prolonged Q-T interval on ECG R94.31    Hypoalbuminemia E88.09    Tinnitus of both ears H93.13    Hx of parathyroidectomy Z90.09    Normocytic anemia D64.9    History of fibromyalgia Z87.39    Physical deconditioning R53.81    Severe malnutrition (HCC) E43    Bacteremia R78.81    Fever R50.9    Calcification of breast R92.1    Hypotension I95.9    Steatosis of liver K76.0    Nutritional marasmus (HCC) E41    Discitis of lumbar region M46.46    Discitis M46.40    Hypokalemia E87.6    Mass of joint of left hip M25.852    Weight loss R63.4    Lumbar discitis M46.46           Impression and Recommendation:   Septic shock  Septicemia due to proteus: due to hx of exposure to multiple antibiotics,I agree with meropenem. Positive blood cx from line: likely infected: will ask IR to remove the port  Liver cirrhosis related to alcohol abuse  Malnutrition  Hungry bone disease  Infection Control:   Contact isolation  Discharge Planning (Coordination of out patient care: To be determined  Thank you Timothy Reyes MD for allowing me to participate in this patient's care.     Sandeep Zhong MD,FACP 9/12/2020 4:09 PM

## 2020-09-12 NOTE — PROGRESS NOTES
1058 Dr. Yue Bourne at bedside for central line placement of the right internal jugular, consent obtained, timeout completed and medications given per [facility]    0112 Triple Lumen CVC placed into the Right Internal Jugular. STAT CXR ordered per protocol to check for placment. 2 units FFP also ordered per Dr. Yue Bourne      1018 Per Dr. Yue Bourne ok to use central line. Bladder scan 310mL. Parson catheter ordered for urinary retention.

## 2020-09-12 NOTE — PROGRESS NOTES
Pharmacy Note  BioFire Result    Tobias Carson is a 62 y.o. female, with a positive blood culture result    [employer] message received from Arturo , laboratory employee on 9/12/2020 at 6:20 AM    First Gram stain result: gram negative bacilli    BioFire BCID result: Enterobacteriaceae- proteus    BioFire BCID and gram stain congruent? Yes    Suspected source? Urinary tract    Patient chart reviewed for signs/symptoms of infection: Yes  BP 95/75   Pulse 119   Temp 98.7 °F (37.1 °C) (Oral)   Resp 19   Wt 122 lb 5.7 oz (55.5 kg)   SpO2 94%   BMI 25.57 kg/m²   Lab Results   Component Value Date    WBC 4.2 (L) 09/11/2020       Allergies reviewed  Penicillins; Sulfa antibiotics; and Bactrim [sulfamethoxazole-trimethoprim]    Renal function reviewed  CrCl cannot be calculated (Unknown ideal weight.). Current antibiotic regimen: Zosyn     Intervention needed: No    Individual contacted: Nurse Greenfield     Recommendations: continue Zosyn    Recommendations accepted?  Yes    Chanelle Loja  9/12/2020 6:20 AM

## 2020-09-13 NOTE — SIGNIFICANT EVENT
Intensivist Progress Note      Patient:  Fly Pantoja    Unit/Bed:4D-06/006-A  YOB: 1960  MRN: [id number]   Acct: [de-identified]     PCP: Mariaelena Tijerina MD  Date of Admission: 9/11/2020    Updated , Flor Garcia, with patient change in status including need for endotracheal intubation. Verbalizes understanding.     Korey Hernández, APRN - CNP

## 2020-09-13 NOTE — CONSULTS
Closed fracture of sixth cervical vertebra (HCC)     Closed fracture of thoracic vertebra (HCC)     Depression     Dry eye syndrome     DVT (deep venous thrombosis) (HCC)     left arm; after contrast for MRI brain    Dyspareunia in female     External hemorrhoids without complication     Fibromyalgia     Gastric bypass status for obesity 2/26/2018    GERD (gastroesophageal reflux disease)     Hiatal hernia     History of ITP     as a senior in high school; no active issues    Hungry bone syndrome     Hx of blood clots     Hyperparathyroidism ([address]     Prior hospitalization with hungry bone syndrome on IV calcium in the past    Hypocalcemia     Hypothyroidism     Insomnia     Irritable bowel     Lactose intolerance     uses lactaid which helps    Menopausal syndrome     Osteoarthritis     Osteopenia     on reclast    Osteopenia     Osteoporosis     Ovarian cyst     repeat imaging in 9/2018 recommended    Oxaluria ([address]     Recurrent cold sores     on acyclovir    Sepsis ([address] 2/2/2020    Tachycardia     due to hyperparathyroidism; on atenolol    TIA (transient ischemic attack)     visible on MRI brain    Tubular adenoma of colon     2019     Past Surgical History:   Procedure Laterality Date    APPENDECTOMY  1996   Abran Thomas 12  approx 2013    normal per patient    COLONOSCOPY Left 5/7/2019    COLONOSCOPY POLYPECTOMY SNARE/COLD BIOPSY performed by Timothy Junior MD at [address] ESOPHAGUS      ENDOSCOPY, COLON, DIAGNOSTIC      FOOT SURGERY      FRACTURE SURGERY      GASTRIC BYPASS SURGERY  1996    LAPAROSCOPY      LEG SURGERY Right     following fracture    PARATHYROIDECTOMY  08/2017    TONSILLECTOMY AND ADENOIDECTOMY      TUBAL LIGATION  1996    UPPER GASTROINTESTINAL ENDOSCOPY  approx 2013    normal per patient except mild erosion    UPPER GASTROINTESTINAL ENDOSCOPY Left 5/7/2019    EGD BIOPSY performed by Timothy Junior MD at [address] [facility]    WRIST FRACTURE SURGERY Right     pinned     Social History     Socioeconomic History    Marital status:      Spouse name: Not on file    Number of children: 1    Years of education: Not on file    Highest education level: Not on file   Occupational History    Not on file   Social Needs    Financial resource strain: Not on file    Food insecurity     Worry: Not on file     Inability: Not on file    Transportation needs     Medical: Not on file     Non-medical: Not on file   Tobacco Use    Smoking status: Never Smoker    Smokeless tobacco: Never Used   Substance and Sexual Activity    Alcohol use: Not Currently    Drug use: No    Sexual activity: Not on file   Lifestyle    Physical activity     Days per week: Not on file     Minutes per session: Not on file    Stress: Not on file   Relationships    Social connections     Talks on phone: Not on file     Gets together: Not on file     Attends Yazidi service: Not on file     Active member of club or organization: Not on file     Attends meetings of clubs or organizations: Not on file     Relationship status: Not on file    Intimate partner violence     Fear of current or ex partner: Not on file     Emotionally abused: Not on file     Physically abused: Not on file     Forced sexual activity: Not on file   Other Topics Concern    Not on file   Social History Narrative    Not on file     Family History   Problem Relation Age of Onset    Breast Cancer Mother         twice age 48[de-identified] 79   Jose Eduardo Walters Other Mother         anxiety, dementia    Depression Mother     High Blood Pressure Mother     Cancer Father         lung    Other Father         Myelodysplastic syndrome, cirrhosis of liver    Depression Father     High Blood Pressure Father     Breast Cancer Sister     Other Maternal Grandmother         Alzheimer's disease    Heart Attack Maternal Grandmother     Mental Illness Maternal Grandmother     Other Maternal Grandfather         asthma, anxiety    Asthma Maternal Grandfather      Medications & Allergies      Prior to Admission medications    Medication Sig Start Date End Date Taking? Authorizing Provider   calcium citrate (CALCITRATE) 950 MG tablet Take 1 tablet by mouth daily   Yes Historical Provider, MD   Calcium Citrate-Vitamin D 500-500 MG-UNIT CHEW Take 1 tablet by mouth daily (with breakfast)   Yes Historical Provider, MD   furosemide (LASIX) 20 MG tablet Take 1 tablet by mouth daily 9/5/20 10/5/20 Yes Kem Bowling MD   spironolactone (ALDACTONE) 25 MG tablet Take 2 tablets by mouth daily 9/5/20  Yes Kem Bowling MD   potassium chloride (MICRO-K) 10 MEQ extended release capsule Take 10 mEq by mouth daily  6/25/20  Yes Historical Provider, MD   Psyllium (METAMUCIL FIBER PO) Take by mouth 5 capsules once daily   Yes Historical Provider, MD   DULoxetine (CYMBALTA) 30 MG extended release capsule TAKE 1 CAPSULE DAILY 7/16/20  Yes Pamela Donald MD   DULoxetine (CYMBALTA) 60 MG extended release capsule Take 1 capsule by mouth nightly 7/16/20  Yes Pamela Donald MD   gabapentin (NEURONTIN) 100 MG capsule Take 1 capsule by mouth daily for 90 days. 7/16/20 10/14/20 Yes Pamela Donald MD   gabapentin (NEURONTIN) 300 MG capsule Take 1 capsule by mouth nightly for 180 days.  7/16/20 1/12/21 Yes Pamela Donald MD   metoprolol tartrate (LOPRESSOR) 25 MG tablet Take 0.5 tablets by mouth 2 times daily 7/16/20  Yes Pamela Donald MD   tiZANidine (ZANAFLEX) 4 MG tablet Take 1 tablet by mouth nightly At bed time 7/16/20  Yes Pamela Donald MD   vitamin B-12 (CYANOCOBALAMIN) 500 MCG tablet TAKE 1 TABLET DAILY 6/2/20  Yes ANITA Preston CNP   acyclovir (ZOVIRAX) 400 MG tablet TAKE 1 TABLET TWICE A DAY 6/2/20  Yes ANITA Preston CNP   Estradiol (VAGIFEM) 10 MCG TABS vaginal tablet AT THE START OF THERAPY INSERT 1 TABLET VAGINALLY DAILY FOR 2 WEEKS , THEN TWICE WEEKLY THEREAFTER 5/4/20  Yes Haroon Dawson MD   Parathyroid Hormone, Recomb, 50 MCG CART Inject 50 mcg into the skin daily Natpara Injection 4/21/20  Yes Historical Provider, MD   calcitRIOL (ROCALTROL) 0.5 MCG capsule Two tablets twice daily  Patient taking differently: Take 0.5 mcg by mouth daily  4/24/20  Yes Lalit Valdivia MD   magnesium cl-calcium carbonate (SLOW-MAG) 71.5-119 MG TBEC tablet Take 1 tablet by mouth 4 times daily  Patient taking differently: Take 1 tablet by mouth 2 times daily  4/24/20  Yes Lalit Valdivia MD   loperamide (IMODIUM) 2 MG capsule Take 2 capsules by mouth 4 times daily as needed for Diarrhea 2/18/20  Yes Alva Amato MD   SYNTHROID 50 MCG tablet Take 1 tablet by mouth Daily Additional RF per her endocrinoloist 2/18/20  Yes Alva Amato MD   vitamin B-6 (PYRIDOXINE) 100 MG tablet Take 100 mg by mouth daily   Yes Historical Provider, MD   zinc gluconate 50 MG tablet TAKE ONE AND ONE-HALF TABLETS (75 MG) DAILY 8/15/19  Yes Safia Rodrigues MD   vitamin B-1 (THIAMINE) 100 MG tablet Take 100 mg by mouth daily   Yes Historical Provider, MD   Vitamin K, Phytonadione, 100 MCG TABS Take 3 tablets by mouth daily  Patient taking differently: Take 1 tablet by mouth daily  4/23/18  Yes Safia Rodrigues MD   FOLIC ACID PO Take 717 mcg by mouth daily    Yes Historical Provider, MD   SYNTHROID 200 MCG tablet Take 200 mcg by mouth daily 2/16/18  Yes Historical Provider, MD   esomeprazole (NEXIUM) 20 MG delayed release capsule Take 20 mg by mouth 2 times daily   Yes Historical Provider, MD   vitamin A 78338 units capsule Take 25,000 Units by mouth daily   Yes Historical Provider, MD   aMILoride (MIDAMOR) 5 MG tablet TAKING 2.5MG THREE TIMES WEEKLY 7/16/20   Yennifer Lee MD   bumetanide (BUMEX) 1 MG tablet TAKE ONE HALF TAB  THREE TIMES WEEKLY 7/16/20   Yennifer Lee MD   ondansetron (ZOFRAN ODT) 4 MG disintegrating tablet Take 1 tablet by mouth every 8 hours as needed for Nausea 3/14/19   Aris Lowe DO     Allergies: Penicillins; Sulfa antibiotics; and Bactrim [sulfamethoxazole-trimethoprim]  IP meds : Scheduled Meds:   sodium chloride  20 mL Intravenous Once    sodium chloride  20 mL Intravenous Once    dexamethasone  4 mg Intravenous Daily    albumin human  25 g Intravenous Daily    sodium chloride flush  10 mL Intravenous 2 times per day    meropenem  1 g Intravenous Q8H    [Held by provider] Calcium Citrate-Vitamin D  1 tablet Oral Daily with breakfast    chlorhexidine  15 mL Mouth/Throat BID    famotidine (PEPCID) injection  20 mg Intravenous BID    lactulose  30 g Oral BID    calcitRIOL  0.5 mcg Oral Daily    [Held by provider] DULoxetine  60 mg Oral Nightly    spironolactone  50 mg Oral Daily    levothyroxine  200 mcg Oral Daily    levothyroxine  50 mcg Oral Daily    vitamin A  20,000 Units Oral Daily    vitamin B-1  100 mg Oral Daily    vitamin B-6  100 mg Oral Daily    vitamin B-12  500 mcg Oral Daily    Vitamin K (Phytonadione)  100 mcg Oral Daily    Parathyroid Hormone (Recomb)  50 mcg Subcutaneous Daily    [Held by provider] DULoxetine  30 mg Oral Daily    acyclovir  400 mg Oral BID    folic acid  1 mg Oral Daily     Continuous Infusions:   phenylephrine (FRANK-SYNEPHRINE) 50mg/250mL infusion Stopped (09/13/20 0730)    sodium bicarbonate infusion 150 mL/hr at 09/13/20 1141    prismaSATE BGK 4/2.5 700 mL/hr (09/13/20 1140)    prismaSol BGK 4/2.5 700 mL/hr (09/13/20 1141)    prismaSol BGK 4/2.5 500 mL/hr (09/13/20 0435)    sodium chloride 100 mL/hr at 09/13/20 0348    dextrose      vasopressin (Septic Shock) infusion 0.04 Units/min (09/13/20 0422)    propofol 5 mcg/kg/min (09/13/20 0600)    dilTIAZem (CARDIZEM) 125 mg in dextrose 5% 125 mL infusion      norepinephrine 28 mcg/min (09/13/20 0834)     Review of Systems Physical Exam   Review of Systems   Unable to perform ROS: Intubated    Physical Exam  Vitals signs reviewed.    Constitutional:       General: She is not in acute distress. Appearance: She is ill-appearing and toxic-appearing. She is not diaphoretic. HENT:      Head: Normocephalic and atraumatic. Right Ear: External ear normal.      Left Ear: External ear normal.      Nose: Nose normal.      Mouth/Throat:      Comments: Et tubes in place  Eyes:      General: No scleral icterus. Right eye: No discharge. Left eye: No discharge. Conjunctiva/sclera: Conjunctivae normal.   Neck:      Musculoskeletal: Normal range of motion and neck supple. Thyroid: No thyromegaly. Vascular: No JVD. Cardiovascular:      Rate and Rhythm: Normal rate and regular rhythm. Heart sounds: Normal heart sounds. No murmur. Pulmonary:      Effort: Pulmonary effort is normal. No respiratory distress. Breath sounds: Normal breath sounds. No stridor. Comments: diminshed  Chest:      Chest wall: No tenderness. Abdominal:      General: Bowel sounds are normal. There is no distension. Palpations: Abdomen is soft. Tenderness: There is no abdominal tenderness. Musculoskeletal:         General: Swelling present. No tenderness. Right lower leg: Edema present. Left lower leg: Edema present. Skin:     General: Skin is warm and dry. Findings: Bruising present. No erythema or rash.    Neurological:      Comments: Not responding to verbal commands   Psychiatric:      Comments: Not agitated           Vitals:    09/13/20 1005   BP:    Pulse: 137   Resp: 25   Temp: 99 °F (37.2 °C)   SpO2:      Labs, Radiology and Tests       Recent Labs     09/13/20  0105 09/13/20  0650 09/13/20  1005   WBC 8.5 4.7* 4.6*   RBC 2.41* 2.10* 1.96*   HGB 7.9* 6.7* 6.3*   HCT 23.8* 22.7* 21.2*   MCV 98.8 108.1* 108.2*   MCH 32.8 31.9 32.1   MCHC 33.2 29.5* 29.7*    139 122*     Recent Labs     09/13/20  0105 09/13/20  0650 09/13/20  1005   * 140 137   K 3.6 3.9 4.1    99 96*   CO2 22* 8* 6*   BUN 13 10 10   CREATININE 1.2 1.2 1.1   CALCIUM 7. 0* 7.7* 7.8*   PROT 4.4* 6.2 5.8*   LABALBU 2.9* 4.1 4.0   BILITOT 1.9* 2.1* 2.0*   ALKPHOS 97 105 92   AST 18 62* 77*   ALT 11 18 18       Radiology : Chest x-ray-reviewed by me shows bilateral congestion and possible pleural effusion/opacities    Other : Old lab data shows patient's baseline creatinine normally runs close to 0.4    Echocardiogram 12/19-EF 45-30%    Assessment    Renal -acute kidney injury, as noticed by increase in serum creatinine from 0.4 to almost 1.2  - Very fluid overloaded at this time requiring very high vent support  - Started on CVVH, currently tolerating well. - Will continue for now with the fluid removal of 100mL/h  Electrolytes-appear to be within normal limits  Acid-base status-patient has significant high anion gap metabolic acidosis, pH is 1.77 which is worsening despite being on CVVH and bicarbonate drip. At this time I will increase the therapy fluid to 700/700/500. Also increase the bicarbonate drip to 150 mL/h  Severe sepsis with organ dysfunction thought to be due to infected port which has been removed this morning on antibiotics ID has been consulted as well  Fluid overload continue continue with 100 mL/h fluid removal  Anemia-trending down need blood transfusion  Hypotension on pressors currently down to 2 pressors wean pressors to map greater than 65  Ventilator dependent respiratory failure almost 800% FiO2  meds reviewed and D/W patients nurse also spoke with patient's   Prognosis very guarded      **This report has been created using voice recognition software. It maycontain minor  errors which are inherent in voice recognition technology. **    Shine Lombardi M.D  [facility].

## 2020-09-13 NOTE — PROGRESS NOTES
Progress note: GI    Patient - Clemencia Dukes,  Age - 62 y.o.    - 1960      Room Number - 4D-06/006-A   MRN -  [id number]   Acct # - [de-identified]  Date of Admission -  2020 11:46 AM    SUBJECTIVE:   Events noted. Intubated. On dialysis.  bed side . OBJECTIVE   VITALS    weight is 122 lb 5.7 oz (55.5 kg). Her temperature is 99 °F (37.2 °C). Her blood pressure is 121/75 and her pulse is 137. Her respiration is 25 and oxygen saturation is 94%. Wt Readings from Last 3 Encounters:   20 122 lb 5.7 oz (55.5 kg)   20 117 lb (53.1 kg)   20 104 lb (47.2 kg)       I/O (24 Hours)    Intake/Output Summary (Last 24 hours) at 2020 1049  Last data filed at 2020 1009  Gross per 24 hour   Intake 7162.92 ml   Output 4531 ml   Net 2631.92 ml       General Appearance intubated  HEENT - normocephalic, atraumatic, pink conjunctiva,  Sluggish pupillary reaction  Neck - Supple, central line in place  Lungs -  Bilateral  air entry, course breath sounds  Cardiovascular - Heart sounds are normal.  Regular rate and rhythm without murmur, gallop or rub.   Abdomen - soft, not distended, nontender, no organomegally, hypo active bowel sounds  Neurologic - intubated, sedated  Skin - ++ bruising   Extremities - generalized edema    MEDICATIONS:      sodium chloride  20 mL Intravenous Once    sodium chloride  20 mL Intravenous Once    dexamethasone  4 mg Intravenous Daily    albumin human  25 g Intravenous Daily    sodium chloride flush  10 mL Intravenous 2 times per day    meropenem  1 g Intravenous Q8H    [Held by provider] Calcium Citrate-Vitamin D  1 tablet Oral Daily with breakfast    chlorhexidine  15 mL Mouth/Throat BID    famotidine (PEPCID) injection  20 mg Intravenous BID    lactulose  30 g Oral BID    calcitRIOL  0.5 mcg Oral Daily    [Held by provider] DULoxetine  60 mg Oral Nightly    spironolactone  50 mg Oral Daily    levothyroxine  200 mcg Oral Daily    levothyroxine  50 mcg Oral Daily    vitamin A  20,000 Units Oral Daily    vitamin B-1  100 mg Oral Daily    vitamin B-6  100 mg Oral Daily    vitamin B-12  500 mcg Oral Daily    Vitamin K (Phytonadione)  100 mcg Oral Daily    Parathyroid Hormone (Recomb)  50 mcg Subcutaneous Daily    [Held by provider] DULoxetine  30 mg Oral Daily    acyclovir  400 mg Oral BID    folic acid  1 mg Oral Daily      phenylephrine (FRANK-SYNEPHRINE) 50mg/250mL infusion Stopped (09/13/20 0730)    sodium bicarbonate infusion 100 mL/hr at 09/13/20 0107    prismaSATE BGK 4/2.5 500 mL/hr (09/13/20 0435)    prismaSol BGK 4/2.5 500 mL/hr (09/13/20 0435)    prismaSol BGK 4/2.5 500 mL/hr (09/13/20 0435)    sodium chloride 100 mL/hr at 09/13/20 0348    dextrose      vasopressin (Septic Shock) infusion 0.04 Units/min (09/13/20 0422)    propofol 5 mcg/kg/min (09/13/20 0600)    dilTIAZem (CARDIZEM) 125 mg in dextrose 5% 125 mL infusion      norepinephrine 28 mcg/min (09/13/20 0834)     potassium chloride, magnesium sulfate, calcium gluconate **OR** calcium gluconate **OR** calcium gluconate **OR** calcium gluconate, sodium phosphate IVPB **OR** sodium phosphate IVPB **OR** sodium phosphate IVPB **OR** sodium phosphate IVPB, sodium chloride flush, acetaminophen **OR** acetaminophen, promethazine **OR** ondansetron, PHENobarbital **OR** PHENobarbital **OR** PHENobarbital IVPB, glucose, dextrose, glucagon (rDNA), dextrose, fentanNYL      LABS:     CBC:   Recent Labs     09/13/20  0105 09/13/20  0650 09/13/20  1005   WBC 8.5 4.7* 4.6*   HGB 7.9* 6.7* 6.3*    139 122*     BMP:    Recent Labs     09/12/20  2225 09/13/20  0105 09/13/20  0650    146* 140   K 3.9 3.6 3.9    106 99   CO2 18* 22* 8*   BUN 13 13 10   CREATININE 1.2 1.2 1.2   GLUCOSE 90 94 134*     Calcium:  Recent Labs     09/13/20  0650   CALCIUM 7.7*     Ionized Calcium:No results for input(s): IONCA in the last 72 hours.   Magnesium:  Recent Labs 09/13/20  0650   MG 2.5*     Phosphorus:  Recent Labs     09/13/20  0650   PHOS 4.0     BNP:No results for input(s): BNP in the last 72 hours. Glucose:  Recent Labs     09/12/20  2231 09/12/20  2342 09/13/20  0056   POCGLU 84 99 82     HgbA1C: No results for input(s): LABA1C in the last 72 hours. INR:   Recent Labs     09/12/20  1510 09/12/20  2342 09/13/20  1005   INR 1.76* 1.92* 1.75*     Hepatic:   Recent Labs     09/11/20  1210 09/13/20  0105 09/13/20  0650   ALKPHOS 108 97 105   ALT 13 11 18   AST 19 18 62*   PROT 2.6* 4.4* 6.2   BILITOT 0.9 1.9* 2.1*   BILIDIR  --  1.2*  --    LABALBU 1.1* 2.9* 4.1     Amylase and Lipase:  Recent Labs     09/13/20  0650   LACTA 17.6*     Lactic Acid:   Recent Labs     09/13/20  0650   LACTA 17.6*     Troponin: No results for input(s): CKTOTAL, CKMB, TROPONINI in the last 72 hours. BNP: No results for input(s): BNP in the last 72 hours.     CULTURES:   UA:   Recent Labs     09/11/20  1300   SPECGRAV 1.017   PHUR 6.0   COLORU DK YELLOW*   PROTEINU TRACE*   BLOODU NEGATIVE   RBCUA 0-2   WBCUA 5-9   BACTERIA MODERATE   NITRU POSITIVE*   BILIRUBINUR SMALL*   UROBILINOGEN 1.0   KETUA TRACE*   LABCAST 0-4 HYALINE  NONE SEEN     Micro:   Lab Results   Component Value Date    BC No growth-preliminary No growth  04/20/2020         IMAGING:         Problem list of patient:     Patient Active Problem List   Diagnosis    TIA (transient ischemic attack)    Hypothyroidism    DVT (deep venous thrombosis) (HCC)    Depression    Anxiety    Fibromyalgia    Fatty liver    Tachycardia    GERD (gastroesophageal reflux disease)    Recurrent cold sores    Irritable bowel    Gastric bypass status for obesity    Insomnia    Ovarian cyst    Muscle weakness (generalized)    Alcoholism (HCC)    Osteoarthritis    Hypocalcemia    Hypomagnesemia    Moderate malnutrition (HCC)    Other hypoparathyroidism (HCC)    Pancytopenia (HCC)    Palpitations    Asymptomatic bacteriuria    Hyperphosphatemia    Prolonged Q-T interval on ECG    Hypoalbuminemia    Tinnitus of both ears    Hx of parathyroidectomy    Normocytic anemia    History of fibromyalgia    Physical deconditioning    Severe malnutrition (HCC)    Bacteremia    Fever    Calcification of breast    Hypotension    Steatosis of liver    Nutritional marasmus (HCC)    Discitis of lumbar region    Discitis    Hypokalemia    Mass of joint of left hip    Weight loss    Lumbar discitis         ASSESSMENT/PLAN     Patient Active Problem List   Diagnosis    TIA (transient ischemic attack)    Hypothyroidism    DVT (deep venous thrombosis) (HCC)    Depression    Anxiety    Fibromyalgia    Fatty liver    Tachycardia    GERD (gastroesophageal reflux disease)    Recurrent cold sores    Irritable bowel    Gastric bypass status for obesity    Insomnia    Ovarian cyst    Muscle weakness (generalized)    Alcoholism (HCC)    Osteoarthritis    Hypocalcemia    Hypomagnesemia    Moderate malnutrition (HCC)    Other hypoparathyroidism (HCC)    Pancytopenia (HCC)    Palpitations    Asymptomatic bacteriuria    Hyperphosphatemia    Prolonged Q-T interval on ECG    Hypoalbuminemia    Tinnitus of both ears    Hx of parathyroidectomy    Normocytic anemia    History of fibromyalgia    Physical deconditioning    Severe malnutrition (HCC)    Bacteremia    Fever    Calcification of breast    Hypotension    Steatosis of liver    Nutritional marasmus (HCC)    Discitis of lumbar region    Discitis    Hypokalemia    Mass of joint of left hip    Weight loss    Lumbar discitis   Acute on chronic alcoholic liver disease. Sever disease limiting any recovery. Overall prognosis poor. Discussed with  bed side. No overt gi bleeding at this time. Likely bone marrow suppression. P- continue current care  Awaiting family members to have meeting about further plans.         Timothy Junior MD, [facility] 9/13/2020 10:49 AM

## 2020-09-13 NOTE — PROCEDURES
Kaia Riggs is a 62 y.o. female patient. 1. Altered mental status, unspecified altered mental status type    2. Hypotension, unspecified hypotension type    3. Alcoholic cirrhosis of liver with ascites (HCC)    4. Other iron deficiency anemia    5. Gram negative septic shock (HCC)      Past Medical History:   Diagnosis Date    Alcoholism ([address]     Anxiety     Atrophy of vulva     Atyp squam cell of undet signfc cyto smr crvx (ASC-US)     Closed fracture of seventh cervical vertebra without spinal cord injury ([address]     Closed fracture of sixth cervical vertebra (HCC)     Closed fracture of thoracic vertebra (HCC)     Depression     Dry eye syndrome     DVT (deep venous thrombosis) (HCC)     left arm; after contrast for MRI brain    Dyspareunia in female     External hemorrhoids without complication     Fibromyalgia     Gastric bypass status for obesity 2/26/2018    GERD (gastroesophageal reflux disease)     Hiatal hernia     History of ITP     as a senior in high school; no active issues    Hungry bone syndrome     Hx of blood clots     Hyperparathyroidism ([address]     Prior hospitalization with hungry bone syndrome on IV calcium in the past    Hypocalcemia     Hypothyroidism     Insomnia     Irritable bowel     Lactose intolerance     uses lactaid which helps    Menopausal syndrome     Osteoarthritis     Osteopenia     on reclast    Osteopenia     Osteoporosis     Ovarian cyst     repeat imaging in 9/2018 recommended    Oxaluria ([address]     Recurrent cold sores     on acyclovir    Sepsis ([address] 2/2/2020    Tachycardia     due to hyperparathyroidism; on atenolol    TIA (transient ischemic attack)     visible on MRI brain    Tubular adenoma of colon     2019     Blood pressure 127/76, pulse 137, temperature 98.6 °F (37 °C), temperature source Bladder, resp. rate 22, weight 122 lb 5.7 oz (55.5 kg), SpO2 95 %.     Insert Arterial Line    Date/Time: 9/13/2020 1:15 AM  Performed by: Abbi Coronado MD  Authorized by: Abbi Coronado MD   Consent: Written consent obtained. Consent given by: spouse  Patient identity confirmed: hospital-assigned identification number  Time out: Immediately prior to procedure a \"time out\" was called to verify the correct patient, procedure, equipment, support staff and site/side marked as required. Preparation: Patient was prepped and draped in the usual sterile fashion. Indications: multiple ABGs, respiratory failure and hemodynamic monitoring  Location: right femoral  Anesthesia: local infiltration    Anesthesia:  Local Anesthetic: lidocaine 1% without epinephrine  Anesthetic total: 3 mL    Sedation:  Patient sedated: no    Needle gauge: 20  Seldinger technique: Seldinger technique used  Number of attempts: 1  Post-procedure: line sutured and dressing applied  Post-procedure CMS: normal  Patient tolerance: Patient tolerated the procedure well with no immediate complications  Comments: Estimated blood loss 20 to 25 cc.           Abbi Coronado MD  9/13/2020

## 2020-09-13 NOTE — PROCEDURES
ICU PROCEDURE - ENDOTRACHEAL INTUBATION    Jasbir Taylor     MRN#: [id number]  20     Acct #: [de-identified]    : 1960      INDICATION: Respiratory failure    TIME OUT: taken    Permission obtained, risks/benefits reviewed:    ANESTHESIA:   [x]Ketamine:  100 mg IV  []Ativan  [] Morphine  [x]Propofol:  Two 50 mg IV boluses and IV gtt started  []Other medications: 100 mg IV fentanyl and additional 100 mg IV fentanyl      ESTIMATED BLOOD LOSS:  None. COMPLICATIONS:  [x]N/A  [] Other:    LARYNGOSCOPIC AIRWAY GRADE (CORMACK-LEHANE):[]1  []2a  [x]2b []3  []4        INTUBATION EQUIPMENT USED:  [x] Direct laryngoscope only    OUTCOME: Successful placement of #  7.5  Taperguard Evac endotracheal via   [x]Oral route    INSERTION DEPTH:  24  cm from   [x]lip           CONFIRMATION OF TUBE POSITION:   [x]Capnography - Strong & repeatable exhaled CO2 detection   [x]Multiple point auscultation   [x]SpO2 response   [x]STAT X-ray   []Bronchoscopic assessment    UNUSUAL FINDINGS: None    PROCEDURE:     Using direct laryngoscopy, the vocal cords were visualized and the endotracheal tube was placed through the cords under direct vision. Good breath sounds were auscultated bilaterally without sounds over abdomen. Appropriate strong & repeatable exhaled CO2 detection was confirmed.        Electronically signed by ANITA Cabrera CNP on 2020 at 10:38 PM

## 2020-09-13 NOTE — SIGNIFICANT EVENT
Intensivist Progress Note      Patient:  Kaia Oneil    Unit/Bed:4D-06/006-A  YOB: 1960  MRN: [id number]   Acct: [de-identified]     PCP: Neyda Tatum MD  Date of Admission: 9/11/2020      Updated  per phone with need for arterial line insertion and explained risks and benefits including bleeding, blood clots, perforation, and infection. Also explained patient clinical status had declined requiring increased use of IV vasopressors and need for dialysis catheter insertion and dialysis for fluid removal.   consents for procedures. 0200   present in lobby and updated regarding insertion of an arterial line and patient current status. Patient's/concerns addressed.     Nikky Bro, APRN - CNP

## 2020-09-13 NOTE — FLOWSHEET NOTE
[place] [id number] PROGRESS NOTE      Patient: Bassam Gilbert  Room #: 4D-06/006-A            YOB: 1960  Age: 62 y.o. Gender: female            Admit Date & Time: 9/11/2020 11:46 AM    Assessment:  {t is a 62year old female. Patient is the vent and receiving dialysis in 4D room 6. Patient's  and another family member are in the room at the time of this visit. This  staff received a called from patient's nurse Óscar Alvarado) asking for spiritual support as patient is not doing well. During this visit, conversation tool place between patient's  and this . He shared with this  that patient appeared to be doing fine yesterday but has not done well today and that her number appeared to be steadily going down. He also told this  staff that patient's son is on his way flying in because of the critical nature of patient. During this visit, patient's  and family who are in the room are also tearful and anxious. Interventions: This  offered words of encouragement, active listening presence, words of consolation.  also read a scripture from [address] [place] [address] 23 and prayed for peace and strength for patient and  family. Outcomes:  Family is receptive to 's visit, engaged in conversation and showed gratitude fort the prayer and  support provided  Plan:  1. SC will follow up for continue emotional and spiritual support needed. 2. During the next visit,  will talk with family to lear how their jacinda is helping them cope with the illness of their loved one.      Electronically signed by Mike Abarca on 9/13/2020 at 4:30 PM.  [facility]  [phone]

## 2020-09-13 NOTE — H&P
Formulation and discussion of sedation / procedure plans, risks, benefits, side effects and alternatives with patient and/or responsible adult completed.     Electronically signed by Jorgito Marc MD on 9/13/2020 at 11:34 AM

## 2020-09-13 NOTE — PROGRESS NOTES
Intensive Care Unit  Medical Intensive Care Unit  Attending Progress Note      Patient was seen and evaluated with Dr. Sondra Little. Team members present on rounds:  Family, Nursing and Respiratory Therapist    ICU guidelines/prophylaxis active for the following:    head above bed above 30 degrees, insulin guidelines, DVT prophylaxis, ulcer prophylaxis and analgesia/sedation guidelines    Patient Examined? yes    Additions/differences/highlights to the resident's/fellow's exam:  VITALS:  /75   Pulse 135   Temp 98.6 °F (37 °C)   Resp 20   Wt 122 lb 5.7 oz (55.5 kg)   SpO2 94%   BMI 25.57 kg/m²   24HR INTAKE/OUTPUT:    Intake/Output Summary (Last 24 hours) at 9/13/2020 1014  Last data filed at 9/13/2020 1009  Gross per 24 hour   Intake 7162.92 ml   Output 4531 ml   Net 2631.92 ml     VENT SETTINGS:    Vent Information  $Ventilation: $Subsequent Day  Skin Assessment: Clean, dry, & intact  Suction Catheter Diameter: 14  Equipment ID: [id number]  Vent Type: C2G5 Obrien  Vent Mode: AC/PC  Vt Ordered: 0 mL  Pressure Ordered: 26(pcontrol 18)  Rate Set: 12 bmp  Peak Flow: 67.7 L/min  FiO2 : 100 %  SpO2: (unable to read, Dr Joanne Becker notified)  SpO2/FiO2 ratio: 188  Sensitivity: 3  PEEP/CPAP: 6  I Time/ I Time %: 1 s  Humidification Source: Heated wire  Humidification Temp: 37  Humidification Temp Measured: 37.1  Circuit Condensation: Not drained  Nitric Oxide/Epoprostenol In Use?: No  Additional Respiratory  Assessments  Pulse: 135  Resp: 20  SpO2: (unable to read, Dr Joanne Becker notified)  Position: Franco's  Humidification Source: Heated wire  Humidification Temp: 37  Circuit Condensation: Not drained  Oral Care: Mouth swabbed, Mouth suctioned  Subglottic Suction Done?: Yes    CONSTITUTIONAL:   Pale acute on chronically ill-appearing, acute sick toxic looking. HEENT:  normocephalic and atraumatic.  No scleral icterus. PERR. Neck: supple.  No thyromegaly. Ecchymosis/subcu hematoma left more than the right neck. Lungs: clear/diminished to auscultation laterally without wheezes/rales/rhonchi.  No retractions or tachypnea. Cardiac: RRR. +++JVD. Abdomen:  Distended semi-firm, left lower quadrant, hypoactive bowel sounds, extensive abdominal wall edema  Extremities:  No clubbing, cyanosis.  Petechial rash bilateral lower extremities. Severe anasarca upper and lower extremities and abdominal wall   vasculature: capillary refill >3 seconds. All pulses all over the body are decreased mainly secondary to anasarca and low secondary to septic shock   skin:  Pale, cold and weeping from the extensive anasarca. Psych: Intubated, unobtainable. Lymph:  No supraclavicular adenopathy. Neurologic: Unobtainable  DATA:  CBC:   Lab Results   Component Value Date    WBC 4.7 09/13/2020    RBC 2.10 09/13/2020    HGB 6.7 09/13/2020    HCT 22.7 09/13/2020    .1 09/13/2020    RDW 12.0 02/12/2018     09/13/2020     CMP:    Lab Results   Component Value Date     09/13/2020    K 3.9 09/13/2020    CL 99 09/13/2020    CO2 8 09/13/2020    BUN 10 09/13/2020    CREATININE 1.2 09/13/2020    LABGLOM 46 09/13/2020    GLUCOSE 134 09/13/2020    PROT 6.2 09/13/2020    CALCIUM 7.7 09/13/2020    BILITOT 2.1 09/13/2020    ALKPHOS 105 09/13/2020    AST 62 09/13/2020    ALT 18 09/13/2020       KEY ISSUES/FINDINGS/ASSESSMENT AND PLAN:  Rosetta Gordon 62 y old patient  presented on 9/11/2020 to [facility] emergency department per EMS with reports of fatigue, confusion, hypotension.  Per patient she woke up this morning and was feeling fatigued and when her home health nurse came to evaluate her her blood pressure was low.  She does not know how low it was.  EMS was called but patient does not remember anything until being in the emergency department.   States that her last alcoholic drink was August 12.    Last EGD and colonoscopy completed May 2019 did not demonstrate esophageal varices but did demonstrate portal hypertensive gastropathy and gastric polyp.     1. Acute Hypoxic Respiratory Failure: Secondary to hypoventilation secondary to altered mental status with bilateral pleural effusion, progressed to hypoxic respiratory failure last night secondary to fluid overload with ILEANA secondary to septic shock, intubated cannot require mechanical ventilator. CRRT started and she is now having severe hypoxemia with the plan to treat her infection and the CRRT to remove fluids and monitor the lung condition, patient is not ready for weaning today. 2. Septic Shock: Secondary to gram-negative bacilli bacteremia( proteus), source is the line(medport) ; IV antibiotics started with IV fluids vasopressors with the plan to remove Mediport today. In addition to continuation of the antibiotic and the other supportive treatment for her septic shock   3. hepatic Encephalopathy: Deteriorated last night, intubated, ammonia increased, patient already on CRRT. 4. Acute versus chronic  Blood Loss Anemia on Macrocytic Anemia:  Hemoglobin 7.9/hematocrit 24.5, baseline range appears tween 9 and 11 since OSS 0914.  Hemoglobin on 9/5/2020 was 11. 0.   GI consult   5. Hepatorenal syndrome : With severe anasarca developed over the last 24 hours with ischemic ATN secondary to septic shock also could be other possibility or added factor to the hepatorenal syndrome, patient started on CRRT for adequate oxygenation lactic acidosis improvement and improvement of anasarca. 6. Anion Gap Metabolic Acidosis: Severe lactic acidosis secondary to septic shock with multiple vasopressors drips. 7. HFrEF: Concern for exacerbation related to pleural effusions per chest x-ray.  Echocardiogram on 12/6/2019 demonstrated EF 45 to 50%.   Repeat echocardiogram.  BNP. 8. Essential Hypertension: Currently hypotensive.  Hold beta-blocker. 9. s/p Parathyroidectomy with Associated Hypocalcemia: Ionized calcium stable.  Continue Calcitrol and Recomb.   10. History of Gastric Bypass: Continue vitamin K, vitamin A, vitamin B12, vitamin B6, vitamin B1, zinc, folic acid. 11. Acquired Hypothyroidism:  Continue synthroid.  check tsh  12. Severe Protein Calorie Malnutrition/Hypoalbuminemia: Consult to Dietitian.     Critical condition with guarded prognosis  Cc time 60 min apart from procedures    Discussed with the   Patient's condition, outcome; the  is aware of her condition /poor prognosis, plan possibly today for hospice care when the rest of the family members are around.

## 2020-09-13 NOTE — PLAN OF CARE
Problem: Pain:  Goal: Pain level will decrease  Description: Pain level will decrease  Outcome: Met This Shift  Note: CPOT 0     Problem: Pain:  Goal: Control of acute pain  Description: Control of acute pain  Outcome: Met This Shift     Problem: Pain:  Goal: Control of chronic pain  Description: Control of chronic pain  Outcome: Met This Shift     Problem: Falls - Risk of:  Goal: Will remain free from falls  Description: Will remain free from falls  Outcome: Ongoing  Note: Remains in bed at this time, not attempting to move, bed alarm on       Problem: Falls - Risk of:  Goal: Absence of physical injury  Description: Absence of physical injury  Outcome: Ongoing  Note: No sigsn of injury at this time      Problem: Skin Integrity:  Goal: Will show no infection signs and symptoms  Description: Will show no infection signs and symptoms  Outcome: Ongoing  Note: Afebrile and low WBC, put positive blood cultures. Problem: Infection - Central Venous Catheter-Associated Bloodstream Infection:  Goal: Will show no infection signs and symptoms  Description: Will show no infection signs and symptoms  Outcome: Ongoing  Note: Afebrile and low WBC, put positive blood cultures. Problem: Skin Integrity:  Goal: Absence of new skin breakdown  Description: Absence of new skin breakdown  Outcome: Not Met This Shift  Note: Skin continues to develop more petechiae, bruising and weeping. Problem: Impaired respiratory status  Goal: Will be able to breathe spontaneously, without ventilator support  Description: Will be able to breathe spontaneously, without ventilator support  9/13/2020 1222 by Sara Estrada RN  Outcome: Not Met This Shift  Note: Remains on full ventilator support.

## 2020-09-13 NOTE — PROGRESS NOTES
Pt became progressively more lethargic and confused between 9/12 2000 and 2200. Ashish Conley NP at bedside to evaluate. NIBP with labile readings. Decreased mental status and BP resulted in intubation per Ashish Conley NP. Dr. Yaniv Spencer called in to place arterial line for more accurate blood pressure monitoring. Pressor requirements continued to increase. Manual BP reading SBP 60s. Once arterial line was placed, . Pressors weaned per arterial BP. Persistent difficulty obtaining spO2 reading. FiO2 100% on vent per Dr. Yaniv Spencer. Left IJ temporary dialysis catheter placed by Dr. Yaniv Spencer for CRRT d/t low urine output, fluid overload, and elevated lactate.

## 2020-09-13 NOTE — PROGRESS NOTES
Progress note: Infectious diseases    Patient - Jorge Tom,  Age - 62 y.o.    - 1960      Room Number - 4D-06/006-A   N -  [id number]   [facility] # - [de-identified]  Date of Admission -  2020 11:46 AM    SUBJECTIVE:   She developed respiatory failure and acidosis. On Vent and CVVH  OBJECTIVE   VITALS    weight is 122 lb 5.7 oz (55.5 kg). Her bladder temperature is 99 °F (37.2 °C). Her blood pressure is 121/75 and her pulse is 133. Her respiration is 23 and oxygen saturation is 94%.        Wt Readings from Last 3 Encounters:   20 122 lb 5.7 oz (55.5 kg)   20 117 lb (53.1 kg)   20 104 lb (47.2 kg)       I/O (24 Hours)    Intake/Output Summary (Last 24 hours) at 2020 1344  Last data filed at 2020 1300  Gross per 24 hour   Intake 7333.67 ml   Output 5388 ml   Net 1945.67 ml       General Appearance  Sick looking on vent  HEENT - normocephalic, atraumatic,pale conjunctiva,  anicteric sclera  Neck - Supple, no mass  Lungs -  Bilateral  air entry, diminished breath sound  Cardiovascular - Heart sounds are normal.     Abdomen - soft, edematous abdominal wall  Neurologic -on vent  Skin - + bruising and ecchymosis with mottling of the skin  Extremities - + edema, oozing of fluid    MEDICATIONS:      sodium chloride  20 mL Intravenous Once    sodium chloride  20 mL Intravenous Once    dexamethasone  4 mg Intravenous Daily    albumin human  25 g Intravenous Daily    sodium chloride flush  10 mL Intravenous 2 times per day    meropenem  1 g Intravenous Q8H    [Held by provider] Calcium Citrate-Vitamin D  1 tablet Oral Daily with breakfast    chlorhexidine  15 mL Mouth/Throat BID    famotidine (PEPCID) injection  20 mg Intravenous BID    lactulose  30 g Oral BID    calcitRIOL  0.5 mcg Oral Daily    [Held by provider] DULoxetine  60 mg Oral Nightly    spironolactone  50 mg Oral Daily    levothyroxine  200 mcg Oral Daily    levothyroxine  50 mcg Oral Daily    vitamin A  20,000 Units Oral Daily    vitamin B-1  100 mg Oral Daily    vitamin B-6  100 mg Oral Daily    vitamin B-12  500 mcg Oral Daily    Vitamin K (Phytonadione)  100 mcg Oral Daily    Parathyroid Hormone (Recomb)  50 mcg Subcutaneous Daily    [Held by provider] DULoxetine  30 mg Oral Daily    acyclovir  400 mg Oral BID    folic acid  1 mg Oral Daily      phenylephrine (FRANK-SYNEPHRINE) 50mg/250mL infusion Stopped (09/13/20 0730)    sodium bicarbonate infusion 150 mL/hr at 09/13/20 1250    prismaSATE BGK 4/2.5 700 mL/hr (09/13/20 1341)    prismaSol BGK 4/2.5 700 mL/hr (09/13/20 1141)    prismaSol BGK 4/2.5 500 mL/hr (09/13/20 0435)    sodium chloride 100 mL/hr at 09/13/20 0348    dextrose      vasopressin (Septic Shock) infusion 0.04 Units/min (09/13/20 0422)    propofol 5 mcg/kg/min (09/13/20 0600)    dilTIAZem (CARDIZEM) 125 mg in dextrose 5% 125 mL infusion      norepinephrine 28 mcg/min (09/13/20 0834)     potassium chloride, magnesium sulfate, calcium gluconate **OR** calcium gluconate **OR** calcium gluconate **OR** calcium gluconate, sodium phosphate IVPB **OR** sodium phosphate IVPB **OR** sodium phosphate IVPB **OR** sodium phosphate IVPB, sodium chloride flush, acetaminophen **OR** acetaminophen, promethazine **OR** ondansetron, PHENobarbital **OR** PHENobarbital **OR** PHENobarbital IVPB, glucose, dextrose, glucagon (rDNA), dextrose, fentanNYL      LABS:     CBC:   Recent Labs     09/13/20 0105 09/13/20  0650 09/13/20  1005   WBC 8.5 4.7* 4.6*   HGB 7.9* 6.7* 6.3*    139 122*     BMP:    Recent Labs     09/13/20  0105 09/13/20  0650 09/13/20  1005   * 140 137   K 3.6 3.9  3.9 4.1    99 96*   CO2 22* 8* 6*   BUN 13 10 10   CREATININE 1.2 1.2 1.1   GLUCOSE 94 134* 94     Calcium:  Recent Labs     09/13/20  1005   CALCIUM 7.8*     Ionized Calcium:No results for input(s): IONCA in the last 72 hours.  Magnesium:  Recent Labs     09/13/20  1005   MG 2.3     Phosphorus:  Recent Labs     09/13/20  1005   PHOS 3.8     BNP:No results for input(s): BNP in the last 72 hours. Glucose:  Recent Labs     09/12/20  2231 09/12/20  2342 09/13/20  0056   POCGLU 84 99 82     HgbA1C: No results for input(s): LABA1C in the last 72 hours. INR:   Recent Labs     09/12/20  1510 09/12/20  2342 09/13/20  1005   INR 1.76* 1.92* 1.75*     Hepatic:   Recent Labs     09/13/20  0105 09/13/20  0650 09/13/20  1005   ALKPHOS 97 105 92   ALT 11 18 18   AST 18 62* 77*   PROT 4.4* 6.2 5.8*   BILITOT 1.9* 2.1* 2.0*   BILIDIR 1.2*  --   --    LABALBU 2.9* 4.1 4.0     Amylase and Lipase:  Recent Labs     09/13/20  1005   LACTA 23.6*     Lactic Acid:   Recent Labs     09/13/20  1005   LACTA 23.6*     Troponin: No results for input(s): CKTOTAL, CKMB, TROPONINI in the last 72 hours. BNP: No results for input(s): BNP in the last 72 hours.     CULTURES:   UA:   Recent Labs     09/11/20  1300   SPECGRAV 1.017   PHUR 6.0   COLORU DK YELLOW*   PROTEINU TRACE*   BLOODU NEGATIVE   RBCUA 0-2   WBCUA 5-9   BACTERIA MODERATE   NITRU POSITIVE*   BILIRUBINUR SMALL*   UROBILINOGEN 1.0   KETUA TRACE*   LABCAST 0-4 HYALINE  NONE SEEN     Micro:   Lab Results   Component Value Date    BC No growth-preliminary No growth  04/20/2020         IMAGING:         Problem list of patient:     Patient Active Problem List   Diagnosis Code    TIA (transient ischemic attack) G45.9    Hypothyroidism E03.9    DVT (deep venous thrombosis) (HCC) I82.409    Depression F32.9    Anxiety F41.9    Fibromyalgia M79.7    Fatty liver K76.0    Tachycardia R00.0    GERD (gastroesophageal reflux disease) K21.9    Recurrent cold sores B00.1    Irritable bowel K58.9    Gastric bypass status for obesity Z98.84    Insomnia G47.00    Ovarian cyst N83.209    Muscle weakness (generalized) M62.81    Alcoholism (HCC) F10.20    Osteoarthritis M19.90    Hypocalcemia E83.51    Hypomagnesemia E83.42    Moderate malnutrition (HCC) E44.0    Other hypoparathyroidism (HCC) E20.8    Pancytopenia (HCC) D61.818    Palpitations R00.2    Asymptomatic bacteriuria R82.71    Hyperphosphatemia E83.39    Prolonged Q-T interval on ECG R94.31    Hypoalbuminemia E88.09    Tinnitus of both ears H93.13    Hx of parathyroidectomy Z90.09    Normocytic anemia D64.9    History of fibromyalgia Z87.39    Physical deconditioning R53.81    Severe malnutrition (HCC) E43    Bacteremia R78.81    Fever R50.9    Calcification of breast R92.1    Hypotension I95.9    Steatosis of liver K76.0    Nutritional marasmus (HCC) E41    Discitis of lumbar region M46.46    Discitis M46.40    Hypokalemia E87.6    Mass of joint of left hip M25.852    Weight loss R63.4    Lumbar discitis M46.46         ASSESSMENT/PLAN   Septic shock with multiorgan faliure: she is on the vent pressors and CVVH  Liver cirrhosis related to alcohol abuse. Severe malnutrition  Infected mediport :removed  Continue current iv antibiotic. Discussed with her  and her sister,discussed on her poor prognosis. Awaiting her son to come and will make decision.       David Peres MD, FACP 9/13/2020 1:44 PM

## 2020-09-13 NOTE — PROGRESS NOTES
Assisted Yuri Goode with intubation. Patient intubated with size 7.5 ett, tube placement 24 cm at the lip, positive color change noted on co2 detector, bilateral breath sounds heard via RN. Patient placed on ventilator using Dr Marylen Perish order set.

## 2020-09-13 NOTE — PROCEDURES
Noemy Romero is a 62 y.o. female patient. 1. Altered mental status, unspecified altered mental status type    2. Hypotension, unspecified hypotension type    3. Alcoholic cirrhosis of liver with ascites (HCC)    4. Other iron deficiency anemia    5. Gram negative septic shock (HCC)      Past Medical History:   Diagnosis Date    Alcoholism ([address]     Anxiety     Atrophy of vulva     Atyp squam cell of undet signfc cyto smr crvx (ASC-US)     Closed fracture of seventh cervical vertebra without spinal cord injury ([address]     Closed fracture of sixth cervical vertebra (HCC)     Closed fracture of thoracic vertebra (HCC)     Depression     Dry eye syndrome     DVT (deep venous thrombosis) (HCC)     left arm; after contrast for MRI brain    Dyspareunia in female     External hemorrhoids without complication     Fibromyalgia     Gastric bypass status for obesity 2/26/2018    GERD (gastroesophageal reflux disease)     Hiatal hernia     History of ITP     as a senior in high school; no active issues    Hungry bone syndrome     Hx of blood clots     Hyperparathyroidism ([address]     Prior hospitalization with hungry bone syndrome on IV calcium in the past    Hypocalcemia     Hypothyroidism     Insomnia     Irritable bowel     Lactose intolerance     uses lactaid which helps    Menopausal syndrome     Osteoarthritis     Osteopenia     on reclast    Osteopenia     Osteoporosis     Ovarian cyst     repeat imaging in 9/2018 recommended    Oxaluria ([address]     Recurrent cold sores     on acyclovir    Sepsis ([address] 2/2/2020    Tachycardia     due to hyperparathyroidism; on atenolol    TIA (transient ischemic attack)     visible on MRI brain    Tubular adenoma of colon     2019     Blood pressure 127/76, pulse 137, temperature 98.6 °F (37 °C), temperature source Bladder, resp. rate 22, weight 122 lb 5.7 oz (55.5 kg), SpO2 95 %.     Central Line    Date/Time: 9/13/2020 2:15 AM  Performed by: Annabella Aviles Arnulfo Beaulieu MD  Authorized by: Caryl Belle MD   Consent: Written consent obtained. Consent given by: spouse  Required items: required blood products, implants, devices, and special equipment available  Patient identity confirmed: hospital-assigned identification number  Time out: Immediately prior to procedure a \"time out\" was called to verify the correct patient, procedure, equipment, support staff and site/side marked as required. Indications: vascular access  Anesthesia: local infiltration    Anesthesia:  Local Anesthetic: lidocaine 1% without epinephrine  Anesthetic total: 3 mL    Sedation:  Patient sedated: no    Preparation: skin prepped with ChloraPrep  Sterile barriers: all five maximum sterile barriers used - cap, mask, sterile gown, sterile gloves, and large sterile sheet  Hand hygiene: hand hygiene performed prior to central venous catheter insertion  Location details: left internal jugular  Patient position: flat  Procedural supplies: Dialysis catheter. Catheter size: 14 Fr  Pre-procedure: landmarks identified  Ultrasound guidance: yes  Sterile ultrasound techniques: sterile gel and sterile probe covers were used  Number of attempts: 1  Successful placement: yes  Post-procedure: line sutured and dressing applied  Assessment: blood return through all ports,  free fluid flow,  placement verified by x-ray and no pneumothorax on x-ray  Patient tolerance: Patient tolerated the procedure well with no immediate complications  Comments: Estimated blood loss 20-25 cc.           Caryl Belle MD  9/13/2020

## 2020-09-14 NOTE — CARE COORDINATION
9/14/20, 7:54 AM EDT  DISCHARGE PLANNING EVALUATION:    Kirk Freitas       Admitted from: ED 9/11/2020/ [facility] day: 3   Location: -06/006-A Reason for admit: Hypotension [I95.9] Status: IP  Admit order signed?: no  PMH:  has a past medical history of Alcoholism ([address] Anxiety, Atrophy of vulva, Atyp squam cell of undet signfc cyto smr crvx (ASC-US), Closed fracture of seventh cervical vertebra without spinal cord injury ([address] Closed fracture of sixth cervical vertebra ([address] Closed fracture of thoracic vertebra ([address] Depression, Dry eye syndrome, DVT (deep venous thrombosis) ([address] Dyspareunia in female, External hemorrhoids without complication, Fibromyalgia, Gastric bypass status for obesity, GERD (gastroesophageal reflux disease), Hiatal hernia, History of ITP, Hungry bone syndrome, Hx of blood clots, Hyperparathyroidism ([address] Hypocalcemia, Hypothyroidism, Insomnia, Irritable bowel, Lactose intolerance, Menopausal syndrome, Osteoarthritis, Osteopenia, Osteopenia, Osteoporosis, Ovarian cyst, Oxaluria ([address] Recurrent cold sores, Sepsis ([address] Tachycardia, TIA (transient ischemic attack), and Tubular adenoma of colon. Procedure:   9/11 CXR: Bilateral pleural effusions and bibasilar atelectasis or infiltrate.  Correlate for CHF  9/12 Intubated  9/12 CVC RIJ  9/13 Mediport removed in IR  9/13 TESSIO [facility]  9/13 CRRT initiated  9/13 CXR: Generalized worsening opacification of the lungs  Medications:  Scheduled Meds:   sodium chloride  20 mL Intravenous Once    sodium chloride  20 mL Intravenous Once    dexamethasone  4 mg Intravenous Daily    sodium chloride  20 mL Intravenous Once    albumin human  25 g Intravenous Daily    sodium chloride flush  10 mL Intravenous 2 times per day    meropenem  1 g Intravenous Q8H    [Held by provider] Calcium Citrate-Vitamin D  1 tablet Oral Daily with breakfast    chlorhexidine  15 mL Mouth/Throat BID    famotidine (PEPCID) injection  20 mg Intravenous BID never used smokeless tobacco.   PCP: Curley Bumpers, MD  Readmission 30 days or less: no  Readmission Risk Score: 56%    Discharge Planning Evaluation  Current Residence:  Private Residence  Living Arrangements:  Spouse/Significant Other   Support Systems:  Spouse/Significant Other, Children  Current Services PTA:     Potential Assistance Needed:  Home Care  Potential Assistance Purchasing Medications:  No  Does patient want to participate in local refill/ meds to beds program?  Yes  Type of Home Care Services:  IV Therapy  Patient expects to be discharged to:  Home  Expected Discharge date:  09/15/20  Follow Up Appointment: Best Day/ Time: Tuesday AM    Patient Goals/Plan/Treatment Preferences: From home with  and current with [facility]. Plan for hospice consult if patient survives. Transportation/Food Security/Housekeeping Addressed:  No issues identified.     Evaluation: yes, ordered prior to withdrawal of treatment

## 2020-09-14 NOTE — PROGRESS NOTES
Rupal Kim 58  PHYSICAL THERAPY MISSED TREATMENT NOTE  [facility] ICU 4D    Date: 2020  Patient Name: Fredrick Villa        MRN: [id number]   : 1960  (60 y.o.)  Gender: female           REASON FOR MISSED TREATMENT:  Hold Treatment per Nursing. Pt currently intubated, on CVVH. Not medically stable for early mobility. Will discontinue orders at this time, please reorder if appropriate. Mirna Sood

## 2020-09-14 NOTE — PLAN OF CARE
Problem: Impaired respiratory status  Goal: Will be able to breathe spontaneously, without ventilator support  Description: Will be able to breathe spontaneously, without ventilator support  9/14/2020 1034 by Meg StagerJAMES  Outcome: Completed  Note: Patient terminally extubated per ordrer

## 2020-09-14 NOTE — PROGRESS NOTES
[facility]  Notice of Patient Passing      Patient Name- Anna Lane Number- [de-identified]   Attending Physician- Darryl Caldera MD    Admitted on-9/11/2020 11:46 AM     On 9/14/2020 at [phone] patient was found in 4D06 with:   Absence of vital signs. Absence of neurological response. Confirmed time of death at [phone]. Physician or On-call Physician notified of time of death- yes    Family present at time of death- yes,  and son   Spiritual care present at time of death- yes    Physician was notified and orders were obtained to release the body. Post-Mortem documentation completed; form printed, signed, and given to admitting.     Veronica Vasquez RN Nursing Supervisor/ Manager  9/14/20   11:50 AM

## 2020-09-14 NOTE — CARE COORDINATION
9/14/20, 11:37 AM EDT    DISCHARGE PLANNING EVALUATION    Consult deferred, patient terminally extubated.

## 2020-09-14 NOTE — PROGRESS NOTES
[facility]   Nephrology progress note  9/14/2020, 8:59 AM      Pt Name:    Kirk Freitas  MRN:     [id number]     YOB: 1960  Admit Date:    9/11/2020 11:46 AM  Primary Care Physician:  Haroon Dawson MD   Room number  4D-06/006-A    Chief Complaint: Nephrology following for ILEANA    Subjective:  Patient seen and examined  Very ill-appearing  On Levophed, vasopressin, propofol  On IV bicarbonate drip  CVVH data reviewed  Labs reviewed  Input and output reviewed  Discussed with multiple family members in the room      Objective:  24HR INTAKE/OUTPUT:      Intake/Output Summary (Last 24 hours) at 9/14/2020 0859  Last data filed at 9/14/2020 0703  Gross per 24 hour   Intake 4993.5 ml   Output 6317 ml   Net -1323.5 ml     I/O last 3 completed shifts: In: 4845.5 [I.V.:4410; Blood:317.5; Other:118]  Out: 8158 [Urine:31]  I/O this shift:  In: 296 [I.V.:296]  Out: 0   Admission weight: 121 lb (54.9 kg)  Wt Readings from Last 3 Encounters:   09/14/20 128 lb 1.4 oz (58.1 kg)   09/05/20 117 lb (53.1 kg)   08/27/20 104 lb (47.2 kg)     Body mass index is 26.77 kg/m².     Physical examination  VITALS:     Vitals:    09/14/20 0703 09/14/20 0715 09/14/20 0759 09/14/20 0800   BP:       Pulse: 142   140   Resp: 24  24 26   Temp:    98.2 °F (36.8 °C)   TempSrc:    Bladder   SpO2:       Weight:       Height:  4' 10\" (1.473 m)       General Appearance: Critically ill, multiple lines, not responsive at this time, intubated on CVVH  Mouth/Throat: ET tube present  Neck: Multiple lines, no JVD  Lungs: Diminished air entry at the bases, no expiratory wheeze  Heart:  S1, S2 heard  GI: Soft  Extremities: Bilateral lower extremity 3+ pitting edema with mottling of skin, multiple blisters on the right lower extremity  Skin: Mottled appearance, some ischemic changes in the legs      Lab Data  CBC:   Recent Labs     09/13/20  2204 09/14/20  0205 09/14/20  0715   WBC 10.2 9.7 10.8   HGB 9.0* 7.2* 7.4*   HCT 28.9* 23.7* 24.1*   PLT 66* 43* 34*     BMP:  Recent Labs     09/13/20 2204 09/14/20 0205 09/14/20 0715    135 132*   K 4.4 4.4 4.3   CL 98 96* 92*   CO2 8* 7* 9*   BUN 6* 6* 5*   CREATININE 0.8 1.0 0.8   GLUCOSE 154* 172* 169*   CALCIUM 7.8* 7.5* 7.5*   MG 2.1 2.1 2.0   PHOS 3.3 3.9 3.0     Hepatic:   Recent Labs     09/13/20 2204 09/14/20 0205 09/14/20 0715   LABALBU 3.4* 2.6* 2.7*   * 320* 446*   ALT 51 54 85*   BILITOT 2.9* 2.1* 2.3*   ALKPHOS 108 89 109         Meds:  Infusion:    sodium bicarbonate infusion 200 mL/hr at 09/14/20 0332    prismaSATE BGK 4/2.5 300 mL/hr (09/14/20 0745)    prismaSol BGK 4/2.5 1,400 mL/hr (09/14/20 0738)    prismaSol BGK 4/2.5 300 mL/hr (09/13/20 1536)    sodium chloride 100 mL/hr at 09/13/20 0348    dextrose      vasopressin (Septic Shock) infusion 0.04 Units/min (09/13/20 2320)    propofol 5 mcg/kg/min (09/13/20 2131)    norepinephrine 20 mcg/min (09/14/20 5994)     Meds:    calcium gluconate IVPB  1 g Intravenous Once    sodium chloride  20 mL Intravenous Once    sodium chloride  20 mL Intravenous Once    dexamethasone  4 mg Intravenous Daily    sodium chloride  20 mL Intravenous Once    albumin human  25 g Intravenous Daily    sodium chloride flush  10 mL Intravenous 2 times per day    meropenem  1 g Intravenous Q8H    [Held by provider] Calcium Citrate-Vitamin D  1 tablet Oral Daily with breakfast    chlorhexidine  15 mL Mouth/Throat BID    famotidine (PEPCID) injection  20 mg Intravenous BID    lactulose  30 g Oral BID    calcitRIOL  0.5 mcg Oral Daily    [Held by provider] DULoxetine  60 mg Oral Nightly    spironolactone  50 mg Oral Daily    levothyroxine  200 mcg Oral Daily    levothyroxine  50 mcg Oral Daily    vitamin A  20,000 Units Oral Daily    vitamin B-1  100 mg Oral Daily    vitamin B-6  100 mg Oral Daily    vitamin B-12  500 mcg Oral Daily    Vitamin K (Phytonadione)  100 mcg Oral Daily    Parathyroid Hormone (Recomb)  50 mcg Subcutaneous Daily    [Held by provider] DULoxetine  30 mg Oral Daily    acyclovir  400 mg Oral BID    folic acid  1 mg Oral Daily     Meds prn: potassium chloride, magnesium sulfate, calcium gluconate **OR** calcium gluconate **OR** calcium gluconate **OR** calcium gluconate, sodium phosphate IVPB **OR** sodium phosphate IVPB **OR** sodium phosphate IVPB **OR** sodium phosphate IVPB, sodium chloride flush, acetaminophen **OR** acetaminophen, promethazine **OR** ondansetron, PHENobarbital **OR** PHENobarbital **OR** PHENobarbital IVPB, glucose, dextrose, glucagon (rDNA), dextrose, fentanNYL       Impression and Plan:  1. Severe ILEANA secondary to septic shock  CVVH data reviewed made several changes to the dialysate prescription  2. Severe acidemia:   ABG reviewed, lactic acid reviewed  Increase Ana state 2000 mL/h  Increase post-replacement 500 mL per hour  Continue pre-replacement at 1400 ml per hour  3. Hypervolemia secondary to ILEANA as well as liver cirrhosis with third spacing  4. History of alcohol abuse  5. Septic shock. Patient requiring multiple pressors including Levophed and vasopressin  6. Liver dysfunction  7. Infected MediPort  8. Anemia and thrombocytopenia    Discussed with multiple family members. They are waiting for more family to arrive before deciding further goals of care. Discussed with ICU RN.   Changes made to above CVVH prescription  Critically ill    D/W ICU RN    Guanakito Beverly MD  [facility]

## 2020-09-14 NOTE — PROGRESS NOTES
Gastrointestinal Progress Note      Patient:  Allison Rebolledo  Unit/Bed:4D-06/006-A       YOB: 1960    MRN: [id number]                      Acct: [de-identified]     Admit date: 9/11/2020      Subjective:  Patient in ICU with family in room. I talked with RN and the family is planning to withdraw treatment but is waiting for some family members to get here before stopping treatment. Objective:       CBC:   Recent Labs     09/13/20 2204 09/14/20  0205 09/14/20  0715   WBC 10.2 9.7 10.8   HGB 9.0* 7.2* 7.4*   PLT 66* 43* 34*     BMP:    Recent Labs     09/13/20 2204 09/14/20  0205 09/14/20  0715    135 132*   K 4.4 4.4 4.3   CL 98 96* 92*   CO2 8* 7* 9*   BUN 6* 6* 5*   CREATININE 0.8 1.0 0.8   GLUCOSE 154* 172* 169*     Calcium:  Recent Labs     09/14/20  0715   CALCIUM 7.5*     Ionized Calcium:No results for input(s): IONCA in the last 72 hours. Magnesium:  Recent Labs     09/14/20  0715   MG 2.0     Phosphorus:  Recent Labs     09/14/20  0715   PHOS 3.0     INR:   Recent Labs     09/14/20  0740   INR 2.41*     Hepatic:   Recent Labs     09/13/20  1405  09/14/20  0715   ALKPHOS 90   < > 109   ALT 21   < > 85*   AST 99*   < > 446*   PROT 5.0*   < > 3.8*   BILITOT 1.9*   < > 2.3*   BILIDIR 1.2*  --   --    LABALBU 3.6   < > 2.7*    < > = values in this interval not displayed. Amylase and Lipase:  Recent Labs     09/14/20  0715   LACTA 23.7*     Lactic Acid:   Recent Labs     09/14/20  0715   LACTA 23.7*             Physical Exam:  Vitals:    09/14/20 0759   BP:    Pulse:    Resp: 24   Temp:    SpO2:      GEN: Well nourished  LUNGS:  INTUBATED Chest rises equally on inspiration. Intubated, on CVVH.          Medications:  Scheduled Meds:   calcium gluconate IVPB  1 g Intravenous Once    sodium chloride  20 mL Intravenous Once    sodium chloride  20 mL Intravenous Once    dexamethasone  4 mg Intravenous Daily    sodium chloride  20 mL Intravenous Once    albumin human  25 g Intravenous Daily    sodium chloride flush  10 mL Intravenous 2 times per day    meropenem  1 g Intravenous Q8H    [Held by provider] Calcium Citrate-Vitamin D  1 tablet Oral Daily with breakfast    chlorhexidine  15 mL Mouth/Throat BID    famotidine (PEPCID) injection  20 mg Intravenous BID    lactulose  30 g Oral BID    calcitRIOL  0.5 mcg Oral Daily    [Held by provider] DULoxetine  60 mg Oral Nightly    spironolactone  50 mg Oral Daily    levothyroxine  200 mcg Oral Daily    levothyroxine  50 mcg Oral Daily    vitamin A  20,000 Units Oral Daily    vitamin B-1  100 mg Oral Daily    vitamin B-6  100 mg Oral Daily    vitamin B-12  500 mcg Oral Daily    Vitamin K (Phytonadione)  100 mcg Oral Daily    Parathyroid Hormone (Recomb)  50 mcg Subcutaneous Daily    [Held by provider] DULoxetine  30 mg Oral Daily    acyclovir  400 mg Oral BID    folic acid  1 mg Oral Daily     Continuous Infusions:   sodium bicarbonate infusion 200 mL/hr at 09/14/20 0332    prismaSATE BGK 4/2.5 300 mL/hr (09/14/20 0745)    prismaSol BGK 4/2.5 1,400 mL/hr (09/14/20 0738)    prismaSol BGK 4/2.5 300 mL/hr (09/13/20 1536)    sodium chloride 100 mL/hr at 09/13/20 0348    dextrose      vasopressin (Septic Shock) infusion 0.04 Units/min (09/13/20 2320)    propofol 5 mcg/kg/min (09/13/20 2131)    norepinephrine 20 mcg/min (09/14/20 0212)     PRN Meds:potassium chloride, magnesium sulfate, calcium gluconate **OR** calcium gluconate **OR** calcium gluconate **OR** calcium gluconate, sodium phosphate IVPB **OR** sodium phosphate IVPB **OR** sodium phosphate IVPB **OR** sodium phosphate IVPB, sodium chloride flush, acetaminophen **OR** acetaminophen, promethazine **OR** ondansetron, PHENobarbital **OR** PHENobarbital **OR** PHENobarbital IVPB, glucose, dextrose, glucagon (rDNA), dextrose, fentanNYL                   ASSESSMENT:     1. ETOH cirrhosis; last saw DR Robyn Montoya in office 9/09/20 .  Severe disease at this time with poor prognosis per Dr Radha Naik note   2. Anemia; macrocytic H/H  7.4/24.1 with negative FOBT at time of consult. Bone marrow suppression suspected from ETOH use  3. Elevated LFT's : , ALT 85 worse today  4. Hepatic encephalopathy   5. Renal failure   6. Elevated ammonia 107 yesterday which is worse  7. In ICU intubated, on CVVH           PLAN:   1. EGD and colonoscopy done May 2019 with DR Radha Naik. No esophageal varices. Had portal hypertensive gastropathy and gastric polyp. Colonoscopy, she had 2 flat polyps that were tubular adenomatous and diverticulosis noted. Colonoscopy due In 5 years    2. We will not plan to see (I did not assess today-just talked with RN ) due to family withdrawing treatment once other family members have been able to arrive to see patient. Hospitalist/Attending provider notes, consulting physician notes, laboratory results and procedure notes reviewed prior to seeing the patient. Note done in collaboration with DR Radha Naik.    Electronically signed by ANITA Corrigan CNP on 9/14/20 at 8:36 AM EDT

## 2020-09-14 NOTE — PLAN OF CARE
Problem: Impaired respiratory status  Goal: Will be able to breathe spontaneously, without ventilator support  Description: Will be able to breathe spontaneously, without ventilator support  9/14/2020 1034 by hCasidy Fung RCP  Outcome: Completed  Note: Patient terminally extubated per ordrer

## 2020-09-14 NOTE — PLAN OF CARE
Problem: Impaired respiratory status  Goal: Will be able to breathe spontaneously, without ventilator support  Description: Will be able to breathe spontaneously, without ventilator support  9/14/2020 0054 by Damaso Capps RCP  Outcome: Ongoing     Patient continues on ventilator at this time, ventilator settings optimized to achieve patients target vt, rr, and oxygenation needs. Will continue to wean ventilator as tolerates.

## 2020-09-14 NOTE — FLOWSHEET NOTE
[address] THERAPY MISSED TREATMENT NOTE  [facility] ICU 4D  4D-006-A      Date: 2020  Patient Name: Allison Rebolledo        CSN: [id number]   : 1960  (62 y.o.)  Gender: female                REASON FOR MISSED TREATMENT: Hold Treatment per Nursing. Pt currently intubated, on CVVH. Not medically stable for early mobility. Will discontinue orders at this time, please reorder if appropriate.

## 2020-09-14 NOTE — PROGRESS NOTES
Assess done. No response to pain. Pupils at 2 mm without reaction.  and son at bedside. Body with multiple dark purple areas with blisters that are weeping. 5- Dr Amada Gann here to see - updated family on condition. 12- Dr Jorge Kee spoke with  and son about pt's terminal condition- decision made by family to withdraw care. 1020-Pressors off. Blood returned from CRRT. 1027-Pt extubated to room air. 80/63  Map-67  HR-126 Family at bedside. 1048- Asystole. No palp pulses. No respirations. 8080 ERNIE Clarke notified. 1115- LOOP notified of death - Reference # [id number]   1215-LOOP returned call - OK to release body. 1242- Post mortem care done.

## 2020-09-14 NOTE — DISCHARGE SUMMARY
CRITICAL CARE DISCHARGE NOTE      Patient:  Gianna Pérez    Unit/Bed:4D-06/006-A  YOB: 1960  MRN: [id number]   PCP: Sandra Del Angel MD  Date of Admission: 9/11/2020  Chief Complaint:-Septic shock    Assessment and Plan:      1. Acute hypoxic respiratory failure  2. Septic shock  3. Bacteremia present on arrival  4. Urinary tract infection present on arrival  5. Liver failure  6. Multi-organ failure  7. Anion gap metabolic acidosis  8. Lactic acidosis  9. Acute hyperglycemia  10. Chronic macrocytic anemia  11. Thrombocytopenia  12. Coagulopathy    INITIAL H AND P AND ICU COURSE:  Dewayne Multani is a 69-year-old white female who presented to [facility] on 9/11/2020 generalized weakness, third spacing, confusion, and hypotension. She has a past medical history of TIA, irritable bowel syndrome, hypothyroidism, hypocalcemia, hyperparathyroidism, GERD, gastric bypass, DVT, ITP,  alcoholism, liver failure. Her generalized weakness was associated with liver cirrhosis. On arrival she was confused and lethargic. She does follow with Dr. Robyn Montoya for GI. She did state on arrival that she was taking Bumex at home for edema which she states is worse now. She was scheduled for a liver biopsy with on 9/23/2020. She did state that she had not been taking her lactulose. She denies bloody stools or hematemesis. Her last EGD and colonoscopy were in May 2019. She was noted to be hypotensive in the emergency department and was admitted to the ICU. Patient did multiorgan failure and required CRRT. On 9/14/2020 family meeting was held and decision was to withdraw care. Past Medical History: Per HPI  Family History: Mother: Breast cancer, depression, hypertension Father: Cancer, depression, hypertension  Social History: Lifetime non-smoker, EtOH abuse, denies substance abuse. Case and plan discussed with Dr. Araceli Milton.     Discharge time 42 minutes  Time of death: 1048  Cause of death: cardiac arrest secondary to multisystem organ failure secondary to bacteremia    Electronically signed by Magno Ascencio. ANITA Ardon - CNP  CRITICAL CARE SPECIALIST  Patient seen by me. Case discussed with nurse practitioner. Case discussed with family. At family request, patient was terminally weaned. Patient  secondary to multisystem organ failure secondary to septic shock secondary to Proteus bacteremia. Cc time 35 minutes. Time does not include nurse practitioner assessment. Time does include my direct assessment of the patient, coordination of care, and family discussion. Electronically signed by Sky Chen MD.

## 2020-09-15 LAB — MRSA SCREEN: NORMAL

## 2020-09-15 NOTE — TELEPHONE ENCOUNTER
Spoke with  Mik Link about patient's passing. He is grieving and coping and with family. Their son did make it to the bedside before she passed. Offered condolences and my availability for any questions or concerns.

## 2021-03-13 NOTE — ED NOTES
Pt states that around 8:30 pm last night she began to have aches and cramping throughout her body, pt says that she never felt SOB or had nausea or vomiting, pt says that the pain was bothering her the most in her lower back, pt says that every time she moves her arms or legs she has spasms throughout her body, pt is alert and oriented, respirs appear to be easy and unlabored, a rectal temp was performed due to the pt's skin feeling very warm to the touch, provider notified of the temperature,      Manoj Horner RN  02/02/20 4078 no chest pain and no edema.

## 2023-01-17 NOTE — PLAN OF CARE
Problem: Pain:  Goal: Pain level will decrease  Description  Pain level will decrease  Outcome: Ongoing    Pain Assessment: 0-10  Pain Level: 3   Patient's Stated Pain Goal: No pain   Is pain goal met at this time? No     Non-Pharmaceutical Pain Intervention(s): Relaxation techniques, Rest, Repositioned       Problem: Falls - Risk of:  Goal: Will remain free from falls  Description  Will remain free from falls  Outcome: Ongoing    Fall precautions in place. Bed in low position. Call light and side table within reach. No falls noted this shift. Problem: Discharge Planning:  Goal: Patients continuum of care needs are met  Description  Patients continuum of care needs are met  Outcome: Ongoing    Discharge planning ongoing. Patient plans for discharge to private residence with . Problem: Mobility - Impaired:  Goal: Mobility will improve  Description  Mobility will improve  Outcome: Ongoing    Patient ambulates with use of walker and gait belt. Therapy continues. Problem: Risk for Impaired Skin Integrity  Goal: Tissue integrity - skin and mucous membranes  Description  Structural intactness and normal physiological function of skin and  mucous membranes. Outcome: Ongoing    No new skin issues noted this shift. Patient encouraged to turn and reposition hourly with rounding. Care plan reviewed with patient and spouse. Patient and spouse verbalize understanding of the plan of care and contribute to goal setting. Ambulatory

## 2024-08-30 NOTE — PROGRESS NOTES
1120 PT ARRIVES AMBULATORY FOR CALCIUM DRAW AND POSSIBLE INFUSION. PROCEDURE EXPLAINED AND QUESTIONS ANSWERED. PT RIGHTS AND RESPONSIBILITIES OFFERED TO PT. PT PROVIDED WITH PEPSI. 1214 NO CALCIUM REPLACEMENT NECESSARY. PT DISCHARGED AMBULATORY WITH INSTRUCTIONS WITH NO COMPLAINTS.                  _M___ Safety:       (Environmental)   [place] to environment   Ensure ID band is correct and in place/ allergy band as needed   Assess for fall risk   Initiate fall precautions as applicable (fall band, side rails, etc.)   Call light within reach   Bed in low position/ wheels locked    __M__ Pain:        Assess pain level and characteristics   Administer analgesics as ordered   Assess effectiveness of pain management and report to MD as needed    __M__ Knowledge Deficit:   Assess baseline knowledge   Provide teaching at level of understanding   Provide teaching via preferred learning method   Evaluate teaching effectiveness    __M__ Hemodynamic/Respiratory Status:       (Pre and Post Procedure Monitoring)   Assess/Monitor vital signs and LOC   Assess Baseline SpO2 prior to any sedation   Obtain weight/height   Assess vital signs/ LOC until patient meets discharge criteria   Monitor procedure site and notify MD of any issues 60

## (undated) DEVICE — FORCEPS BX L L240CM DIA2.4MM RAD JAW 4 HOT FOR POLYP DISP

## (undated) DEVICE — CONNECTOR TBNG AUX H2O JET DISP FOR OLY 160/180 SER

## (undated) DEVICE — PATIENT RETURN ELECTRODE, SINGLE-USE, CONTACT QUALITY MONITORING, ADULT, WITH 9FT CORD, FOR PATIENTS WEIGING OVER 33LBS. (15KG): Brand: MEGADYNE

## (undated) DEVICE — IV START KIT: Brand: MEDLINE INDUSTRIES, INC.

## (undated) DEVICE — FORCEPS BX L240CM JAW DIA3.2MM L CAP W/ NDL MIC MESH TOOTH

## (undated) DEVICE — SNARE POLYP SM W13MMXL240CM SHTH DIA2.4MM OVL FLX DISP

## (undated) DEVICE — CONMED SCOPE SAVER BITE BLOCK, 20X27 MM: Brand: SCOPE SAVER

## (undated) DEVICE — TRAP POLYP ETRAP

## (undated) DEVICE — TUBING IV STOPCOCK 48 CM 3 W

## (undated) DEVICE — CATHETER ETER IV 22GA L1IN POLYUR STR RADPQ INTROCAN SFTY

## (undated) DEVICE — SOLUTION IV 1000ML 0.45% SOD CHL PH 5 INJ USP VIAFLX PLAS

## (undated) DEVICE — ENDO KIT: Brand: MEDLINE INDUSTRIES, INC.

## (undated) DEVICE — SET LNR RED GRN W/ BASE CLEANASCOPE

## (undated) DEVICE — SET ADMIN 25ML L117IN PMP MOD CK VLV RLER CLMP 2 SMRTSITE